# Patient Record
Sex: MALE | Race: OTHER | Employment: OTHER | URBAN - METROPOLITAN AREA
[De-identification: names, ages, dates, MRNs, and addresses within clinical notes are randomized per-mention and may not be internally consistent; named-entity substitution may affect disease eponyms.]

---

## 2021-03-22 ENCOUNTER — APPOINTMENT (OUTPATIENT)
Dept: CT IMAGING | Age: 61
DRG: 223 | End: 2021-03-22
Payer: MEDICAID

## 2021-03-22 ENCOUNTER — HOSPITAL ENCOUNTER (INPATIENT)
Age: 61
LOS: 1 days | Discharge: HOME | DRG: 223 | End: 2021-03-23
Attending: EMERGENCY MEDICINE | Admitting: INTERNAL MEDICINE
Payer: MEDICAID

## 2021-03-22 DIAGNOSIS — K61.1 PERIRECTAL ABSCESS: Primary | ICD-10-CM

## 2021-03-22 PROBLEM — E78.2 MIXED HYPERLIPIDEMIA: Status: ACTIVE | Noted: 2020-03-09

## 2021-03-22 PROBLEM — F17.210 DEPENDENCE ON NICOTINE FROM CIGARETTES: Status: ACTIVE | Noted: 2020-03-09

## 2021-03-22 LAB
-: ABNORMAL
ABSOLUTE EOS #: 0 K/UL (ref 0–0.4)
ABSOLUTE IMMATURE GRANULOCYTE: 0 K/UL (ref 0–0.3)
ABSOLUTE LYMPH #: 1.29 K/UL (ref 1–4.8)
ABSOLUTE MONO #: 2.15 K/UL (ref 0.2–0.8)
AMORPHOUS: ABNORMAL
ANION GAP SERPL CALCULATED.3IONS-SCNC: 14 MMOL/L (ref 9–17)
ATYPICAL LYMPHOCYTE ABSOLUTE COUNT: 0.43 K/UL
ATYPICAL LYMPHOCYTES: 2 %
BACTERIA: ABNORMAL
BASOPHILS # BLD: 0 %
BASOPHILS ABSOLUTE: 0 K/UL (ref 0–0.2)
BILIRUBIN URINE: ABNORMAL
BUN BLDV-MCNC: 16 MG/DL (ref 8–23)
BUN/CREAT BLD: 21 (ref 9–20)
CALCIUM SERPL-MCNC: 9.5 MG/DL (ref 8.6–10.4)
CASTS UA: ABNORMAL /LPF
CHLORIDE BLD-SCNC: 96 MMOL/L (ref 98–107)
CO2: 21 MMOL/L (ref 20–31)
COLOR: ABNORMAL
COMMENT UA: ABNORMAL
CREAT SERPL-MCNC: 0.77 MG/DL (ref 0.7–1.2)
CRYSTALS, UA: ABNORMAL /HPF
DIFFERENTIAL TYPE: ABNORMAL
EOSINOPHILS RELATIVE PERCENT: 0 % (ref 1–4)
EPITHELIAL CELLS UA: ABNORMAL /HPF (ref 0–5)
GFR AFRICAN AMERICAN: >60 ML/MIN
GFR NON-AFRICAN AMERICAN: >60 ML/MIN
GFR SERPL CREATININE-BSD FRML MDRD: ABNORMAL ML/MIN/{1.73_M2}
GFR SERPL CREATININE-BSD FRML MDRD: ABNORMAL ML/MIN/{1.73_M2}
GLUCOSE BLD-MCNC: 113 MG/DL (ref 70–99)
GLUCOSE URINE: NEGATIVE
HCT VFR BLD CALC: 42 % (ref 40.7–50.3)
HEMOGLOBIN: 13.6 G/DL (ref 13–17)
IMMATURE GRANULOCYTES: 0 %
KETONES, URINE: ABNORMAL
LACTIC ACID, SEPSIS WHOLE BLOOD: NORMAL MMOL/L (ref 0.5–1.9)
LACTIC ACID, SEPSIS WHOLE BLOOD: NORMAL MMOL/L (ref 0.5–1.9)
LACTIC ACID, SEPSIS: 0.8 MMOL/L (ref 0.5–1.9)
LACTIC ACID, SEPSIS: 0.8 MMOL/L (ref 0.5–1.9)
LACTIC ACID: 0.6 MMOL/L (ref 0.5–2.2)
LEUKOCYTE ESTERASE, URINE: NEGATIVE
LYMPHOCYTES # BLD: 6 % (ref 24–44)
MCH RBC QN AUTO: 28.6 PG (ref 25.2–33.5)
MCHC RBC AUTO-ENTMCNC: 32.4 G/DL (ref 28.4–34.8)
MCV RBC AUTO: 88.4 FL (ref 82.6–102.9)
MONOCYTES # BLD: 10 % (ref 1–7)
MORPHOLOGY: ABNORMAL
MUCUS: ABNORMAL
NITRITE, URINE: NEGATIVE
NRBC AUTOMATED: 0 PER 100 WBC
OTHER OBSERVATIONS UA: ABNORMAL
PDW BLD-RTO: 12.9 % (ref 11.8–14.4)
PH UA: 6 (ref 5–8)
PLATELET # BLD: 272 K/UL (ref 138–453)
PLATELET ESTIMATE: ABNORMAL
PMV BLD AUTO: 10.5 FL (ref 8.1–13.5)
POTASSIUM SERPL-SCNC: 4.1 MMOL/L (ref 3.7–5.3)
PROCALCITONIN: 0.05 NG/ML
PROTEIN UA: ABNORMAL
RBC # BLD: 4.75 M/UL (ref 4.21–5.77)
RBC # BLD: ABNORMAL 10*6/UL
RBC UA: ABNORMAL /HPF (ref 0–2)
RENAL EPITHELIAL, UA: ABNORMAL /HPF
SEG NEUTROPHILS: 82 % (ref 36–66)
SEGMENTED NEUTROPHILS ABSOLUTE COUNT: 17.63 K/UL (ref 1.8–7.7)
SODIUM BLD-SCNC: 131 MMOL/L (ref 135–144)
SPECIFIC GRAVITY UA: 1.02 (ref 1–1.03)
TRICHOMONAS: ABNORMAL
TURBIDITY: ABNORMAL
URINE HGB: ABNORMAL
UROBILINOGEN, URINE: NORMAL
WBC # BLD: 21.5 K/UL (ref 3.5–11.3)
WBC # BLD: ABNORMAL 10*3/UL
WBC UA: ABNORMAL /HPF (ref 0–5)
YEAST: ABNORMAL

## 2021-03-22 PROCEDURE — 96365 THER/PROPH/DIAG IV INF INIT: CPT

## 2021-03-22 PROCEDURE — 2580000003 HC RX 258: Performed by: EMERGENCY MEDICINE

## 2021-03-22 PROCEDURE — 99223 1ST HOSP IP/OBS HIGH 75: CPT | Performed by: INTERNAL MEDICINE

## 2021-03-22 PROCEDURE — 74177 CT ABD & PELVIS W/CONTRAST: CPT

## 2021-03-22 PROCEDURE — 83605 ASSAY OF LACTIC ACID: CPT

## 2021-03-22 PROCEDURE — 80048 BASIC METABOLIC PNL TOTAL CA: CPT

## 2021-03-22 PROCEDURE — APPSS45 APP SPLIT SHARED TIME 31-45 MINUTES: Performed by: NURSE PRACTITIONER

## 2021-03-22 PROCEDURE — 2500000003 HC RX 250 WO HCPCS: Performed by: STUDENT IN AN ORGANIZED HEALTH CARE EDUCATION/TRAINING PROGRAM

## 2021-03-22 PROCEDURE — 99283 EMERGENCY DEPT VISIT LOW MDM: CPT

## 2021-03-22 PROCEDURE — 84145 PROCALCITONIN (PCT): CPT

## 2021-03-22 PROCEDURE — 81001 URINALYSIS AUTO W/SCOPE: CPT

## 2021-03-22 PROCEDURE — 51798 US URINE CAPACITY MEASURE: CPT

## 2021-03-22 PROCEDURE — 2580000003 HC RX 258: Performed by: NURSE PRACTITIONER

## 2021-03-22 PROCEDURE — 6360000002 HC RX W HCPCS: Performed by: INTERNAL MEDICINE

## 2021-03-22 PROCEDURE — 6360000002 HC RX W HCPCS: Performed by: STUDENT IN AN ORGANIZED HEALTH CARE EDUCATION/TRAINING PROGRAM

## 2021-03-22 PROCEDURE — 36415 COLL VENOUS BLD VENIPUNCTURE: CPT

## 2021-03-22 PROCEDURE — 6370000000 HC RX 637 (ALT 250 FOR IP): Performed by: NURSE PRACTITIONER

## 2021-03-22 PROCEDURE — 85025 COMPLETE CBC W/AUTO DIFF WBC: CPT

## 2021-03-22 PROCEDURE — 87040 BLOOD CULTURE FOR BACTERIA: CPT

## 2021-03-22 PROCEDURE — 0D9P4ZZ DRAINAGE OF RECTUM, PERCUTANEOUS ENDOSCOPIC APPROACH: ICD-10-PCS | Performed by: SURGERY

## 2021-03-22 PROCEDURE — 6360000002 HC RX W HCPCS: Performed by: NURSE PRACTITIONER

## 2021-03-22 PROCEDURE — 1200000000 HC SEMI PRIVATE

## 2021-03-22 PROCEDURE — 96375 TX/PRO/DX INJ NEW DRUG ADDON: CPT

## 2021-03-22 PROCEDURE — 6360000002 HC RX W HCPCS: Performed by: EMERGENCY MEDICINE

## 2021-03-22 PROCEDURE — 6360000004 HC RX CONTRAST MEDICATION: Performed by: EMERGENCY MEDICINE

## 2021-03-22 RX ORDER — PROMETHAZINE HYDROCHLORIDE 12.5 MG/1
12.5 TABLET ORAL EVERY 6 HOURS PRN
Status: DISCONTINUED | OUTPATIENT
Start: 2021-03-22 | End: 2021-03-23 | Stop reason: HOSPADM

## 2021-03-22 RX ORDER — MAGNESIUM SULFATE 1 G/100ML
1000 INJECTION INTRAVENOUS PRN
Status: DISCONTINUED | OUTPATIENT
Start: 2021-03-22 | End: 2021-03-23 | Stop reason: HOSPADM

## 2021-03-22 RX ORDER — ACETAMINOPHEN 650 MG/1
650 SUPPOSITORY RECTAL EVERY 6 HOURS PRN
Status: DISCONTINUED | OUTPATIENT
Start: 2021-03-22 | End: 2021-03-23

## 2021-03-22 RX ORDER — SODIUM CHLORIDE 0.9 % (FLUSH) 0.9 %
10 SYRINGE (ML) INJECTION EVERY 12 HOURS SCHEDULED
Status: DISCONTINUED | OUTPATIENT
Start: 2021-03-22 | End: 2021-03-23 | Stop reason: HOSPADM

## 2021-03-22 RX ORDER — SODIUM CHLORIDE 0.9 % (FLUSH) 0.9 %
10 SYRINGE (ML) INJECTION PRN
Status: DISCONTINUED | OUTPATIENT
Start: 2021-03-22 | End: 2021-03-22 | Stop reason: DRUGHIGH

## 2021-03-22 RX ORDER — LIDOCAINE HYDROCHLORIDE AND EPINEPHRINE 10; 10 MG/ML; UG/ML
20 INJECTION, SOLUTION INFILTRATION; PERINEURAL ONCE
Status: COMPLETED | OUTPATIENT
Start: 2021-03-22 | End: 2021-03-22

## 2021-03-22 RX ORDER — POTASSIUM CHLORIDE 7.45 MG/ML
10 INJECTION INTRAVENOUS PRN
Status: DISCONTINUED | OUTPATIENT
Start: 2021-03-22 | End: 2021-03-23 | Stop reason: HOSPADM

## 2021-03-22 RX ORDER — 0.9 % SODIUM CHLORIDE 0.9 %
80 INTRAVENOUS SOLUTION INTRAVENOUS ONCE
Status: COMPLETED | OUTPATIENT
Start: 2021-03-22 | End: 2021-03-22

## 2021-03-22 RX ORDER — MORPHINE SULFATE 2 MG/ML
1 INJECTION, SOLUTION INTRAMUSCULAR; INTRAVENOUS
Status: DISCONTINUED | OUTPATIENT
Start: 2021-03-22 | End: 2021-03-23

## 2021-03-22 RX ORDER — FENTANYL CITRATE 50 UG/ML
50 INJECTION, SOLUTION INTRAMUSCULAR; INTRAVENOUS ONCE
Status: COMPLETED | OUTPATIENT
Start: 2021-03-22 | End: 2021-03-22

## 2021-03-22 RX ORDER — ASPIRIN 81 MG/1
81 TABLET ORAL DAILY
Status: DISCONTINUED | OUTPATIENT
Start: 2021-03-22 | End: 2021-03-23 | Stop reason: HOSPADM

## 2021-03-22 RX ORDER — MORPHINE SULFATE 4 MG/ML
4 INJECTION, SOLUTION INTRAMUSCULAR; INTRAVENOUS ONCE
Status: COMPLETED | OUTPATIENT
Start: 2021-03-22 | End: 2021-03-22

## 2021-03-22 RX ORDER — POTASSIUM CHLORIDE 20 MEQ/1
40 TABLET, EXTENDED RELEASE ORAL PRN
Status: DISCONTINUED | OUTPATIENT
Start: 2021-03-22 | End: 2021-03-23 | Stop reason: HOSPADM

## 2021-03-22 RX ORDER — SODIUM CHLORIDE 0.9 % (FLUSH) 0.9 %
10 SYRINGE (ML) INJECTION PRN
Status: DISCONTINUED | OUTPATIENT
Start: 2021-03-22 | End: 2021-03-23 | Stop reason: HOSPADM

## 2021-03-22 RX ORDER — NICOTINE 21 MG/24HR
1 PATCH, TRANSDERMAL 24 HOURS TRANSDERMAL DAILY PRN
Status: DISCONTINUED | OUTPATIENT
Start: 2021-03-22 | End: 2021-03-22 | Stop reason: SDUPTHER

## 2021-03-22 RX ORDER — POLYETHYLENE GLYCOL 3350 17 G/17G
17 POWDER, FOR SOLUTION ORAL DAILY PRN
Status: DISCONTINUED | OUTPATIENT
Start: 2021-03-22 | End: 2021-03-23

## 2021-03-22 RX ORDER — ONDANSETRON 2 MG/ML
4 INJECTION INTRAMUSCULAR; INTRAVENOUS EVERY 6 HOURS PRN
Status: DISCONTINUED | OUTPATIENT
Start: 2021-03-22 | End: 2021-03-23 | Stop reason: HOSPADM

## 2021-03-22 RX ORDER — NICOTINE 21 MG/24HR
1 PATCH, TRANSDERMAL 24 HOURS TRANSDERMAL EVERY 24 HOURS
Status: DISCONTINUED | OUTPATIENT
Start: 2021-03-22 | End: 2021-03-23 | Stop reason: HOSPADM

## 2021-03-22 RX ORDER — ACETAMINOPHEN 325 MG/1
650 TABLET ORAL EVERY 6 HOURS PRN
Status: DISCONTINUED | OUTPATIENT
Start: 2021-03-22 | End: 2021-03-23

## 2021-03-22 RX ORDER — ALBUTEROL SULFATE 90 UG/1
2 AEROSOL, METERED RESPIRATORY (INHALATION) EVERY 6 HOURS PRN
Status: DISCONTINUED | OUTPATIENT
Start: 2021-03-22 | End: 2021-03-23 | Stop reason: HOSPADM

## 2021-03-22 RX ORDER — SODIUM CHLORIDE 9 MG/ML
INJECTION, SOLUTION INTRAVENOUS CONTINUOUS
Status: DISCONTINUED | OUTPATIENT
Start: 2021-03-22 | End: 2021-03-23

## 2021-03-22 RX ADMIN — LIDOCAINE HYDROCHLORIDE,EPINEPHRINE BITARTRATE 20 ML: 10; .01 INJECTION, SOLUTION INFILTRATION; PERINEURAL at 13:40

## 2021-03-22 RX ADMIN — PIPERACILLIN AND TAZOBACTAM 3375 MG: 3; .375 INJECTION, POWDER, LYOPHILIZED, FOR SOLUTION INTRAVENOUS at 13:40

## 2021-03-22 RX ADMIN — MORPHINE SULFATE 2 MG: 4 INJECTION, SOLUTION INTRAMUSCULAR; INTRAVENOUS at 03:06

## 2021-03-22 RX ADMIN — MORPHINE SULFATE 1 MG: 2 INJECTION, SOLUTION INTRAMUSCULAR; INTRAVENOUS at 18:11

## 2021-03-22 RX ADMIN — SODIUM CHLORIDE 80 ML: 9 INJECTION, SOLUTION INTRAVENOUS at 05:14

## 2021-03-22 RX ADMIN — VANCOMYCIN HYDROCHLORIDE 1000 MG: 1 INJECTION, POWDER, LYOPHILIZED, FOR SOLUTION INTRAVENOUS at 18:03

## 2021-03-22 RX ADMIN — PIPERACILLIN SODIUM AND TAZOBACTAM SODIUM 4500 MG: 4; .5 INJECTION, POWDER, LYOPHILIZED, FOR SOLUTION INTRAVENOUS at 06:19

## 2021-03-22 RX ADMIN — VANCOMYCIN HYDROCHLORIDE 1500 MG: 5 INJECTION, POWDER, LYOPHILIZED, FOR SOLUTION INTRAVENOUS at 06:57

## 2021-03-22 RX ADMIN — Medication 81 MG: at 11:26

## 2021-03-22 RX ADMIN — SODIUM CHLORIDE: 9 INJECTION, SOLUTION INTRAVENOUS at 08:40

## 2021-03-22 RX ADMIN — Medication 10 ML: at 05:14

## 2021-03-22 RX ADMIN — MORPHINE SULFATE 1 MG: 2 INJECTION, SOLUTION INTRAMUSCULAR; INTRAVENOUS at 08:40

## 2021-03-22 RX ADMIN — PIPERACILLIN AND TAZOBACTAM 3375 MG: 3; .375 INJECTION, POWDER, LYOPHILIZED, FOR SOLUTION INTRAVENOUS at 20:52

## 2021-03-22 RX ADMIN — IOPAMIDOL 75 ML: 755 INJECTION, SOLUTION INTRAVENOUS at 05:14

## 2021-03-22 RX ADMIN — FENTANYL CITRATE 50 MCG: 50 INJECTION, SOLUTION INTRAMUSCULAR; INTRAVENOUS at 10:18

## 2021-03-22 ASSESSMENT — ENCOUNTER SYMPTOMS
SINUS PAIN: 0
RECTAL PAIN: 1
COLOR CHANGE: 0
VOMITING: 0
CONSTIPATION: 0
CHEST TIGHTNESS: 0
DIARRHEA: 0
EYES NEGATIVE: 1
SHORTNESS OF BREATH: 0
APNEA: 0
ABDOMINAL DISTENTION: 0

## 2021-03-22 ASSESSMENT — PAIN DESCRIPTION - FREQUENCY: FREQUENCY: CONTINUOUS

## 2021-03-22 ASSESSMENT — PAIN DESCRIPTION - LOCATION: LOCATION: PERINEUM

## 2021-03-22 ASSESSMENT — PAIN DESCRIPTION - DESCRIPTORS: DESCRIPTORS: DISCOMFORT

## 2021-03-22 ASSESSMENT — PAIN SCALES - GENERAL
PAINLEVEL_OUTOF10: 9
PAINLEVEL_OUTOF10: 2

## 2021-03-22 ASSESSMENT — PAIN DESCRIPTION - ONSET: ONSET: ON-GOING

## 2021-03-22 ASSESSMENT — PAIN DESCRIPTION - PAIN TYPE: TYPE: SURGICAL PAIN

## 2021-03-22 NOTE — PROGRESS NOTES
Lori Ortega the NP for Dr. Ashok Angulo was in to see the pt. New orders received. Dr. Windy Ruiz was informed of the new consult.   He stated he would see him later today and to keep the pt NPO

## 2021-03-22 NOTE — CONSULTS
[facility] GENERAL SURGERY  CONSULT NOTE      ATTENDING PHYSICIAN: Krishna Fajardo MD  CONSULTING PHYSICIAN: Mirlande Wu MD  PATIENT- Stormy Rodgers   - 1960        MRN -  [id number]   [facility]      ADMISSION DATE: 3/22/2021  1:33 AM   TODAY'S DATE: 21   LOS: 0     REASON FOR ADMISSION:  Perirectal abscess    REASON FOR CONSULT:  Perirectal abscess    HISTORY OF PRESENT ILLNESS:  Stormy Rodgers is a 55-year-old gentleman with complaint of perirectal pain, constipation, and difficulty urinating for the past 4 days. This began as a small painful bump near his rectum on Friday and it has gotten progressively more painful. He has not been able to have a bowel movement and has severe pain with straining. He took some stool softener on Saturday but it had no effect. He denies history of perirectal abscess in the past.  He had a colonoscopy that was reportedly normal 5 years ago. He denies history of colorectal cancer or anorectal problems. He presented to the emergency department earlier and was found to have low-grade fever of 99.6 and a white count of 21.5. He is prediabetic and states his most recent A1c was approximately 7. He was on Metformin for a time but no longer takes it. He denies history of heart problems or blood thinner prescription. PAST MEDICAL HISTORY:        Diagnosis Date    COPD (chronic obstructive pulmonary disease) ([facility] [address]     Diabetes mellitus ([facility] [address]     controlled with diet    Mixed hyperlipidemia 3/9/2020       PAST SURGICAL HISTORY:        Procedure Laterality Date    COLONOSCOPY  2015    TOOTH EXTRACTION  14       MEDICATIONS PRIOR TO ADMISSION:   Medications Prior to Admission: albuterol-ipratropium (COMBIVENT)  MCG/ACT inhaler, Inhale 2 puffs into the lungs every 6 hours as needed for Wheezing  nicotine (NICODERM CQ) 21 MG/24HR, Place 1 patch onto the skin every 24 hours.   aspirin EC 81 MG EC tablet, Take 1 tablet by mouth daily. albuterol (VENTOLIN HFA) 108 (90 BASE) MCG/ACT inhaler, Inhale 2 puffs into the lungs every 6 hours as needed for Wheezing. ALLERGIES:    Patient has no known allergies. SOCIAL HISTORY   Social History     Tobacco Use    Smoking status: Current Every Day Smoker     Packs/day: 0.50     Years: 20.00     Pack years: 10.00     Types: Cigarettes    Smokeless tobacco: Never Used   Substance Use Topics    Alcohol use: No    Drug use: No        FAMILY HISTORY:       Problem Relation Age of Onset    High Blood Pressure Mother     High Blood Pressure Father     Cancer Brother        REVIEW OF SYSTEMS:    CONSTITUTIONAL: Denies weight loss, +subjective fever and +chills  HEENT: Denies changes in vision and hearing  RESP: Denies SOB and cough  CV: Denies palpitations and CP  GI: Denies abdominal pain, nausea, vomiting and diarrhea, +constipation  : Denies dysuria and urinary frequency, +urinary retention  MSK: Denies myalgia and joint pain  SKIN: Denies rash and pruritus  NEURO: Denies headache and syncope  PSYCH: Denies recent changes in mood. Denies anxiety and depression    PHYSICAL EXAM:    Vitals:  Temp  Av.7 °F (37.6 °C)  Min: 99.6 °F (37.6 °C)  Max: 99.7 °F (00.0 °C)  Systolic (61CVU), YSJ:240 , Min:108 , UIF:418   Diastolic (70GVI), KEVYN:62, Min:63, Max:63  Pulse  Av.5  Min: 82  Max: 121  Resp  Av  Min: 16  Max: 20  SpO2: 99 % SpO2  Av.5 %  Min: 98 %  Max: 99 %     General: No acute distress. A&Ox3. HEENT:  NC/AT. Conjunctiva moist without icterus. Ears are symmetric. Nares are patent. Oral mucus membranes are moist.  Neck:  Supple. No LAD. Cardiovascular:  Regular rate and rhythm. Warm, well perfused. Respiratory:  Equal chest rise bilaterally. No wheezes, stridor, or increased work of breathing. Abdomen:  Soft, non tender, non distended. No organomegaly. No masses palpated. Neuro: Motor and sensory grossly intact. Extremities:  Warm, dry, and well perfused. Limbs without apparent deformity. Skin:  No rashes or lesions. Rectal: Erythematous, painful, and fluctuant area at the 3 o'clock position adjacent to the anal verge. LABS:  Recent Labs     03/22/21 0310   WBC 21.5*   HGB 13.6        Recent Labs     03/22/21 0310   *   K 4.1   CL 96*   CO2 21   BUN 16   CREATININE 0.77   GLUCOSE 113*     No results for input(s): AST, ALT, ALB, ALKPHOS, BILITOT, BILIDIR in the last 72 hours. No results for input(s): APTT, PROT, INR in the last 72 hours. IMAGING:  Ct Abdomen Pelvis W Iv Contrast  Result Date: 3/22/2021  1. Large multiloculated anorectal posterior soft tissue abscess, with dimensions as discussed above. 2. Note: The inferior extent of the abscess is not completely within the field of view. 3. Surrounding subcutaneous cellulitis within the posterior perineum and medial gluteal subcutaneous tissues. 4. Cholelithiasis, as discussed above. 5. Mild hepatic steatosis. 6. Mild hepatomegaly. ASSESSMENT     78-year-old male with perirectal abscess. PLAN  -Patient seen and evaluated. History, physical exam, laboratory and radiographic findings reviewed and discussed with on-call attending.  -We will perform I&D at bedside  -Continue IV antibiotics for an additional 24 hours.  -Pain and nausea control PRN  -Risks, benefits, and alternatives discussed with patient and they agree with treatment plan. Arminda Brooks MD  General Surgery PGY3  Available via [employer]  Attending: Francisco Javier Franco MD    Attending Physician Statement  I have discussed the case, including pertinent history and exam findings with the resident. I agree with the assessment, plan and orders as documented by the resident. Patient seen and examined. Agree with above. Perirectal abscess in need of sharp incision and drainage. Strict monitor of blood glucose. Continue intravenous antibiotics. Local wound care.

## 2021-03-22 NOTE — CARE COORDINATION
Case Management Initial Discharge Plan  Lee Caceres,             Met with:patient and SO to discuss discharge plans. Information verified: address, contacts, phone number, , insurance Yes  PCP: Zoey Concepcion DO  Date of last visit: [facility] PCP [facility] and patient has yet to set up an appt. Insurance Provider: Louie    Discharge Planning    Living Arrangements:  Family Members   Support Systems:  Spouse/Significant Other    Home has 1 story  4 stairs to climb to get into front door, 0 stairs to climb to reach second floor  Location of bedroom/bathroom in home Main Floor    Patient able to perform ADL's:Independent    Current Services (outpatient & in home) None  DME equipment: None  DME provider: N/A    Pharmacy: [facility]    Potential Assistance Needed:  N/A    Patient agreeable to home care: Yes  Freedom of choice provided:  yes    Prior SNF/Rehab Placement and Facility: No  Agreeable to SNF/Rehab: No  Rochelle of choice provided: n/a   Evaluation: n/a    Expected Discharge date:  21  Patient expects to be discharged to:  home  Follow Up Appointment: Best Day/ Time:     Transportation provider: VIGNESH  Transportation arrangements needed for discharge: No    Readmission Risk              Risk of Unplanned Readmission:        8             Does patient have a readmission risk score greater than 14?: No  If yes, follow-up appointment must be made within 7 days of discharge. Goal of Care:       Discharge Plan:   Met with patient and SO at bedside to discuss d/c plans. Patient admitted with a rectal abscess. Patient had an I&D of heraclio-rectal abscess at bedside. The wound was packed with iodoform and packing tape. Patient is open to [facility]. if necessary at d/c, but surgery resident was not sure what type of dressing patient would be sent home with-provided patient with list of [facility]. agency's-will need to revisit tomorrow.   Independent, drives, and lives with SO and daughter. Patient denies any DME needs at d/c. Follow for Gen Surgery plan.                Electronically signed by Lucia Mcknight RN on 3/22/21 at 4:30 PM EDT

## 2021-03-22 NOTE — ACP (ADVANCE CARE PLANNING)
Advance Care Planning     Advance Care Planning Activator (Inpatient)  Conversation Note      Date of ACP Conversation: 3/22/2021    Conversation Conducted with: Patient with Decision Making Capacity    ACP Activator: Quin Number    {When Decision Maker makes decisions on behalf of the incapacitated patient: Decision Maker is asked to consider and make decisions based on patient values, known preferences, or best interests. Health Care Decision Maker:     Current Designated Health Care Decision Maker:   Anaya Greenwood (Significant Other)  Ph# [phone]    Click here to complete Healthcare Decision Makers including section of the Healthcare Decision Maker Relationship (ie \"Primary\")      Care Preferences    Ventilation: \"If you were in your present state of health and suddenly became very ill and were unable to breathe on your own, what would your preference be about the use of a ventilator (breathing machine) if it were available to you? \"      Would the patient desire the use of ventilator (breathing machine)?: yes    \"If your health worsens and it becomes clear that your chance of recovery is unlikely, what would your preference be about the use of a ventilator (breathing machine) if it were available to you? \"     Would the patient desire the use of ventilator (breathing machine)?: No      Resuscitation  \"CPR works best to restart the heart when there is a sudden event, like a heart attack, in someone who is otherwise healthy. Unfortunately, CPR does not typically restart the heart for people who have serious health conditions or who are very sick. \"    \"In the event your heart stopped as a result of an underlying serious health condition, would you want attempts to be made to restart your heart (answer \"yes\" for attempt to resuscitate) or would you prefer a natural death (answer \"no\" for do not attempt to resuscitate)? \" yes       [] Yes   [] No   Educated Patient / Decision Maker regarding differences

## 2021-03-22 NOTE — PROGRESS NOTES
Pt admitted to room 2010 . Oriented to room, call light and bed mechanics. Side rails up x2. Call light within reach. Orders reviewed.

## 2021-03-22 NOTE — PROGRESS NOTES
Pharmacy Note  Vancomycin Consult - Initial Note     Lamonte Samson is a 59 y.o. male ordered Vancomycin. .  Current diagnosis for which MRSA is suspected/confirmed: skin and soft tissue infection   Vancomycin order/consult received from Dr. Mccloud    Additional antimicrobials:  Zosyn    Patient Active Problem List   Diagnosis    Rectal abscess    Diabetes mellitus ([facility] [address]    COPD (chronic obstructive pulmonary disease) ([facility] [address]    Dependence on nicotine from cigarettes    Mixed hyperlipidemia       Height:     Wt Readings from Last 1 Encounters:   03/22/21 127 lb (57.6 kg)     Allergies:  Patient has no known allergies. No results found for: PROCAL  Recent Labs     03/22/21  0310   BUN 16     Recent Labs     03/22/21  0310   CREATININE 0.77     Recent Labs     03/22/21  0310   WBC 21.5*     No intake or output data in the 24 hours ending 03/22/21 1121    Temp: 99.7    Culture Date / Source  /  Results  Pending   Actual Weight:    Wt Readings from Last 1 Encounters:   03/22/21 127 lb (57.6 kg)     CrCl based on IBW\"  80        PLAN   Initial loading dose of 25mg/kg (max of 2500 mg) = 1500  mg   2. Vancomycin 1000 mg IV every 12 hours. 3.   Ensured BUN/sCr ordered at baseline and at least every 3rd day. 4.   ONLY for suspected pneumonia or COPD: MRSA nasal swab  is not ordered. Non respiratory infection. .    5. Trough ordered for: 3/23/21 1700 . Trough Goals (Non-dialysis patient) Peaks are not routinely recommended. 10-20 mcg/mL for mild skin/soft tissue infections or UTI.  15-20 mcg/mL is the target trough for all other indications that MRSA infection is suspected. TROUGH TIMING: (Additional levels drawn based on renal function and/or clinical response).   Dosing interval Timing of Trough   Every 8 hr, 12 hr, or 18 hr regimen Prior to the 4th dose  Twice weekly troughs for every 8 hour dosing   Every 24 hr regimen Prior to the 3rd or 4th dose   Every 36 hr regimen Prior to the 3rd dose           Thank

## 2021-03-22 NOTE — H&P
[facility]  Office: [address], DO, Toyin Dines, DO, Sallie Zaynab, DO, Bronwyn Fortune Blood, DO, Denys Dodd MD, Rich Glover MD, Kathrin Stewart MD, Niels Reeves MD, Sena Gutierrez MD, Maria A Okeefe MD, Geeta Napier MD, Kaylene Segovia MD, Paresh Ochoa MD, Queenie Patricio DO, Maritza Huitron MD, Darshana Lau DO, Connie Rutledge MD,  Susannah Griffin DO, Viv Whitley MD, Holly Venegas MD, Tnug Ang, [facility], [facility] Zhaneosso, CNP, Ernie Amas, CNP, Pari Pulse, CNS, Alexis Halsted, CNP, Samantha Abebe, CNP, Yesenia Magana, CNP, Pilar Pulse, CNP, Clarice Camp, CNP, Kathleen Gonzalez PA-C, [place] [facility], [facility], Sadiq Adams, CNP, Tucker Yarbrough, CNP, Janell Choe, CNP, Tino Landis, CNP, Bridgett Mak, CNP, Esperanza Nash, CNP         [facility]    HISTORY AND PHYSICAL EXAMINATION            Date:   3/22/2021  Patient name:  Hair Varma  Date of admission:  3/22/2021  1:33 AM  MRN:   [id number]  Account:  [de-identified]  YOB: 1960  PCP:    Reny Pizano MD  Room:   2010/2010-02  Code Status:    Full Code    Chief Complaint:     Chief Complaint   Patient presents with    Other     difficulty urinating    Constipation       History Obtained From:     patient, spouse    History of Present Illness:     Hair Varma is a 59 y.o. Non-/non  male who presents with Other (difficulty urinating) and Constipation   and is admitted to the hospital for the management of Rectal abscess. Patient reports to the emergency department with a chief complaint of constipation for the past 4 days. Patient also reports that he has a palpable protrusion of his rectum. Patient reports that this is causing him significant pain and discomfort in both his rectum and lower abdomen. Patient reports the pain as a 7/10 at its worst is described as a extreme pressure.   Emergency department evaluation was completed and patient is found to have an elevated white count, had a rapid heart rate, and a fever of 99.6. Code sepsis was called in the emergency department and patient was treated accordingly. After emergency department evaluation patient is found to have a perirectal abscess with concern for early sepsis and he was admitted as a result. At the time my exam patient is hemodynamically stable. He is no longer tachycardic. Lactic normal.  Blood pressure WNL. Sepsis not present at the time of my exam.  General surgery consultation has been requested in anticipation of the need for an I&D. Past Medical History:     Past Medical History:   Diagnosis Date    COPD (chronic obstructive pulmonary disease) ([facility] [address]     Diabetes mellitus ([facility] [address]     controlled with diet    Mixed hyperlipidemia 3/9/2020        Past Surgical History:     Past Surgical History:   Procedure Laterality Date    COLONOSCOPY  01/05/2015    TOOTH EXTRACTION  11/06/14        Medications Prior to Admission:     Prior to Admission medications    Medication Sig Start Date End Date Taking? Authorizing Provider   albuterol-ipratropium (COMBIVENT)  MCG/ACT inhaler Inhale 2 puffs into the lungs every 6 hours as needed for Wheezing    Historical Provider, MD   nicotine (NICODERM CQ) 21 MG/24HR Place 1 patch onto the skin every 24 hours. 2/19/15   Jamie Dempsey MD   aspirin EC 81 MG EC tablet Take 1 tablet by mouth daily. 11/18/14   Jamie Dempsey MD   albuterol (VENTOLIN HFA) 108 (90 BASE) MCG/ACT inhaler Inhale 2 puffs into the lungs every 6 hours as needed for Wheezing. 11/18/14   Jamie Dempsey MD        Allergies:     Patient has no known allergies. Social History:     Tobacco:    reports that he has been smoking cigarettes. He has a 10.00 pack-year smoking history. He has never used smokeless tobacco.  Alcohol:      reports no history of alcohol use. Drug Use:  reports no history of drug use.     Family History:     Family History   Problem Relation Age of Onset    High Blood Pressure Mother     High Blood Pressure Father     Cancer Brother        Review of Systems:     Positive and Negative as described in HPI. CONSTITUTIONAL:  negative for fevers, chills, sweats, fatigue, weight loss  HEENT:  negative for vision, hearing changes, runny nose, throat pain  RESPIRATORY:  negative for shortness of breath, cough, congestion, wheezing  CARDIOVASCULAR:  negative for chest pain, palpitations  GASTROINTESTINAL:  negative for nausea, vomiting, diarrhea, constipation, change in bowel habits, mild lower abdominal pain, endorses severe rectal pain. GENITOURINARY:  negative for difficulty of urination, burning with urination, frequency   INTEGUMENT:  negative for rash, skin lesions, easy bruising   HEMATOLOGIC/LYMPHATIC:  negative for swelling/edema   ALLERGIC/IMMUNOLOGIC:  negative for urticaria , itching  ENDOCRINE:  negative increase in drinking, increase in urination, hot or cold intolerance  MUSCULOSKELETAL:  negative joint pains, muscle aches, swelling of joints  NEUROLOGICAL:  negative for headaches, dizziness, lightheadedness, numbness, pain, tingling extremities  BEHAVIOR/PSYCH:  negative for depression, anxiety    Physical Exam:   /63   Pulse 82   Temp 99.7 °F (37.6 °C) (Oral)   Resp 16   Ht 5' 6\" (1.676 m)   Wt 127 lb (57.6 kg)   SpO2 99%   BMI 20.50 kg/m²   Temp (24hrs), Av.7 °F (37.6 °C), Min:99.6 °F (37.6 °C), Max:99.7 °F (37.6 °C)    No results for input(s): POCGLU in the last 72 hours.   No intake or output data in the 24 hours ending 21 0913    General Appearance:  alert, well appearing, and in no acute distress  Mental status: oriented to person, place, and time  Head:  normocephalic, atraumatic  Eye: no icterus, redness, pupils equal and reactive, extraocular eye movements intact, conjunctiva clear  Ear: normal external ear, no discharge, hearing intact  Nose:  no drainage noted  Mouth: mucous membranes moist  Neck: supple, no carotid bruits, thyroid not palpable  Lungs: Bilateral equal air entry, clear to auscultation, no wheezing, rales or rhonchi, normal effort  Cardiovascular: normal rate, regular rhythm, no murmur, gallop, rub. Abdomen: Soft, nontender, nondistended, normal bowel sounds, no hepatomegaly or splenomegaly  Neurologic: There are no new focal motor or sensory deficits, normal muscle tone and bulk, no abnormal sensation, normal speech, cranial nerves II through XII grossly intact  Skin: No gross lesions, rashes, bruising or bleeding on exposed skin area. Anorectal abscess. Extremities:  peripheral pulses palpable, no pedal edema or calf pain with palpation  Psych: normal affect     Investigations:      Laboratory Testing:  Recent Results (from the past 24 hour(s))   Urinalysis Reflex to Culture    Collection Time: 03/22/21  2:10 AM    Specimen: Urine, clean catch   Result Value Ref Range    Color, UA DARK YELLOW (A) YELLOW    Turbidity UA SLIGHTLY CLOUDY (A) CLEAR    Glucose, Ur NEGATIVE NEGATIVE    Bilirubin Urine (A) NEGATIVE     Presumptive positive. Unable to confirm due to unavailability of reagent.     Ketones, Urine 1+ (A) NEGATIVE    Specific Gravity, UA 1.025 1.005 - 1.030    Urine Hgb 2+ (A) NEGATIVE    pH, UA 6.0 5.0 - 8.0    Protein, UA TRACE (A) NEGATIVE    Urobilinogen, Urine Normal Normal    Nitrite, Urine NEGATIVE NEGATIVE    Leukocyte Esterase, Urine NEGATIVE NEGATIVE    Urinalysis Comments NOT REPORTED    Microscopic Urinalysis    Collection Time: 03/22/21  2:10 AM   Result Value Ref Range    -          WBC, UA 0 TO 2 0 - 5 /HPF    RBC, UA 5 TO 10 0 - 2 /HPF    Casts UA NOT REPORTED /LPF    Crystals, UA NOT REPORTED None /HPF    Epithelial Cells UA 0 TO 2 0 - 5 /HPF    Renal Epithelial, UA NOT REPORTED 0 /HPF    Bacteria, UA NOT REPORTED None    Mucus, UA NOT REPORTED None    Trichomonas, UA NOT REPORTED None    Amorphous, UA 1+ (A) None    Other Observations UA NOT 6:40 AM   Result Value Ref Range    Lactic Acid, Sepsis 0.8 0.5 - 1.9 mmol/L    Lactic Acid, Sepsis, Whole Blood NOT REPORTED 0.5 - 1.9 mmol/L       Imaging/Diagnostics:  Ct Abdomen Pelvis W Iv Contrast Additional Contrast? None    Result Date: 3/22/2021  1. Large multiloculated anorectal posterior soft tissue abscess, with dimensions as discussed above. 2. Note: The inferior extent of the abscess is not completely within the field of view. 3. Surrounding subcutaneous cellulitis within the posterior perineum and medial gluteal subcutaneous tissues. 4. Cholelithiasis, as discussed above. 5. Mild hepatic steatosis. 6. Mild hepatomegaly. Assessment :      Hospital Problems           Last Modified POA    * (Principal) Rectal abscess 3/22/2021 Yes    Diabetes mellitus ([address] 3/22/2021 Yes    Overview Signed 3/22/2021  8:56 AM by BIANCA Lux NP     controlled with diet         COPD (chronic obstructive pulmonary disease) ([facility] [address] 3/22/2021 Yes    Dependence on nicotine from cigarettes 3/22/2021 Yes    Overview Signed 3/22/2021  9:08 AM by BIANCA Lux NP     Last Assessment & Plan:   Formatting of this note might be different from the original.  Condition: stable    Discussed risk of Lung Cancer, COPD, PAD, Stroke, Heart Attack and Death. Discussed pharmaceutical and non-pharmaceutical options to quit. Encouraged to quit. 800-QUIT-NOW phone number discussed. Follow up in: one month         Mixed hyperlipidemia 3/22/2021 Yes    Overview Signed 3/22/2021  9:08 AM by BIANCA Lux NP     Last Assessment & Plan:   Formatting of this note might be different from the original.  Condition: stable     Hasn't had labwork completed since 2017. Recommend Establishing care with family medicine to monitor lipid levels, continue low fat, low cholesterol diet and increase activity. Follow up in: one month               Plan:     Patient status inpatient in the Med/Surge    1.  Rectal abscess  1. IV vancomycin and IV Zosyn  2. Trend lactate and add procalcitonin. Trend WBC  3. General surgery consultation for anticipated I&D  4. Blood cultures x2  2. Diabetes  1. , education on the need to follow-up with PCP for continued monitoring  3. Tobacco dependence  1. Nicotine patch  2. Education on the need for tobacco abstinence  4. COPD  1. Combivent and albuterol as needed for shortness of breath and wheezing  5. Hyperlipidemia  1. Recommend outpatient follow-up with PCP for continued monitoring and long-term treatment    Consultations:   PHARMACY TO DOSE VANCOMYCIN  IP CONSULT TO INTERNAL MEDICINE  PHARMACY TO DOSE VANCOMYCIN  IP CONSULT TO GENERAL SURGERY    Patient is admitted as inpatient status because of co-morbidities listed above, severity of signs and symptoms as outlined, requirement for current medical therapies and most importantly because of direct risk to patient if care not provided in a hospital setting. Expected length of stay > 48 hours.     Doug Ceja, APRN - NP  3/22/2021  9:13 AM    Copy sent to Dr. Parish Soto MD

## 2021-03-22 NOTE — PROCEDURES
PROCEDURE NOTE    PATIENT: Lea Kaba    MRN: [id number]    DATE OF PROCEDURE: 3/22/2021     Peter Tsang MD, PGY3    PREOPERATIVE DIAGNOSIS: Perirectal abscess    POSTOPERATIVE DIAGNOSIS: Same     PROCEDURE: Incision and drainage of heraclio-rectal abscess    ANESTHESIA: Local    ESTIMATED BLOOD LOSS:  5 cc    COMPLICATIONS: None     SPECIMENS:  Was Not Obtained     HISTORY: The patient is a 59y.o. year old male with history of perirectal abscess. Risks, benefits, expected outcome, and alternatives to the procedure were explained and all questions answered. Patient understands and signed a consent for procedure. PROCEDURE: The patient was positioned in the left lateral decubitus and the area immediately surrounding the abscess was anesthetized with 50 mcg IV fentanyl 10 mL 1% lidocaine with epi. A 15 blade scalpel was used to make a 1 cm x 1 cm cruciate incision over the area of fluctuance. Approximately 35 cc of purulent, foul-smelling pus was immediately evacuated. The cavity was locally explored with hemostats and all loculations were broken up. The cavity was flushed copiously with normal saline. The wound was packed with 1 inch iodoform packing tape. The patient tolerated the procedure well. Dr. Rama Patterson was available for all critical portions of the case.

## 2021-03-22 NOTE — ED PROVIDER NOTES
EMERGENCY DEPARTMENT ENCOUNTER    Pt Name: Selena Mckoy  MRN: [id number]  [place] 1960  Date of evaluation: 3/22/21  CHIEF COMPLAINT       Chief Complaint   Patient presents with    Other     difficulty urinating    Constipation     HISTORY OF PRESENT ILLNESS   56-year-old male presenting to the emergency room for rectal discomfort difficulty going to the bathroom. Patient reports worsening pain over the last couple of days. He has not had a bowel movement since Friday. He states that when he tries to go it is very painful. He has been straining. He states that he is having some urinary symptoms with retention and difficulty urinating but believes this may be related to the rectal pain. REVIEW OF SYSTEMS     Review of Systems   Constitutional: Negative for activity change, chills and diaphoresis. HENT: Negative for congestion, sinus pain and tinnitus. Eyes: Negative. Respiratory: Negative for apnea, chest tightness and shortness of breath. Gastrointestinal: Positive for rectal pain. Negative for abdominal distention, constipation, diarrhea and vomiting. Genitourinary: Negative for difficulty urinating and frequency. Musculoskeletal: Negative for arthralgias and myalgias. Skin: Negative for color change and rash. Neurological: Negative for dizziness. Hematological: Negative. Psychiatric/Behavioral: Negative.         PASTMEDICAL HISTORY     Past Medical History:   Diagnosis Date    COPD [facility]chronic obstructive [facility] disease) ([facility] [address]     Diabetes mellitus ([facility] [address]     controlled with diet     Past Problem List  Patient Active Problem List   Diagnosis Code    Rectal abscess K61.1     SURGICAL HISTORY       Past Surgical History:   Procedure Laterality Date    COLONOSCOPY  01/05/2015    TOOTH EXTRACTION  11/06/14     CURRENT MEDICATIONS       Previous Medications    ALBUTEROL (VENTOLIN HFA) 108 (90 BASE) MCG/ACT INHALER    Inhale 2 puffs into the lungs every 6 hours as needed for Wheezing. ALBUTEROL-IPRATROPIUM (COMBIVENT)  MCG/ACT INHALER    Inhale 2 puffs into the lungs every 6 hours as needed for Wheezing    ASPIRIN EC 81 MG EC TABLET    Take 1 tablet by mouth daily. NICOTINE (NICODERM CQ) 21 MG/24HR    Place 1 patch onto the skin every 24 hours. ALLERGIES     has No Known Allergies. FAMILY HISTORY     He indicated that his mother is . He indicated that his father is . He indicated that the status of his brother is unknown. SOCIAL HISTORY       Social History     Tobacco Use    Smoking status: Current Every Day Smoker     Packs/day: 0.50     Years: 20.00     Pack years: 10.00     Types: Cigarettes    Smokeless tobacco: Never Used   Substance Use Topics    Alcohol use: No    Drug use: No     PHYSICAL EXAM     INITIAL VITALS: /63   Pulse 121   Temp 99.6 °F (37.6 °C) (Oral)   Resp 20   Ht 5' 6\" (1.676 m)   Wt 127 lb (57.6 kg)   SpO2 98%   BMI 20.50 kg/m²    Physical Exam  Constitutional:       General: He is not in acute distress. Appearance: He is well-developed. HENT:      Head: Normocephalic. Eyes:      Pupils: Pupils are equal, round, and reactive to light. Cardiovascular:      Rate and Rhythm: Normal rate and regular rhythm. Heart sounds: Normal heart sounds. Pulmonary:      Effort: Pulmonary effort is normal. No respiratory distress. Breath sounds: Normal breath sounds. Abdominal:      General: Bowel sounds are normal.      Palpations: Abdomen is soft. Tenderness: There is no abdominal tenderness. Genitourinary:      Musculoskeletal: Normal range of motion. Skin:     General: Skin is warm and dry. Neurological:      Mental Status: He is alert and oriented to person, place, and time. [employer]KIN-year-old male presenting to the emergency room with perirectal abscess; CT imaging shows rectoanal abscess with extending soft tissue swelling and cellulitis.   Given patient's pain elevated heart rate and white count plan is for admission at this time. Patient started on broad-spectrum antibiotics. Plan is for admission and surgical consult evaluate for possible drainage. 1)  Sepsis Identified at Time:      6:00    2)  Sepsis Alert Protocol Guidelines:  · Blood Cultures drawn before antibiotics  · Broad Spectrum Antibiotics given stat within one hour  · Lactic Acid Q2 hours times 2 occurrences (if first lactic acid > 2.0)    3)  Fluid Bolus Guidelines:    If lactate > 4.0   OR   MAP less than 65  OR  systolic BP < 90 (two separate readings),            then 30ml/kg crystalloid fluid bolus infused, and may give over 4 hour duration. Is the patient Obese (BMI > 30): No    Body mass index is 20.5 kg/m². Ideal body weight: 63.8 kg (140 lb 10.5 oz)    If Obese the Ideal Body  ([place] formula):   Ideal body weight (IBW) (men) = 50 kg + 2.3 kg x (height, inches - 60)    Ideal body weight (IBW) (women) = 45.5 kg + 2.3 kg x (height, inches - 60)    4)  Repeat Sepsis Exam: 6:00 am           5)  Vasopressors: For persistent hypotension after completion of 30ml/kg fluid bolus (need to measure BP Q 15 min in the hour after completion of fluid bolus). Discuss risks and benefits of vasopressors and alternative therapies with patient and/or DPOA. CRITICAL CARE:       PROCEDURES:    Procedures    DIAGNOSTIC RESULTS   EKG:All EKG's are interpreted by the Emergency Department Physician who either signs or Co-signs this chart in the absence of a cardiologist.        RADIOLOGY:All plain film, CT, MRI, and formal ultrasound images (except ED bedside ultrasound) are read by the radiologist, see reports below, unless otherwisenoted in MDM or here. CT ABDOMEN PELVIS W IV CONTRAST Additional Contrast? None   Final Result   1. Large multiloculated anorectal posterior soft tissue abscess, with   dimensions as discussed above.    2. Note: The inferior extent of the abscess is not completely within the   field of view. 3. Surrounding subcutaneous cellulitis within the posterior perineum and   medial gluteal subcutaneous tissues. 4. Cholelithiasis, as discussed above. 5. Mild hepatic steatosis. 6. Mild hepatomegaly. LABS: All lab results were reviewed by myself, and all abnormals are listed below. Labs Reviewed   URINE RT REFLEX TO CULTURE - Abnormal; Notable for the following components:       Result Value    Color, UA DARK YELLOW (*)     Turbidity UA SLIGHTLY CLOUDY (*)     Bilirubin Urine   (*)     Value: Presumptive positive. Unable to confirm due to unavailability of reagent.     Ketones, Urine 1+ (*)     Urine Hgb 2+ (*)     Protein, UA TRACE (*)     All other components within normal limits   MICROSCOPIC URINALYSIS - Abnormal; Notable for the following components:    Amorphous, UA 1+ (*)     All other components within normal limits   CBC WITH AUTO DIFFERENTIAL - Abnormal; Notable for the following components:    WBC 21.5 (*)     Seg Neutrophils 82 (*)     Lymphocytes 6 (*)     Monocytes 10 (*)     Eosinophils % 0 (*)     Segs Absolute 17.63 (*)     Absolute Mono # 2.15 (*)     All other components within normal limits   BASIC METABOLIC PANEL W/ REFLEX TO MG FOR LOW K - Abnormal; Notable for the following components:    Glucose 113 (*)     Bun/Cre Ratio 21 (*)     Sodium 131 (*)     Chloride 96 (*)     All other components within normal limits   CULTURE, BLOOD 1   CULTURE, BLOOD 1   LACTATE, SEPSIS   LACTATE, SEPSIS       EMERGENCY DEPARTMENTCOURSE:         Vitals:    Vitals:    03/22/21 0128   BP: 118/63   Pulse: 121   Resp: 20   Temp: 99.6 °F (37.6 °C)   TempSrc: Oral   SpO2: 98%   Weight: 127 lb (57.6 kg)   Height: 5' 6\" (1.676 m)       The patient was given the following medications while in the emergency department:  Orders Placed This Encounter   Medications    morphine sulfate (PF) injection 4 mg    0.9 % sodium chloride bolus    sodium chloride flush 0.9 % injection 10 mL    iopamidol (ISOVUE-370) 76 % injection 75 mL    piperacillin-tazobactam (ZOSYN) 4,500 mg in dextrose 5 % 100 mL IVPB (mini-bag)     Order Specific Question:   Antimicrobial Indications     Answer:   Skin and Soft Tissue Infection    vancomycin (VANCOCIN) 1,500 mg in dextrose 5 % 250 mL IVPB     Order Specific Question:   Antimicrobial Indications     Answer:   Skin and Soft Tissue Infection     CONSULTS:  PHARMACY TO DOSE VANCOMYCIN  IP CONSULT TO INTERNAL MEDICINE  IP CONSULT TO GENERAL SURGERY  PHARMACY TO DOSE VANCOMYCIN    FINAL IMPRESSION      1. Perirectal abscess          DISPOSITION/PLAN   DISPOSITION        PATIENT REFERRED TO:  No follow-up provider specified.   DISCHARGE MEDICATIONS:  New Prescriptions    No medications on file     Mahesh Ng MD  Attending Emergency Physician                 Rosan Bernheim, MD  03/22/21 7914

## 2021-03-23 VITALS
DIASTOLIC BLOOD PRESSURE: 60 MMHG | OXYGEN SATURATION: 96 % | BODY MASS INDEX: 18.95 KG/M2 | WEIGHT: 117.9 LBS | SYSTOLIC BLOOD PRESSURE: 107 MMHG | TEMPERATURE: 99 F | HEART RATE: 69 BPM | HEIGHT: 66 IN | RESPIRATION RATE: 16 BRPM

## 2021-03-23 LAB
ANION GAP SERPL CALCULATED.3IONS-SCNC: 10 MMOL/L (ref 9–17)
BUN BLDV-MCNC: 12 MG/DL (ref 8–23)
BUN/CREAT BLD: 18 (ref 9–20)
CALCIUM SERPL-MCNC: 8.6 MG/DL (ref 8.6–10.4)
CHLORIDE BLD-SCNC: 103 MMOL/L (ref 98–107)
CO2: 23 MMOL/L (ref 20–31)
CREAT SERPL-MCNC: 0.67 MG/DL (ref 0.7–1.2)
GFR AFRICAN AMERICAN: >60 ML/MIN
GFR NON-AFRICAN AMERICAN: >60 ML/MIN
GFR SERPL CREATININE-BSD FRML MDRD: ABNORMAL ML/MIN/{1.73_M2}
GFR SERPL CREATININE-BSD FRML MDRD: ABNORMAL ML/MIN/{1.73_M2}
GLUCOSE BLD-MCNC: 116 MG/DL (ref 70–99)
HCT VFR BLD CALC: 39.2 % (ref 40.7–50.3)
HEMOGLOBIN: 12 G/DL (ref 13–17)
MCH RBC QN AUTO: 28 PG (ref 25.2–33.5)
MCHC RBC AUTO-ENTMCNC: 30.6 G/DL (ref 28.4–34.8)
MCV RBC AUTO: 91.6 FL (ref 82.6–102.9)
NRBC AUTOMATED: 0 PER 100 WBC
PDW BLD-RTO: 13.2 % (ref 11.8–14.4)
PLATELET # BLD: 238 K/UL (ref 138–453)
PMV BLD AUTO: 10.9 FL (ref 8.1–13.5)
POTASSIUM SERPL-SCNC: 4 MMOL/L (ref 3.7–5.3)
RBC # BLD: 4.28 M/UL (ref 4.21–5.77)
SODIUM BLD-SCNC: 136 MMOL/L (ref 135–144)
WBC # BLD: 13.2 K/UL (ref 3.5–11.3)

## 2021-03-23 PROCEDURE — 2580000003 HC RX 258: Performed by: NURSE PRACTITIONER

## 2021-03-23 PROCEDURE — 6370000000 HC RX 637 (ALT 250 FOR IP): Performed by: NURSE PRACTITIONER

## 2021-03-23 PROCEDURE — 80048 BASIC METABOLIC PNL TOTAL CA: CPT

## 2021-03-23 PROCEDURE — 6360000002 HC RX W HCPCS: Performed by: NURSE PRACTITIONER

## 2021-03-23 PROCEDURE — APPSS45 APP SPLIT SHARED TIME 31-45 MINUTES: Performed by: NURSE PRACTITIONER

## 2021-03-23 PROCEDURE — 6370000000 HC RX 637 (ALT 250 FOR IP): Performed by: STUDENT IN AN ORGANIZED HEALTH CARE EDUCATION/TRAINING PROGRAM

## 2021-03-23 PROCEDURE — 99238 HOSP IP/OBS DSCHRG MGMT 30/<: CPT | Performed by: FAMILY MEDICINE

## 2021-03-23 PROCEDURE — 36415 COLL VENOUS BLD VENIPUNCTURE: CPT

## 2021-03-23 PROCEDURE — 85027 COMPLETE CBC AUTOMATED: CPT

## 2021-03-23 RX ORDER — IBUPROFEN 400 MG/1
400 TABLET ORAL EVERY 6 HOURS SCHEDULED
Status: DISCONTINUED | OUTPATIENT
Start: 2021-03-23 | End: 2021-03-23 | Stop reason: HOSPADM

## 2021-03-23 RX ORDER — POLYETHYLENE GLYCOL 3350 17 G/17G
17 POWDER, FOR SOLUTION ORAL 2 TIMES DAILY
Status: DISCONTINUED | OUTPATIENT
Start: 2021-03-23 | End: 2021-03-23 | Stop reason: HOSPADM

## 2021-03-23 RX ORDER — AMOXICILLIN AND CLAVULANATE POTASSIUM 875; 125 MG/1; MG/1
1 TABLET, FILM COATED ORAL 2 TIMES DAILY
Qty: 12 TABLET | Refills: 0 | Status: SHIPPED | OUTPATIENT
Start: 2021-03-23 | End: 2021-03-29

## 2021-03-23 RX ORDER — OXYCODONE HYDROCHLORIDE 5 MG/1
5 TABLET ORAL EVERY 4 HOURS PRN
Qty: 14 TABLET | Refills: 0 | Status: SHIPPED | OUTPATIENT
Start: 2021-03-23 | End: 2021-03-26

## 2021-03-23 RX ORDER — ACETAMINOPHEN 500 MG
1000 TABLET ORAL EVERY 8 HOURS SCHEDULED
Status: DISCONTINUED | OUTPATIENT
Start: 2021-03-23 | End: 2021-03-23 | Stop reason: HOSPADM

## 2021-03-23 RX ORDER — OXYCODONE HYDROCHLORIDE 5 MG/1
2.5 TABLET ORAL EVERY 4 HOURS PRN
Status: DISCONTINUED | OUTPATIENT
Start: 2021-03-23 | End: 2021-03-23 | Stop reason: HOSPADM

## 2021-03-23 RX ADMIN — Medication 81 MG: at 08:57

## 2021-03-23 RX ADMIN — ENOXAPARIN SODIUM 40 MG: 40 INJECTION SUBCUTANEOUS at 08:57

## 2021-03-23 RX ADMIN — ACETAMINOPHEN 1000 MG: 500 TABLET ORAL at 05:53

## 2021-03-23 RX ADMIN — IBUPROFEN 400 MG: 400 TABLET, FILM COATED ORAL at 05:53

## 2021-03-23 RX ADMIN — SODIUM CHLORIDE: 9 INJECTION, SOLUTION INTRAVENOUS at 04:24

## 2021-03-23 RX ADMIN — PIPERACILLIN AND TAZOBACTAM 3375 MG: 3; .375 INJECTION, POWDER, LYOPHILIZED, FOR SOLUTION INTRAVENOUS at 04:24

## 2021-03-23 RX ADMIN — VANCOMYCIN HYDROCHLORIDE 1000 MG: 1 INJECTION, POWDER, LYOPHILIZED, FOR SOLUTION INTRAVENOUS at 05:53

## 2021-03-23 RX ADMIN — POLYETHYLENE GLYCOL 3350 17 G: 17 POWDER, FOR SOLUTION ORAL at 08:57

## 2021-03-23 ASSESSMENT — PAIN SCALES - GENERAL
PAINLEVEL_OUTOF10: 2
PAINLEVEL_OUTOF10: 1

## 2021-03-23 ASSESSMENT — PAIN DESCRIPTION - PROGRESSION: CLINICAL_PROGRESSION: NOT CHANGED

## 2021-03-23 ASSESSMENT — PAIN - FUNCTIONAL ASSESSMENT: PAIN_FUNCTIONAL_ASSESSMENT: ACTIVITIES ARE NOT PREVENTED

## 2021-03-23 ASSESSMENT — PAIN DESCRIPTION - FREQUENCY: FREQUENCY: CONTINUOUS

## 2021-03-23 ASSESSMENT — PAIN DESCRIPTION - ONSET: ONSET: ON-GOING

## 2021-03-23 NOTE — PROGRESS NOTES
[facility]  Office: [address], DO, John Gloria, DO, [place] Elidia, DO, Elisa Lordwilly Mccloud, DO, Last Hodges MD, Katie Montenegro MD, Braulio Sierra MD, Willette Klinefelter, MD, Chelsey Huffman MD, Sagar Mcdonald MD, Melissa Shaw MD, Lady Escalante MD, Paresh Orta MD, Irineo Huntley, DO, Sabrina Swenson MD, Elmo Lao DO, Beltran Vanegas MD,  Luciano Wilkes DO, Jesse Winkler MD, Gali Tsang MD, Lizbeth Rubin, [facility], [facility], CNP, Georges Hemp, CNP, Monik Andrew, CNS, Naomy Green, CNP, Kalee January, CNP, Suzan [place], CNP, Vandana Travis, CNP, Kathy Red, CNP, Luis Eduardo Handy PA-C, Gavin Tracy, [facility], Adal Farr, CNP, Earnestine Waite, CNP, Monique Schmidt, CNP, Marland Cowden, CNP, Khushbu Abrams, CNP, Katerina Celis, [facility]    Progress Note    3/23/2021    11:45 AM    Name:   Diamond Peterson  MRN:     [id number]     Acct:      [de-identified]   Room:   2010/2010-02  IP Day:  1  Admit Date:  3/22/2021  1:33 AM    PCP:   Sarah Chapa DO  Code Status:  Full Code    Subjective:     C/C:   Chief Complaint   Patient presents with   [address] [facility] [address] Other     difficulty urinating    Constipation     Interval History Status: improved. Patient condition has improved overnight. Patient underwent I&D at the bedside yesterday and the operative note is reviewed. General surgery has seen and evaluated the patient today and is comfortable with discharge. Patient will be continued on an oral antibiotic and discharge with outpatient follow-up with general surgery in 1 week. White count has improved, patient is afebrile, patient's pain is more manageable. Brief History:     3/22 - Patient reports to the emergency department with a chief complaint of constipation for the past 4 days. Patient also reports that he has a palpable protrusion of his rectum.   Patient reports that this is causing him significant pain and discomfort in both his rectum and lower abdomen. Patient reports the pain as a 7/10 at its worst is described as a extreme pressure. Emergency department evaluation was completed and patient is found to have an elevated white count, had a rapid heart rate, and a fever of 99.6. Code sepsis was called in the emergency department and patient was treated accordingly. After emergency department evaluation patient is found to have a perirectal abscess with concern for early sepsis and he was admitted as a result. At the time my exam patient is hemodynamically stable. He is no longer tachycardic. Lactic normal.  Blood pressure WNL. Sepsis not present at the time of my exam.  General surgery consultation has been requested in anticipation of the need for an I&D.     3/23 - Patient condition has improved overnight. Patient underwent I&D at the bedside yesterday and the operative note is reviewed. General surgery has seen and evaluated the patient today and is comfortable with discharge. Patient will be continued on an oral antibiotic and discharge with outpatient follow-up with general surgery in 1 week. White count has improved, patient is afebrile, patient's pain is more manageable. Review of Systems:     Constitutional:  negative for chills, fevers, sweats  Respiratory:  negative for cough, dyspnea on exertion, shortness of breath, wheezing  Cardiovascular:  negative for chest pain, chest pressure/discomfort, lower extremity edema, palpitations  Gastrointestinal:  negative for abdominal pain, constipation, diarrhea, nausea, vomiting. Expected post I&D pain  Neurological:  negative for dizziness, headache    Medications:      Allergies:  No Known Allergies    Current Meds:   Scheduled Meds:    acetaminophen  1,000 mg Oral 3 times per day    ibuprofen  400 mg Oral 4 times per day    polyethylene glycol  17 g Oral BID    sodium chloride flush  10 mL Intravenous 2 times per day    enoxaparin  40 mg Subcutaneous Daily  piperacillin-tazobactam  3,375 mg Intravenous Q8H    vancomycin  1,000 mg Intravenous Q12H    vancomycin (VANCOCIN) intermittent dosing (placeholder)   Other RX Placeholder    nicotine  1 patch Transdermal Q24H    aspirin EC  81 mg Oral Daily     Continuous Infusions:   PRN Meds: oxyCODONE, sodium chloride flush, potassium chloride **OR** potassium alternative oral replacement **OR** potassium chloride, magnesium sulfate, promethazine **OR** ondansetron, albuterol sulfate HFA, albuterol-ipratropium    Data:     Past Medical History:   has a past medical history of COPD (chronic obstructive pulmonary disease) ([facility] [address] Diabetes mellitus ([address] and Mixed hyperlipidemia. Social History:   reports that he has been smoking cigarettes. He has a 10.00 pack-year smoking history. He has never used smokeless tobacco. He reports that he does not drink alcohol or use drugs. Family History:   Family History   Problem Relation Age of Onset    High Blood Pressure Mother     High Blood Pressure Father     Cancer Brother        Vitals:  /60   Pulse 69   Temp 99 °F (37.2 °C) (Oral)   Resp 16   Ht 5' 6\" (1.676 m)   Wt 117 lb 14.4 oz (53.5 kg)   SpO2 96%   BMI 19.03 kg/m²   Temp (24hrs), Av.6 °F (37.6 °C), Min:99 °F (37.2 °C), Max:100.6 °F (38.1 °C)    No results for input(s): POCGLU in the last 72 hours. I/O (24Hr):     Intake/Output Summary (Last 24 hours) at 3/23/2021 1145  Last data filed at 3/23/2021 0616  Gross per 24 hour   Intake 1882 ml   Output 400 ml   Net 1482 ml       Labs:  Hematology:  Recent Labs     21  0310 21  0600   WBC 21.5* 13.2*   RBC 4.75 4.28   HGB 13.6 12.0*   HCT 42.0 39.2*   MCV 88.4 91.6   MCH 28.6 28.0   MCHC 32.4 30.6   RDW 12.9 13.2    238   MPV 10.5 10.9     Chemistry:  Recent Labs     21  0310 21  0600   * 136   K 4.1 4.0   CL 96* 103   CO2 21 23   GLUCOSE 113* 116*   BUN 16 12   CREATININE 0.77 0.67*   ANIONGAP 14 10   LABGLOM >60 >60   GFRAA >60 >60   CALCIUM 9.5 8.6   No results for input(s): PROT, LABALBU, LABA1C, I3BXFLA, B7FLBMS, FT4, TSH, AST, ALT, LDH, GGT, ALKPHOS, LABGGT, BILITOT, BILIDIR, AMMONIA, AMYLASE, LIPASE, LACTATE, CHOL, HDL, LDLCHOLESTEROL, CHOLHDLRATIO, TRIG, VLDL, TVH63DY, PHENYTOIN, PHENYF, URICACID, POCGLU in the last 72 hours. ABG:No results found for: POCPH, PHART, PH, POCPCO2, DSL7QGX, PCO2, POCPO2, PO2ART, PO2, POCHCO3, BSC0DIR, HCO3, NBEA, PBEA, BEART, BE, THGBART, THB, XTP9BHP, ODTM8TCY, E0QQLBNC, O2SAT, FIO2  Lab Results   Component Value Date/Time    SPECIAL LT AC 03/22/2021 05:00 AM     Lab Results   Component Value Date/Time    CULTURE NO GROWTH 23 HOURS 03/22/2021 05:00 AM       Radiology:  Ct Abdomen Pelvis W Iv Contrast Additional Contrast? None    Result Date: 3/22/2021  1. Large multiloculated anorectal posterior soft tissue abscess, with dimensions as discussed above. 2. Note: The inferior extent of the abscess is not completely within the field of view. 3. Surrounding subcutaneous cellulitis within the posterior perineum and medial gluteal subcutaneous tissues. 4. Cholelithiasis, as discussed above. 5. Mild hepatic steatosis. 6. Mild hepatomegaly. Physical Examination:        General appearance:  alert, cooperative and no distress  Mental Status:  oriented to person, place and time and normal affect  Lungs:  clear to auscultation bilaterally, normal effort  Heart:  regular rate and rhythm, no murmur  Abdomen:  soft, nontender, nondistended, normal bowel sounds, no masses, hepatomegaly, splenomegaly  Extremities:  no edema, redness, tenderness in the calves  Skin:  no gross lesions, rashes, induration. Expected postsurgical findings.     Assessment:        Hospital Problems           Last Modified POA    * (Principal) Perirectal abscess 3/22/2021 Yes    Diabetes mellitus ([address] 3/22/2021 Yes    Overview Signed 3/22/2021  8:56 AM by BIANCA Lr NP     controlled with diet         COPD (chronic obstructive pulmonary disease) ([facility] [address] 3/22/2021 Yes    Dependence on nicotine from cigarettes 3/22/2021 Yes    Overview Signed 3/22/2021  9:08 AM by BIANCA Carolina NP     Last Assessment & Plan:   Formatting of this note might be different from the original.  Condition: stable    Discussed risk of Lung Cancer, COPD, PAD, Stroke, Heart Attack and Death. Discussed pharmaceutical and non-pharmaceutical options to quit. Encouraged to quit. 800-QUIT-NOW phone number discussed. Follow up in: one month         Mixed hyperlipidemia 3/22/2021 Yes    Overview Signed 3/22/2021  9:08 AM by BIANCA Carolina NP     Last Assessment & Plan:   Formatting of this note might be different from the original.  Condition: stable     Hasn't had labwork completed since 2017. Recommend Establishing care with family medicine to monitor lipid levels, continue low fat, low cholesterol diet and increase activity. Follow up in: one month               Plan:        1. Rectal abscess  1. IV vancomycin and IV Zosyn -transition to oral Augmentin today  2. White count trending near baseline, no fever  3. General surgery consultation completed bedside I&D  4. Blood cultures negative thus far x2  2. COPD  1. Combivent and albuterol as needed for shortness of breath and wheezing  2. Education on smoking cessation  3. Hyperlipidemia  1.  Recommend outpatient follow-up with PCP for continued monitoring and long-term treatment    BIANCA Carolina NP  3/23/2021  11:45 AM

## 2021-03-23 NOTE — PLAN OF CARE
Problem: Infection:  Goal: Will remain free from infection  Description: Will remain free from infection  3/23/2021 1625 by Leslie Patel RN  Outcome: Completed  3/23/2021 1210 by Leslie Patel RN  Outcome: Ongoing     Problem: Safety:  Goal: Free from accidental physical injury  Description: Free from accidental physical injury  3/23/2021 1625 by Leslie aPtel RN  Outcome: Completed  3/23/2021 1210 by Leslie Patel RN  Outcome: Ongoing  Goal: Free from intentional harm  Description: Free from intentional harm  3/23/2021 1625 by Leslie Patel RN  Outcome: Completed  3/23/2021 1210 by Leslie Patel RN  Outcome: Ongoing     Problem: Daily Care:  Goal: Daily care needs are met  Description: Daily care needs are met  Outcome: Completed     Problem: Pain:  Goal: Patient's pain/discomfort is manageable  Description: Patient's pain/discomfort is manageable  Outcome: Completed     Problem: Skin Integrity:  Goal: Skin integrity will stabilize  Description: Skin integrity will stabilize  Outcome: Completed     Problem: Discharge Planning:  Goal: Patients continuum of care needs are met  Description: Patients continuum of care needs are met  Outcome: Completed     Problem:  Activity:  Goal: Risk for activity intolerance will decrease  Description: Risk for activity intolerance will decrease  Outcome: Completed     Problem: Coping:  Goal: Ability to adjust to condition or change in health will improve  Description: Ability to adjust to condition or change in health will improve  Outcome: Completed     Problem: Fluid Volume:  Goal: Ability to maintain a balanced intake and output will improve  Description: Ability to maintain a balanced intake and output will improve  Outcome: Completed     Problem: Health Behavior:  Goal: Ability to identify and utilize available resources and services will improve  Description: Ability to identify and utilize available resources and services will improve  Outcome: Completed  Goal: Ability to manage health-related needs will improve  Description: Ability to manage health-related needs will improve  Outcome: Completed     Problem: Metabolic:  Goal: Ability to maintain appropriate glucose levels will improve  Description: Ability to maintain appropriate glucose levels will improve  Outcome: Completed     Problem: Nutritional:  Goal: Maintenance of adequate nutrition will improve  Description: Maintenance of adequate nutrition will improve  Outcome: Completed  Goal: Progress toward achieving an optimal weight will improve  Description: Progress toward achieving an optimal weight will improve  Outcome: Completed     Problem: Physical Regulation:  Goal: Complications related to the disease process, condition or treatment will be avoided or minimized  Description: Complications related to the disease process, condition or treatment will be avoided or minimized  Outcome: Completed  Goal: Diagnostic test results will improve  Description: Diagnostic test results will improve  Outcome: Completed     Problem: Skin Integrity:  Goal: Risk for impaired skin integrity will decrease  Description: Risk for impaired skin integrity will decrease  Outcome: Completed     Problem: Tissue Perfusion:  Goal: Adequacy of tissue perfusion will improve  Description: Adequacy of tissue perfusion will improve  Outcome: Completed     Problem: Nutrition  Goal: Optimal nutrition therapy  Outcome: Completed

## 2021-03-23 NOTE — PLAN OF CARE
Problem: Infection:  Goal: Will remain free from infection  Description: Will remain free from infection  3/23/2021 1210 by Kerwin Pelaez RN  Outcome: Ongoing  3/23/2021 0124 by Ike Duong RN  Outcome: Ongoing     Problem: Safety:  Goal: Free from accidental physical injury  Description: Free from accidental physical injury  3/23/2021 1210 by Kerwin Pelaez RN  Outcome: Ongoing  3/23/2021 0124 by Ike Duong RN  Outcome: Ongoing  Goal: Free from intentional harm  Description: Free from intentional harm  Outcome: Ongoing

## 2021-03-23 NOTE — DISCHARGE SUMMARY
[facility]  Office: [address], DO, Kendra Betts, DO, Sana Carias, DO, John Manriquez Blood, DO, Katharina Painter MD, Yris Shah MD, Loraine Simons MD, Kranthi Kim MD, Cali Perez MD, Radha Steinberg MD, Alec Brewer MD, Kayleigh Manuel MD, Paresh Norwood MD, Rangel Brown DO, Kalee Boone MD, Akin Choe DO, Rupa Shields MD,  Rachell Choi DO, Augustine Peacock MD, Sharron Delcid MD, Silvia Mcguire, [facility], [facility] Zbigniew, [facility], Catalina Sevilla, CNP, Arnold Mc, [facility], Eddi Thomas, CNP, Cam Verdugo, CNP, Wanda Rutledge, CNP, Alta Francois, CNP, Patria Rowan, CNP, Savanna Stephenson PA-C, Jennifer Bazan, RAINE, Selena Flores, CNP, Isiah, CNP, Mingo Baig, CNP, Mary Phan, CNP, Janice Hammer, [facility], Tasha Taveras, [facility]    Discharge Summary     Patient ID: Marielos Cardenas  :  1960   MRN: [id number]     ACCOUNT:  [de-identified]   Patient's PCP: Estelle Jonas DO  Admit Date: 3/22/2021   Discharge Date: 3/23/2021     Length of Stay: 1  Code Status:  Full Code  Admitting Physician: Yoana Romano MD  Discharge Physician: BIANCA Carolina NP     Active Discharge Diagnoses:     Hospital Problem Lists:  Principal Problem:    Perirectal abscess  Active Problems:    Diabetes mellitus ([facility]    COPD (chronic obstructive pulmonary disease) ([facility]    Dependence on nicotine from cigarettes    Mixed hyperlipidemia  Resolved Problems:    * No resolved hospital problems.  *      Admission Condition:  fair     Discharged Condition: stable    Hospital Stay:     Hospital Course:  Marielos Cardenas is a 59 y.o. male who was admitted for the management of  Perirectal abscess , presented to ER with Other (difficulty urinating) and Constipation     3/22 - Patient reports to the emergency department with a chief complaint of constipation for the past 4 days.  Patient also reports that he has a palpable protrusion of his rectum.  Patient reports that this is causing him significant pain and discomfort in both his rectum and lower abdomen.  Patient reports the pain as a 7/10 at its worst is described as a extreme pressure.  Emergency department evaluation was completed and patient is found to have an elevated white count, had a rapid heart rate, and a fever of 99.6.  Code sepsis was called in the emergency department and patient was treated accordingly.  After emergency department evaluation patient is found to have a perirectal abscess with concern for early sepsis and he was admitted as a result.  At the time my exam patient is hemodynamically stable.  He is no longer tachycardic.  Lactic normal.  Blood pressure WNL.  Sepsis not present at the time of my exam. Maribel Garcia surgery consultation has been requested in anticipation of the need for an I&D.      3/23 - Patient condition has improved overnight. Patient underwent I&D at the bedside yesterday and the operative note is reviewed. General surgery has seen and evaluated the patient today and is comfortable with discharge. Patient will be continued on an oral antibiotic and discharge with outpatient follow-up with general surgery in 1 week. White count has improved, patient is afebrile, patient's pain is more manageable.     Significant therapeutic interventions: IV antibiotics, I&D per surgery, glycemic control, transition to oral antibiotics    Significant Diagnostic Studies:   Labs / Micro:  CBC:   Lab Results   Component Value Date    WBC 13.2 03/23/2021    RBC 4.28 03/23/2021    HGB 12.0 03/23/2021    HCT 39.2 03/23/2021    MCV 91.6 03/23/2021    MCH 28.0 03/23/2021    MCHC 30.6 03/23/2021    RDW 13.2 03/23/2021     03/23/2021     BMP:    Lab Results   Component Value Date    GLUCOSE 116 03/23/2021     03/23/2021    K 4.0 03/23/2021     03/23/2021    CO2 23 03/23/2021    ANIONGAP 10 03/23/2021    BUN 12 03/23/2021    CREATININE 0.67 03/23/2021    BUNCRER 18 03/23/2021    CALCIUM 8.6 03/23/2021    LABGLOM >60 03/23/2021    GFRAA >60 03/23/2021    GFR      03/23/2021    GFR NOT REPORTED 03/23/2021     U/A:    Lab Results   Component Value Date    COLORU DARK YELLOW 03/22/2021    TURBIDITY SLIGHTLY CLOUDY 03/22/2021    SPECGRAV 1.025 03/22/2021    HGBUR 2+ 03/22/2021    PHUR 6.0 03/22/2021    PROTEINU TRACE 03/22/2021    GLUCOSEU NEGATIVE 03/22/2021    KETUA 1+ 03/22/2021    BILIRUBINUR  03/22/2021     Presumptive positive. Unable to confirm due to unavailability of reagent. UROBILINOGEN Normal 03/22/2021    NITRU NEGATIVE 03/22/2021    LEUKOCYTESUR NEGATIVE 03/22/2021        Radiology:  Ct Abdomen Pelvis W Iv Contrast Additional Contrast? None    Result Date: 3/22/2021  1. Large multiloculated anorectal posterior soft tissue abscess, with dimensions as discussed above. 2. Note: The inferior extent of the abscess is not completely within the field of view. 3. Surrounding subcutaneous cellulitis within the posterior perineum and medial gluteal subcutaneous tissues. 4. Cholelithiasis, as discussed above. 5. Mild hepatic steatosis. 6. Mild hepatomegaly. Consultations:    Consults:     Final Specialist Recommendations/Findings:   PHARMACY TO DOSE VANCOMYCIN  IP CONSULT TO INTERNAL MEDICINE  PHARMACY TO DOSE VANCOMYCIN  IP CONSULT TO GENERAL SURGERY      The patient was seen and examined on day of discharge and this discharge summary is in conjunction with any daily progress note from day of discharge. Discharge plan:     Disposition: Home    Physician Follow Up:     Arnold Fothergill, MD  [address]  [place]  [phone]    In 1 week         Requiring Further Evaluation/Follow Up POST HOSPITALIZATION/Incidental Findings: Have your annual physical and speak with your physician about continuing to manage your high cholesterol, blood pressure, and diabetes.     Diet: cardiac diet    Activity: As directed by

## 2021-03-23 NOTE — PROGRESS NOTES
General Surgery:  Daily Progress Note         PATIENT NAME: Lamonte Samson     TODAY'S DATE: 3/23/2021, 5:23 AM  CC:  Buttock pain improving     SUBJECTIVE:     Pt seen and examined at bedside. No acute events overnight. Tolerated diet, no n/v. +flatus no bm. Pain better than prior to I&D. Afebrile. Concerned he has not had bm. OBJECTIVE:   VITALS:  /65   Pulse 77   Temp 100.6 °F (38.1 °C) (Oral)   Resp 14   Ht 5' 6\" (1.676 m)   Wt 117 lb 14.4 oz (53.5 kg)   SpO2 97%   BMI 19.03 kg/m²      INTAKE/OUTPUT:    No intake or output data in the 24 hours ending 03/23/21 0523    PHYSICAL EXAM:  General Appearance: awake, alert, oriented, in no acute distress  Heart: s1+s2  Lungs: equal and symmetric chest rise/fall  Abdomen:soft, nd, nt  : buttocks w/out erythema, induration improving. Packing removed and replaced. Extremities: No cyanosis, pitting edema, rashes noted. Skin: Skin color, texture, turgor normal. No rashes or lesions. Data:  Cbc pending     Radiology Review:      ASSESSMENT:  Active Hospital Problems    Diagnosis Date Noted    Perirectal abscess [K61.1] 03/22/2021    Diabetes mellitus ([facility] [E11.9]     COPD (chronic obstructive pulmonary disease) ([facility] [J44.9]     Dependence on nicotine from cigarettes [F17.210] 03/09/2020    Mixed hyperlipidemia [E78.2] 03/09/2020       1. 59 y.o. male with perianal abscess s/p I&D    Plan:  1. Wound re-packed this am. Start sitz bath TID and after each bowel movement. If leukocytosis improved ok to transition to PO abx (total 7d including IV abx) and d/c home. No need to repack wound after today. Once home pt to continue sitz baths. Can follow up with Dr. Torsten Vanegas in 1 week. Javier [address] [place] [address] for general diet  3. Bowel regimen rec miralax titrated to daily bm.      Electronically signed by Jose Daniel Cardoza DO  on 3/23/2021 at 5:23 AM   Attending Physician Statement  I have discussed the case, including pertinent history and exam findings with the resident. I agree with the assessment, plan and orders as documented by the resident.     Surgically stable for discharge
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
## 2021-03-24 ENCOUNTER — CARE COORDINATION (OUTPATIENT)
Dept: CASE MANAGEMENT | Age: 61
End: 2021-03-24

## 2021-03-24 NOTE — CARE COORDINATION
[facility] Transitions Initial Follow Up Call    Call within 2 business days of discharge: Yes    Patient: Crystal Sue Patient : 1960   MRN: <[id number]>  Reason for Admission: perirectal abcess  Discharge Date: 3/23/21 RARS: Readmission Risk Score: 7      Last Discharge [facility]       Complaint Diagnosis Description Type Department Provider    3/22/21 Other; Constipation Perirectal abscess ED to Hosp-Admission (Discharged) (ADMITTED) Oni Luna MD; Aniceto Soliz. .. Attempted to reach pt for follow up call. Left message requesting call back. Care Transitions 24 Hour Call    Care Transitions Interventions         Follow Up  No future appointments.     Sue Goltz

## 2021-03-25 ENCOUNTER — CARE COORDINATION (OUTPATIENT)
Dept: CASE MANAGEMENT | Age: 61
End: 2021-03-25

## 2021-03-25 NOTE — CARE COORDINATION
[facility] Transitions Initial Follow Up Call    Call within 2 business days of discharge: Yes    Patient: Doni Hartman Patient : 1960   MRN: <[id number]>  Reason for Admission: Perirectal abscess   Discharge Date: 3/23/21 RARS: Readmission Risk Score: 7      Last Discharge [facility]       Complaint Diagnosis Description Type Department Provider    3/22/21 Other; Constipation Perirectal abscess ED to Hosp-Admission (Discharged) (ADMITTED) Anjum Caldwell MD; Kevin Phan. .. Second and final attempt made to reach patient for initial post hospital transition call. VM left stating purpose of call along with my contact information requesting a return call. Care Transitions 24 Hour Call    Care Transitions Interventions         Follow Up  No future appointments.     Chelo Johnson RN

## 2021-03-28 LAB
CULTURE: NORMAL
CULTURE: NORMAL
Lab: NORMAL
Lab: NORMAL
SPECIMEN DESCRIPTION: NORMAL
SPECIMEN DESCRIPTION: NORMAL

## 2023-12-26 ENCOUNTER — HOSPITAL ENCOUNTER (INPATIENT)
Age: 63
LOS: 16 days | Discharge: NURSING, REHAB OR LONG-TERM CARE | DRG: 165 | End: 2024-01-11
Attending: INTERNAL MEDICINE | Admitting: INTERNAL MEDICINE
Payer: MEDICAID

## 2023-12-26 ENCOUNTER — APPOINTMENT (OUTPATIENT)
Dept: VASCULAR LAB | Age: 63
DRG: 165 | End: 2023-12-26
Attending: INTERNAL MEDICINE
Payer: MEDICAID

## 2023-12-26 ENCOUNTER — ANESTHESIA EVENT (OUTPATIENT)
Dept: OPERATING ROOM | Age: 63
End: 2023-12-26
Payer: MEDICAID

## 2023-12-26 ENCOUNTER — APPOINTMENT (OUTPATIENT)
Dept: GENERAL RADIOLOGY | Age: 63
DRG: 165 | End: 2023-12-26
Payer: MEDICAID

## 2023-12-26 ENCOUNTER — ANESTHESIA (OUTPATIENT)
Dept: OPERATING ROOM | Age: 63
End: 2023-12-26
Payer: MEDICAID

## 2023-12-26 ENCOUNTER — APPOINTMENT (OUTPATIENT)
Dept: GENERAL RADIOLOGY | Age: 63
DRG: 165 | End: 2023-12-26
Attending: INTERNAL MEDICINE
Payer: MEDICAID

## 2023-12-26 ENCOUNTER — APPOINTMENT (OUTPATIENT)
Age: 63
DRG: 165 | End: 2023-12-26
Attending: INTERNAL MEDICINE
Payer: MEDICAID

## 2023-12-26 ENCOUNTER — HOSPITAL ENCOUNTER (OUTPATIENT)
Age: 63
Discharge: ANOTHER HOSPITAL | DRG: 165 | End: 2023-12-26
Attending: EMERGENCY MEDICINE | Admitting: INTERNAL MEDICINE
Payer: MEDICAID

## 2023-12-26 VITALS
SYSTOLIC BLOOD PRESSURE: 124 MMHG | OXYGEN SATURATION: 100 % | RESPIRATION RATE: 24 BRPM | WEIGHT: 117 LBS | BODY MASS INDEX: 18.88 KG/M2 | DIASTOLIC BLOOD PRESSURE: 62 MMHG | HEART RATE: 72 BPM

## 2023-12-26 DIAGNOSIS — I25.10 LEFT MAIN CORONARY ARTERY DISEASE: Primary | ICD-10-CM

## 2023-12-26 DIAGNOSIS — I21.3 ST ELEVATION MYOCARDIAL INFARCTION (STEMI), UNSPECIFIED ARTERY (HCC): ICD-10-CM

## 2023-12-26 DIAGNOSIS — I21.4 NSTEMI (NON-ST ELEVATION MYOCARDIAL INFARCTION) (HCC): ICD-10-CM

## 2023-12-26 DIAGNOSIS — U07.1 COVID: ICD-10-CM

## 2023-12-26 DIAGNOSIS — I21.3 STEMI (ST ELEVATION MYOCARDIAL INFARCTION) (HCC): ICD-10-CM

## 2023-12-26 DIAGNOSIS — I25.10 CAD, MULTIPLE VESSEL: Primary | ICD-10-CM

## 2023-12-26 DIAGNOSIS — I74.3: ICD-10-CM

## 2023-12-26 DIAGNOSIS — Z95.1 S/P CABG X 3: ICD-10-CM

## 2023-12-26 DIAGNOSIS — M79.89 LEFT LEG SWELLING: ICD-10-CM

## 2023-12-26 PROBLEM — R57.0 CARDIOGENIC SHOCK (HCC): Status: ACTIVE | Noted: 2023-12-26

## 2023-12-26 PROBLEM — Z01.810 ENCOUNTER FOR PRE-OPERATIVE CARDIOVASCULAR CLEARANCE: Status: ACTIVE | Noted: 2023-12-26

## 2023-12-26 PROBLEM — D72.825 BANDEMIA: Status: ACTIVE | Noted: 2023-12-26

## 2023-12-26 LAB
ALBUMIN SERPL-MCNC: 3.7 G/DL (ref 3.5–5.2)
ALBUMIN SERPL-MCNC: 4.4 G/DL (ref 3.5–5.2)
ALBUMIN/GLOB SERPL: 1.3 {RATIO} (ref 1–2.5)
ALLEN TEST: ABNORMAL
ALP SERPL-CCNC: 157 U/L (ref 40–129)
ALP SERPL-CCNC: 91 U/L (ref 40–129)
ALT SERPL-CCNC: 18 U/L (ref 5–41)
ALT SERPL-CCNC: 29 U/L (ref 5–41)
ANION GAP SERPL CALCULATED.3IONS-SCNC: 11 MMOL/L (ref 9–17)
ANION GAP SERPL CALCULATED.3IONS-SCNC: 20 MMOL/L (ref 9–17)
ANTI-XA UNFRAC HEPARIN: 0.6 IU/L
APAP SERPL-MCNC: <10 UG/ML (ref 10–30)
AST SERPL-CCNC: 24 U/L
AST SERPL-CCNC: 52 U/L
BACTERIA URNS QL MICRO: NORMAL
BASOPHILS # BLD: 0 K/UL (ref 0–0.2)
BASOPHILS # BLD: 0 K/UL (ref 0–0.2)
BASOPHILS # BLD: 0.11 K/UL (ref 0–0.2)
BASOPHILS NFR BLD: 0 % (ref 0–2)
BASOPHILS NFR BLD: 0 % (ref 0–2)
BASOPHILS NFR BLD: 1 %
BILIRUB DIRECT SERPL-MCNC: 0.1 MG/DL
BILIRUB DIRECT SERPL-MCNC: 0.1 MG/DL
BILIRUB INDIRECT SERPL-MCNC: 0.1 MG/DL (ref 0–1)
BILIRUB INDIRECT SERPL-MCNC: 0.2 MG/DL (ref 0–1)
BILIRUB SERPL-MCNC: 0.2 MG/DL (ref 0.3–1.2)
BILIRUB SERPL-MCNC: 0.3 MG/DL (ref 0.3–1.2)
BILIRUB UR QL STRIP: NEGATIVE
BNP SERPL-MCNC: 454 PG/ML
BUN BLD-MCNC: 13 MG/DL (ref 8–26)
BUN BLD-MCNC: 15 MG/DL (ref 8–26)
BUN BLD-MCNC: 16 MG/DL (ref 8–26)
BUN BLD-MCNC: 16 MG/DL (ref 8–26)
BUN BLD-MCNC: 17 MG/DL (ref 8–26)
BUN SERPL-MCNC: 15 MG/DL (ref 8–23)
BUN SERPL-MCNC: 17 MG/DL (ref 8–23)
BUN/CREAT SERPL: 17 (ref 9–20)
CA-I BLD-SCNC: 0.66 MMOL/L (ref 1.15–1.33)
CA-I BLD-SCNC: 0.93 MMOL/L (ref 1.15–1.33)
CA-I BLD-SCNC: 0.98 MMOL/L (ref 1.15–1.33)
CA-I BLD-SCNC: 0.99 MMOL/L (ref 1.15–1.33)
CA-I BLD-SCNC: 1.23 MMOL/L (ref 1.15–1.33)
CA-I BLD-SCNC: 1.26 MMOL/L (ref 1.15–1.33)
CA-I BLD-SCNC: 1.32 MMOL/L (ref 1.15–1.33)
CALCIUM SERPL-MCNC: 8.7 MG/DL (ref 8.6–10.4)
CALCIUM SERPL-MCNC: 9.7 MG/DL (ref 8.6–10.4)
CASTS #/AREA URNS LPF: NORMAL /LPF (ref 0–8)
CHLORIDE BLD-SCNC: 100 MMOL/L (ref 98–107)
CHLORIDE BLD-SCNC: 102 MMOL/L (ref 98–107)
CHLORIDE BLD-SCNC: 104 MMOL/L (ref 98–107)
CHLORIDE BLD-SCNC: 104 MMOL/L (ref 98–107)
CHLORIDE BLD-SCNC: 108 MMOL/L (ref 98–107)
CHLORIDE BLD-SCNC: 112 MMOL/L (ref 98–107)
CHLORIDE SERPL-SCNC: 102 MMOL/L (ref 98–107)
CHLORIDE SERPL-SCNC: 104 MMOL/L (ref 98–107)
CLARITY UR: CLEAR
CLOT ANGLE.KAOLIN INDUCED BLD RES TEG: 56.9 DEG (ref 63–78)
CLOT ANGLE.KAOLIN INDUCED BLD RES TEG: 67.9 DEG (ref 63–78)
CO2 BLD CALC-SCNC: 21 MMOL/L (ref 22–30)
CO2 BLD CALC-SCNC: 22 MMOL/L (ref 22–30)
CO2 BLD CALC-SCNC: 23 MMOL/L (ref 22–30)
CO2 BLD CALC-SCNC: 25 MMOL/L (ref 22–30)
CO2 BLD CALC-SCNC: 25 MMOL/L (ref 22–30)
CO2 BLD CALC-SCNC: 27 MMOL/L (ref 22–30)
CO2 BLD CALC-SCNC: 27 MMOL/L (ref 22–30)
CO2 SERPL-SCNC: 16 MMOL/L (ref 20–31)
CO2 SERPL-SCNC: 20 MMOL/L (ref 20–31)
COLOR UR: YELLOW
CREAT SERPL-MCNC: 0.9 MG/DL (ref 0.7–1.2)
CREAT SERPL-MCNC: 0.9 MG/DL (ref 0.7–1.2)
CRITICAL ACTION: NORMAL
CRITICAL NOTIFICATION DATE/TIME: NORMAL
CRITICAL NOTIFICATION: NORMAL
CRITICAL VALUE READ BACK: YES
ECHO AO ROOT DIAM: 3.3 CM
ECHO AO ROOT INDEX: 2.12 CM/M2
ECHO AV AREA PEAK VELOCITY: 1.9 CM2
ECHO AV AREA VTI: 2 CM2
ECHO AV AREA/BSA PEAK VELOCITY: 1.2 CM2/M2
ECHO AV AREA/BSA VTI: 1.3 CM2/M2
ECHO AV MEAN GRADIENT: 2 MMHG
ECHO AV MEAN VELOCITY: 0.6 M/S
ECHO AV PEAK GRADIENT: 4 MMHG
ECHO AV PEAK VELOCITY: 1 M/S
ECHO AV VELOCITY RATIO: 0.7
ECHO AV VTI: 18.8 CM
ECHO BSA: 1.55 M2
ECHO EST RA PRESSURE: 10 MMHG
ECHO IVC EXP: 1.6 CM
ECHO LA AREA 4C: 12.9 CM2
ECHO LA DIAMETER INDEX: 1.6 CM/M2
ECHO LA DIAMETER: 2.5 CM
ECHO LA MAJOR AXIS: 4.6 CM
ECHO LA TO AORTIC ROOT RATIO: 0.76
ECHO LA VOL MOD A4C: 29 ML (ref 18–58)
ECHO LA VOLUME INDEX MOD A4C: 19 ML/M2 (ref 16–34)
ECHO LV E' LATERAL VELOCITY: 4 CM/S
ECHO LV E' SEPTAL VELOCITY: 4 CM/S
ECHO LV EDV A2C: 42 ML
ECHO LV EDV A4C: 38 ML
ECHO LV EDV INDEX A4C: 24 ML/M2
ECHO LV EDV NDEX A2C: 27 ML/M2
ECHO LV EJECTION FRACTION A2C: 34 %
ECHO LV EJECTION FRACTION A4C: 57 %
ECHO LV EJECTION FRACTION BIPLANE: 46 % (ref 55–100)
ECHO LV ESV A2C: 28 ML
ECHO LV ESV A4C: 16 ML
ECHO LV ESV INDEX A2C: 18 ML/M2
ECHO LV ESV INDEX A4C: 10 ML/M2
ECHO LV INTERNAL DIMENSION DIASTOLE INDEX: 2.88 CM/M2
ECHO LV INTERNAL DIMENSION DIASTOLIC: 4.5 CM (ref 4.2–5.9)
ECHO LV IVSD: 0.8 CM (ref 0.6–1)
ECHO LV MASS 2D: 113.6 G (ref 88–224)
ECHO LV MASS INDEX 2D: 72.8 G/M2 (ref 49–115)
ECHO LV POSTERIOR WALL DIASTOLIC: 0.8 CM (ref 0.6–1)
ECHO LV RELATIVE WALL THICKNESS RATIO: 0.36
ECHO LVOT AREA: 2.8 CM2
ECHO LVOT AV VTI INDEX: 0.69
ECHO LVOT DIAM: 1.9 CM
ECHO LVOT MEAN GRADIENT: 1 MMHG
ECHO LVOT PEAK GRADIENT: 2 MMHG
ECHO LVOT PEAK VELOCITY: 0.7 M/S
ECHO LVOT STROKE VOLUME INDEX: 23.6 ML/M2
ECHO LVOT SV: 36.8 ML
ECHO LVOT VTI: 13 CM
ECHO MV A VELOCITY: 0.77 M/S
ECHO MV AREA VTI: 2.4 CM2
ECHO MV E DECELERATION TIME (DT): 278 MS
ECHO MV E VELOCITY: 0.43 M/S
ECHO MV E/A RATIO: 0.56
ECHO MV E/E' LATERAL: 10.75
ECHO MV E/E' RATIO (AVERAGED): 10.75
ECHO MV LVOT VTI INDEX: 1.19
ECHO MV MAX VELOCITY: 0.6 M/S
ECHO MV MEAN GRADIENT: 0 MMHG
ECHO MV MEAN VELOCITY: 0.3 M/S
ECHO MV PEAK GRADIENT: 1 MMHG
ECHO MV VTI: 15.5 CM
ECHO PV MAX VELOCITY: 0.8 M/S
ECHO PV PEAK GRADIENT: 2 MMHG
ECHO RA AREA 4C: 11.6 CM2
ECHO RIGHT VENTRICULAR SYSTOLIC PRESSURE (RVSP): 39 MMHG
ECHO RV BASAL DIMENSION: 3 CM
ECHO RV MID DIMENSION: 2.3 CM
ECHO RV TAPSE: 2 CM (ref 1.7–?)
ECHO TV REGURGITANT MAX VELOCITY: 2.71 M/S
ECHO TV REGURGITANT PEAK GRADIENT: 29 MMHG
EGFR, POC: >60 ML/MIN/1.73M2
EOSINOPHIL # BLD: 0 K/UL (ref 0–0.4)
EOSINOPHIL # BLD: 0 K/UL (ref 0–0.4)
EOSINOPHIL # BLD: 0.11 K/UL (ref 0–0.4)
EOSINOPHILS RELATIVE PERCENT: 0 % (ref 1–4)
EOSINOPHILS RELATIVE PERCENT: 0 % (ref 1–4)
EOSINOPHILS RELATIVE PERCENT: 1 % (ref 1–4)
EPI CELLS #/AREA URNS HPF: NORMAL /HPF (ref 0–5)
ERYTHROCYTE [DISTWIDTH] IN BLOOD BY AUTOMATED COUNT: 13 % (ref 11.8–14.4)
ERYTHROCYTE [DISTWIDTH] IN BLOOD BY AUTOMATED COUNT: 13.1 % (ref 11.8–14.4)
ERYTHROCYTE [DISTWIDTH] IN BLOOD BY AUTOMATED COUNT: 13.3 % (ref 11.8–14.4)
ETHANOL PERCENT: <0.01 %
ETHANOLAMINE SERPL-MCNC: <10 MG/DL
FIBRINOGEN, FUNCTIONAL TEG: 16.7 MM (ref 15–32)
FIBRINOGEN, FUNCTIONAL TEG: 20.8 MM (ref 15–32)
FIO2: 100
FIO2: 100
FLUAV RNA RESP QL NAA+PROBE: NOT DETECTED
FLUBV RNA RESP QL NAA+PROBE: NOT DETECTED
GFR SERPL CREATININE-BSD FRML MDRD: >60 ML/MIN/1.73M2
GFR SERPL CREATININE-BSD FRML MDRD: >60 ML/MIN/1.73M2
GLUCOSE BLD-MCNC: 118 MG/DL (ref 74–100)
GLUCOSE BLD-MCNC: 126 MG/DL (ref 74–100)
GLUCOSE BLD-MCNC: 129 MG/DL (ref 74–100)
GLUCOSE BLD-MCNC: 138 MG/DL (ref 74–100)
GLUCOSE BLD-MCNC: 141 MG/DL (ref 74–100)
GLUCOSE BLD-MCNC: 173 MG/DL (ref 74–100)
GLUCOSE BLD-MCNC: 175 MG/DL (ref 74–100)
GLUCOSE SERPL-MCNC: 124 MG/DL (ref 70–99)
GLUCOSE SERPL-MCNC: 156 MG/DL (ref 70–99)
GLUCOSE UR STRIP-MCNC: NEGATIVE MG/DL
HCO3 VENOUS: 19.7 MMOL/L (ref 22–29)
HCT VFR BLD AUTO: 20 % (ref 41–53)
HCT VFR BLD AUTO: 22 % (ref 41–53)
HCT VFR BLD AUTO: 24 % (ref 41–53)
HCT VFR BLD AUTO: 26 % (ref 41–53)
HCT VFR BLD AUTO: 37 % (ref 41–53)
HCT VFR BLD AUTO: 39 % (ref 41–53)
HCT VFR BLD AUTO: 45.8 % (ref 40.7–50.3)
HCT VFR BLD AUTO: 48.4 % (ref 40.7–50.3)
HCT VFR BLD AUTO: 49 % (ref 41–53)
HCT VFR BLD AUTO: 59.8 % (ref 40.7–50.3)
HGB BLD-MCNC: 15.2 G/DL (ref 13–17)
HGB BLD-MCNC: 15.7 G/DL (ref 13–17)
HGB BLD-MCNC: 18.6 G/DL (ref 13–17)
HGB UR QL STRIP.AUTO: ABNORMAL
IMM GRANULOCYTES # BLD AUTO: 0 K/UL (ref 0–0.3)
IMM GRANULOCYTES # BLD AUTO: 0.32 K/UL (ref 0–0.3)
IMM GRANULOCYTES # BLD AUTO: 0.79 K/UL (ref 0–0.3)
IMM GRANULOCYTES NFR BLD: 0 %
IMM GRANULOCYTES NFR BLD: 1 %
IMM GRANULOCYTES NFR BLD: 7 %
INR PPP: 1.1
INR PPP: 1.2
KETONES UR STRIP-MCNC: NEGATIVE MG/DL
KINETICS TEG: 1.6 MIN (ref 0.8–2.1)
KINETICS TEG: 3.7 MIN (ref 0.8–2.1)
LACTATE BLDV-SCNC: 9.9 MMOL/L (ref 0.5–1.9)
LACTIC ACID, WHOLE BLOOD: 1.4 MMOL/L (ref 0.7–2.1)
LACTIC ACID, WHOLE BLOOD: 1.6 MMOL/L (ref 0.7–2.1)
LEUKOCYTE ESTERASE UR QL STRIP: NEGATIVE
LYMPHOCYTES NFR BLD: 1.86 K/UL (ref 1–4.8)
LYMPHOCYTES NFR BLD: 2.23 K/UL (ref 1–4.8)
LYMPHOCYTES NFR BLD: 5.43 K/UL (ref 1–4.8)
LYMPHOCYTES RELATIVE PERCENT: 48 % (ref 24–44)
LYMPHOCYTES RELATIVE PERCENT: 6 % (ref 24–44)
LYMPHOCYTES RELATIVE PERCENT: 7 % (ref 24–44)
MA (MAX CLOT) TEG: 56.8 MM (ref 52–69)
MA (MAX CLOT) TEG: 61.7 MM (ref 52–69)
MA(MAX CLOT) RAPID TEG: 58.4 MM (ref 52–70)
MA(MAX CLOT) RAPID TEG: 64.4 MM (ref 52–70)
MAGNESIUM SERPL-MCNC: 2.1 MG/DL (ref 1.6–2.6)
MAGNESIUM SERPL-MCNC: 2.3 MG/DL (ref 1.6–2.6)
MCH RBC QN AUTO: 28.6 PG (ref 25.2–33.5)
MCH RBC QN AUTO: 28.7 PG (ref 25.2–33.5)
MCH RBC QN AUTO: 29.2 PG (ref 25.2–33.5)
MCHC RBC AUTO-ENTMCNC: 31.1 G/DL (ref 28.4–34.8)
MCHC RBC AUTO-ENTMCNC: 32.4 G/DL (ref 28.4–34.8)
MCHC RBC AUTO-ENTMCNC: 33.2 G/DL (ref 28.4–34.8)
MCV RBC AUTO: 87.9 FL (ref 82.6–102.9)
MCV RBC AUTO: 88.2 FL (ref 82.6–102.9)
MCV RBC AUTO: 92.4 FL (ref 82.6–102.9)
MODE: ABNORMAL
MONOCYTES NFR BLD: 0.45 K/UL (ref 0.2–0.8)
MONOCYTES NFR BLD: 1.24 K/UL (ref 0.1–0.8)
MONOCYTES NFR BLD: 2.55 K/UL (ref 0.1–0.8)
MONOCYTES NFR BLD: 4 % (ref 1–7)
MONOCYTES NFR BLD: 4 % (ref 1–7)
MONOCYTES NFR BLD: 8 % (ref 1–7)
MORPHOLOGY: NORMAL
MORPHOLOGY: NORMAL
NEGATIVE BASE EXCESS, ART: 0.9 MMOL/L (ref 0–2)
NEGATIVE BASE EXCESS, ART: 1 MMOL/L (ref 0–2)
NEGATIVE BASE EXCESS, ART: 4.4 MMOL/L (ref 0–2)
NEGATIVE BASE EXCESS, ART: 5.2 MMOL/L (ref 0–2)
NEGATIVE BASE EXCESS, VEN: 9.8 MMOL/L (ref 0–2)
NEUTROPHILS NFR BLD: 39 % (ref 36–66)
NEUTROPHILS NFR BLD: 84 % (ref 36–66)
NEUTROPHILS NFR BLD: 90 % (ref 36–66)
NEUTS SEG NFR BLD: 26.8 K/UL (ref 1.8–7.7)
NEUTS SEG NFR BLD: 27.9 K/UL (ref 1.8–7.7)
NEUTS SEG NFR BLD: 4.41 K/UL (ref 1.8–7.7)
NITRITE UR QL STRIP: NEGATIVE
NRBC BLD-RTO: 0 PER 100 WBC
NRBC BLD-RTO: 0 PER 100 WBC
NRBC BLD-RTO: 2.8 PER 100 WBC
O2 DELIVERY DEVICE: ABNORMAL
O2 DELIVERY DEVICE: ABNORMAL
O2 SAT, VEN: 33.2 % (ref 60–85)
PARTIAL THROMBOPLASTIN TIME: 108.9 SEC (ref 23–36.5)
PARTIAL THROMBOPLASTIN TIME: 64.4 SEC (ref 23–36.5)
PATIENT TEMP: 36.2
PCO2, VEN: 54.1 MM HG (ref 41–51)
PERFORMING LOCATION: ABNORMAL
PERFORMING LOCATION: ABNORMAL
PH UR STRIP: 7 [PH] (ref 5–8)
PH VENOUS: 7.17 (ref 7.32–7.43)
PHOSPHATE SERPL-MCNC: 2.5 MG/DL (ref 2.5–4.5)
PLATELET # BLD AUTO: 211 K/UL (ref 138–453)
PLATELET # BLD AUTO: 272 K/UL (ref 138–453)
PLATELET # BLD AUTO: 273 K/UL (ref 138–453)
PMV BLD AUTO: 10.5 FL (ref 8.1–13.5)
PMV BLD AUTO: 10.5 FL (ref 8.1–13.5)
PMV BLD AUTO: 11.1 FL (ref 8.1–13.5)
PO2, VEN: 26 MM HG (ref 30–50)
POC ANION GAP: 11 MMOL/L (ref 7–16)
POC ANION GAP: 15 MMOL/L (ref 7–16)
POC CREATININE: 0.7 MG/DL (ref 0.51–1.19)
POC CREATININE: 0.8 MG/DL (ref 0.51–1.19)
POC CREATININE: 0.8 MG/DL (ref 0.51–1.19)
POC CREATININE: 0.9 MG/DL (ref 0.51–1.19)
POC CREATININE: 0.9 MG/DL (ref 0.51–1.19)
POC HCO3: 21 MMOL/L (ref 21–28)
POC HCO3: 22 MMOL/L (ref 21–28)
POC HCO3: 23.7 MMOL/L (ref 21–28)
POC HCO3: 25.7 MMOL/L (ref 21–28)
POC HCO3: 26.1 MMOL/L (ref 21–28)
POC HCO3: 27.3 MMOL/L (ref 21–28)
POC HCO3: 27.6 MMOL/L (ref 21–28)
POC HEMOGLOBIN (CALC): 12.5 G/DL (ref 13.5–17.5)
POC HEMOGLOBIN (CALC): 13.4 G/DL (ref 13.5–17.5)
POC HEMOGLOBIN (CALC): 16.8 G/DL (ref 13.5–17.5)
POC HEMOGLOBIN (CALC): 7 G/DL (ref 13.5–17.5)
POC HEMOGLOBIN (CALC): 7.5 G/DL (ref 13.5–17.5)
POC HEMOGLOBIN (CALC): 8.1 G/DL (ref 13.5–17.5)
POC HEMOGLOBIN (CALC): 8.9 G/DL (ref 13.5–17.5)
POC LACTIC ACID: 1 MMOL/L (ref 0.56–1.39)
POC LACTIC ACID: 1.4 MMOL/L (ref 0.56–1.39)
POC LACTIC ACID: 1.6 MMOL/L (ref 0.56–1.39)
POC LACTIC ACID: 1.8 MMOL/L (ref 0.56–1.39)
POC LACTIC ACID: 2.4 MMOL/L (ref 0.56–1.39)
POC LACTIC ACID: 3.2 MMOL/L (ref 0.56–1.39)
POC LACTIC ACID: 3.4 MMOL/L (ref 0.56–1.39)
POC O2 SATURATION: 100 % (ref 94–98)
POC O2 SATURATION: 98.4 % (ref 94–98)
POC O2 SATURATION: 99.6 % (ref 94–98)
POC O2 SATURATION: 99.6 % (ref 94–98)
POC O2 SATURATION: 99.9 % (ref 94–98)
POC PCO2: 38.3 MM HG (ref 35–48)
POC PCO2: 39.7 MM HG (ref 35–48)
POC PCO2: 40 MM HG (ref 35–48)
POC PCO2: 41.6 MM HG (ref 35–48)
POC PCO2: 42.4 MM HG (ref 35–48)
POC PCO2: 44.8 MM HG (ref 35–48)
POC PCO2: 66.1 MM HG (ref 35–48)
POC PH: 7.22 (ref 7.35–7.45)
POC PH: 7.3 (ref 7.35–7.45)
POC PH: 7.3 (ref 7.35–7.45)
POC PH: 7.4 (ref 7.35–7.45)
POC PH: 7.4 (ref 7.35–7.45)
POC PH: 7.42 (ref 7.35–7.45)
POC PH: 7.45 (ref 7.35–7.45)
POC PO2: 124.3 MM HG (ref 83–108)
POC PO2: 176.2 MM HG (ref 83–108)
POC PO2: 212.4 MM HG (ref 83–108)
POC PO2: 283.4 MM HG (ref 83–108)
POC PO2: 339.8 MM HG (ref 83–108)
POC PO2: 362.6 MM HG (ref 83–108)
POC PO2: 453.1 MM HG (ref 83–108)
POSITIVE BASE EXCESS, ART: 0.8 MMOL/L (ref 0–3)
POSITIVE BASE EXCESS, ART: 1.5 MMOL/L (ref 0–3)
POSITIVE BASE EXCESS, ART: 3.3 MMOL/L (ref 0–3)
POTASSIUM BLD-SCNC: 3.7 MMOL/L (ref 3.5–4.5)
POTASSIUM BLD-SCNC: 3.7 MMOL/L (ref 3.5–4.5)
POTASSIUM BLD-SCNC: 4 MMOL/L (ref 3.5–4.5)
POTASSIUM BLD-SCNC: 4.3 MMOL/L (ref 3.5–4.5)
POTASSIUM BLD-SCNC: 4.7 MMOL/L (ref 3.5–4.5)
POTASSIUM BLD-SCNC: 4.9 MMOL/L (ref 3.5–4.5)
POTASSIUM BLD-SCNC: 5 MMOL/L (ref 3.5–4.5)
POTASSIUM SERPL-SCNC: 4 MMOL/L (ref 3.7–5.3)
POTASSIUM SERPL-SCNC: 4.5 MMOL/L (ref 3.7–5.3)
PROT SERPL-MCNC: 6.5 G/DL (ref 6.4–8.3)
PROT SERPL-MCNC: 7.8 G/DL (ref 6.4–8.3)
PROT UR STRIP-MCNC: NEGATIVE MG/DL
PROTHROMBIN TIME: 13.7 SEC (ref 11.7–14.9)
PROTHROMBIN TIME: 15.1 SEC (ref 11.7–14.9)
RBC # BLD AUTO: 5.21 M/UL (ref 4.21–5.77)
RBC # BLD AUTO: 5.49 M/UL (ref 4.21–5.77)
RBC # BLD AUTO: 6.47 M/UL (ref 4.21–5.77)
RBC #/AREA URNS HPF: NORMAL /HPF (ref 0–4)
REACTION TIME TEG W HEPARIN: 12.3 MIN (ref 4.3–8.3)
REACTION TIME TEG W HEPARIN: 9.4 MIN (ref 4.3–8.3)
REACTION TIME TEG: 13.1 MIN (ref 4.6–9.1)
REACTION TIME TEG: >17 MIN (ref 4.6–9.1)
SALICYLATES SERPL-MCNC: <1 MG/DL (ref 3–10)
SAMPLE SITE: ABNORMAL
SAMPLE SITE: ABNORMAL
SARS-COV-2 RNA RESP QL NAA+PROBE: DETECTED
SODIUM BLD-SCNC: 135 MMOL/L (ref 138–146)
SODIUM BLD-SCNC: 137 MMOL/L (ref 138–146)
SODIUM BLD-SCNC: 139 MMOL/L (ref 138–146)
SODIUM BLD-SCNC: 139 MMOL/L (ref 138–146)
SODIUM BLD-SCNC: 141 MMOL/L (ref 138–146)
SODIUM BLD-SCNC: 141 MMOL/L (ref 138–146)
SODIUM BLD-SCNC: 144 MMOL/L (ref 138–146)
SODIUM SERPL-SCNC: 135 MMOL/L (ref 135–144)
SODIUM SERPL-SCNC: 138 MMOL/L (ref 135–144)
SOURCE: ABNORMAL
SP GR UR STRIP: 1.01 (ref 1–1.03)
SPECIMEN DESCRIPTION: ABNORMAL
TOXIC TRICYCLIC SC,BLOOD: NEGATIVE
TROPONIN I SERPL HS-MCNC: 11 NG/L (ref 0–22)
UROBILINOGEN UR STRIP-ACNC: NORMAL EU/DL (ref 0–1)
VAS LEFT GSV BK DIST DIAM: 0.7 MM
VAS LEFT GSV BK MID DIAM: 1.1 MM
VAS LEFT GSV BK PROX DIAM: 1.4 MM
VAS LEFT GSV JUNC DIAM: 4.7 MM
VAS LEFT GSV THIGH DIST DIAM: 1.1 MM
VAS LEFT GSV THIGH MID DIAM: 1.1 MM
VAS LEFT RADIAL DIST AP DIAM: 0.15 CM
VAS LEFT RADIAL DIST TR DIAM: 0.2 CM
VAS LEFT RADIAL MID AP DIAM: 0.18 CM
VAS LEFT RADIAL MID TR DIAM: 0.22 CM
VAS RIGHT BULB EDV: 13 CM/S
VAS RIGHT BULB PSV: 80.8 CM/S
VAS RIGHT CCA DIST EDV: 15.5 CM/S
VAS RIGHT CCA DIST PSV: 73.3 CM/S
VAS RIGHT CCA MID EDV: 16.2 CM/S
VAS RIGHT CCA MID PSV: 47.2 CM/S
VAS RIGHT CCA PROX EDV: 13 CM/S
VAS RIGHT CCA PROX PSV: 60.9 CM/S
VAS RIGHT GSV BK DIST DIAM: 1.4 MM
VAS RIGHT GSV BK MID DIAM: 1.4 MM
VAS RIGHT GSV JUNC DIAM: 3.5 MM
VAS RIGHT GSV THIGH MID DIAM: 1.5 MM
VAS RIGHT ICA PROX PSV: 13.7 CM/S
VAS RIGHT RADIAL DIST AP DIAM: 0.16 CM
VAS RIGHT RADIAL DIST TR DIAM: 0.23 CM
VAS RIGHT RADIAL MID AP DIAM: 0.19 CM
VAS RIGHT RADIAL MID TR DIAM: 0.24 CM
VAS RIGHT RADIAL PROX AP DIAM: 0.18 CM
VAS RIGHT RADIAL PROX TR DIAM: 0.18 CM
VAS RIGHT ULNAR DIST AP DIAM: 0.16 CM
VAS RIGHT ULNAR DIST TR DIAM: 0.23 CM
VAS RIGHT ULNAR MID AP DIAM: 0.18 CM
VAS RIGHT ULNAR MID TR DIAM: 0.19 CM
VAS RIGHT ULNAR PROX AP DIAM: 0.15 CM
VAS RIGHT ULNAR PROX TR DIAM: 0.2 CM
WBC #/AREA URNS HPF: NORMAL /HPF (ref 0–5)
WBC OTHER # BLD: 11.3 K/UL (ref 3.5–11.3)
WBC OTHER # BLD: 31 K/UL (ref 3.5–11.3)
WBC OTHER # BLD: 31.9 K/UL (ref 3.5–11.3)

## 2023-12-26 PROCEDURE — 85730 THROMBOPLASTIN TIME PARTIAL: CPT

## 2023-12-26 PROCEDURE — 85027 COMPLETE CBC AUTOMATED: CPT

## 2023-12-26 PROCEDURE — 2500000003 HC RX 250 WO HCPCS: Performed by: INTERNAL MEDICINE

## 2023-12-26 PROCEDURE — 6360000002 HC RX W HCPCS

## 2023-12-26 PROCEDURE — 33970 AORTIC CIRCULATION ASSIST: CPT | Performed by: INTERNAL MEDICINE

## 2023-12-26 PROCEDURE — 2709999900 HC NON-CHARGEABLE SUPPLY: Performed by: INTERNAL MEDICINE

## 2023-12-26 PROCEDURE — 71045 X-RAY EXAM CHEST 1 VIEW: CPT

## 2023-12-26 PROCEDURE — 33508 ENDOSCOPIC VEIN HARVEST: CPT | Performed by: THORACIC SURGERY (CARDIOTHORACIC VASCULAR SURGERY)

## 2023-12-26 PROCEDURE — 84520 ASSAY OF UREA NITROGEN: CPT

## 2023-12-26 PROCEDURE — 6370000000 HC RX 637 (ALT 250 FOR IP): Performed by: NURSE ANESTHETIST, CERTIFIED REGISTERED

## 2023-12-26 PROCEDURE — 6360000002 HC RX W HCPCS: Performed by: NURSE PRACTITIONER

## 2023-12-26 PROCEDURE — 93458 L HRT ARTERY/VENTRICLE ANGIO: CPT | Performed by: INTERNAL MEDICINE

## 2023-12-26 PROCEDURE — 93930 UPPER EXTREMITY STUDY: CPT

## 2023-12-26 PROCEDURE — 84295 ASSAY OF SERUM SODIUM: CPT

## 2023-12-26 PROCEDURE — 2700000000 HC OXYGEN THERAPY PER DAY

## 2023-12-26 PROCEDURE — 82803 BLOOD GASES ANY COMBINATION: CPT

## 2023-12-26 PROCEDURE — 2500000003 HC RX 250 WO HCPCS: Performed by: EMERGENCY MEDICINE

## 2023-12-26 PROCEDURE — 81001 URINALYSIS AUTO W/SCOPE: CPT

## 2023-12-26 PROCEDURE — 5A1223Z PERFORMANCE OF CARDIAC PACING, CONTINUOUS: ICD-10-PCS | Performed by: INTERNAL MEDICINE

## 2023-12-26 PROCEDURE — 85347 COAGULATION TIME ACTIVATED: CPT

## 2023-12-26 PROCEDURE — 99291 CRITICAL CARE FIRST HOUR: CPT | Performed by: INTERNAL MEDICINE

## 2023-12-26 PROCEDURE — 83605 ASSAY OF LACTIC ACID: CPT

## 2023-12-26 PROCEDURE — 92950 HEART/LUNG RESUSCITATION CPR: CPT

## 2023-12-26 PROCEDURE — 6360000002 HC RX W HCPCS: Performed by: STUDENT IN AN ORGANIZED HEALTH CARE EDUCATION/TRAINING PROGRAM

## 2023-12-26 PROCEDURE — B2151ZZ FLUOROSCOPY OF LEFT HEART USING LOW OSMOLAR CONTRAST: ICD-10-PCS | Performed by: INTERNAL MEDICINE

## 2023-12-26 PROCEDURE — 80048 BASIC METABOLIC PNL TOTAL CA: CPT

## 2023-12-26 PROCEDURE — 93970 EXTREMITY STUDY: CPT

## 2023-12-26 PROCEDURE — 5A1221Z PERFORMANCE OF CARDIAC OUTPUT, CONTINUOUS: ICD-10-PCS | Performed by: THORACIC SURGERY (CARDIOTHORACIC VASCULAR SURGERY)

## 2023-12-26 PROCEDURE — 99152 MOD SED SAME PHYS/QHP 5/>YRS: CPT | Performed by: INTERNAL MEDICINE

## 2023-12-26 PROCEDURE — 2709999900 HC NON-CHARGEABLE SUPPLY: Performed by: THORACIC SURGERY (CARDIOTHORACIC VASCULAR SURGERY)

## 2023-12-26 PROCEDURE — 85025 COMPLETE CBC W/AUTO DIFF WBC: CPT

## 2023-12-26 PROCEDURE — 6360000002 HC RX W HCPCS: Performed by: THORACIC SURGERY (CARDIOTHORACIC VASCULAR SURGERY)

## 2023-12-26 PROCEDURE — 2500000003 HC RX 250 WO HCPCS: Performed by: NURSE ANESTHETIST, CERTIFIED REGISTERED

## 2023-12-26 PROCEDURE — 99223 1ST HOSP IP/OBS HIGH 75: CPT | Performed by: INTERNAL MEDICINE

## 2023-12-26 PROCEDURE — 99255 IP/OBS CONSLTJ NEW/EST HI 80: CPT | Performed by: NURSE PRACTITIONER

## 2023-12-26 PROCEDURE — 6370000000 HC RX 637 (ALT 250 FOR IP): Performed by: STUDENT IN AN ORGANIZED HEALTH CARE EDUCATION/TRAINING PROGRAM

## 2023-12-26 PROCEDURE — 83735 ASSAY OF MAGNESIUM: CPT

## 2023-12-26 PROCEDURE — 86901 BLOOD TYPING SEROLOGIC RH(D): CPT

## 2023-12-26 PROCEDURE — 84100 ASSAY OF PHOSPHORUS: CPT

## 2023-12-26 PROCEDURE — 021109W BYPASS CORONARY ARTERY, TWO ARTERIES FROM AORTA WITH AUTOLOGOUS VENOUS TISSUE, OPEN APPROACH: ICD-10-PCS | Performed by: THORACIC SURGERY (CARDIOTHORACIC VASCULAR SURGERY)

## 2023-12-26 PROCEDURE — 2580000003 HC RX 258

## 2023-12-26 PROCEDURE — 31500 INSERT EMERGENCY AIRWAY: CPT

## 2023-12-26 PROCEDURE — 94761 N-INVAS EAR/PLS OXIMETRY MLT: CPT

## 2023-12-26 PROCEDURE — 80076 HEPATIC FUNCTION PANEL: CPT

## 2023-12-26 PROCEDURE — 85384 FIBRINOGEN ACTIVITY: CPT

## 2023-12-26 PROCEDURE — 87641 MR-STAPH DNA AMP PROBE: CPT

## 2023-12-26 PROCEDURE — 3700000000 HC ANESTHESIA ATTENDED CARE: Performed by: THORACIC SURGERY (CARDIOTHORACIC VASCULAR SURGERY)

## 2023-12-26 PROCEDURE — 02100Z9 BYPASS CORONARY ARTERY, ONE ARTERY FROM LEFT INTERNAL MAMMARY, OPEN APPROACH: ICD-10-PCS | Performed by: THORACIC SURGERY (CARDIOTHORACIC VASCULAR SURGERY)

## 2023-12-26 PROCEDURE — 2500000003 HC RX 250 WO HCPCS: Performed by: STUDENT IN AN ORGANIZED HEALTH CARE EDUCATION/TRAINING PROGRAM

## 2023-12-26 PROCEDURE — C1889 IMPLANT/INSERT DEVICE, NOC: HCPCS | Performed by: INTERNAL MEDICINE

## 2023-12-26 PROCEDURE — 93930 UPPER EXTREMITY STUDY: CPT | Performed by: SURGERY

## 2023-12-26 PROCEDURE — 86900 BLOOD TYPING SEROLOGIC ABO: CPT

## 2023-12-26 PROCEDURE — 2580000003 HC RX 258: Performed by: THORACIC SURGERY (CARDIOTHORACIC VASCULAR SURGERY)

## 2023-12-26 PROCEDURE — 2580000003 HC RX 258: Performed by: NURSE PRACTITIONER

## 2023-12-26 PROCEDURE — 3600000018 HC SURGERY OHS ADDTL 15MIN: Performed by: THORACIC SURGERY (CARDIOTHORACIC VASCULAR SURGERY)

## 2023-12-26 PROCEDURE — 85520 HEPARIN ASSAY: CPT

## 2023-12-26 PROCEDURE — 93970 EXTREMITY STUDY: CPT | Performed by: SURGERY

## 2023-12-26 PROCEDURE — 83036 HEMOGLOBIN GLYCOSYLATED A1C: CPT

## 2023-12-26 PROCEDURE — 4A023N7 MEASUREMENT OF CARDIAC SAMPLING AND PRESSURE, LEFT HEART, PERCUTANEOUS APPROACH: ICD-10-PCS | Performed by: INTERNAL MEDICINE

## 2023-12-26 PROCEDURE — 93005 ELECTROCARDIOGRAM TRACING: CPT | Performed by: EMERGENCY MEDICINE

## 2023-12-26 PROCEDURE — 3700000001 HC ADD 15 MINUTES (ANESTHESIA): Performed by: THORACIC SURGERY (CARDIOTHORACIC VASCULAR SURGERY)

## 2023-12-26 PROCEDURE — 2720000010 HC SURG SUPPLY STERILE: Performed by: THORACIC SURGERY (CARDIOTHORACIC VASCULAR SURGERY)

## 2023-12-26 PROCEDURE — 85014 HEMATOCRIT: CPT

## 2023-12-26 PROCEDURE — 99255 IP/OBS CONSLTJ NEW/EST HI 80: CPT | Performed by: INTERNAL MEDICINE

## 2023-12-26 PROCEDURE — 6360000002 HC RX W HCPCS: Performed by: EMERGENCY MEDICINE

## 2023-12-26 PROCEDURE — C1894 INTRO/SHEATH, NON-LASER: HCPCS | Performed by: INTERNAL MEDICINE

## 2023-12-26 PROCEDURE — 82330 ASSAY OF CALCIUM: CPT

## 2023-12-26 PROCEDURE — 93880 EXTRACRANIAL BILAT STUDY: CPT | Performed by: SURGERY

## 2023-12-26 PROCEDURE — 2500000003 HC RX 250 WO HCPCS

## 2023-12-26 PROCEDURE — 33518 CABG ARTERY-VEIN TWO: CPT | Performed by: THORACIC SURGERY (CARDIOTHORACIC VASCULAR SURGERY)

## 2023-12-26 PROCEDURE — P9045 ALBUMIN (HUMAN), 5%, 250 ML: HCPCS | Performed by: NURSE ANESTHETIST, CERTIFIED REGISTERED

## 2023-12-26 PROCEDURE — 82374 ASSAY BLOOD CARBON DIOXIDE: CPT

## 2023-12-26 PROCEDURE — 93306 TTE W/DOPPLER COMPLETE: CPT | Performed by: INTERNAL MEDICINE

## 2023-12-26 PROCEDURE — A4217 STERILE WATER/SALINE, 500 ML: HCPCS | Performed by: THORACIC SURGERY (CARDIOTHORACIC VASCULAR SURGERY)

## 2023-12-26 PROCEDURE — 6370000000 HC RX 637 (ALT 250 FOR IP): Performed by: THORACIC SURGERY (CARDIOTHORACIC VASCULAR SURGERY)

## 2023-12-26 PROCEDURE — 6360000002 HC RX W HCPCS: Performed by: NURSE ANESTHETIST, CERTIFIED REGISTERED

## 2023-12-26 PROCEDURE — 96375 TX/PRO/DX INJ NEW DRUG ADDON: CPT

## 2023-12-26 PROCEDURE — 0BH17EZ INSERTION OF ENDOTRACHEAL AIRWAY INTO TRACHEA, VIA NATURAL OR ARTIFICIAL OPENING: ICD-10-PCS | Performed by: EMERGENCY MEDICINE

## 2023-12-26 PROCEDURE — G0480 DRUG TEST DEF 1-7 CLASSES: HCPCS

## 2023-12-26 PROCEDURE — 5A2204Z RESTORATION OF CARDIAC RHYTHM, SINGLE: ICD-10-PCS | Performed by: EMERGENCY MEDICINE

## 2023-12-26 PROCEDURE — B24BZZ4 ULTRASONOGRAPHY OF HEART WITH AORTA, TRANSESOPHAGEAL: ICD-10-PCS | Performed by: ANESTHESIOLOGY

## 2023-12-26 PROCEDURE — 93306 TTE W/DOPPLER COMPLETE: CPT

## 2023-12-26 PROCEDURE — 5A1945Z RESPIRATORY VENTILATION, 24-96 CONSECUTIVE HOURS: ICD-10-PCS | Performed by: INTERNAL MEDICINE

## 2023-12-26 PROCEDURE — 80307 DRUG TEST PRSMV CHEM ANLYZR: CPT

## 2023-12-26 PROCEDURE — 86920 COMPATIBILITY TEST SPIN: CPT

## 2023-12-26 PROCEDURE — 85610 PROTHROMBIN TIME: CPT

## 2023-12-26 PROCEDURE — 80179 DRUG ASSAY SALICYLATE: CPT

## 2023-12-26 PROCEDURE — 6360000004 HC RX CONTRAST MEDICATION

## 2023-12-26 PROCEDURE — 82565 ASSAY OF CREATININE: CPT

## 2023-12-26 PROCEDURE — 80143 DRUG ASSAY ACETAMINOPHEN: CPT

## 2023-12-26 PROCEDURE — 3600000008 HC SURGERY OHS BASE: Performed by: THORACIC SURGERY (CARDIOTHORACIC VASCULAR SURGERY)

## 2023-12-26 PROCEDURE — 85055 RETICULATED PLATELET ASSAY: CPT

## 2023-12-26 PROCEDURE — 82947 ASSAY GLUCOSE BLOOD QUANT: CPT

## 2023-12-26 PROCEDURE — 6360000002 HC RX W HCPCS: Performed by: INTERNAL MEDICINE

## 2023-12-26 PROCEDURE — 06BP4ZZ EXCISION OF RIGHT SAPHENOUS VEIN, PERCUTANEOUS ENDOSCOPIC APPROACH: ICD-10-PCS | Performed by: THORACIC SURGERY (CARDIOTHORACIC VASCULAR SURGERY)

## 2023-12-26 PROCEDURE — 80051 ELECTROLYTE PANEL: CPT

## 2023-12-26 PROCEDURE — 94002 VENT MGMT INPAT INIT DAY: CPT

## 2023-12-26 PROCEDURE — 33210 INSERT ELECTRD/PM CATH SNGL: CPT | Performed by: INTERNAL MEDICINE

## 2023-12-26 PROCEDURE — 84132 ASSAY OF SERUM POTASSIUM: CPT

## 2023-12-26 PROCEDURE — 6360000004 HC RX CONTRAST MEDICATION: Performed by: INTERNAL MEDICINE

## 2023-12-26 PROCEDURE — 93880 EXTRACRANIAL BILAT STUDY: CPT

## 2023-12-26 PROCEDURE — 5A02210 ASSISTANCE WITH CARDIAC OUTPUT USING BALLOON PUMP, CONTINUOUS: ICD-10-PCS | Performed by: INTERNAL MEDICINE

## 2023-12-26 PROCEDURE — 33533 CABG ARTERIAL SINGLE: CPT | Performed by: THORACIC SURGERY (CARDIOTHORACIC VASCULAR SURGERY)

## 2023-12-26 PROCEDURE — 2100000001 HC CVICU R&B

## 2023-12-26 PROCEDURE — 93005 ELECTROCARDIOGRAM TRACING: CPT | Performed by: NURSE PRACTITIONER

## 2023-12-26 PROCEDURE — 96374 THER/PROPH/DIAG INJ IV PUSH: CPT

## 2023-12-26 PROCEDURE — C1729 CATH, DRAINAGE: HCPCS | Performed by: THORACIC SURGERY (CARDIOTHORACIC VASCULAR SURGERY)

## 2023-12-26 PROCEDURE — 87086 URINE CULTURE/COLONY COUNT: CPT

## 2023-12-26 PROCEDURE — 99153 MOD SED SAME PHYS/QHP EA: CPT | Performed by: INTERNAL MEDICINE

## 2023-12-26 PROCEDURE — 37799 UNLISTED PX VASCULAR SURGERY: CPT

## 2023-12-26 PROCEDURE — 6370000000 HC RX 637 (ALT 250 FOR IP): Performed by: NURSE PRACTITIONER

## 2023-12-26 PROCEDURE — 2580000003 HC RX 258: Performed by: NURSE ANESTHETIST, CERTIFIED REGISTERED

## 2023-12-26 PROCEDURE — 2500000003 HC RX 250 WO HCPCS: Performed by: NURSE PRACTITIONER

## 2023-12-26 PROCEDURE — 86850 RBC ANTIBODY SCREEN: CPT

## 2023-12-26 PROCEDURE — B2111ZZ FLUOROSCOPY OF MULTIPLE CORONARY ARTERIES USING LOW OSMOLAR CONTRAST: ICD-10-PCS | Performed by: INTERNAL MEDICINE

## 2023-12-26 PROCEDURE — 85576 BLOOD PLATELET AGGREGATION: CPT

## 2023-12-26 PROCEDURE — 83880 ASSAY OF NATRIURETIC PEPTIDE: CPT

## 2023-12-26 PROCEDURE — 84484 ASSAY OF TROPONIN QUANT: CPT

## 2023-12-26 PROCEDURE — 87636 SARSCOV2 & INF A&B AMP PRB: CPT

## 2023-12-26 PROCEDURE — 99285 EMERGENCY DEPT VISIT HI MDM: CPT

## 2023-12-26 DEVICE — HORIZON TI SMALL RED  24 CLIPS/POUCH
Type: IMPLANTABLE DEVICE | Status: FUNCTIONAL
Brand: WECK

## 2023-12-26 DEVICE — FLOW DIRECTED PACING CATHETER
Type: IMPLANTABLE DEVICE | Status: FUNCTIONAL
Brand: PACEL™

## 2023-12-26 RX ORDER — NOREPINEPHRINE BITARTRATE 0.06 MG/ML
INJECTION, SOLUTION INTRAVENOUS CONTINUOUS PRN
Status: COMPLETED | OUTPATIENT
Start: 2023-12-26 | End: 2023-12-26

## 2023-12-26 RX ORDER — PROTAMINE SULFATE 10 MG/ML
INJECTION, SOLUTION INTRAVENOUS
Status: COMPLETED
Start: 2023-12-26 | End: 2023-12-26

## 2023-12-26 RX ORDER — MIDAZOLAM HYDROCHLORIDE 1 MG/ML
1-10 INJECTION, SOLUTION INTRAVENOUS CONTINUOUS
Status: DISCONTINUED | OUTPATIENT
Start: 2023-12-26 | End: 2023-12-26

## 2023-12-26 RX ORDER — METOPROLOL TARTRATE 1 MG/ML
2.5 INJECTION, SOLUTION INTRAVENOUS EVERY 10 MIN PRN
Status: DISCONTINUED | OUTPATIENT
Start: 2023-12-26 | End: 2024-01-05

## 2023-12-26 RX ORDER — POTASSIUM CHLORIDE 7.45 MG/ML
10 INJECTION INTRAVENOUS PRN
Status: DISCONTINUED | OUTPATIENT
Start: 2023-12-26 | End: 2023-12-26

## 2023-12-26 RX ORDER — CLOPIDOGREL BISULFATE 75 MG/1
75 TABLET ORAL DAILY
Status: DISCONTINUED | OUTPATIENT
Start: 2023-12-27 | End: 2024-01-11 | Stop reason: HOSPADM

## 2023-12-26 RX ORDER — NOREPINEPHRINE BITARTRATE 0.06 MG/ML
.01-3.3 INJECTION, SOLUTION INTRAVENOUS CONTINUOUS
Status: DISCONTINUED | OUTPATIENT
Start: 2023-12-26 | End: 2023-12-26

## 2023-12-26 RX ORDER — NOREPINEPHRINE BITARTRATE 0.06 MG/ML
1-100 INJECTION, SOLUTION INTRAVENOUS CONTINUOUS
Status: DISCONTINUED | OUTPATIENT
Start: 2023-12-26 | End: 2023-12-26 | Stop reason: HOSPADM

## 2023-12-26 RX ORDER — PROPOFOL 10 MG/ML
INJECTION, EMULSION INTRAVENOUS PRN
Status: DISCONTINUED | OUTPATIENT
Start: 2023-12-26 | End: 2023-12-26 | Stop reason: SDUPTHER

## 2023-12-26 RX ORDER — PROTAMINE SULFATE 10 MG/ML
INJECTION, SOLUTION INTRAVENOUS PRN
Status: DISCONTINUED | OUTPATIENT
Start: 2023-12-26 | End: 2023-12-26 | Stop reason: SDUPTHER

## 2023-12-26 RX ORDER — POLYETHYLENE GLYCOL 3350 17 G/17G
17 POWDER, FOR SOLUTION ORAL DAILY
Status: DISCONTINUED | OUTPATIENT
Start: 2023-12-27 | End: 2024-01-11 | Stop reason: HOSPADM

## 2023-12-26 RX ORDER — HEPARIN SODIUM 1000 [USP'U]/ML
INJECTION, SOLUTION INTRAVENOUS; SUBCUTANEOUS
Status: COMPLETED
Start: 2023-12-26 | End: 2023-12-26

## 2023-12-26 RX ORDER — POTASSIUM CHLORIDE 29.8 MG/ML
20 INJECTION INTRAVENOUS PRN
Status: DISCONTINUED | OUTPATIENT
Start: 2023-12-26 | End: 2024-01-05

## 2023-12-26 RX ORDER — AMIODARONE HYDROCHLORIDE 200 MG/1
200 TABLET ORAL 3 TIMES DAILY
Status: DISCONTINUED | OUTPATIENT
Start: 2023-12-26 | End: 2023-12-27

## 2023-12-26 RX ORDER — POTASSIUM CHLORIDE 20 MEQ/1
40 TABLET, EXTENDED RELEASE ORAL PRN
Status: DISCONTINUED | OUTPATIENT
Start: 2023-12-26 | End: 2023-12-26

## 2023-12-26 RX ORDER — HEPARIN SODIUM 10000 [USP'U]/100ML
5-30 INJECTION, SOLUTION INTRAVENOUS CONTINUOUS
Status: DISCONTINUED | OUTPATIENT
Start: 2023-12-26 | End: 2023-12-26

## 2023-12-26 RX ORDER — HEPARIN SODIUM 1000 [USP'U]/ML
INJECTION, SOLUTION INTRAVENOUS; SUBCUTANEOUS PRN
Status: DISCONTINUED | OUTPATIENT
Start: 2023-12-26 | End: 2023-12-26 | Stop reason: HOSPADM

## 2023-12-26 RX ORDER — ONDANSETRON 4 MG/1
4 TABLET, ORALLY DISINTEGRATING ORAL EVERY 8 HOURS PRN
Status: DISCONTINUED | OUTPATIENT
Start: 2023-12-26 | End: 2024-01-11 | Stop reason: HOSPADM

## 2023-12-26 RX ORDER — SODIUM CHLORIDE 9 MG/ML
INJECTION, SOLUTION INTRAVENOUS PRN
Status: DISCONTINUED | OUTPATIENT
Start: 2023-12-26 | End: 2024-01-11 | Stop reason: HOSPADM

## 2023-12-26 RX ORDER — PROMETHAZINE HYDROCHLORIDE 12.5 MG/1
12.5 TABLET ORAL EVERY 6 HOURS PRN
Status: DISCONTINUED | OUTPATIENT
Start: 2023-12-26 | End: 2023-12-26

## 2023-12-26 RX ORDER — AMIODARONE HYDROCHLORIDE 200 MG/1
200 TABLET ORAL 3 TIMES DAILY
Status: DISCONTINUED | OUTPATIENT
Start: 2023-12-26 | End: 2023-12-26

## 2023-12-26 RX ORDER — HEPARIN SODIUM 10000 [USP'U]/100ML
INJECTION, SOLUTION INTRAVENOUS CONTINUOUS PRN
Status: COMPLETED | OUTPATIENT
Start: 2023-12-26 | End: 2023-12-26

## 2023-12-26 RX ORDER — SODIUM CHLORIDE 9 MG/ML
INJECTION, SOLUTION INTRAVENOUS PRN
Status: DISCONTINUED | OUTPATIENT
Start: 2023-12-26 | End: 2023-12-26

## 2023-12-26 RX ORDER — PROPOFOL 10 MG/ML
5-50 INJECTION, EMULSION INTRAVENOUS CONTINUOUS
Status: DISCONTINUED | OUTPATIENT
Start: 2023-12-26 | End: 2023-12-26 | Stop reason: HOSPADM

## 2023-12-26 RX ORDER — SODIUM CHLORIDE, SODIUM LACTATE, POTASSIUM CHLORIDE, CALCIUM CHLORIDE 600; 310; 30; 20 MG/100ML; MG/100ML; MG/100ML; MG/100ML
INJECTION, SOLUTION INTRAVENOUS CONTINUOUS PRN
Status: DISCONTINUED | OUTPATIENT
Start: 2023-12-26 | End: 2023-12-26 | Stop reason: SDUPTHER

## 2023-12-26 RX ORDER — EPINEPHRINE 1 MG/ML(1)
AMPUL (ML) INJECTION PRN
Status: DISCONTINUED | OUTPATIENT
Start: 2023-12-26 | End: 2023-12-26 | Stop reason: HOSPADM

## 2023-12-26 RX ORDER — SODIUM CHLORIDE 0.9 % (FLUSH) 0.9 %
10 SYRINGE (ML) INJECTION EVERY 12 HOURS SCHEDULED
Status: DISCONTINUED | OUTPATIENT
Start: 2023-12-26 | End: 2023-12-26

## 2023-12-26 RX ORDER — ROCURONIUM BROMIDE 10 MG/ML
INJECTION, SOLUTION INTRAVENOUS PRN
Status: DISCONTINUED | OUTPATIENT
Start: 2023-12-26 | End: 2023-12-26 | Stop reason: SDUPTHER

## 2023-12-26 RX ORDER — DIPHENHYDRAMINE HCL 25 MG
25 TABLET ORAL NIGHTLY PRN
Status: DISCONTINUED | OUTPATIENT
Start: 2023-12-27 | End: 2024-01-11 | Stop reason: HOSPADM

## 2023-12-26 RX ORDER — ISOFLURANE 1 ML/ML
LIQUID RESPIRATORY (INHALATION)
Status: DISCONTINUED
Start: 2023-12-26 | End: 2023-12-26

## 2023-12-26 RX ORDER — MIDAZOLAM HYDROCHLORIDE 1 MG/ML
INJECTION INTRAMUSCULAR; INTRAVENOUS PRN
Status: DISCONTINUED | OUTPATIENT
Start: 2023-12-26 | End: 2023-12-26 | Stop reason: HOSPADM

## 2023-12-26 RX ORDER — CHLORHEXIDINE GLUCONATE ORAL RINSE 1.2 MG/ML
15 SOLUTION DENTAL ONCE
Status: COMPLETED | OUTPATIENT
Start: 2023-12-26 | End: 2023-12-26

## 2023-12-26 RX ORDER — MAGNESIUM HYDROXIDE 1200 MG/15ML
LIQUID ORAL CONTINUOUS PRN
Status: COMPLETED | OUTPATIENT
Start: 2023-12-26 | End: 2023-12-26

## 2023-12-26 RX ORDER — HEPARIN SODIUM 1000 [USP'U]/ML
60 INJECTION, SOLUTION INTRAVENOUS; SUBCUTANEOUS PRN
Status: DISCONTINUED | OUTPATIENT
Start: 2023-12-26 | End: 2023-12-26

## 2023-12-26 RX ORDER — POLYETHYLENE GLYCOL 3350 17 G/17G
17 POWDER, FOR SOLUTION ORAL DAILY PRN
Status: DISCONTINUED | OUTPATIENT
Start: 2023-12-26 | End: 2023-12-26

## 2023-12-26 RX ORDER — SODIUM CHLORIDE 0.9 % (FLUSH) 0.9 %
5-40 SYRINGE (ML) INJECTION PRN
Status: DISCONTINUED | OUTPATIENT
Start: 2023-12-26 | End: 2024-01-11 | Stop reason: HOSPADM

## 2023-12-26 RX ORDER — SENNA AND DOCUSATE SODIUM 50; 8.6 MG/1; MG/1
1 TABLET, FILM COATED ORAL 2 TIMES DAILY
Status: DISCONTINUED | OUTPATIENT
Start: 2023-12-26 | End: 2024-01-11 | Stop reason: HOSPADM

## 2023-12-26 RX ORDER — FENTANYL CITRATE 50 UG/ML
25 INJECTION, SOLUTION INTRAMUSCULAR; INTRAVENOUS
Status: DISCONTINUED | OUTPATIENT
Start: 2023-12-26 | End: 2024-01-05

## 2023-12-26 RX ORDER — ALBUMIN (HUMAN) 12.5 G/50ML
25 SOLUTION INTRAVENOUS PRN
Status: DISCONTINUED | OUTPATIENT
Start: 2023-12-26 | End: 2024-01-11 | Stop reason: HOSPADM

## 2023-12-26 RX ORDER — PAPAVERINE HYDROCHLORIDE 30 MG/ML
INJECTION INTRAMUSCULAR; INTRAVENOUS
Status: DISCONTINUED
Start: 2023-12-26 | End: 2023-12-26

## 2023-12-26 RX ORDER — METOPROLOL TARTRATE 1 MG/ML
5 INJECTION, SOLUTION INTRAVENOUS ONCE
Status: COMPLETED | OUTPATIENT
Start: 2023-12-26 | End: 2023-12-26

## 2023-12-26 RX ORDER — HYDRALAZINE HYDROCHLORIDE 20 MG/ML
5 INJECTION INTRAMUSCULAR; INTRAVENOUS EVERY 5 MIN PRN
Status: DISCONTINUED | OUTPATIENT
Start: 2023-12-26 | End: 2024-01-05

## 2023-12-26 RX ORDER — MAGNESIUM SULFATE IN WATER 40 MG/ML
2000 INJECTION, SOLUTION INTRAVENOUS PRN
Status: DISCONTINUED | OUTPATIENT
Start: 2023-12-26 | End: 2024-01-05

## 2023-12-26 RX ORDER — OXYCODONE HYDROCHLORIDE AND ACETAMINOPHEN 5; 325 MG/1; MG/1
1 TABLET ORAL EVERY 4 HOURS PRN
Status: DISCONTINUED | OUTPATIENT
Start: 2023-12-26 | End: 2023-12-30

## 2023-12-26 RX ORDER — MIDAZOLAM HYDROCHLORIDE 1 MG/ML
INJECTION INTRAMUSCULAR; INTRAVENOUS
Status: COMPLETED
Start: 2023-12-26 | End: 2023-12-26

## 2023-12-26 RX ORDER — HEPARIN SODIUM 1000 [USP'U]/ML
INJECTION, SOLUTION INTRAVENOUS; SUBCUTANEOUS PRN
Status: DISCONTINUED | OUTPATIENT
Start: 2023-12-26 | End: 2023-12-26 | Stop reason: SDUPTHER

## 2023-12-26 RX ORDER — SODIUM CHLORIDE 0.9 % (FLUSH) 0.9 %
5-40 SYRINGE (ML) INJECTION EVERY 12 HOURS SCHEDULED
Status: DISCONTINUED | OUTPATIENT
Start: 2023-12-26 | End: 2024-01-11 | Stop reason: HOSPADM

## 2023-12-26 RX ORDER — PROPOFOL 10 MG/ML
INJECTION, EMULSION INTRAVENOUS
Status: DISCONTINUED
Start: 2023-12-26 | End: 2023-12-26 | Stop reason: HOSPADM

## 2023-12-26 RX ORDER — FENTANYL CITRATE 0.05 MG/ML
INJECTION, SOLUTION INTRAMUSCULAR; INTRAVENOUS PRN
Status: DISCONTINUED | OUTPATIENT
Start: 2023-12-26 | End: 2023-12-26 | Stop reason: SDUPTHER

## 2023-12-26 RX ORDER — FUROSEMIDE 10 MG/ML
40 INJECTION INTRAMUSCULAR; INTRAVENOUS ONCE
Status: COMPLETED | OUTPATIENT
Start: 2023-12-26 | End: 2023-12-26

## 2023-12-26 RX ORDER — PROPOFOL 10 MG/ML
5-50 INJECTION, EMULSION INTRAVENOUS CONTINUOUS
Status: DISCONTINUED | OUTPATIENT
Start: 2023-12-26 | End: 2023-12-26

## 2023-12-26 RX ORDER — MAGNESIUM SULFATE IN WATER 40 MG/ML
2000 INJECTION, SOLUTION INTRAVENOUS PRN
Status: DISCONTINUED | OUTPATIENT
Start: 2023-12-26 | End: 2023-12-26

## 2023-12-26 RX ORDER — MIDAZOLAM HYDROCHLORIDE 2 MG/2ML
2 INJECTION, SOLUTION INTRAMUSCULAR; INTRAVENOUS ONCE
Status: COMPLETED | OUTPATIENT
Start: 2023-12-26 | End: 2023-12-26

## 2023-12-26 RX ORDER — 0.9 % SODIUM CHLORIDE 0.9 %
30 INTRAVENOUS SOLUTION INTRAVENOUS PRN
Status: DISCONTINUED | OUTPATIENT
Start: 2023-12-26 | End: 2024-01-11 | Stop reason: HOSPADM

## 2023-12-26 RX ORDER — NOREPINEPHRINE BITARTRATE 0.06 MG/ML
.01-.08 INJECTION, SOLUTION INTRAVENOUS CONTINUOUS PRN
Status: DISCONTINUED | OUTPATIENT
Start: 2023-12-26 | End: 2024-01-05

## 2023-12-26 RX ORDER — ONDANSETRON 2 MG/ML
4 INJECTION INTRAMUSCULAR; INTRAVENOUS EVERY 6 HOURS PRN
Status: DISCONTINUED | OUTPATIENT
Start: 2023-12-26 | End: 2023-12-26

## 2023-12-26 RX ORDER — DEXTROSE MONOHYDRATE 100 MG/ML
INJECTION, SOLUTION INTRAVENOUS CONTINUOUS PRN
Status: DISCONTINUED | OUTPATIENT
Start: 2023-12-26 | End: 2024-01-11 | Stop reason: HOSPADM

## 2023-12-26 RX ORDER — BISACODYL 10 MG
10 SUPPOSITORY, RECTAL RECTAL DAILY PRN
Status: DISCONTINUED | OUTPATIENT
Start: 2023-12-26 | End: 2024-01-11 | Stop reason: HOSPADM

## 2023-12-26 RX ORDER — ATORVASTATIN CALCIUM 40 MG/1
40 TABLET, FILM COATED ORAL NIGHTLY
Status: DISCONTINUED | OUTPATIENT
Start: 2023-12-26 | End: 2023-12-26

## 2023-12-26 RX ORDER — ASPIRIN 81 MG/1
81 TABLET ORAL
Status: DISCONTINUED | OUTPATIENT
Start: 2023-12-26 | End: 2023-12-26

## 2023-12-26 RX ORDER — SODIUM CHLORIDE 0.9 % (FLUSH) 0.9 %
10 SYRINGE (ML) INJECTION PRN
Status: DISCONTINUED | OUTPATIENT
Start: 2023-12-26 | End: 2023-12-26

## 2023-12-26 RX ORDER — ACETAMINOPHEN 650 MG/1
650 SUPPOSITORY RECTAL EVERY 6 HOURS PRN
Status: DISCONTINUED | OUTPATIENT
Start: 2023-12-26 | End: 2023-12-26

## 2023-12-26 RX ORDER — SODIUM CHLORIDE 9 MG/ML
INJECTION, SOLUTION INTRAVENOUS PRN
Status: DISCONTINUED | OUTPATIENT
Start: 2023-12-26 | End: 2023-12-28

## 2023-12-26 RX ORDER — ETOMIDATE 2 MG/ML
INJECTION INTRAVENOUS DAILY PRN
Status: COMPLETED | OUTPATIENT
Start: 2023-12-26 | End: 2023-12-26

## 2023-12-26 RX ORDER — MIDAZOLAM HYDROCHLORIDE 1 MG/ML
INJECTION INTRAMUSCULAR; INTRAVENOUS PRN
Status: DISCONTINUED | OUTPATIENT
Start: 2023-12-26 | End: 2023-12-26 | Stop reason: SDUPTHER

## 2023-12-26 RX ORDER — MIDAZOLAM HYDROCHLORIDE 1 MG/ML
2 INJECTION INTRAMUSCULAR; INTRAVENOUS ONCE
Status: COMPLETED | OUTPATIENT
Start: 2023-12-26 | End: 2023-12-26

## 2023-12-26 RX ORDER — PHENYLEPHRINE HCL IN 0.9% NACL 1 MG/10 ML
SYRINGE (ML) INTRAVENOUS PRN
Status: DISCONTINUED | OUTPATIENT
Start: 2023-12-26 | End: 2023-12-26 | Stop reason: SDUPTHER

## 2023-12-26 RX ORDER — SODIUM CHLORIDE 0.9 % (FLUSH) 0.9 %
5-40 SYRINGE (ML) INJECTION EVERY 12 HOURS SCHEDULED
Status: DISCONTINUED | OUTPATIENT
Start: 2023-12-26 | End: 2023-12-26

## 2023-12-26 RX ORDER — IPRATROPIUM BROMIDE AND ALBUTEROL SULFATE 2.5; .5 MG/3ML; MG/3ML
1 SOLUTION RESPIRATORY (INHALATION) EVERY 4 HOURS PRN
Status: DISCONTINUED | OUTPATIENT
Start: 2023-12-26 | End: 2024-01-11 | Stop reason: HOSPADM

## 2023-12-26 RX ORDER — PROPOFOL 10 MG/ML
10 INJECTION, EMULSION INTRAVENOUS CONTINUOUS
Status: DISCONTINUED | OUTPATIENT
Start: 2023-12-26 | End: 2024-01-02

## 2023-12-26 RX ORDER — NOREPINEPHRINE BITARTRATE 0.06 MG/ML
1-100 INJECTION, SOLUTION INTRAVENOUS CONTINUOUS
Status: DISCONTINUED | OUTPATIENT
Start: 2023-12-26 | End: 2023-12-26

## 2023-12-26 RX ORDER — ALBUMIN, HUMAN INJ 5% 5 %
25 SOLUTION INTRAVENOUS PRN
Status: DISCONTINUED | OUTPATIENT
Start: 2023-12-26 | End: 2024-01-11 | Stop reason: HOSPADM

## 2023-12-26 RX ORDER — VANCOMYCIN HYDROCHLORIDE 1 G/20ML
INJECTION, POWDER, LYOPHILIZED, FOR SOLUTION INTRAVENOUS PRN
Status: DISCONTINUED | OUTPATIENT
Start: 2023-12-26 | End: 2023-12-26 | Stop reason: SDUPTHER

## 2023-12-26 RX ORDER — OXYCODONE HYDROCHLORIDE AND ACETAMINOPHEN 5; 325 MG/1; MG/1
2 TABLET ORAL EVERY 4 HOURS PRN
Status: DISCONTINUED | OUTPATIENT
Start: 2023-12-26 | End: 2023-12-30

## 2023-12-26 RX ORDER — SUCCINYLCHOLINE CHLORIDE 20 MG/ML
INJECTION INTRAMUSCULAR; INTRAVENOUS DAILY PRN
Status: COMPLETED | OUTPATIENT
Start: 2023-12-26 | End: 2023-12-26

## 2023-12-26 RX ORDER — CHLORHEXIDINE GLUCONATE 4 G/100ML
SOLUTION TOPICAL SEE ADMIN INSTRUCTIONS
Status: DISCONTINUED | OUTPATIENT
Start: 2023-12-26 | End: 2023-12-26

## 2023-12-26 RX ORDER — ASPIRIN 81 MG/1
81 TABLET, CHEWABLE ORAL DAILY
Status: DISCONTINUED | OUTPATIENT
Start: 2023-12-26 | End: 2023-12-26

## 2023-12-26 RX ORDER — CALCIUM CHLORIDE 100 MG/ML
INJECTION INTRAVENOUS; INTRAVENTRICULAR PRN
Status: DISCONTINUED | OUTPATIENT
Start: 2023-12-26 | End: 2023-12-26 | Stop reason: SDUPTHER

## 2023-12-26 RX ORDER — ONDANSETRON 2 MG/ML
4 INJECTION INTRAMUSCULAR; INTRAVENOUS EVERY 6 HOURS PRN
Status: DISCONTINUED | OUTPATIENT
Start: 2023-12-26 | End: 2024-01-11 | Stop reason: HOSPADM

## 2023-12-26 RX ORDER — SODIUM CHLORIDE 9 MG/ML
INJECTION, SOLUTION INTRAVENOUS CONTINUOUS
Status: DISCONTINUED | OUTPATIENT
Start: 2023-12-27 | End: 2023-12-26

## 2023-12-26 RX ORDER — ASPIRIN 81 MG/1
81 TABLET ORAL DAILY
Status: DISCONTINUED | OUTPATIENT
Start: 2023-12-27 | End: 2024-01-05

## 2023-12-26 RX ORDER — ALBUMIN, HUMAN INJ 5% 5 %
SOLUTION INTRAVENOUS PRN
Status: DISCONTINUED | OUTPATIENT
Start: 2023-12-26 | End: 2023-12-26 | Stop reason: SDUPTHER

## 2023-12-26 RX ORDER — ATORVASTATIN CALCIUM 20 MG/1
20 TABLET, FILM COATED ORAL NIGHTLY
Status: DISCONTINUED | OUTPATIENT
Start: 2023-12-27 | End: 2024-01-08

## 2023-12-26 RX ORDER — PROPOFOL 10 MG/ML
INJECTION, EMULSION INTRAVENOUS CONTINUOUS PRN
Status: DISCONTINUED | OUTPATIENT
Start: 2023-12-26 | End: 2023-12-26 | Stop reason: SDUPTHER

## 2023-12-26 RX ORDER — PROPOFOL 10 MG/ML
INJECTION, EMULSION INTRAVENOUS
Status: DISCONTINUED
Start: 2023-12-26 | End: 2023-12-26

## 2023-12-26 RX ORDER — FENTANYL CITRATE 50 UG/ML
50 INJECTION, SOLUTION INTRAMUSCULAR; INTRAVENOUS
Status: DISCONTINUED | OUTPATIENT
Start: 2023-12-26 | End: 2024-01-05

## 2023-12-26 RX ORDER — PROPOFOL 10 MG/ML
5-50 INJECTION, EMULSION INTRAVENOUS ONCE
Status: COMPLETED | OUTPATIENT
Start: 2023-12-26 | End: 2023-12-26

## 2023-12-26 RX ORDER — MEPERIDINE HYDROCHLORIDE 50 MG/ML
25 INJECTION INTRAMUSCULAR; INTRAVENOUS; SUBCUTANEOUS
Status: ACTIVE | OUTPATIENT
Start: 2023-12-26 | End: 2023-12-27

## 2023-12-26 RX ORDER — VANCOMYCIN HYDROCHLORIDE 1 G/20ML
INJECTION, POWDER, LYOPHILIZED, FOR SOLUTION INTRAVENOUS
Status: COMPLETED
Start: 2023-12-26 | End: 2023-12-26

## 2023-12-26 RX ORDER — ACETAMINOPHEN 325 MG/1
650 TABLET ORAL EVERY 6 HOURS PRN
Status: DISCONTINUED | OUTPATIENT
Start: 2023-12-26 | End: 2023-12-26

## 2023-12-26 RX ORDER — HEPARIN SODIUM 1000 [USP'U]/ML
30 INJECTION, SOLUTION INTRAVENOUS; SUBCUTANEOUS PRN
Status: DISCONTINUED | OUTPATIENT
Start: 2023-12-26 | End: 2023-12-26

## 2023-12-26 RX ADMIN — PROPOFOL 30 MCG/KG/MIN: 10 INJECTION, EMULSION INTRAVENOUS at 16:25

## 2023-12-26 RX ADMIN — CALCIUM CHLORIDE 0.5 G: 100 INJECTION INTRAVENOUS; INTRAVENTRICULAR at 22:12

## 2023-12-26 RX ADMIN — PROTAMINE SULFATE 300 MG: 10 INJECTION, SOLUTION INTRAVENOUS at 21:10

## 2023-12-26 RX ADMIN — METOPROLOL TARTRATE 5 MG: 5 INJECTION INTRAVENOUS at 09:47

## 2023-12-26 RX ADMIN — HEPARIN SODIUM 18.8 UNITS/KG/HR: 10000 INJECTION, SOLUTION INTRAVENOUS at 14:42

## 2023-12-26 RX ADMIN — ROCURONIUM BROMIDE 25 MG: 10 INJECTION, SOLUTION INTRAVENOUS at 20:22

## 2023-12-26 RX ADMIN — ROCURONIUM BROMIDE 25 MG: 10 INJECTION, SOLUTION INTRAVENOUS at 18:20

## 2023-12-26 RX ADMIN — ROCURONIUM BROMIDE 30 MG: 10 INJECTION, SOLUTION INTRAVENOUS at 21:11

## 2023-12-26 RX ADMIN — CHLORHEXIDINE GLUCONATE 15 ML: 1.2 RINSE ORAL at 17:00

## 2023-12-26 RX ADMIN — FENTANYL CITRATE 150 MCG: 50 INJECTION INTRAVENOUS at 18:17

## 2023-12-26 RX ADMIN — PROPOFOL 10 MCG/KG/MIN: 10 INJECTION, EMULSION INTRAVENOUS at 09:54

## 2023-12-26 RX ADMIN — SUCCINYLCHOLINE CHLORIDE 150 MG: 20 INJECTION, SOLUTION INTRAMUSCULAR; INTRAVENOUS at 09:26

## 2023-12-26 RX ADMIN — ROCURONIUM BROMIDE 25 MG: 10 INJECTION, SOLUTION INTRAVENOUS at 19:35

## 2023-12-26 RX ADMIN — SODIUM CHLORIDE, POTASSIUM CHLORIDE, SODIUM LACTATE AND CALCIUM CHLORIDE: 600; 310; 30; 20 INJECTION, SOLUTION INTRAVENOUS at 17:35

## 2023-12-26 RX ADMIN — Medication 100 MCG: at 18:22

## 2023-12-26 RX ADMIN — Medication 1000 MG: at 18:17

## 2023-12-26 RX ADMIN — FUROSEMIDE 40 MG: 10 INJECTION, SOLUTION INTRAMUSCULAR; INTRAVENOUS at 15:20

## 2023-12-26 RX ADMIN — ALBUMIN (HUMAN) 12.5 G: 12.5 INJECTION, SOLUTION INTRAVENOUS at 21:32

## 2023-12-26 RX ADMIN — VANCOMYCIN HYDROCHLORIDE 1000 MG: 1 INJECTION, POWDER, LYOPHILIZED, FOR SOLUTION INTRAVENOUS at 18:14

## 2023-12-26 RX ADMIN — Medication 100 MCG: at 19:19

## 2023-12-26 RX ADMIN — MIDAZOLAM HYDROCHLORIDE 2 MG: 1 INJECTION, SOLUTION INTRAMUSCULAR; INTRAVENOUS at 14:55

## 2023-12-26 RX ADMIN — Medication 50 MCG: at 19:28

## 2023-12-26 RX ADMIN — ETOMIDATE INJECTION 10 MG: 2 SOLUTION INTRAVENOUS at 09:26

## 2023-12-26 RX ADMIN — SODIUM BICARBONATE 25 MEQ: 84 INJECTION, SOLUTION INTRAVENOUS at 16:02

## 2023-12-26 RX ADMIN — SODIUM BICARBONATE 25 MEQ: 84 INJECTION, SOLUTION INTRAVENOUS at 16:01

## 2023-12-26 RX ADMIN — MIDAZOLAM 2 MG: 1 INJECTION INTRAMUSCULAR; INTRAVENOUS at 17:39

## 2023-12-26 RX ADMIN — MIDAZOLAM HYDROCHLORIDE 2 MG: 2 INJECTION, SOLUTION INTRAMUSCULAR; INTRAVENOUS at 14:55

## 2023-12-26 RX ADMIN — AMIODARONE HYDROCHLORIDE 1 MG/MIN: 50 INJECTION, SOLUTION INTRAVENOUS at 16:51

## 2023-12-26 RX ADMIN — Medication 25 MCG/HR: at 15:20

## 2023-12-26 RX ADMIN — FENTANYL CITRATE 100 MCG: 50 INJECTION INTRAVENOUS at 21:11

## 2023-12-26 RX ADMIN — AMINOCAPROIC ACID 5 G/HR: 250 INJECTION, SOLUTION INTRAVENOUS at 17:35

## 2023-12-26 RX ADMIN — PROPOFOL 25 MCG/KG/MIN: 10 INJECTION, EMULSION INTRAVENOUS at 22:10

## 2023-12-26 RX ADMIN — HEPARIN SODIUM 20000 UNITS: 1000 INJECTION INTRAVENOUS; SUBCUTANEOUS at 19:20

## 2023-12-26 RX ADMIN — FENTANYL CITRATE 150 MCG: 50 INJECTION INTRAVENOUS at 17:54

## 2023-12-26 RX ADMIN — Medication 100 MCG: at 19:32

## 2023-12-26 RX ADMIN — ALBUMIN (HUMAN) 12.5 G: 12.5 INJECTION, SOLUTION INTRAVENOUS at 21:37

## 2023-12-26 RX ADMIN — ROCURONIUM BROMIDE 50 MG: 10 INJECTION, SOLUTION INTRAVENOUS at 17:40

## 2023-12-26 RX ADMIN — MIDAZOLAM 2 MG: 1 INJECTION INTRAMUSCULAR; INTRAVENOUS at 20:26

## 2023-12-26 RX ADMIN — Medication 100 MCG: at 19:29

## 2023-12-26 RX ADMIN — Medication 100 MCG: at 18:52

## 2023-12-26 RX ADMIN — SODIUM CHLORIDE 1 UNITS/HR: 9 INJECTION, SOLUTION INTRAVENOUS at 20:44

## 2023-12-26 RX ADMIN — ASPIRIN 81 MG: 81 TABLET, CHEWABLE ORAL at 15:20

## 2023-12-26 RX ADMIN — PROPOFOL 30 MG: 10 INJECTION, EMULSION INTRAVENOUS at 18:56

## 2023-12-26 RX ADMIN — FUROSEMIDE 40 MG: 10 INJECTION, SOLUTION INTRAMUSCULAR; INTRAVENOUS at 13:40

## 2023-12-26 RX ADMIN — FENTANYL CITRATE 250 MCG: 50 INJECTION INTRAVENOUS at 18:20

## 2023-12-26 RX ADMIN — EPINEPHRINE 0.04 MCG/KG/MIN: 1 INJECTION INTRAMUSCULAR; INTRAVENOUS; SUBCUTANEOUS at 20:32

## 2023-12-26 RX ADMIN — PROPOFOL 30 MCG/KG/MIN: 10 INJECTION, EMULSION INTRAVENOUS at 14:06

## 2023-12-26 RX ADMIN — MIDAZOLAM 2 MG: 1 INJECTION INTRAMUSCULAR; INTRAVENOUS at 09:51

## 2023-12-26 RX ADMIN — FENTANYL CITRATE 50 MCG: 50 INJECTION INTRAVENOUS at 22:04

## 2023-12-26 RX ADMIN — ROCURONIUM BROMIDE 25 MG: 10 INJECTION, SOLUTION INTRAVENOUS at 19:12

## 2023-12-26 RX ADMIN — SODIUM CHLORIDE 0.3 MCG/KG/MIN: 9 INJECTION, SOLUTION INTRAVENOUS at 17:35

## 2023-12-26 RX ADMIN — FENTANYL CITRATE 50 MCG: 50 INJECTION, SOLUTION INTRAMUSCULAR; INTRAVENOUS at 23:00

## 2023-12-26 ASSESSMENT — PULMONARY FUNCTION TESTS
PIF_VALUE: 19
PIF_VALUE: 18
PIF_VALUE: 18
PIF_VALUE: 13
PIF_VALUE: 14
PIF_VALUE: 22
PIF_VALUE: 17
PIF_VALUE: 17
PIF_VALUE: 16
PIF_VALUE: 24
PIF_VALUE: 9
PIF_VALUE: 13
PIF_VALUE: 22

## 2023-12-26 NOTE — CONSULTS
[facility] Cardiothoracic Surgery  Consult    Patient's Name/Date of Birth: Lee Mejia / 1960 (63 y.o.)    Date: December 26, 2023     Chief Complaint: LM disease-VFIB arrest     HPI: Lee Mejia is a 63 y.o.  Mediterranean male who presented to cardiothoracic surgery from [facility] after he went to the ER with chest pain.  In the emergency department patient had V-fib arrest required CPR.  ROSC was obtained and patient taken stat to cardiac catheterization.  In cardiac Cath Lab patient noted to have left main disease along with ostial LAD disease with good TESSIE flow.    Patient has no known medical concerns that would be contraindicated for surgery.  History of smoking with COPD.  Does not take any AC or AP medication.    On arrival to [facility] patient had what was described as a seizure-like activity and was given 5 mg of Versed stat and propofol bolus.    Neurology, pulmonary, infectious disease follow-up consulted stat for evaluation of anoxic brain injury, tested positive for COVID and pulmonary for vent management.    Currently patient does not show any acute distress intubated with IABP in place.  Labs noted to be stable from [facility].    Imaging ordered stat to begin evaluation for potential bypass surgery.      ROS:   CONSTITUTIONAL:  PRAVEEN    Past Medical History:   Diagnosis Date    COPD (chronic obstructive pulmonary disease) (HCC)     COPD (chronic obstructive pulmonary disease) (HCC)     Diabetes mellitus (HCC)     controlled with diet    Diabetes mellitus (HCC)     Mixed hyperlipidemia 03/09/2020     Past Surgical History:   Procedure Laterality Date    COLONOSCOPY  01/05/2015    TOOTH EXTRACTION  11/06/14     No Known Allergies  Family History   Problem Relation Age of Onset    High Blood Pressure Mother     High Blood Pressure Father     Cancer Brother      Social History     Socioeconomic History    Marital status: Single     Spouse name: Not on file    Number of children:  Benito Gillis MD        acetaminophen (TYLENOL) tablet 650 mg  650 mg Oral Q6H PRN Benito Gillis MD        Or    acetaminophen (TYLENOL) suppository 650 mg  650 mg Rectal Q6H PRN Benito Gillis MD        fentaNYL 50 mcg/mL 50 mL infusion   mcg/hr IntraVENous Continuous Benito Gillis MD        propofol infusion  5-50 mcg/kg/min IntraVENous Continuous Benito Gillis MD        heparin (porcine) injection 3,190 Units  60 Units/kg IntraVENous PRN Benito Gillis MD        heparin (porcine) injection 1,590 Units  30 Units/kg IntraVENous PRN Benito Gillis MD        heparin 25,000 units in dextrose 5% 250 mL (premix) infusion  5-30 Units/kg/hr IntraVENous Continuous Benito Gillis MD        aspirin chewable tablet 81 mg  81 mg Oral Daily Benito Gillis MD        atorvastatin (LIPITOR) tablet 40 mg  40 mg Oral Nightly Benito Gillis MD           Physical Exam:  Vitals:    12/26/23 1321   Pulse: 74   Resp: 20     Weight:               General: had tremors noted. Unable to eval  HEENT:  Normocephalic and atraumatic. PERRL. EOMI. Lips and oral mucosa moist and without lesions.  Neck:  Supple. Trachea midline.  Chest:  No abnormality.  Equal and symmetric expansion with respiration.  Lungs:  bilateral rhonchi noted   Cardiac:  Regular rate and rhythm without murmurs, rubs or gallops.   Abdomen:  Soft, non-tender, normoactive bowel sounds. No masses or organomegaly.  Extremities:  No cyanosis, clubbing, or edema.  Intact pulses in all four extremities.  Musculoskeletal:  Intact range of motion of peripheral joints.  Normal muscular strength.  Neurologic:  on entry pt had what might of been seizure like activity. PRAVEEN due heavy sedation at this time  Psychiatric: PRAVEEN      Imaging Studies:    Cardiac Cath:Left main: 80% distal left main extending into ostial LAD     LAD: 90% ostial LAD with TESSIE-3 flow     LCX: Dominant with

## 2023-12-26 NOTE — PROGRESS NOTES
[facility]    Shift date: 12/26/2023  Shift day: Tuesday   Shift # 1    Room # 1027/1027-01   Name: Lee Mejia                Roman Catholic: Family of \"mixed\" Episcopal backgrounds   Place of Hoahaoism: Unknown    Referral: Rapid Response and from  at [facility]    Admit Date & Time: 12/26/2023  1:07 PM    Assessment:  Lee Mejia is a 63 y.o. male in the hospital because of ST elevation myocardial infarction. Upon entering the hallway, outside patient's room writer observes many medical staff in room.  went to waiting room to offer emotional and spiritual support and accompaniment to as many as 15 family members and friends at one time. Many family members displayed signs of anxiety and powerlessness as they awaited updates from medical staff and to learn if they would be permitted to see the patient.      Intervention:  Writer introduced self and title as , present to offer emotional and spiritual support. Offered active listening and accompaniment to 15 family members and friends; deescalated some of family members' expressions of anxiety; offered sustained presence and spoken prayers.      Outcome:  Family members utilized  presence as a resource for emotional and spiritual support and expressed gratitude for accompaniment and prayers.    Plan:  Chaplains will remain available to offer spiritual and emotional support as needed.      Electronically signed by Chaplain Johnathan, on 12/26/2023 at 3:14 PM.  [facility]  [phone]     12/26/23 1505   Encounter Summary   Encounter Overview/Reason  Crisis;Follow-up   Service Provided For: Patient not available;Family   Referral/Consult From: Other ;Multi-disciplinary team   Support System Family members   Last Encounter  12/26/23   Complexity of Encounter High   Begin Time 1310   End Time  1456   Total Time Calculated 106 min   Crisis   Type

## 2023-12-26 NOTE — PROGRESS NOTES
[facility] Cardiothoracic Surgery  Pre-op Note    12/26/2023    Lee Mejia is scheduled for surgery today.  All of the pertinent studies have been reviewed and patient is NPO.      I have discussed the procedure with the patient and family, and they have been given opportunity to ask questions.     The attendant risks, benefits, and possible outcomes have been discussed with them.  They understand and have signed informed consent.      David Bañuelos MD    Patient has a critical left main with V. Fib arrest.  Have explained the elevated risks to girlfriend, daughter (POA) and family members.

## 2023-12-26 NOTE — CONSULTS
[facility] -   Infectious diseases evaluation  admission date 12/26/2023    reason for consultation:   Covid  - surg clearance    Impression :   Current:  Anterolateral NSTEMI  Card cath complicated w complete AV block and placed a temporary pace maker  High grade left main ostial disease 12/26  LCX 20-30 - RCA 20 - 30LVEF 35  Card arrest due to the AV block -  CPR less than a min to achieve ROSC  Cardiogenic shock  Intra aortic balloon placed  Bandemia 31  Flash Pulm edema - sp white red, no clear bacterial pneumonia   Resp failure from the card arrest   intubated  Covid test +  COPD  DM    Other:    Discussion / summary of stay / plan of care     Recommendations   Planned for urgent CABG TODAY 12/26  COVID + but not clear if causing disease  No contraindication to the CABG 12/26  Start remdesevir after the CABG  Bandemia is reactive to the MI and arrest  Disc w RN and pulm     Infection Control Recommendations   [place] Precautions  Contact Isolation   Antimicrobial Stewardship Recommendations   Simplification of therapy  Targeted therapy    History of Present Illness:   Initial history:  Lee Mejia is a 63 y.o.-year-old male admitted w severe chest pain and NSTEMI  Over exerts himself and has COPD  Taken to card cath and had a complete HB and pacer placed  He is intubated w resp failure and P edema  on CXR  WBC elevated and no clear infection  Tested + covid and ID called for clearance for surgery  He is on pressors for card failure and cardiogenic shock  According to the girl friend she had worked  long hours last 4 days and can't tell about cough or other symptoms -      Pt now is on the vent - no cellulitis -  UA ng  BC pend          Interval changes  12/26/2023   Patient Vitals for the past 8 hrs:   BP Temp Pulse Resp SpO2 Height Weight   12/26/23 1628 -- -- 88 20 97 % -- --   12/26/23 1603 -- -- -- -- -- 1.626 m (5' 4\") 53.1 kg (117 lb)   12/26/23 1515

## 2023-12-26 NOTE — H&P
[facility]    H&P               Today's Date: 12/26/2023  Patient Name: Lee Mejia  Date of admission: 12/26/2023  1:07 PM  Patient's age: 63 y.o., 1960  Admission Dx: STEMI (ST elevation myocardial infarction) ([facility]) [I21.3]    History Obtained From: patient and chart review     History of Present Illness:    This patient 63 y.o. years old male with past medical history as below.     Patient presented to [facility] with sudden onset severe chest pain that woke him up from sleep.  Patient went into V-fib cardiac arrest while in the ER at [facility] requiring defibrillation and converted to normal sinus rhythm.  EKG was consistent with anterolateral STEMI and patient was emergently taken to Cath Lab at [facility].  Coronary angiogram showed high-grade left main and ostial LAD disease with a dominant left circumflex system.  Due to cardiogenic shock, IABP was placed as well.  Patient did develop complete heart block during cardiac cath and a temporary pacemaker was inserted via the left femoral vein approach.  Patient was initially admitted to medical ICU at [facility] where he did coded again with very brief CPR and achievement of ROSC.  Patient was subsequently transferred to [place] for CT surgery evaluation for emergent CABG.  On my evaluation, patient is intubated.  He is showing some purposeful movements when he is not on any sedation.  Pinpointed pupils bilaterally.  IABP in place with 1:1 augmentation.  Currently running on IV heparin drip.  He is also requiring Levophed.    Past Medical History:   has a past medical history of COPD (chronic obstructive pulmonary disease) ([facility]), COPD (chronic obstructive pulmonary disease) ([facility]), Diabetes mellitus (HCC), Diabetes mellitus (HCC), Left main coronary artery disease, and Mixed hyperlipidemia.    Past Surgical History:   has a past surgical history that includes Tooth Extraction (11/06/14) and Colonoscopy  (01/05/2015).     Home Medications:    Prior to Admission medications    Medication Sig Start Date End Date Taking? Authorizing Provider   albuterol-ipratropium (COMBIVENT)  MCG/ACT inhaler Inhale 2 puffs into the lungs every 6 hours as needed for Wheezing    Provider, MD Candie   nicotine (NICODERM CQ) 21 MG/24HR Place 1 patch onto the skin every 24 hours. 2/19/15   Lorrie Serrato MD   aspirin EC 81 MG EC tablet Take 1 tablet by mouth daily. 11/18/14   Lorrie Serrato MD   albuterol (VENTOLIN HFA) 108 (90 BASE) MCG/ACT inhaler Inhale 2 puffs into the lungs every 6 hours as needed for Wheezing. 11/18/14   Lorrie Serrato MD       Allergies:  Patient has no known allergies.    Social History:   reports that he has been smoking cigarettes. He has a 10.0 pack-year smoking history. He has never used smokeless tobacco. He reports that he does not drink alcohol and does not use drugs.     Family History: family history includes Cancer in his brother; High Blood Pressure in his father and mother.     REVIEW OF SYSTEMS:    Not able to obtain as intubated    PHYSICAL EXAM:      Pulse 74   Resp 20    Constitutional and General Appearance: Intubated and sedated   HEENT: atraumatic, normocephalic.   Respiratory:  Bilateral ventilatory breath sounds present  Cardiovascular:  Regular S1 and S2.  No JVD  Peripheral pulses are symmetrical and full   Abdomen:   Soft abdomen  No organomegaly  Bowel sounds present  Extremities:  No LE edema or cyanosis   Neurological:  Intubated      DATA:    EKG:   Anterolateral STEs    ECHO:   ordered, but not yet obtained.     Stress Test:   not obtained.    Cardiac Angiography:   11/26/23  Findings:     Left main: 80% distal left main extending into ostial LAD     LAD: 90% ostial LAD with TESSIE-3 flow     LCX: Dominant with luminal irregularities of 20 to 30%     RCA: Luminal irregularities of 20 to 30%     The LV gram was performed in the WITT 30 position.   LVEF:

## 2023-12-26 NOTE — PROGRESS NOTES
1228:  Sudden loss of augmentation on IABP and pressures. No palpable pulses. CPR started. Increased Norepi drip. Code Blue called. 1229: Carotid pulse felt, BP and augmentation normalizing. Cancel code.

## 2023-12-26 NOTE — PLAN OF CARE
Problem: Respiratory - Adult  Goal: Achieves optimal ventilation and oxygenation  Outcome: Progressing     Problem: OXYGENATION/RESPIRATORY FUNCTION  Goal: Patient will maintain patent airway  Outcome: Ongoing  Goal: Patient will achieve/maintain normal respiratory rate/effort  Respiratory rate and effort will be within normal limits for the patient  Outcome: Ongoing    Problem: MECHANICAL VENTILATION  Goal: Patient will maintain patent airway  Outcome: Ongoing  Goal: Oral health is maintained or improved  Outcome: Ongoing  Goal: ET tube will be managed safely  Outcome: Ongoing  Goal: Ability to express needs and understand communication  Outcome: Ongoing  Goal: Mobility/activity is maintained at optimum level for patient  Outcome: Ongoing    Problem: ASPIRATION PRECAUTIONS  Goal: Patient’s risk of aspiration is minimized  Outcome: Ongoing    Problem: SKIN INTEGRITY  Goal: Skin integrity is maintained or improved  Outcome: Ongoing

## 2023-12-26 NOTE — ANESTHESIA PRE PROCEDURE
Department of Anesthesiology  Preprocedure Note       Name:  Jennifer Willis   Age:  70 y.o.  :  1960                                          MRN:  [id number]         Date:  2023      Surgeon: Khadijah Cerda):  Shasha Acevedo MD    Procedure: Procedure(s):  CABG CORONARY ARTERY BYPASS x2, ON PUMP, SWAN, ANGELLA    Medications prior to admission:   Prior to Admission medications    Medication Sig Start Date End Date Taking? Authorizing Provider   albuterol-ipratropium (COMBIVENT)  MCG/ACT inhaler Inhale 2 puffs into the lungs every 6 hours as needed for Wheezing    Provider, MD Candie   nicotine (NICODERM CQ) 21 MG/24HR Place 1 patch onto the skin every 24 hours. 2/19/15   Peter Tafoya MD   aspirin EC 81 MG EC tablet Take 1 tablet by mouth daily. 14   Mina Serrato MD   albuterol (VENTOLIN HFA) 108 (90 BASE) MCG/ACT inhaler Inhale 2 puffs into the lungs every 6 hours as needed for Wheezing.  14   Peter Tafoya MD       Current medications:    Current Facility-Administered Medications   Medication Dose Route Frequency Provider Last Rate Last Admin    sodium chloride flush 0.9 % injection 10 mL  10 mL IntraVENous 2 times per day Jackie Sevilla MD        sodium chloride flush 0.9 % injection 10 mL  10 mL IntraVENous PRN Jackie Sevilla MD        0.9 % sodium chloride infusion   IntraVENous PRN Jackie Sevilla MD        potassium chloride (KLOR-CON M) extended release tablet 40 mEq  40 mEq Oral PRN Jackie Sevilla MD        Or    potassium bicarb-citric acid (EFFER-K) effervescent tablet 40 mEq  40 mEq Oral PRN Jackie Sevilla MD        Or    potassium chloride 10 mEq/100 mL IVPB (Peripheral Line)  10 mEq IntraVENous PRN Jackie Sevilla MD        magnesium sulfate 2000 mg in 50 mL IVPB premix  2,000 mg IntraVENous PRN Jackie Sevilla MD        promethazine (PHENERGAN) tablet 12.5 mg  12.5 mg Oral Q6H PRN

## 2023-12-26 NOTE — H&P
[facility]  In PatientCardiology Consult             Date:   12/26/2023  Patient name: Aruna Fisher  Date of admission:  12/26/2023  9:22 AM  MRN:   [id number]  YOB: 1960      Date:   12/26/2023  Patient name: Aruna Fisher  Date of admission:  12/26/2023  9:22 AM  MRN:   [id number]  YOB: 1960    Reason for Admission: Cardiac arrest    CHIEF COMPLAINT: Chest pain    History Obtained From:  Patient and medical records. HISTORY OF PRESENT ILLNESS:      70years old male with history of prediabetes and chronic smoking presented to the hospital with sudden onset of crushing chest pain that awakened him up from sleep brought to the emergency room, while in the emergency room went into cardiac arrest ventricular fibrillation and was shocked back to normal sinus rhythm, EKG is consistent with anterolateral STEMI was taken emergently for cardiac cath. Past Medical History:   has a past medical history of COPD (chronic obstructive pulmonary disease) ([address]), COPD (chronic obstructive pulmonary disease) ([address]), Diabetes mellitus ([address]), Diabetes mellitus ([address]), and Mixed hyperlipidemia. Past Surgical History:   has a past surgical history that includes Tooth Extraction (11/06/14) and Colonoscopy (01/05/2015). Home Medications:    Prior to Admission medications    Medication Sig Start Date End Date Taking? Authorizing Provider   albuterol-ipratropium (COMBIVENT)  MCG/ACT inhaler Inhale 2 puffs into the lungs every 6 hours as needed for Wheezing    ProviderCandie MD   nicotine (NICODERM CQ) 21 MG/24HR Place 1 patch onto the skin every 24 hours. 2/19/15   Tiny Brody MD   aspirin EC 81 MG EC tablet Take 1 tablet by mouth daily. 11/18/14   Anurag Serrato MD   albuterol (VENTOLIN HFA) 108 (90 BASE) MCG/ACT inhaler Inhale 2 puffs into the lungs every 6 hours as needed for Wheezing.  11/18/14   Tiny Brody MD       Allergies:  Patient has

## 2023-12-26 NOTE — PROGRESS NOTES
[address] NOTE    Room # 2030/2030-01   Name: Jonathan Harrington              Reason for visit: Routine    I visited the patient. Admit Date & Time: 12/26/2023  9:22 AM    Assessment:  Jonathan Harrington is a 70 y.o. male. Upon entering the room patient was intubated, on stretcher to be transferred to [facility]. V's for emergency surgery. PT: coded on transport. Code blue team present. Family already left for [facility]. PT:'s pulse was regained on on his way to [facility]. V's. Intervention:   provided a ministry presence and  prayer. Outcome:  Patient did not respond. Plan:  Chaplains will remain available to offer spiritual and emotional support as needed. Electronically signed by Chaplain Zacarias, on 12/26/2023 at 12:47 PM.  [address]      12/26/23 1245   Encounter Summary   Encounter Overview/Reason  Crisis; Follow-up   Service Provided For: Patient not available   Referral/Consult From: Multi-disciplinary team   Last Encounter  12/26/23   Complexity of Encounter High   Begin Time 1225   End Time  1245   Total Time Calculated 20 min   Crisis   Type Code Blue   Assessment/Intervention/Outcome   Assessment Unable to assess   Intervention Prayer (assurance of)/[place];Sustaining Presence/Ministry of presence

## 2023-12-26 NOTE — PROCEDURES
[facility]        Date:   12/26/2023  Patient name: Harish Castro  Date of admission:  12/26/2023  9:22 AM  MRN:   [id number]  YOB: 1960    CARDIAC CATHETERIZATION    Operators:  Primary: Paige Randall MD.  Assistant:     Indications for cath: Cardiac arrest and STEMI    Procedure performed: Cardiac cath. Access: Right Femoral artery      Procedure: After informed consent was obtained with explanation of the risks and benefits, patient was brought to the cath lab. The right groin were prepped and draped in sterile fashion. 1% lidocaine was used for local block. The Femoral artery was cannulated with ultrasound guidance with 6  Fr sheath with brisk arterial blood return. The side port was frequently flushed and aspirated with normal saline. EBL is 20 mL    Findings:    Left main: 80% distal left main extending into ostial LAD    LAD: 90% ostial LAD with TESSIE-3 flow    LCX: Dominant with luminal irregularities of 20 to 30%    RCA: Luminal irregularities of 20 to 30%    The LV gram was performed in the WITT 30 position. LVEF: 35%. LV Wall Motion: Anteroapical akinesis    Conclusions:  High-grade left main and ostial LAD disease.   Dominant left circumflex system  Moderately reduced LV function  Cardiogenic shock  Short episode of complete heart block during cardiac cath    Recommendation:  CT consult for emergent CABG  Intra-aortic balloon pump was inserted via her left femoral approach  Transvenous temporary pacemaker inserted via left femoral vein  Supportive care on vent and vasopressors  Transfer emergently to [place] for CABG      Electronically signed by Enrrique Amaro MD on 12/26/2023 at [facility]  [phone]

## 2023-12-26 NOTE — ED PROVIDER NOTES
EMERGENCY DEPARTMENT ENCOUNTER    Pt Name: Lee Mejia  MRN: [id number]  Birthdate 1960  Date of evaluation: 12/26/23  CHIEF COMPLAINT       Chief Complaint   Patient presents with    Chest Pain     HISTORY OF PRESENT ILLNESS   This is a 63-year-old male that presents the emergency department with complaints of chest pain.  The clinical history was taken from the patient's wife, the patient was not able to give any clinical history.  The wife states that he woke up today and woke her up from a dead sleep complaining of severe chest pain.  The patient was brought to the emergency department, while he was being brought back from triage she became unresponsive and went to cardiac arrest.           REVIEW OF SYSTEMS     Review of Systems   Unable to perform ROS: Dementia     PASTMEDICAL HISTORY     Past Medical History:   Diagnosis Date    COPD (chronic obstructive pulmonary disease) (HCC)     COPD (chronic obstructive pulmonary disease) (HCC)     Diabetes mellitus (HCC)     controlled with diet    Diabetes mellitus (HCC)     Mixed hyperlipidemia 03/09/2020     Past Problem List  Patient Active Problem List   Diagnosis Code    Perirectal abscess K61.1    Diabetes mellitus (HCC) E11.9    COPD (chronic obstructive pulmonary disease) (HCC) J44.9    Dependence on nicotine from cigarettes F17.210    Mixed hyperlipidemia E78.2     SURGICAL HISTORY       Past Surgical History:   Procedure Laterality Date    COLONOSCOPY  01/05/2015    TOOTH EXTRACTION  11/06/14     CURRENT MEDICATIONS       Current Discharge Medication List        CONTINUE these medications which have NOT CHANGED    Details   albuterol-ipratropium (COMBIVENT)  MCG/ACT inhaler Inhale 2 puffs into the lungs every 6 hours as needed for Wheezing      nicotine (NICODERM CQ) 21 MG/24HR Place 1 patch onto the skin every 24 hours.  Qty: 30 patch, Refills: 3    Associated Diagnoses: Smoker      aspirin EC 81 MG EC tablet Take 1 tablet by mouth daily.  Qty: 30   Tube size (mm):  7.5    Tube type:  Cuffed    Tube visualized through cords: yes    Placement assessment:     ETT at teeth/gumline (cm):  24    Tube secured with:  ETT short    Placement verification: CXR verification      CXR findings:  Appropriate position  Post-procedure details:     Procedure completion:  Tolerated well, no immediate complications        DATA FOR LAB AND RADIOLOGY TESTS ORDERED BELOW ARE REVIEWED BY THE ED CLINICIAN:    RADIOLOGY: All x-rays, CT, MRI, and formal ultrasound images (except ED bedside ultrasound) are read by the radiologist, see reports below, unless otherwise noted in MDM or here.  Reports below are reviewed by myself.  XR CHEST PORTABLE   Final Result   Chronic pulmonary change with scattered right pulmonary opacification may   relate to an infectious process.             LABS: Lab orders shown below, the results are reviewed by myself, and all abnormals are listed below.  Labs Reviewed   COVID-19 & INFLUENZA COMBO - Abnormal; Notable for the following components:       Result Value    SARS-CoV-2 RNA, RT PCR DETECTED (*)     All other components within normal limits   BASIC METABOLIC PANEL - Abnormal; Notable for the following components:    CO2 16 (*)     Anion Gap 20 (*)     Glucose 124 (*)     All other components within normal limits   CBC WITH AUTO DIFFERENTIAL - Abnormal; Notable for the following components:    RBC 6.47 (*)     Hemoglobin 18.6 (*)     Hematocrit 59.8 (*)     NRBC Automated 2.8 (*)     Lymphocytes % 48 (*)     Immature Granulocytes 7 (*)     Lymphocytes Absolute 5.43 (*)     Absolute Immature Granulocyte 0.79 (*)     All other components within normal limits   TOX SCR, BLD, ED - Abnormal; Notable for the following components:    Acetaminophen Level <10 (*)     Salicylate Lvl <1.0 (*)     All other components within normal limits   HEPATIC FUNCTION PANEL - Abnormal; Notable for the following components:    Alkaline Phosphatase 157 (*)     Total Bilirubin  mg/10 mL injection    midazolam (VERSED) injection    heparin (porcine) injection     DISCHARGE PRESCRIPTIONS:  Current Discharge Medication List        PHYSICIAN CONSULTS ORDERED THIS ENCOUNTER:  IP CONSULT TO HOSPITALIST  FINAL IMPRESSION      1. STEMI (ST elevation myocardial infarction) ([address])          DISPOSITION/PLAN   DISPOSITION Decision To Admit 12/26/2023 09:49:07 AM      OUTPATIENT FOLLOW UP THE PATIENT:  No follow-up provider specified.     MD Vanesa Baugh MD  12/26/23 4451

## 2023-12-26 NOTE — PROGRESS NOTES
Patient to floor from Cath Lab. IABP running, BP trigger, 100% augmentation, radial pulses moderate, UO good from [place]. Temp pacemaker in place rate 60bpm set. Heparin, Norepinephrine and Propofol drips running. VSS.   Awaiting [facility] transport team.

## 2023-12-26 NOTE — CONSULTS
PULMONARY & CRITICAL CARE MEDICINE PROGRESS NOTE     Patient:  Lee Mejia  MRN: [id number]  Admit date: 12/26/2023  Primary Care Physician: Dee Morrison DO  Consulting Physician: Db Sanchez MD  CODE Status: Full Code  LOS: 0     SUBJECTIVE     CHIEF COMPLAINT/REASON FOR INITIAL CONSULT:    BRIEF HOSPITAL COURSE:   The patient is a 63 y.o. male with chronic smoking history and COPD, presented to [facility] with severe chest pain.  He went into V-fib arrest while in the ER at [facility] requiring defibrillation.  He converted into sinus rhythm.  EKG was obtained which was consistent with anterolateral STEMI and the patient was emergently taken to Cath Lab.  He underwent cardiac cath which showed high-grade left main and ostial LAD disease with a dominant left circumflex system.  He was in cardiogenic shock, intra-aortic balloon pump was placed.  He also developed complete heart block during cardiac cath, temporary pacemaker was inserted.  He was admitted in medical ICU at the same [facility] facility where he coded again, had brief CPR after which ROSC was achieved.  He was subsequently transferred to [facility] for CT surgery evaluation for emergent CABG.  Nasal swab also came back positive for COVID.  CT surgery planning on bypass today.  Pulmonology consulted for recommendations prior to surgery, COVID infection and for vent management.  History obtained from the family member/girlfriend.  She denies any sick contact in the family.  But he does go out and meet several people.  She does not think that the patient had cough, fever or breathing difficulty.  Patient is heavy smoker, has been smoking 1-.5 pack per day for last several years.  He uses inhaler at home.  Denies any other significant past medical history.      REVIEW OF SYSTEMS:  Unobtainable from patient due to mechanical ventilation    OBJECTIVE     VENTILATOR SETTINGS:  Vent Information  Ventilator Initiate:  pulmonary edema and not due to Covid -19 pneumonia .   Pt is vaccinated and had his booster shot   Ok to proceed with emergent CABG from pulmonary standpoint   Will follow post op   D/w Dr Bañuelos   D/w Dr Lim    Total critical care time caring for this patient with life threatening, unstable organ failure, including direct patient contact, management of life support systems, review of data including imaging and labs, discussions with other team members and physicians at least 50 Min so far today, excluding procedures.     Electronically signed by FELIPA PURDY MD on   12/26/23 at 8:57 PM EST    Please note that this chart was generated using voice recognition Dragon dictation software.  Although every effort was made to ensure the accuracy of this automated transcription, some errors in transcription may have occurred.

## 2023-12-26 NOTE — ANESTHESIA PROCEDURE NOTES
Procedure Performed: ANGELLA       Start Time:  12/26/2023 5:57 PM       End Time:      Preanesthesia Checklist:  Patient identified, IV assessed, risks and benefits discussed, monitors and equipment assessed, procedure being performed at surgeon's request and anesthesia consent obtained. General Procedure Information  Diagnostic Indications for Echo:  hemodynamic monitoring and assessment of valve function  Physician Requesting Echo: Christal Sutherland MD  Location performed:  OR  Intubated  Heart visualized  Probe Type:  3D  Modalities:  2D, color flow mapping, continuous wave Doppler and M-mode                   Name:  Kevin Mcintyre                                         Age:  70 y.o. MRN:  [id number]           Procedure (Scheduled):  ANGELLA  Requested by Surgeon: Dr. Fernando York  Performed by Dr. Alex Lake Date: 12/26/2023    Patient seen and examined. History and Physical reviewed. Labs reviewed. Structures:    LA: Normal  RA: Normal  RV: Normal size and function  LV: Normal size. Anterior segment hypokinetic. Estimated LVEF 35-40 %  LV apex: No LV apical thrombus identified  Aorta: Mild atheromatous disease Asc Aorta, arch and descending Aorta  Percardium: No pericardial effusion  JACKIE: No appendage thrombus identified  Septum: No intracardiac shunt via color Doppler. IABP catheter tip seen in descending aorta just distal to LSA takeoff. Valves:    Mitral Valve: Structurally normal. No regurgitation is identified. Aortic Valve: The aortic valve is trileaflet and opens adequately. No stenosis is identified. No regurgitation is identified. Tricuspid valve: Structurally normal. No regurgitation is identified. Pulmonary valve: Normal. No significant regurgitation  No valvular vegetations or thrombus identified. Summary:     1. A ANGELLA was performed without complications. 2. LVEF 35-40 % preop. 3. Pre-op Valvular abnormalities No significant valvular problems. 4. No Aortic dissection  5.  IABP

## 2023-12-26 NOTE — ANESTHESIA PROCEDURE NOTES
Central Venous Line:    A central venous line was placed using ultrasound guidance, in the OR for the following indication(s): central venous access and CVP monitoring. Sterility preparation included the following: hand hygiene performed prior to procedure, maximum sterile barriers used and sterile technique used to drape from head to toe. The patient was placed in Trendelenburg position. The right internal jugular vein was prepped. The site was prepped with Chloraprep. A 9 Fr (size), 10 (length), introducer SLIC was placed. During the procedure, the following specific steps were taken: target vein identified, needle advanced into vein and blood aspirated and guidewire advanced into vein. Intravenous verification was obtained by ultrasound and venous blood return. Post insertion care included: all ports aspirated, all ports flushed easily, guidewire removed intact, Biopatch applied, line sutured in place and dressing applied. During the procedure the patient experienced: patient tolerated procedure well with no complications. Insertion site scrubbed per usage guidelines?: Yes  Skin prep agent dried for 3 minutes prior to procedure?:yes  Outcomes: uncomplicatedno  Anesthesia type: general..No  Staffing  Performed: Anesthesiologist   Anesthesiologist: Florencio Long MD  Performed by: Florencio Long MD  Authorized by:  Florencio Long MD    Preanesthetic Checklist  Completed: patient identified, IV checked, site marked, risks and benefits discussed, surgical/procedural consents, equipment checked, pre-op evaluation, timeout performed, anesthesia consent given, oxygen available, monitors applied/VS acknowledged, fire risk safety assessment completed and verbalized and blood product R/B/A discussed and consented

## 2023-12-26 NOTE — CONSULTS
Neuro Critical Care Consult Note    Reason for Consult:  Prognostication post cardiac arrest  Requesting Physician:  Db  Attending Physician: Dr. David Mcmahon    History Obtained From:  electronic medical record    CHIEF COMPLAINT:       Unable to state; cardiac arrest    HISTORY OF PRESENT ILLNESS:       The patient is a 63 y.o. male with a history of DM 2, HLD and COPD who presented to [facility] who presents to [facility] Cardiac ICU as a direct admit from [facility] following vfib arrest.  Patient initially presented to [facility] ED with chest pain.  Per records, patient was woken this morning by severe chest pain.  He went to [facility] ED for evaluation and as he was being brought back to a room from triage became unresponsive and went into cardiac arrest.  CPR initiated.  Initial rhythm vfib; given a single defibrillation with ROSC.  EKG showed STEMI.  Patient was intubated and taken emergently to cath lab where he was found to have high-grade left main and ostial LAD disease, moderately reduced LV function and cardiogenic shock.  He had a short episode of complete heart block during the cath.  Underwent IABP placement and transvenous temporary pacemaker placement.  Labs at OSH overall unremarkable however patient did test positive for COVID-19.  Transferred to [facility] cardiac ICU.  CT Surgery consulted for emergent CABG.  Neuro Critical Care consulted for neuro prognostication.  Of note, there was some concern for possible seizure activity although it was described as generalized tremoring/shaking and spontaneous movement of extremities.  CT surgery requesting expedited neuro prognostication.    Patient is intubated and on Propofol 20mcg/kg/min for sedation.  He received IV Versed during the cardiac cath at OSH.  He is on Levophed and Heparin infusions.  Propofol was held for clinical exam.  Patient opens eyes to noxious stimuli, maintains eye  intubated and taken emergently to cath lab where he was found to have high-grade left main and ostial LAD disease, moderately reduced LV function and cardiogenic shock.  He had a short episode of complete heart block during the cath.  Underwent IABP placement and transvenous temporary pacemaker placement.  Labs at OSH overall unremarkable however patient did test positive for COVID-19.  Transferred to [facility] cardiac ICU.  CT Surgery consulted for emergent CABG.  Neuro Critical Care consulted for neuro prognostication.     Encephalopathy post cardiac arrest  Clinical exam encouraging; opening eyes, moving all extremities  Follow clinical examination, limit sedation as able  Will follow clinically at this time; no need for brain imaging or LTME    Vfib arrest  STEMI  CAD  Cardiogenic shock  Acute hypoxic respiratory failure requiring mechanical ventilation  COVID-19 positive  On Levophed and Heparin infusion  IABP and temporary transvenous pacemaker placed 12/26  CT surgery consulted for emergent CABG  ID consult pending  Management per primary team    DISPOSITION per primary team.    We will continue to follow along.     For any changes in exam or patient status please contact Neuro Critical Care.      BIANCA Kan - CNP  Neuro Critical Care  Pager [phone]  12/26/2023     1:42 PM

## 2023-12-27 ENCOUNTER — APPOINTMENT (OUTPATIENT)
Dept: GENERAL RADIOLOGY | Age: 63
DRG: 165 | End: 2023-12-27
Attending: INTERNAL MEDICINE
Payer: MEDICAID

## 2023-12-27 PROBLEM — Z95.1 S/P CABG X 3: Status: ACTIVE | Noted: 2023-12-27

## 2023-12-27 LAB
ABO/RH: NORMAL
ALLEN TEST: ABNORMAL
ANION GAP SERPL CALCULATED.3IONS-SCNC: 11 MMOL/L (ref 9–17)
ANION GAP SERPL CALCULATED.3IONS-SCNC: 13 MMOL/L (ref 9–17)
ANION GAP SERPL CALCULATED.3IONS-SCNC: 18 MMOL/L (ref 9–17)
ANION GAP SERPL CALCULATED.3IONS-SCNC: 9 MMOL/L (ref 9–17)
ANTI-XA UNFRAC HEPARIN: <0.1 IU/L
ANTI-XA UNFRAC HEPARIN: <0.1 IU/L
ANTIBODY SCREEN: NEGATIVE
ARM BAND NUMBER: NORMAL
BASOPHILS # BLD: <0.03 K/UL (ref 0–0.2)
BASOPHILS NFR BLD: 0 % (ref 0–2)
BLOOD BANK DISPENSE STATUS: NORMAL
BLOOD BANK DISPENSE STATUS: NORMAL
BLOOD BANK SAMPLE EXPIRATION: NORMAL
BPU ID: NORMAL
BPU ID: NORMAL
BUN BLD-MCNC: 17 MG/DL (ref 8–26)
BUN BLD-MCNC: 23 MG/DL (ref 8–26)
BUN SERPL-MCNC: 16 MG/DL (ref 8–23)
BUN SERPL-MCNC: 17 MG/DL (ref 8–23)
BUN SERPL-MCNC: 23 MG/DL (ref 8–23)
BUN SERPL-MCNC: 26 MG/DL (ref 8–23)
CA-I BLD-SCNC: 1.1 MMOL/L (ref 1.13–1.33)
CA-I BLD-SCNC: 1.12 MMOL/L (ref 1.13–1.33)
CA-I BLD-SCNC: 1.16 MMOL/L (ref 1.15–1.33)
CA-I BLD-SCNC: 1.17 MMOL/L (ref 1.13–1.33)
CA-I BLD-SCNC: 1.17 MMOL/L (ref 1.15–1.33)
CA-I BLD-SCNC: 1.2 MMOL/L (ref 1.13–1.33)
CALCIUM SERPL-MCNC: 7.8 MG/DL (ref 8.6–10.4)
CALCIUM SERPL-MCNC: 7.8 MG/DL (ref 8.6–10.4)
CALCIUM SERPL-MCNC: 7.9 MG/DL (ref 8.6–10.4)
CALCIUM SERPL-MCNC: 8.6 MG/DL (ref 8.6–10.4)
CHLORIDE BLD-SCNC: 110 MMOL/L (ref 98–107)
CHLORIDE BLD-SCNC: 116 MMOL/L (ref 98–107)
CHLORIDE SERPL-SCNC: 106 MMOL/L (ref 98–107)
CHLORIDE SERPL-SCNC: 107 MMOL/L (ref 98–107)
CHLORIDE SERPL-SCNC: 109 MMOL/L (ref 98–107)
CHLORIDE SERPL-SCNC: 110 MMOL/L (ref 98–107)
CO2 BLD CALC-SCNC: 14 MMOL/L (ref 22–30)
CO2 BLD CALC-SCNC: 22 MMOL/L (ref 22–30)
CO2 SERPL-SCNC: 14 MMOL/L (ref 20–31)
CO2 SERPL-SCNC: 19 MMOL/L (ref 20–31)
CO2 SERPL-SCNC: 22 MMOL/L (ref 20–31)
CO2 SERPL-SCNC: 23 MMOL/L (ref 20–31)
COMPONENT: NORMAL
COMPONENT: NORMAL
CREAT SERPL-MCNC: 0.9 MG/DL (ref 0.7–1.2)
CREAT SERPL-MCNC: 0.9 MG/DL (ref 0.7–1.2)
CREAT SERPL-MCNC: 1.3 MG/DL (ref 0.7–1.2)
CREAT SERPL-MCNC: 1.5 MG/DL (ref 0.7–1.2)
CROSSMATCH RESULT: NORMAL
CROSSMATCH RESULT: NORMAL
EGFR, POC: 48 ML/MIN/1.73M2
EGFR, POC: >60 ML/MIN/1.73M2
EKG ATRIAL RATE: 94 BPM
EKG P AXIS: 82 DEGREES
EKG P-R INTERVAL: 148 MS
EKG Q-T INTERVAL: 332 MS
EKG QRS DURATION: 84 MS
EKG QTC CALCULATION (BAZETT): 415 MS
EKG R AXIS: 56 DEGREES
EKG T AXIS: 85 DEGREES
EKG VENTRICULAR RATE: 94 BPM
EOSINOPHIL # BLD: <0.03 K/UL (ref 0–0.44)
EOSINOPHILS RELATIVE PERCENT: 0 % (ref 1–4)
ERYTHROCYTE [DISTWIDTH] IN BLOOD BY AUTOMATED COUNT: 13.2 % (ref 11.8–14.4)
ERYTHROCYTE [DISTWIDTH] IN BLOOD BY AUTOMATED COUNT: 13.3 % (ref 11.8–14.4)
ERYTHROCYTE [DISTWIDTH] IN BLOOD BY AUTOMATED COUNT: 13.7 % (ref 11.8–14.4)
ERYTHROCYTE [DISTWIDTH] IN BLOOD BY AUTOMATED COUNT: 13.9 % (ref 11.8–14.4)
EST. AVERAGE GLUCOSE BLD GHB EST-MCNC: 120 MG/DL
FIO2: 100
FIO2: 100
FIO2: 40
FIO2: 40
FIO2: 60
FIO2: 70
FIO2: 70
FIO2: 80
GFR SERPL CREATININE-BSD FRML MDRD: 51 ML/MIN/1.73M2
GFR SERPL CREATININE-BSD FRML MDRD: >60 ML/MIN/1.73M2
GLUCOSE BLD-MCNC: 105 MG/DL (ref 74–100)
GLUCOSE BLD-MCNC: 105 MG/DL (ref 75–110)
GLUCOSE BLD-MCNC: 106 MG/DL (ref 75–110)
GLUCOSE BLD-MCNC: 109 MG/DL (ref 74–100)
GLUCOSE BLD-MCNC: 110 MG/DL (ref 75–110)
GLUCOSE BLD-MCNC: 115 MG/DL (ref 74–100)
GLUCOSE BLD-MCNC: 119 MG/DL (ref 75–110)
GLUCOSE BLD-MCNC: 122 MG/DL (ref 74–100)
GLUCOSE BLD-MCNC: 122 MG/DL (ref 75–110)
GLUCOSE BLD-MCNC: 130 MG/DL (ref 75–110)
GLUCOSE BLD-MCNC: 135 MG/DL (ref 75–110)
GLUCOSE BLD-MCNC: 135 MG/DL (ref 75–110)
GLUCOSE BLD-MCNC: 146 MG/DL (ref 74–100)
GLUCOSE BLD-MCNC: 159 MG/DL (ref 74–100)
GLUCOSE BLD-MCNC: 160 MG/DL (ref 75–110)
GLUCOSE BLD-MCNC: 78 MG/DL (ref 74–100)
GLUCOSE BLD-MCNC: 84 MG/DL (ref 74–100)
GLUCOSE BLD-MCNC: 97 MG/DL (ref 75–110)
GLUCOSE SERPL-MCNC: 110 MG/DL (ref 70–99)
GLUCOSE SERPL-MCNC: 127 MG/DL (ref 70–99)
GLUCOSE SERPL-MCNC: 165 MG/DL (ref 70–99)
GLUCOSE SERPL-MCNC: 96 MG/DL (ref 70–99)
HBA1C MFR BLD: 5.8 % (ref 4–6)
HCT VFR BLD AUTO: 25 % (ref 41–53)
HCT VFR BLD AUTO: 26.8 % (ref 40.7–50.3)
HCT VFR BLD AUTO: 27 % (ref 41–53)
HCT VFR BLD AUTO: 28.3 % (ref 40.7–50.3)
HCT VFR BLD AUTO: 28.7 % (ref 40.7–50.3)
HCT VFR BLD AUTO: 35.6 % (ref 40.7–50.3)
HGB BLD-MCNC: 11.5 G/DL (ref 13–17)
HGB BLD-MCNC: 8.6 G/DL (ref 13–17)
HGB BLD-MCNC: 8.8 G/DL (ref 13–17)
HGB BLD-MCNC: 9.2 G/DL (ref 13–17)
IMM GRANULOCYTES # BLD AUTO: 0.06 K/UL (ref 0–0.3)
IMM GRANULOCYTES NFR BLD: 1 %
INR PPP: 1.5
INR PPP: 1.8
INR PPP: 2.9
LYMPHOCYTES NFR BLD: 1.04 K/UL (ref 1.1–3.7)
LYMPHOCYTES RELATIVE PERCENT: 8 % (ref 24–43)
MAGNESIUM SERPL-MCNC: 2.6 MG/DL (ref 1.6–2.6)
MAGNESIUM SERPL-MCNC: 3.1 MG/DL (ref 1.6–2.6)
MAGNESIUM SERPL-MCNC: 3.7 MG/DL (ref 1.6–2.6)
MCH RBC QN AUTO: 28.7 PG (ref 25.2–33.5)
MCH RBC QN AUTO: 29 PG (ref 25.2–33.5)
MCHC RBC AUTO-ENTMCNC: 31.1 G/DL (ref 28.4–34.8)
MCHC RBC AUTO-ENTMCNC: 32.1 G/DL (ref 28.4–34.8)
MCHC RBC AUTO-ENTMCNC: 32.1 G/DL (ref 28.4–34.8)
MCHC RBC AUTO-ENTMCNC: 32.3 G/DL (ref 28.4–34.8)
MCV RBC AUTO: 89.9 FL (ref 82.6–102.9)
MCV RBC AUTO: 90.2 FL (ref 82.6–102.9)
MCV RBC AUTO: 90.5 FL (ref 82.6–102.9)
MCV RBC AUTO: 92.2 FL (ref 82.6–102.9)
MICROORGANISM SPEC CULT: NO GROWTH
MODE: ABNORMAL
MONOCYTES NFR BLD: 0.66 K/UL (ref 0.1–1.2)
MONOCYTES NFR BLD: 5 % (ref 3–12)
MRSA, DNA, NASAL: NEGATIVE
NEGATIVE BASE EXCESS, ART: 1.6 MMOL/L (ref 0–2)
NEGATIVE BASE EXCESS, ART: 11.8 MMOL/L (ref 0–2)
NEGATIVE BASE EXCESS, ART: 3 MMOL/L (ref 0–2)
NEGATIVE BASE EXCESS, ART: 3.1 MMOL/L (ref 0–2)
NEGATIVE BASE EXCESS, ART: 3.5 MMOL/L (ref 0–2)
NEGATIVE BASE EXCESS, ART: 4.1 MMOL/L (ref 0–2)
NEGATIVE BASE EXCESS, ART: 5.6 MMOL/L (ref 0–2)
NEGATIVE BASE EXCESS, ART: 7.2 MMOL/L (ref 0–2)
NEUTROPHILS NFR BLD: 86 % (ref 36–65)
NEUTS SEG NFR BLD: 10.64 K/UL (ref 1.5–8.1)
NRBC BLD-RTO: 0 PER 100 WBC
O2 DELIVERY DEVICE: ABNORMAL
PARTIAL THROMBOPLASTIN TIME: 32.9 SEC (ref 23–36.5)
PARTIAL THROMBOPLASTIN TIME: 59.4 SEC (ref 23–36.5)
PATIENT TEMP: 36.1
PATIENT TEMP: 36.9
PATIENT TEMP: 37.3
PATIENT TEMP: 38.1
PLATELET # BLD AUTO: 87 K/UL (ref 138–453)
PLATELET # BLD AUTO: ABNORMAL K/UL (ref 138–453)
PLATELET, FLUORESCENCE: 116 K/UL (ref 138–453)
PLATELET, FLUORESCENCE: 133 K/UL (ref 138–453)
PLATELET, FLUORESCENCE: 92 K/UL (ref 138–453)
PLATELETS.RETICULATED NFR BLD AUTO: 10 % (ref 1.1–10.3)
PLATELETS.RETICULATED NFR BLD AUTO: 7.9 % (ref 1.1–10.3)
PLATELETS.RETICULATED NFR BLD AUTO: 9.9 % (ref 1.1–10.3)
PMV BLD AUTO: 12.9 FL (ref 8.1–13.5)
POC ANION GAP: 11 MMOL/L (ref 7–16)
POC ANION GAP: 15 MMOL/L (ref 7–16)
POC CREATININE: 1 MG/DL (ref 0.51–1.19)
POC CREATININE: 1.6 MG/DL (ref 0.51–1.19)
POC HCO3: 14.1 MMOL/L (ref 21–28)
POC HCO3: 17.6 MMOL/L (ref 21–28)
POC HCO3: 18.6 MMOL/L (ref 21–28)
POC HCO3: 20.9 MMOL/L (ref 21–28)
POC HCO3: 21.1 MMOL/L (ref 21–28)
POC HCO3: 22 MMOL/L (ref 21–28)
POC HCO3: 23 MMOL/L (ref 21–28)
POC HCO3: 23.4 MMOL/L (ref 21–28)
POC HEMOGLOBIN (CALC): 8.4 G/DL (ref 13.5–17.5)
POC HEMOGLOBIN (CALC): 9.3 G/DL (ref 13.5–17.5)
POC LACTIC ACID: 1.9 MMOL/L (ref 0.56–1.39)
POC LACTIC ACID: 2.1 MMOL/L (ref 0.56–1.39)
POC LACTIC ACID: 2.2 MMOL/L (ref 0.56–1.39)
POC LACTIC ACID: 2.8 MMOL/L (ref 0.56–1.39)
POC LACTIC ACID: 3.1 MMOL/L (ref 0.56–1.39)
POC LACTIC ACID: 3.4 MMOL/L (ref 0.56–1.39)
POC LACTIC ACID: 4.6 MMOL/L (ref 0.56–1.39)
POC LACTIC ACID: 6.9 MMOL/L (ref 0.56–1.39)
POC O2 SATURATION: 90.4 % (ref 94–98)
POC O2 SATURATION: 91 % (ref 94–98)
POC O2 SATURATION: 92 % (ref 94–98)
POC O2 SATURATION: 96.6 % (ref 94–98)
POC O2 SATURATION: 96.8 % (ref 94–98)
POC O2 SATURATION: 98.9 % (ref 94–98)
POC O2 SATURATION: 99 % (ref 94–98)
POC O2 SATURATION: 99.9 % (ref 94–98)
POC PCO2 TEMP: 31.6 MM HG
POC PCO2 TEMP: 42.9 MM HG
POC PCO2: 30 MM HG (ref 35–48)
POC PCO2: 31.1 MM HG (ref 35–48)
POC PCO2: 31.2 MM HG (ref 35–48)
POC PCO2: 32.6 MM HG (ref 35–48)
POC PCO2: 37.3 MM HG (ref 35–48)
POC PCO2: 39.9 MM HG (ref 35–48)
POC PCO2: 40.9 MM HG (ref 35–48)
POC PCO2: 44.7 MM HG (ref 35–48)
POC PH TEMP: 7.26
POC PH TEMP: 7.32
POC PH: 7.26 (ref 7.35–7.45)
POC PH: 7.32 (ref 7.35–7.45)
POC PH: 7.34 (ref 7.35–7.45)
POC PH: 7.36 (ref 7.35–7.45)
POC PH: 7.36 (ref 7.35–7.45)
POC PH: 7.38 (ref 7.35–7.45)
POC PH: 7.4 (ref 7.35–7.45)
POC PH: 7.42 (ref 7.35–7.45)
POC PO2 TEMP: 140.9 MM HG
POC PO2 TEMP: 68.3 MM HG
POC PO2: 133.9 MM HG (ref 83–108)
POC PO2: 141.9 MM HG (ref 83–108)
POC PO2: 272.5 MM HG (ref 83–108)
POC PO2: 59.9 MM HG (ref 83–108)
POC PO2: 65.3 MM HG (ref 83–108)
POC PO2: 66.9 MM HG (ref 83–108)
POC PO2: 85.4 MM HG (ref 83–108)
POC PO2: 90 MM HG (ref 83–108)
POTASSIUM BLD-SCNC: 4.7 MMOL/L (ref 3.5–4.5)
POTASSIUM BLD-SCNC: 5.1 MMOL/L (ref 3.5–4.5)
POTASSIUM SERPL-SCNC: 4.1 MMOL/L (ref 3.7–5.3)
POTASSIUM SERPL-SCNC: 4.8 MMOL/L (ref 3.7–5.3)
POTASSIUM SERPL-SCNC: 5.1 MMOL/L (ref 3.7–5.3)
POTASSIUM SERPL-SCNC: 5.2 MMOL/L (ref 3.7–5.3)
PROTHROMBIN TIME: 18.2 SEC (ref 11.7–14.9)
PROTHROMBIN TIME: 20.2 SEC (ref 11.7–14.9)
PROTHROMBIN TIME: 30 SEC (ref 11.7–14.9)
RBC # BLD AUTO: 2.97 M/UL (ref 4.21–5.77)
RBC # BLD AUTO: 3.07 M/UL (ref 4.21–5.77)
RBC # BLD AUTO: 3.17 M/UL (ref 4.21–5.77)
RBC # BLD AUTO: 3.96 M/UL (ref 4.21–5.77)
SAMPLE SITE: ABNORMAL
SODIUM BLD-SCNC: 142 MMOL/L (ref 138–146)
SODIUM BLD-SCNC: 144 MMOL/L (ref 138–146)
SODIUM SERPL-SCNC: 138 MMOL/L (ref 135–144)
SODIUM SERPL-SCNC: 139 MMOL/L (ref 135–144)
SODIUM SERPL-SCNC: 141 MMOL/L (ref 135–144)
SODIUM SERPL-SCNC: 143 MMOL/L (ref 135–144)
SPECIMEN DESCRIPTION: NORMAL
SPECIMEN DESCRIPTION: NORMAL
TRANSFUSION STATUS: NORMAL
TRANSFUSION STATUS: NORMAL
UNIT DIVISION: 0
UNIT DIVISION: 0
WBC OTHER # BLD: 12.4 K/UL (ref 3.5–11.3)
WBC OTHER # BLD: 18.2 K/UL (ref 3.5–11.3)
WBC OTHER # BLD: 22.4 K/UL (ref 3.5–11.3)
WBC OTHER # BLD: 27.8 K/UL (ref 3.5–11.3)

## 2023-12-27 PROCEDURE — 85520 HEPARIN ASSAY: CPT

## 2023-12-27 PROCEDURE — 2500000003 HC RX 250 WO HCPCS: Performed by: NURSE PRACTITIONER

## 2023-12-27 PROCEDURE — 94003 VENT MGMT INPAT SUBQ DAY: CPT

## 2023-12-27 PROCEDURE — 6360000002 HC RX W HCPCS: Performed by: INTERNAL MEDICINE

## 2023-12-27 PROCEDURE — 99233 SBSQ HOSP IP/OBS HIGH 50: CPT | Performed by: SURGERY

## 2023-12-27 PROCEDURE — 82803 BLOOD GASES ANY COMBINATION: CPT

## 2023-12-27 PROCEDURE — 6370000000 HC RX 637 (ALT 250 FOR IP): Performed by: NURSE PRACTITIONER

## 2023-12-27 PROCEDURE — 82565 ASSAY OF CREATININE: CPT

## 2023-12-27 PROCEDURE — 6360000002 HC RX W HCPCS: Performed by: THORACIC SURGERY (CARDIOTHORACIC VASCULAR SURGERY)

## 2023-12-27 PROCEDURE — 2580000003 HC RX 258: Performed by: INTERNAL MEDICINE

## 2023-12-27 PROCEDURE — 80051 ELECTROLYTE PANEL: CPT

## 2023-12-27 PROCEDURE — 85055 RETICULATED PLATELET ASSAY: CPT

## 2023-12-27 PROCEDURE — 6360000002 HC RX W HCPCS: Performed by: STUDENT IN AN ORGANIZED HEALTH CARE EDUCATION/TRAINING PROGRAM

## 2023-12-27 PROCEDURE — 85014 HEMATOCRIT: CPT

## 2023-12-27 PROCEDURE — 2580000003 HC RX 258: Performed by: STUDENT IN AN ORGANIZED HEALTH CARE EDUCATION/TRAINING PROGRAM

## 2023-12-27 PROCEDURE — 99291 CRITICAL CARE FIRST HOUR: CPT | Performed by: INTERNAL MEDICINE

## 2023-12-27 PROCEDURE — P9041 ALBUMIN (HUMAN),5%, 50ML: HCPCS | Performed by: NURSE PRACTITIONER

## 2023-12-27 PROCEDURE — 99024 POSTOP FOLLOW-UP VISIT: CPT | Performed by: NURSE PRACTITIONER

## 2023-12-27 PROCEDURE — 2500000003 HC RX 250 WO HCPCS: Performed by: THORACIC SURGERY (CARDIOTHORACIC VASCULAR SURGERY)

## 2023-12-27 PROCEDURE — 85025 COMPLETE CBC W/AUTO DIFF WBC: CPT

## 2023-12-27 PROCEDURE — 2700000000 HC OXYGEN THERAPY PER DAY

## 2023-12-27 PROCEDURE — 84520 ASSAY OF UREA NITROGEN: CPT

## 2023-12-27 PROCEDURE — 99233 SBSQ HOSP IP/OBS HIGH 50: CPT | Performed by: INTERNAL MEDICINE

## 2023-12-27 PROCEDURE — 71045 X-RAY EXAM CHEST 1 VIEW: CPT

## 2023-12-27 PROCEDURE — 82330 ASSAY OF CALCIUM: CPT

## 2023-12-27 PROCEDURE — 7100000001 HC PACU RECOVERY - ADDTL 15 MIN

## 2023-12-27 PROCEDURE — 2580000003 HC RX 258: Performed by: THORACIC SURGERY (CARDIOTHORACIC VASCULAR SURGERY)

## 2023-12-27 PROCEDURE — 6360000002 HC RX W HCPCS: Performed by: NURSE PRACTITIONER

## 2023-12-27 PROCEDURE — 7100000000 HC PACU RECOVERY - FIRST 15 MIN

## 2023-12-27 PROCEDURE — 82947 ASSAY GLUCOSE BLOOD QUANT: CPT

## 2023-12-27 PROCEDURE — 37799 UNLISTED PX VASCULAR SURGERY: CPT

## 2023-12-27 PROCEDURE — 99253 IP/OBS CNSLTJ NEW/EST LOW 45: CPT | Performed by: PSYCHIATRY & NEUROLOGY

## 2023-12-27 PROCEDURE — 2580000003 HC RX 258: Performed by: NURSE PRACTITIONER

## 2023-12-27 PROCEDURE — 94761 N-INVAS EAR/PLS OXIMETRY MLT: CPT

## 2023-12-27 PROCEDURE — 2100000001 HC CVICU R&B

## 2023-12-27 PROCEDURE — 83605 ASSAY OF LACTIC ACID: CPT

## 2023-12-27 PROCEDURE — C9113 INJ PANTOPRAZOLE SODIUM, VIA: HCPCS | Performed by: INTERNAL MEDICINE

## 2023-12-27 PROCEDURE — 85730 THROMBOPLASTIN TIME PARTIAL: CPT

## 2023-12-27 PROCEDURE — XW033E5 INTRODUCTION OF REMDESIVIR ANTI-INFECTIVE INTO PERIPHERAL VEIN, PERCUTANEOUS APPROACH, NEW TECHNOLOGY GROUP 5: ICD-10-PCS | Performed by: INTERNAL MEDICINE

## 2023-12-27 RX ORDER — HEPARIN SODIUM 1000 [USP'U]/ML
60 INJECTION, SOLUTION INTRAVENOUS; SUBCUTANEOUS PRN
Status: DISCONTINUED | OUTPATIENT
Start: 2023-12-27 | End: 2024-01-03

## 2023-12-27 RX ORDER — HEPARIN SODIUM 1000 [USP'U]/ML
30 INJECTION, SOLUTION INTRAVENOUS; SUBCUTANEOUS PRN
Status: DISCONTINUED | OUTPATIENT
Start: 2023-12-27 | End: 2024-01-03

## 2023-12-27 RX ORDER — PANTOPRAZOLE SODIUM 40 MG/10ML
40 INJECTION, POWDER, LYOPHILIZED, FOR SOLUTION INTRAVENOUS DAILY
Status: DISCONTINUED | OUTPATIENT
Start: 2023-12-27 | End: 2024-01-11 | Stop reason: HOSPADM

## 2023-12-27 RX ORDER — DEXMEDETOMIDINE HYDROCHLORIDE 4 UG/ML
.1-1.5 INJECTION, SOLUTION INTRAVENOUS CONTINUOUS
Status: DISCONTINUED | OUTPATIENT
Start: 2023-12-27 | End: 2024-01-08

## 2023-12-27 RX ORDER — MAGNESIUM SULFATE IN WATER 40 MG/ML
2000 INJECTION, SOLUTION INTRAVENOUS ONCE
Status: COMPLETED | OUTPATIENT
Start: 2023-12-27 | End: 2023-12-27

## 2023-12-27 RX ORDER — HEPARIN SODIUM 10000 [USP'U]/100ML
5-30 INJECTION, SOLUTION INTRAVENOUS CONTINUOUS
Status: DISCONTINUED | OUTPATIENT
Start: 2023-12-27 | End: 2024-01-03

## 2023-12-27 RX ADMIN — DEXMEDETOMIDINE HYDROCHLORIDE 0.6 MCG/KG/HR: 400 INJECTION, SOLUTION INTRAVENOUS at 18:14

## 2023-12-27 RX ADMIN — ALBUMIN (HUMAN) 25 G: 12.5 INJECTION, SOLUTION INTRAVENOUS at 21:35

## 2023-12-27 RX ADMIN — HEPARIN SODIUM 12 UNITS/KG/HR: 10000 INJECTION, SOLUTION INTRAVENOUS at 17:03

## 2023-12-27 RX ADMIN — PANTOPRAZOLE SODIUM 40 MG: 40 INJECTION, POWDER, FOR SOLUTION INTRAVENOUS at 13:40

## 2023-12-27 RX ADMIN — SENNOSIDES AND DOCUSATE SODIUM 1 TABLET: 50; 8.6 TABLET ORAL at 21:36

## 2023-12-27 RX ADMIN — ALBUMIN (HUMAN) 25 G: 12.5 INJECTION, SOLUTION INTRAVENOUS at 00:02

## 2023-12-27 RX ADMIN — SENNOSIDES AND DOCUSATE SODIUM 1 TABLET: 50; 8.6 TABLET ORAL at 08:37

## 2023-12-27 RX ADMIN — VANCOMYCIN HYDROCHLORIDE 1000 MG: 1 INJECTION, POWDER, LYOPHILIZED, FOR SOLUTION INTRAVENOUS at 18:33

## 2023-12-27 RX ADMIN — CLOPIDOGREL BISULFATE 75 MG: 75 TABLET ORAL at 08:37

## 2023-12-27 RX ADMIN — ASPIRIN 81 MG: 81 TABLET, COATED ORAL at 08:37

## 2023-12-27 RX ADMIN — SODIUM BICARBONATE 50 MEQ: 84 INJECTION, SOLUTION INTRAVENOUS at 20:50

## 2023-12-27 RX ADMIN — MUPIROCIN: 20 OINTMENT TOPICAL at 21:36

## 2023-12-27 RX ADMIN — CALCIUM CHLORIDE 1000 MG: 100 INJECTION INTRAVENOUS; INTRAVENTRICULAR at 11:14

## 2023-12-27 RX ADMIN — SODIUM CHLORIDE, PRESERVATIVE FREE 10 ML: 5 INJECTION INTRAVENOUS at 00:53

## 2023-12-27 RX ADMIN — POLYETHYLENE GLYCOL 3350 17 G: 17 POWDER, FOR SOLUTION ORAL at 08:37

## 2023-12-27 RX ADMIN — Medication 2000 MG: at 08:39

## 2023-12-27 RX ADMIN — VANCOMYCIN HYDROCHLORIDE 1000 MG: 1 INJECTION, POWDER, LYOPHILIZED, FOR SOLUTION INTRAVENOUS at 06:28

## 2023-12-27 RX ADMIN — Medication 2000 MG: at 17:10

## 2023-12-27 RX ADMIN — SODIUM BICARBONATE 50 MEQ: 84 INJECTION, SOLUTION INTRAVENOUS at 20:45

## 2023-12-27 RX ADMIN — REMDESIVIR 100 MG: 100 INJECTION, POWDER, LYOPHILIZED, FOR SOLUTION INTRAVENOUS at 17:26

## 2023-12-27 RX ADMIN — POTASSIUM CHLORIDE 20 MEQ: 29.8 INJECTION, SOLUTION INTRAVENOUS at 02:23

## 2023-12-27 RX ADMIN — OXYCODONE AND ACETAMINOPHEN 2 TABLET: 5; 325 TABLET ORAL at 11:44

## 2023-12-27 RX ADMIN — AMIODARONE HYDROCHLORIDE 1 MG/MIN: 50 INJECTION, SOLUTION INTRAVENOUS at 00:50

## 2023-12-27 RX ADMIN — OXYCODONE AND ACETAMINOPHEN 2 TABLET: 5; 325 TABLET ORAL at 21:37

## 2023-12-27 RX ADMIN — ALBUMIN (HUMAN) 25 G: 12.5 INJECTION, SOLUTION INTRAVENOUS at 03:48

## 2023-12-27 RX ADMIN — AMIODARONE HYDROCHLORIDE 1 MG/MIN: 50 INJECTION, SOLUTION INTRAVENOUS at 08:35

## 2023-12-27 RX ADMIN — ATORVASTATIN CALCIUM 20 MG: 20 TABLET, FILM COATED ORAL at 22:05

## 2023-12-27 RX ADMIN — MUPIROCIN: 20 OINTMENT TOPICAL at 08:37

## 2023-12-27 RX ADMIN — SODIUM BICARBONATE 50 MEQ: 84 INJECTION, SOLUTION INTRAVENOUS at 20:55

## 2023-12-27 RX ADMIN — HEPARIN SODIUM 3190 UNITS: 1000 INJECTION INTRAVENOUS; SUBCUTANEOUS at 23:50

## 2023-12-27 RX ADMIN — MAGNESIUM SULFATE HEPTAHYDRATE 2000 MG: 40 INJECTION, SOLUTION INTRAVENOUS at 12:24

## 2023-12-27 RX ADMIN — SODIUM BICARBONATE 50 MEQ: 84 INJECTION, SOLUTION INTRAVENOUS at 11:37

## 2023-12-27 RX ADMIN — DEXMEDETOMIDINE HYDROCHLORIDE 0.2 MCG/KG/HR: 400 INJECTION, SOLUTION INTRAVENOUS at 09:44

## 2023-12-27 RX ADMIN — PROPOFOL 20 MCG/KG/MIN: 10 INJECTION, EMULSION INTRAVENOUS at 10:18

## 2023-12-27 RX ADMIN — FENTANYL CITRATE 50 MCG: 50 INJECTION, SOLUTION INTRAMUSCULAR; INTRAVENOUS at 04:30

## 2023-12-27 RX ADMIN — MUPIROCIN: 20 OINTMENT TOPICAL at 00:39

## 2023-12-27 RX ADMIN — ALBUMIN (HUMAN) 25 G: 12.5 INJECTION, SOLUTION INTRAVENOUS at 15:53

## 2023-12-27 RX ADMIN — SODIUM CHLORIDE, PRESERVATIVE FREE 10 ML: 5 INJECTION INTRAVENOUS at 08:39

## 2023-12-27 RX ADMIN — SENNOSIDES AND DOCUSATE SODIUM 1 TABLET: 50; 8.6 TABLET ORAL at 00:39

## 2023-12-27 RX ADMIN — ALBUMIN (HUMAN) 25 G: 12.5 INJECTION, SOLUTION INTRAVENOUS at 09:53

## 2023-12-27 RX ADMIN — ALBUMIN (HUMAN) 25 G: 12.5 INJECTION, SOLUTION INTRAVENOUS at 08:39

## 2023-12-27 RX ADMIN — AMIODARONE HYDROCHLORIDE 0.5 MG/MIN: 50 INJECTION, SOLUTION INTRAVENOUS at 23:35

## 2023-12-27 RX ADMIN — FENTANYL CITRATE 50 MCG: 50 INJECTION, SOLUTION INTRAMUSCULAR; INTRAVENOUS at 19:30

## 2023-12-27 RX ADMIN — Medication 2000 MG: at 01:14

## 2023-12-27 ASSESSMENT — PULMONARY FUNCTION TESTS
PIF_VALUE: 18
PIF_VALUE: 18
PIF_VALUE: 26
PIF_VALUE: 27
PIF_VALUE: 20
PIF_VALUE: 24
PIF_VALUE: 26
PIF_VALUE: 16
PIF_VALUE: 21
PIF_VALUE: 28
PIF_VALUE: 29
PIF_VALUE: 24

## 2023-12-27 NOTE — PROGRESS NOTES
Dr Gallegos at bedside. Patient vent had just been switched to CPAP but was switched back to previous setting per Dr Gallegos. No extubation today. Will restart propofol. Patient able to follow commands and nod appropriately

## 2023-12-27 NOTE — PLAN OF CARE
Problem: Respiratory - Adult  Goal: Achieves optimal ventilation and oxygenation  12/27/2023 0847 by Kat Jiang RCP  Outcome: Progressing     Problem: OXYGENATION/RESPIRATORY FUNCTION  Goal: Patient will maintain patent airway  Outcome: Ongoing  Goal: Patient will achieve/maintain normal respiratory rate/effort  Respiratory rate and effort will be within normal limits for the patient  Outcome: Ongoing    Problem: MECHANICAL VENTILATION  Goal: Patient will maintain patent airway  Outcome: Ongoing  Goal: Oral health is maintained or improved  Outcome: Ongoing  Goal: ET tube will be managed safely  Outcome: Ongoing  Goal: Ability to express needs and understand communication  Outcome: Ongoing  Goal: Mobility/activity is maintained at optimum level for patient  Outcome: Ongoing    Problem: ASPIRATION PRECAUTIONS  Goal: Patient’s risk of aspiration is minimized  Outcome: Ongoing    Problem: SKIN INTEGRITY  Goal: Skin integrity is maintained or improved  Outcome: Ongoing

## 2023-12-27 NOTE — PLAN OF CARE
Problem: Respiratory - Adult  Goal: Achieves optimal ventilation and oxygenation  12/27/2023 0442 by Latoya Coats RCP  Outcome: Progressing     Problem: OXYGENATION/RESPIRATORY FUNCTION  Goal: Patient will maintain patent airway  Outcome: Ongoing  Goal: Patient will achieve/maintain normal respiratory rate/effort  Respiratory rate and effort will be within normal limits for the patient  Outcome: Ongoing    Problem: MECHANICAL VENTILATION  Goal: Patient will maintain patent airway  Outcome: Ongoing  Goal: Oral health is maintained or improved  Outcome: Ongoing  Goal: ET tube will be managed safely  Outcome: Ongoing  Goal: Ability to express needs and understand communication  Outcome: Ongoing  Goal: Mobility/activity is maintained at optimum level for patient  Outcome: Ongoing    Problem: ASPIRATION PRECAUTIONS  Goal: Patient’s risk of aspiration is minimized  Outcome: Ongoing    Problem: SKIN INTEGRITY  Goal: Skin integrity is maintained or improved  Outcome: Ongoing

## 2023-12-27 NOTE — PROGRESS NOTES
Vascular Surgery Progress Note            PATIENT NAME: Lee Mejia     TODAY'S DATE: 2023, 2:38 PM    CC: intubated    SUBJECTIVE:    Pt seen and examined. Af, remains intubated with IABP in place.      OBJECTIVE:   VITALS:  /69   Pulse 63   Temp 99.9 °F (37.7 °C)   Resp 18   Ht 1.626 m (5' 4\")   Wt 53.1 kg (117 lb)   SpO2 96%   BMI 20.08 kg/m²  I Temperature Max - Temp  Av.8 °F (37.1 °C)  Min: 96.4 °F (35.8 °C)  Max: 100.9 °F (38.3 °C)      LABS:  Lab Results   Component Value Date/Time    WBC 22.4 2023 05:22 AM    HGB 9.2 2023 05:22 AM    HCT 28.7 2023 05:22 AM    PLT See Reflexed IPF Result 2023 05:22 AM       Lab Results   Component Value Date/Time     2023 05:22 AM    K 4.8 2023 05:22 AM     2023 05:22 AM    CO2 22 2023 05:22 AM    BUN 17 2023 05:22 AM    CREATININE 0.9 2023 05:22 AM    CREATININE 1.0 2023 05:13 AM    GLUCOSE 165 2023 05:22 AM       Lab Results   Component Value Date/Time    ALKPHOS 91 2023 02:04 PM    ALT 29 2023 02:04 PM    AST 52 2023 02:04 PM    PROT 6.5 2023 02:04 PM    BILITOT 0.3 2023 02:04 PM    BILIDIR 0.1 2023 02:04 PM    IBILI 0.2 2023 02:04 PM    LABALBU 3.7 2023 02:04 PM       Lab Results   Component Value Date/Time    PROTIME 20.2 2023 05:22 AM    INR 1.8 2023 05:22 AM       Calcium:    Lab Results   Component Value Date/Time    CALCIUM 7.8 2023 05:22 AM     Ionized Calcium:  No results found for: \"IONCA\"  Magnesium:    Lab Results   Component Value Date/Time    MG 2.6 2023 05:22 AM     Phosphorus:    Lab Results   Component Value Date/Time    PHOS 2.5 2023 09:38 AM     Albumin:    Lab Results   Component Value Date/Time    LABALBU 3.7 2023 02:04 PM         General: AOx3, NAD  Heart: Regular rate and rhythm   Lungs: mechanically ventilated, chest tubes in place  Abdomen: Soft,   nondistended.  Extremity: pt and pop signals present, foot warm, cap refill 2-3 sec, no mottling or stiffness        ASSESSMENT   Principal Problem:    STEMI (ST elevation myocardial infarction) ([facility])  Active Problems:    Left main coronary artery disease    Bandemia    COVID    Cardiogenic shock ([facility])    Encounter for pre-operative cardiovascular clearance    S/P CABG x 3  Resolved Problems:    * No resolved hospital problems. *      PLAN  Re-assess patient once iabp pump down and also once extubated for accurate motro and sensation exam. Foot/extremity does not appear acuetly threatened. Pt and pop signals present, cap refill 2-3 seconds. No mottling, warm.   Hep gtt if able  Continue warming lower extremites  Continue pulse checks per unit protocol        Kary Ram, DO  General Surgery, PGY-3

## 2023-12-27 NOTE — PROGRESS NOTES
Carrie fellow at bedside. IABP 1:1, had previously been at 1:2 for a short time per Dr Bañuelos. Updated on pressor requirements and low u/o. Will continue to wean vent, propofol off and just using precedex for sedation at this time

## 2023-12-27 NOTE — PROGRESS NOTES
[facility] Cardiothoracic Surgery  Progress Note    12/27/2023 8:33 AM  Surgeon: Surgeon CTS: Dr. Sarmad ESCOBAR   POD # 1  S/P: emergent CABG with IABP     Subjective:  Mr. Mejia   Intubated with no acute distress.   IABP In place.     Objective:  /69   Pulse 72   Temp (!) 100.8 °F (38.2 °C)   Resp 18   Ht 1.626 m (5' 4\")   Wt 53.1 kg (117 lb)   SpO2 98%   BMI 20.08 kg/m²   Chest: pacing wires: yes, chest tubes:yes, air leak no, 3 +  CV: no murmur noted, Normal S1, S2,   Lungs: clear to auscultation, no wheezes, rales, or rhonchi  Abd: normal bowel sounds   Lower Extremities: Trace edema  Saph Incison: no sign of drainage or infection  Sternal Incison: dressing applied-no excessive drainage or signs of infection noted    CXR-stable CXR with no significant effusion. IABPO in normal placement. No pneumothorax     Labs: Labs reviewed today  CBC:   Recent Labs     12/26/23  1404 12/26/23  1642 12/27/23  0522   WBC 31.9* 31.0* 22.4*   HGB 15.7 15.2 9.2*   HCT 48.4 45.8 28.7*   MCV 88.2 87.9 90.5    272 See Reflexed IPF Result     BMP:   Recent Labs     12/26/23  0938 12/26/23  1404 12/26/23  1510 12/26/23  2247 12/27/23  0513 12/27/23  0522    135  --   --   --  143   K 4.0 4.5  --   --   --  4.8    104  --   --   --  110*   CO2 16* 20  --   --   --  22   PHOS 2.5  --   --   --   --   --    BUN 15 17  --   --   --  17   CREATININE 0.9 0.9   < > 0.7 1.0 0.9    < > = values in this interval not displayed.       I/O: I/O last 3 completed shifts:  In: 2769.3 [I.V.:1969.3; Blood:750; IV Piggyback:50]  Out: 2556 [Urine:2200; Blood:1; Chest Tube:355]  Scheduled Meds:   sodium chloride flush  5-40 mL IntraVENous 2 times per day    aspirin  81 mg Oral Daily    clopidogrel  75 mg Oral Daily    amiodarone  200 mg Oral TID    mupirocin   Each Nostril BID    polyethylene glycol  17 g Oral Daily    sennosides-docusate sodium  1 tablet Oral BID    metoprolol tartrate  12.5 mg Oral BID    atorvastatin  20 mg

## 2023-12-27 NOTE — PROGRESS NOTES
Physical Therapy        Physical Therapy Cancel Note      DATE: 2023    NAME: Lee Mejia  MRN: [id number]   : 1960      Patient not seen this date for Physical Therapy due to:    Other: int/sed. Ck       Electronically signed by Erica Soriano PT on 2023 at 12:12 PM

## 2023-12-27 NOTE — PLAN OF CARE
Problem: Chronic Conditions and Co-morbidities  Goal: Patient's chronic conditions and co-morbidity symptoms are monitored and maintained or improved  Outcome: Progressing     Problem: Discharge Planning  Goal: Discharge to home or other facility with appropriate resources  Outcome: Progressing     Problem: Respiratory - Adult  Goal: Achieves optimal ventilation and oxygenation  12/27/2023 0253 by Rod Salas RN  Outcome: Progressing  12/26/2023 1323 by Kat Jiang RCP  Outcome: Progressing     Problem: Safety - Adult  Goal: Free from fall injury  Outcome: Progressing     Problem: Safety - Medical Restraint  Goal: Remains free of injury from restraints (Restraint for Interference with Medical Device)  Description: INTERVENTIONS:  1. Determine that other, less restrictive measures have been tried or would not be effective before applying the restraint  2. Evaluate the patient's condition at the time of restraint application  3. Inform patient/family regarding the reason for restraint  4. Q2H: Monitor safety, psychosocial status, comfort, nutrition and hydration  Outcome: Progressing

## 2023-12-27 NOTE — PROGRESS NOTES
[facility]   Progress Note                   Date:   12/27/2023  Patient name: Lee Mejia  Date of admission:  12/26/2023  1:07 PM  MRN:   [id number]  YOB: 1960  PCP: Dee Morrison DO    Reason for Admission: stemi     Subjective:       Patient seen and examined at bedside.  Underwent CABG x 3 yesterday.  Remains intubated.  IABP in place, 1:1 augmentation.  Currently on Levophed and epinephrine.  On IV amiodarone prophylactically per CT surgery.  Labs reviewed.  Hemoglobin 9.2, platelets 116.  INR 1.8.    Medications:   Scheduled Meds:   magnesium sulfate  2,000 mg IntraVENous Once    sodium chloride flush  5-40 mL IntraVENous 2 times per day    aspirin  81 mg Oral Daily    clopidogrel  75 mg Oral Daily    mupirocin   Each Nostril BID    polyethylene glycol  17 g Oral Daily    sennosides-docusate sodium  1 tablet Oral BID    metoprolol tartrate  12.5 mg Oral BID    atorvastatin  20 mg Oral Nightly    ceFAZolin (ANCEF) IVPB  2,000 mg IntraVENous Q8H    vancomycin (VANCOCIN) IV  1,000 mg IntraVENous Q12H    remdesivir 200 mg in sodium chloride 0.9 % 250 mL IVPB  200 mg IntraVENous Once    Followed by    remdesivir 100 mg in sodium chloride 0.9 % 250 mL IVPB  100 mg IntraVENous Q24H     Continuous Infusions:   amiodarone (CORDARONE) 450 mg in dextrose 5 % 250 mL infusion 0.5 mg/min (12/27/23 1114)    dexmedetomidine 0.2 mcg/kg/hr (12/27/23 1018)    sodium chloride      sodium chloride      propofol 20 mcg/kg/min (12/27/23 1018)    norepinephrine 0.06 mcg/kg/min (12/27/23 1018)    EPINEPHrine 0.02 mcg/kg/min (12/27/23 1018)    insulin 2.2 Units/hr (12/27/23 1122)    dextrose       CBC:   Recent Labs     12/26/23  1404 12/26/23  1642 12/27/23  0522   WBC 31.9* 31.0* 22.4*   HGB 15.7 15.2 9.2*    272 See Reflexed IPF Result     BMP:    Recent Labs     12/26/23  0938 12/26/23  1404 12/26/23  1510 12/26/23  2247 12/27/23  0513 12/27/23  0522    135  --   --   --  143   K

## 2023-12-27 NOTE — PROGRESS NOTES
input(s): \"LDH\" in the last 72 hours.  BMP:   Recent Labs     12/26/23  0017 12/26/23  0938 12/26/23  1404 12/26/23  1510 12/26/23  1953 12/26/23  2040 12/26/23  2247 12/27/23  0513 12/27/23  0522    138 135  --   --   --   --   --  143   K 4.1 4.0 4.5  --   --   --   --   --  4.8    102 104  --   --   --   --   --  110*   CO2 23 16* 20  --   --   --   --   --  22   BUN 16 15 17  --   --   --   --   --  17   CREATININE 0.9 0.9 0.9   < > 0.8 0.8 0.7 1.0 0.9   GLUCOSE 127* 124* 156*  --   --   --   --   --  165*   MG 3.1* 2.1 2.3  --   --   --   --   --  2.6   PHOS  --  2.5  --   --   --   --   --   --   --     < > = values in this interval not displayed.     Liver Function Test:   Recent Labs     12/26/23  0938 12/26/23  1404   PROT 7.8 6.5   LABALBU 4.4 3.7   ALT 18 29   AST 24 52*   ALKPHOS 157* 91   BILITOT 0.2* 0.3     Coagulation Profile:   Recent Labs     12/26/23  0017 12/26/23  1404 12/26/23  1642 12/27/23  0522   INR 1.5 1.2 1.1 1.8   PROTIME 18.2* 15.1* 13.7 20.2*   APTT 32.9 108.9* 64.4*  --      D-Dimer:  No results for input(s): \"DDIMER\" in the last 72 hours.  Lactic Acid:  No results for input(s): \"LACTA\" in the last 72 hours.  Cardiac Enzymes:  No results for input(s): \"CKTOTAL\", \"CKMB\", \"CKMBINDEX\", \"TROPONINI\" in the last 72 hours.    Invalid input(s): \"TROPONIN\", \"HSTROP\"  BNP/ProBNP:   Recent Labs     12/26/23  1404   PROBNP 454*     Triglycerides:  No results for input(s): \"TRIG\" in the last 72 hours.     Microbiology:  Urine Culture:  No components found for: \"CURINE\"  Blood Culture:  No components found for: \"CBLOOD\", \"CFUNGUSBL\"  Sputum Culture:  No components found for: \"CSPUTUM\"  Recent Labs     12/26/23  1723   SPECDESC .NASAL SWAB     Recent Labs     12/26/23  0942 12/26/23  1723   SPECDESC .NASOPHARYNGEAL SWAB .NASAL SWAB        Pathology:    Radiology Reports:  XR CHEST PORTABLE   Final Result   No significant interval change.         XR CHEST PORTABLE   Final Result  was generated using voice recognition Dragon dictation software.  Although every effort was made to ensure the accuracy of this automated transcription, some errors in transcription may have occurred.

## 2023-12-27 NOTE — PROGRESS NOTES
Comprehensive Nutrition Assessment    Type and Reason for Visit:  Initial (vent check)    Nutrition Recommendations/Plan:   Continue NPO.  If indicated, start Standard with Fiber TF formula 10 mL/hr and increase by 10 mL/hr every 8 hours to a goal rate of 35 mL/hr. Goal rate will provide 1260 kcal, 54 gm protein, and 638 mL free water.  Monitor plan for care.     Malnutrition Assessment:  Malnutrition Status:  Insufficient data (12/27/23 1440)    Context:  Acute Illness     Findings of the 6 clinical characteristics of malnutrition:  Energy Intake:  Unable to assess  Weight Loss:  Unable to assess     Body Fat Loss:  Unable to assess     Muscle Mass Loss:  Unable to assess    Fluid Accumulation:  Unable to assess     Strength:  Not Performed    Nutrition Assessment:    Chart reviewed for vent check. 62 yo M adm for STEMI, CAD, COVID, bandemia, s/p CABGX3. PMH includes DM, COPD, mixed HLD. A1c 5.8%, Glu . Patient intubated/sedated. Propofol running at 1.6 mL/hr providing 42 kcal/day. +OGT. Per RN, plans to extubate either tomorrow or the day after.    Nutrition Related Findings:    Labs/meds reviewed. Wound Type: Surgical Incision       Current Nutrition Intake & Therapies:    Average Meal Intake: NPO  Average Supplements Intake: NPO  No diet orders on file  Additional Calorie Sources:  propofol at 1.6 mL/hr provides 42 kcal/day    Anthropometric Measures:  Height: 162.6 cm (5' 4\")  Ideal Body Weight (IBW): 130 lbs (59 kg)    Current Body Weight: 53.1 kg (117 lb 1 oz),   IBW. Weight Source: Other (Comment) (estimated)  Current BMI (kg/m2): 20.1  BMI Categories: Normal Weight (BMI 18.5-24.9)    Estimated Daily Nutrient Needs:  Energy Requirements Based On: Kcal/kg     Energy (kcal/day): 8887-5947  Weight Used for Protein Requirements: Current  Protein (g/day): 80  Method Used for Fluid Requirements: 1 ml/kcal  Fluid (ml/day): 1300 mL/d or per MD    Nutrition Diagnosis:   Inadequate oral intake related to  impaired respiratory function as evidenced by NPO or clear liquid status due to medical condition, intubation    Nutrition Interventions:   Food and/or Nutrient Delivery: Start Tube Feeding  Nutrition Education/Counseling: No recommendation at this time  Coordination of Nutrition Care: Continue to monitor while inpatient  Plan of Care discussed with: RN    Goals:  Previous Goal Met:  (goal set)  Goals: Initiate nutrition support, within 2 days       Nutrition Monitoring and Evaluation:   Behavioral-Environmental Outcomes: None Identified  Food/Nutrient Intake Outcomes: Enteral Nutrition Intake/Tolerance  Physical Signs/Symptoms Outcomes: Biochemical Data, GI Status, Fluid Status or Edema, Hemodynamic Status, Nutrition Focused Physical Findings, Weight, Skin    Discharge Planning:    Too soon to determine     Yaritza Smart RD  Contact: [phone]

## 2023-12-27 NOTE — OP NOTE
anastomosis was done using 5-0  Prolene running suture.  At this point, the patient was actively  rewarmed.  The LIMA was retrieved from the left chest, cleaned of  fibrofatty tissue.  The LIMA was a smaller vessel.  It does have good  flow but is 1 mm in size.  It matches well with the first diagonal and  the first diagonal was opened using a 15-blade and a [place] blade.  The  LIMA to diagonal anastomosis was done using a 7-0 Prolene running  suture.  Once this was completed, the patient had the pedicles tacked to  the surface heart using 6-0 Prolene figure-of-eight suture.    At this juncture, the hot shot blood was given in antegrade fashion.   The heart was beating spontaneously in deep Trendelenburg position.  The  crossclamp was removed.  The the patient had the ventricular pacing  wires placed and they were tested at rate of 80 to be functioning well.   The patient was slowly weaned from cardiopulmonary bypass.  The balloon  pump was started at 1:1 and he was weaned successfully from  cardiopulmonary bypass.  The venous cannula was removed.  There was  improvement on the echo at this time noted with the new grafts.    Protamine was started.  The antegrade needle was then removed.  Site was  oversewn using 5-0 Prolene figure-of-eight suture.  Additional protamine  was given.  The aortic cannula was removed.  Site was oversewn using 4-0  pledgeted Prolene suture.    At this point, the patient had three mediastinal chest tubes placed in  addition to the ventricular pacing wires which were sewn in place.  The  patient had the three mediastinal chest tubes sewn in place using 0 silk  suture.  The chest was then closed using eight sternal wires.   Suprasternal fascia was closed using #1 running Vicryl suture; 2-0, 3-0  and 4-0 Vicryl for the superficial layers.  The leg was closed using a  2-0 and 4-0 Vicryl suture.  Vascular Surgery was consulted at the end of  the procedure for diminished pulses in the  leg.    The platelet gel was applied to the cut surface of the sternum prior to  closing as well as applied into the leg.  The patient was taken in  extremely critical condition to the intensive care unit.  Family was  updated regarding the outcome of the procedure and the critical nature  of the patient.    This procedure could not have been performed without the assistance of  Errol Cuellar who was invaluable harvesting the vein, closing, and  assisting at the chest.        MINOO NAIK MD    D: 12/27/2023 11:32:05       T: 12/27/2023 11:35:57     [email or web address]  Job#: [id number]     Doc#: [id number]    CC:

## 2023-12-27 NOTE — PROGRESS NOTES
Daily Progress Note  Neuro Critical Care    Patient Name: Lee Mejia  Patient : 1960  Room/Bed: 1028/1028-01  Code Status: FULL  Allergies: No Known Allergies    CHIEF COMPLAINT:      Unable to state, cardiac arrest     INTERVAL HISTORY    Initial Presentation (Admitted 23):  The patient is a 63 y.o. male with a history of DM 2, HLD and COPD who [facility] as a direct admit from [facility] following vfib arrest.  Patient initially presented to [facility] ED with chest pain.  Per records, patient was woken this morning by severe chest pain.  He went to [facility] ED for evaluation and as he was being brought back to a room from triage became unresponsive and went into cardiac arrest.  CPR initiated.  Initial rhythm vfib; given a single defibrillation with ROSC.  EKG showed STEMI.  Patient was intubated and taken emergently to cath lab where he was found to have high-grade left main and ostial LAD disease, moderately reduced LV function and cardiogenic shock.  He had a short episode of complete heart block during the cath.  Underwent IABP placement and transvenous temporary pacemaker placement.  Labs at OSH overall unremarkable however patient did test positive for COVID-19.  Transferred to [facility] cardiac ICU.  CT Surgery consulted for emergent CABG.  Neuro Critical Care consulted for neuro prognostication.  Of note, there was some concern for possible seizure activity although it was described as generalized tremoring/shaking and spontaneous movement of extremities.  CT surgery requesting expedited neuro prognostication.     Patient is intubated and on Propofol 20mcg/kg/min for sedation.  He received IV Versed during the cardiac cath at OSH.  He is on Levophed and Heparin infusions.  Propofol was held for clinical exam.  Patient opens eyes to noxious stimuli, maintains eye opening but does not track or follow commands.  PERRL, intact  6  Fr sheath with brisk arterial blood return. The side port was frequently flushed and aspirated with normal saline.  EBL is 20 mL  Findings:  Left main: 80% distal left main extending into ostial LAD  LAD: 90% ostial LAD with TESSIE-3 flow  LCX: Dominant with luminal irregularities of 20 to 30%  RCA: Luminal irregularities of 20 to 30%  The LV gram was performed in the WITT 30 position. LVEF: 35%. LV Wall Motion: Anteroapical akinesis  Conclusions: High-grade left main and ostial LAD disease. Dominant left circumflex system Moderately reduced LV function Cardiogenic shock Short episode of complete heart block during cardiac cath  Recommendation: CT consult for emergent CABG Intra-aortic balloon pump was inserted via her left femoral approach Transvenous temporary pacemaker inserted via left femoral vein Supportive care on vent and vasopressors Transfer emergently to [facility] for CABG   Electronically signed by Radha Escobar MD on 12/26/2023 at 11:22 AM  [facility] [phone]      XR CHEST PORTABLE    Result Date: 12/26/2023  EXAMINATION: ONE XRAY VIEW OF THE CHEST 12/26/2023 9:49 am COMPARISON: None. HISTORY: ORDERING SYSTEM PROVIDED HISTORY: AMS TECHNOLOGIST PROVIDED HISTORY: AMS FINDINGS: There is chronic pulmonary change.  There is scattered right pulmonary opacification.  There is no effusion.  There is no pneumothorax.  Mediastinal structures are unremarkable.  The upper abdomen is unremarkable.  The extrathoracic soft tissues are unremarkable.  There is an endotracheal tube with the tip in the midtrachea.  There is a gastric tube and the tip is not visualized branch     Chronic pulmonary change with scattered right pulmonary opacification may relate to an infectious process.        Labs and Images reviewed with:  [] Dr. Dea Gregory  [x] Dr. David Mcmahon  [] Dr. Kenrick Gómez  [] There are no new interval images to review.     PHYSICAL EXAM       CONSTITUTIONAL:  Intubated,

## 2023-12-27 NOTE — CARE COORDINATION
Case Management Assessment  Initial Evaluation    Date/Time of Evaluation: 12/27/2023 11:37 AM  Assessment Completed by: JESSICA NOVOA RN    If patient is discharged prior to next notation, then this note serves as note for discharge by case management.    Patient Name: Lee Mejia                   YOB: 1960  Diagnosis: STEMI (ST elevation myocardial infarction) ([facility]) [I21.3]                   Date / Time: 12/26/2023  1:07 PM    Patient Admission Status: Inpatient   Readmission Risk (Low < 19, Mod (19-27), High > 27): Readmission Risk Score: 9.3    Current PCP: Dee Morrison, DO  PCP verified by CM? (P) Yes    Chart Reviewed: Yes      History Provided by: (P) Child/Family (Only child Nataliia Fishman. Pt not .)  Patient Orientation: (P) Sedated    Patient Cognition: (P) Other (see comment) (intubated)    Hospitalization in the last 30 days (Readmission):  No    If yes, Readmission Assessment in CM Navigator will be completed.    Advance Directives:      Code Status: Full Code   Patient's Primary Decision Maker is: (P) Legal Next of Kin      Discharge Planning:    Patient lives with: (P) Friends (Hazel Milner) Type of Home: (P) House  Primary Care Giver: (P) Self  Patient Support Systems include: (P) Children   Current Financial resources: (P) Medicaid  Current community resources:    Current services prior to admission: (P) None            Current DME:              Type of Home Care services:  (P) None    ADLS  Prior functional level: (P) Independent in ADLs/IADLs  Current functional level: (P) Assistance with the following:, Bathing, Dressing, Toileting, Feeding, Cooking, Housework, Shopping, Mobility    PT AM-PAC:   /24  OT AM-PAC:   /24    Family can provide assistance at DC: (P) Yes  Would you like Case Management to discuss the discharge plan with any other family members/significant others, and if so, who? (P) Yes (daughter)  Plans to Return to Present Housing: (P) Unknown at

## 2023-12-27 NOTE — PROGRESS NOTES
Charge RN received call from [facility]. Pt currently in OR. No pulses bilateral lower extremities after coming off bypass. [facility] asking Charge RN to reach out to vascular for stat consult while the patient is still in OR. Perfect serve sent to Dr amaro. She is now on her way to [facility] to see patient.

## 2023-12-27 NOTE — BRIEF OP NOTE
Brief Postoperative Note      Patient: Lee Mejia  YOB: 1960  MRN: [id number]    Date of Procedure: 12/26/2023    Pre-Op Diagnosis Codes:     * Coronary artery disease involving coronary bypass graft of native heart without angina pectoris [I25.810]    Post-Op Diagnosis: Same       Procedure(s):  EMERGENCY CABG, CORONARY ARTERY BYPASS X3, ON PUMP, ANGELLA PER ANESTHESIA    Surgeon(s):  David Bañuelos MD    Assistant:  * No surgical staff found *    Anesthesia: General    Estimated Blood Loss (mL): 1000    Complications: None    Specimens:   * No specimens in log *    Implants:  Implant Name Type Inv. Item Serial No.  Lot No. LRB No. Used Action   CLIP INT SM WIDE WECK RED TI TRNSVRS GRV CHEVRON SHP W/ PERCIS TIP 24 PER PK - [id number]  CLIP INT SM WIDE WECK RED TI TRNSVRS GRV CHEVRON SHP W/ PERCIS TIP 24 PER PK  [employer] [id number] N/A 2 Implanted         Drains:   Chest Tube Right 1 (Active)       Chest Tube Left 2 (Active)       Chest Tube Left 3 (Active)       NG/OG/NJ/NE Tube Orogastric Center mouth (Active)   Surrounding Skin Dry;Intact 12/26/23 1330   Securement device Tape 12/26/23 1330   Status Suction-low intermittent 12/26/23 1330   Placement Verified X-Ray (Initial);Gastric Contents;External Catheter Length 12/26/23 1330   Drainage Appearance Brown;Bile 12/26/23 1330       Urinary Catheter 12/26/23 (Active)   Catheter Indications Need for fluid volume management of the critically ill patient in a critical care setting 12/26/23 1330   Site Assessment No urethral drainage 12/26/23 1330   Urine Color Yellow 12/26/23 1330   Urine Appearance Clear 12/26/23 1330   Collection Container Standard 12/26/23 1330   Securement Method Securing device (Describe) 12/26/23 1330   Catheter Care  Soap and water 12/26/23 1330   Catheter Best Practices  Drainage tube clipped to bed;Catheter secured to thigh;Tamper seal intact;Bag below bladder;Bag not on floor;Lack of dependent loop in  tubing;Drainage bag less than half full 12/26/23 1330   Status Draining 12/26/23 1330   Output (mL) 1050 mL 12/26/23 1700       Findings: Improved EF      Electronically signed by David Bañuelos MD on 12/26/2023 at 9:47 PM

## 2023-12-27 NOTE — CONSULTS
VASCULAR SURGERY CONSULTATION  [facility] Department      Patient's Name/ Date of Birth/ Gender: Lee Mejia / 1960 (63 y.o.) / male     Referring Physician: Db Sanchez MD    Consulting Physician: Dr. Delos Reyes    History of present Illness: Pt is a 63 y.o. male who presented as transfer from [facility] after vfib arrest. Underwetn cardiac cath with intraortic balloon pump placed. Was transferred to [facility] for emergent cabg. During operative intervention, after coming off bypass, stat consult called for loss of signals to left lower extremity. Unfortunately patient cannot tolerate balloon pump being down. Right leg has plapble fem pulse, and popliteal/sp/pt signals. Left leg has only fem signal, no distal signals. No vascualr surgcial hx or hx PAD per chart review.     Past Medical History:  has a past medical history of COPD (chronic obstructive pulmonary disease) ([facility]), COPD (chronic obstructive pulmonary disease) (HCC), Diabetes mellitus (HCC), Diabetes mellitus (HCC), Left main coronary artery disease, and Mixed hyperlipidemia.    Past Surgical History:   Past Surgical History:   Procedure Laterality Date    COLONOSCOPY  01/05/2015    TOOTH EXTRACTION  11/06/14       Social History:  reports that he has been smoking cigarettes. He has a 10.0 pack-year smoking history. He has never used smokeless tobacco. He reports that he does not drink alcohol and does not use drugs.    Family History: family history includes Cancer in his brother; High Blood Pressure in his father and mother.    Review of Systems:   Unable to obtian due to patient being intubated and under general anesthesia    Allergies: Patient has no known allergies.    Current Meds:  Current Facility-Administered Medications:     amiodarone (CORDARONE) 450 mg in dextrose 5 % 250 mL infusion, 1 mg/min, IntraVENous, Continuous, Sonya Jose MD    0.9 % sodium chloride infusion, , IntraVENous, PRN, Kameron Cerda APRN - NP     0.01-0.08 mcg/kg/min, IntraVENous, Continuous PRN, Paradise Cerdas MAC, APRN - NP, Last Rate: 6.4 mL/hr at 12/26/23 2222, 0.04 mcg/kg/min at 12/26/23 2222    insulin regular (HUMULIN R;NOVOLIN R) 100 Units in sodium chloride 0.9 % 100 mL infusion, 0.1-50 Units/hr, IntraVENous, Continuous, CerdaParadise abads MAC, APRN - NP, Paused at 12/27/23 0030    glucose chewable tablet 16 g, 4 tablet, Oral, PRN, Cerda, Kameron A, APRN - NP    dextrose bolus 10% 125 mL, 125 mL, IntraVENous, PRN **OR** dextrose bolus 10% 250 mL, 250 mL, IntraVENous, PRN, CerdaGonzálezKameron A, APRN - NP    glucagon (rDNA) injection 1 mg, 1 mg, IntraMUSCular, PRN, CerdaGonzálezKameron A, APRN - NP    dextrose 10 % infusion, , IntraVENous, Continuous PRN, Paradise Cerdas MAC, APRN - NP    remdesivir 200 mg in sodium chloride 0.9 % 250 mL IVPB, 200 mg, IntraVENous, Once **FOLLOWED BY** remdesivir 100 mg in sodium chloride 0.9 % 250 mL IVPB, 100 mg, IntraVENous, Q24H, Milli Lim MD    sodium chloride 0.9 % bolus 30 mL, 30 mL, IntraVENous, PRN, Milli Lim MD    Vital Signs:  Vitals:    12/26/23 1730   BP: 112/69   Pulse: 65   Resp: 10   Temp: 99 °F (37.2 °C)   SpO2: 100%       Physical Exam:  Due to limitations of exam on operating table, lower extremity vascular exam performed only    R fem palpable 2+, pop signal sp/pt signal  L fem signal, no distal signals  Both extremities cool      Labs:   Lab Results   Component Value Date/Time    WBC 31.0 12/26/2023 04:42 PM    HGB 15.2 12/26/2023 04:42 PM    HCT 45.8 12/26/2023 04:42 PM    MCV 87.9 12/26/2023 04:42 PM     12/26/2023 04:42 PM     Lab Results   Component Value Date/Time     12/26/2023 02:04 PM    K 4.5 12/26/2023 02:04 PM     12/26/2023 02:04 PM    CO2 20 12/26/2023 02:04 PM    BUN 17 12/26/2023 02:04 PM    CREATININE 0.7 12/26/2023 10:47 PM    CREATININE 0.9 12/26/2023 02:04 PM    GLUCOSE 156 12/26/2023 02:04 PM    CALCIUM 8.7 12/26/2023 02:04 PM     Lab Results   Component

## 2023-12-28 ENCOUNTER — APPOINTMENT (OUTPATIENT)
Dept: GENERAL RADIOLOGY | Age: 63
DRG: 165 | End: 2023-12-28
Attending: INTERNAL MEDICINE
Payer: MEDICAID

## 2023-12-28 PROBLEM — N17.0 ATN (ACUTE TUBULAR NECROSIS) (HCC): Status: ACTIVE | Noted: 2023-12-28

## 2023-12-28 PROBLEM — E87.20 METABOLIC ACIDEMIA: Status: ACTIVE | Noted: 2023-12-28

## 2023-12-28 PROBLEM — E87.0 HYPERNATREMIA: Status: ACTIVE | Noted: 2023-12-28

## 2023-12-28 LAB
ALBUMIN SERPL-MCNC: 3.8 G/DL (ref 3.5–5.2)
ALBUMIN/GLOB SERPL: 6.3 {RATIO} (ref 1–2.5)
ALLEN TEST: ABNORMAL
ALP SERPL-CCNC: 35 U/L (ref 40–129)
ALT SERPL-CCNC: 1585 U/L (ref 5–41)
ANION GAP SERPL CALCULATED.3IONS-SCNC: 15 MMOL/L (ref 9–17)
ANION GAP SERPL CALCULATED.3IONS-SCNC: 19 MMOL/L (ref 9–17)
ANTI-XA UNFRAC HEPARIN: <0.1 IU/L
ANTI-XA UNFRAC HEPARIN: <0.1 IU/L
AST SERPL-CCNC: 2550 U/L
BILIRUB DIRECT SERPL-MCNC: 1.1 MG/DL
BILIRUB INDIRECT SERPL-MCNC: 1.1 MG/DL (ref 0–1)
BILIRUB SERPL-MCNC: 2.2 MG/DL (ref 0.3–1.2)
BUN BLD-MCNC: 30 MG/DL (ref 8–26)
BUN BLD-MCNC: 33 MG/DL (ref 8–26)
BUN BLD-MCNC: 34 MG/DL (ref 8–26)
BUN BLD-MCNC: 36 MG/DL (ref 8–26)
BUN SERPL-MCNC: 36 MG/DL (ref 8–23)
BUN SERPL-MCNC: 49 MG/DL (ref 8–23)
CA-I BLD-SCNC: 1.04 MMOL/L (ref 1.13–1.33)
CA-I BLD-SCNC: 1.06 MMOL/L (ref 1.15–1.33)
CA-I BLD-SCNC: 1.08 MMOL/L (ref 1.15–1.33)
CA-I BLD-SCNC: 1.12 MMOL/L (ref 1.15–1.33)
CA-I BLD-SCNC: 1.13 MMOL/L (ref 1.15–1.33)
CALCIUM SERPL-MCNC: 7.8 MG/DL (ref 8.6–10.4)
CALCIUM SERPL-MCNC: 9.2 MG/DL (ref 8.6–10.4)
CHLORIDE BLD-SCNC: 112 MMOL/L (ref 98–107)
CHLORIDE BLD-SCNC: 114 MMOL/L (ref 98–107)
CHLORIDE BLD-SCNC: 114 MMOL/L (ref 98–107)
CHLORIDE BLD-SCNC: 115 MMOL/L (ref 98–107)
CHLORIDE SERPL-SCNC: 110 MMOL/L (ref 98–107)
CHLORIDE SERPL-SCNC: 110 MMOL/L (ref 98–107)
CO2 BLD CALC-SCNC: 18 MMOL/L (ref 22–30)
CO2 BLD CALC-SCNC: 19 MMOL/L (ref 22–30)
CO2 BLD CALC-SCNC: 20 MMOL/L (ref 22–30)
CO2 BLD CALC-SCNC: 21 MMOL/L (ref 22–30)
CO2 SERPL-SCNC: 19 MMOL/L (ref 20–31)
CO2 SERPL-SCNC: 23 MMOL/L (ref 20–31)
CREAT SERPL-MCNC: 1.9 MG/DL (ref 0.7–1.2)
CREAT SERPL-MCNC: 2.3 MG/DL (ref 0.7–1.2)
EGFR, POC: 34 ML/MIN/1.73M2
EGFR, POC: 36 ML/MIN/1.73M2
EGFR, POC: 41 ML/MIN/1.73M2
EGFR, POC: 44 ML/MIN/1.73M2
EKG ATRIAL RATE: 122 BPM
EKG ATRIAL RATE: 300 BPM
EKG P AXIS: -127 DEGREES
EKG P AXIS: 77 DEGREES
EKG P-R INTERVAL: 120 MS
EKG Q-T INTERVAL: 300 MS
EKG Q-T INTERVAL: 302 MS
EKG QRS DURATION: 74 MS
EKG QRS DURATION: 84 MS
EKG QTC CALCULATION (BAZETT): 430 MS
EKG QTC CALCULATION (BAZETT): 474 MS
EKG R AXIS: 19 DEGREES
EKG R AXIS: 7 DEGREES
EKG T AXIS: 34 DEGREES
EKG T AXIS: 63 DEGREES
EKG VENTRICULAR RATE: 122 BPM
EKG VENTRICULAR RATE: 150 BPM
ERYTHROCYTE [DISTWIDTH] IN BLOOD BY AUTOMATED COUNT: 14.2 % (ref 11.8–14.4)
ERYTHROCYTE [DISTWIDTH] IN BLOOD BY AUTOMATED COUNT: 14.2 % (ref 11.8–14.4)
FIO2: 60
FIO2: 60
FIO2: 65
GFR SERPL CREATININE-BSD FRML MDRD: 31 ML/MIN/1.73M2
GFR SERPL CREATININE-BSD FRML MDRD: 39 ML/MIN/1.73M2
GLUCOSE BLD-MCNC: 101 MG/DL (ref 74–100)
GLUCOSE BLD-MCNC: 102 MG/DL (ref 74–100)
GLUCOSE BLD-MCNC: 103 MG/DL (ref 74–100)
GLUCOSE BLD-MCNC: 105 MG/DL (ref 74–100)
GLUCOSE BLD-MCNC: 110 MG/DL (ref 74–100)
GLUCOSE BLD-MCNC: 115 MG/DL (ref 74–100)
GLUCOSE BLD-MCNC: 95 MG/DL (ref 74–100)
GLUCOSE BLD-MCNC: 95 MG/DL (ref 74–100)
GLUCOSE SERPL-MCNC: 107 MG/DL (ref 70–99)
GLUCOSE SERPL-MCNC: 128 MG/DL (ref 70–99)
HCT VFR BLD AUTO: 23 % (ref 41–53)
HCT VFR BLD AUTO: 23 % (ref 41–53)
HCT VFR BLD AUTO: 24 % (ref 41–53)
HCT VFR BLD AUTO: 24 % (ref 41–53)
HCT VFR BLD AUTO: 25.4 % (ref 40.7–50.3)
HCT VFR BLD AUTO: 26.4 % (ref 40.7–50.3)
HGB BLD-MCNC: 8.2 G/DL (ref 13–17)
HGB BLD-MCNC: 8.5 G/DL (ref 13–17)
INR PPP: 3.5
INR PPP: 3.8
INR PPP: 5.3
LACTIC ACID, WHOLE BLOOD: 3.3 MMOL/L (ref 0.7–2.1)
MAGNESIUM SERPL-MCNC: 3.1 MG/DL (ref 1.6–2.6)
MAGNESIUM SERPL-MCNC: 3.2 MG/DL (ref 1.6–2.6)
MCH RBC QN AUTO: 29 PG (ref 25.2–33.5)
MCH RBC QN AUTO: 29.3 PG (ref 25.2–33.5)
MCHC RBC AUTO-ENTMCNC: 32.2 G/DL (ref 28.4–34.8)
MCHC RBC AUTO-ENTMCNC: 32.3 G/DL (ref 28.4–34.8)
MCV RBC AUTO: 90.1 FL (ref 82.6–102.9)
MCV RBC AUTO: 90.7 FL (ref 82.6–102.9)
MODE: ABNORMAL
NEGATIVE BASE EXCESS, ART: 2.9 MMOL/L (ref 0–2)
NEGATIVE BASE EXCESS, ART: 3.3 MMOL/L (ref 0–2)
NEGATIVE BASE EXCESS, ART: 4.4 MMOL/L (ref 0–2)
NEGATIVE BASE EXCESS, ART: 4.5 MMOL/L (ref 0–2)
NEGATIVE BASE EXCESS, ART: 5.2 MMOL/L (ref 0–2)
NEGATIVE BASE EXCESS, ART: 6.4 MMOL/L (ref 0–2)
NRBC BLD-RTO: 0.2 PER 100 WBC
NRBC BLD-RTO: 0.9 PER 100 WBC
O2 DELIVERY DEVICE: ABNORMAL
PATIENT TEMP: 37.8
PLATELET # BLD AUTO: ABNORMAL K/UL (ref 138–453)
PLATELET # BLD AUTO: ABNORMAL K/UL (ref 138–453)
PLATELET, FLUORESCENCE: 81 K/UL (ref 138–453)
PLATELET, FLUORESCENCE: 87 K/UL (ref 138–453)
PLATELETS.RETICULATED NFR BLD AUTO: 13.3 % (ref 1.1–10.3)
PLATELETS.RETICULATED NFR BLD AUTO: 14.6 % (ref 1.1–10.3)
POC ANION GAP: 13 MMOL/L (ref 7–16)
POC ANION GAP: 14 MMOL/L (ref 7–16)
POC ANION GAP: 14 MMOL/L (ref 7–16)
POC ANION GAP: 16 MMOL/L (ref 7–16)
POC CREATININE: 1.7 MG/DL (ref 0.51–1.19)
POC CREATININE: 1.8 MG/DL (ref 0.51–1.19)
POC CREATININE: 2 MG/DL (ref 0.51–1.19)
POC CREATININE: 2.1 MG/DL (ref 0.51–1.19)
POC HCO3: 17.9 MMOL/L (ref 21–28)
POC HCO3: 19.2 MMOL/L (ref 21–28)
POC HCO3: 19.3 MMOL/L (ref 21–28)
POC HCO3: 19.9 MMOL/L (ref 21–28)
POC HCO3: 20.4 MMOL/L (ref 21–28)
POC HCO3: 21.3 MMOL/L (ref 21–28)
POC HCO3: 24.2 MMOL/L (ref 21–28)
POC HCO3: 27.3 MMOL/L (ref 21–28)
POC HEMOGLOBIN (CALC): 7.8 G/DL (ref 13.5–17.5)
POC HEMOGLOBIN (CALC): 8 G/DL (ref 13.5–17.5)
POC HEMOGLOBIN (CALC): 8.1 G/DL (ref 13.5–17.5)
POC HEMOGLOBIN (CALC): 8.1 G/DL (ref 13.5–17.5)
POC LACTIC ACID: 2.4 MMOL/L (ref 0.56–1.39)
POC LACTIC ACID: 4.3 MMOL/L (ref 0.56–1.39)
POC LACTIC ACID: 5.2 MMOL/L (ref 0.56–1.39)
POC LACTIC ACID: 6.7 MMOL/L (ref 0.56–1.39)
POC LACTIC ACID: 7.3 MMOL/L (ref 0.56–1.39)
POC LACTIC ACID: 7.6 MMOL/L (ref 0.56–1.39)
POC O2 SATURATION: 93.7 % (ref 94–98)
POC O2 SATURATION: 94.4 % (ref 94–98)
POC O2 SATURATION: 95.8 % (ref 94–98)
POC O2 SATURATION: 95.9 % (ref 94–98)
POC O2 SATURATION: 96.1 % (ref 94–98)
POC O2 SATURATION: 96.1 % (ref 94–98)
POC O2 SATURATION: 96.6 % (ref 94–98)
POC O2 SATURATION: 96.8 % (ref 94–98)
POC PCO2 TEMP: 39.5 MM HG
POC PCO2: 29.4 MM HG (ref 35–48)
POC PCO2: 29.9 MM HG (ref 35–48)
POC PCO2: 30.7 MM HG (ref 35–48)
POC PCO2: 31.9 MM HG (ref 35–48)
POC PCO2: 32.3 MM HG (ref 35–48)
POC PCO2: 33.5 MM HG (ref 35–48)
POC PCO2: 36 MM HG (ref 35–48)
POC PCO2: 38.1 MM HG (ref 35–48)
POC PH TEMP: 7.45
POC PH: 7.38 (ref 7.35–7.45)
POC PH: 7.39 (ref 7.35–7.45)
POC PH: 7.4 (ref 7.35–7.45)
POC PH: 7.41 (ref 7.35–7.45)
POC PH: 7.42 (ref 7.35–7.45)
POC PH: 7.43 (ref 7.35–7.45)
POC PH: 7.43 (ref 7.35–7.45)
POC PH: 7.46 (ref 7.35–7.45)
POC PO2 TEMP: 87.7 MM HG
POC PO2: 66.5 MM HG (ref 83–108)
POC PO2: 72.1 MM HG (ref 83–108)
POC PO2: 77.2 MM HG (ref 83–108)
POC PO2: 78.3 MM HG (ref 83–108)
POC PO2: 79.6 MM HG (ref 83–108)
POC PO2: 82.6 MM HG (ref 83–108)
POC PO2: 83.2 MM HG (ref 83–108)
POC PO2: 85.8 MM HG (ref 83–108)
POSITIVE BASE EXCESS, ART: 0.1 MMOL/L (ref 0–3)
POSITIVE BASE EXCESS, ART: 3.3 MMOL/L (ref 0–3)
POTASSIUM BLD-SCNC: 4.7 MMOL/L (ref 3.5–4.5)
POTASSIUM BLD-SCNC: 4.8 MMOL/L (ref 3.5–4.5)
POTASSIUM SERPL-SCNC: 4.3 MMOL/L (ref 3.7–5.3)
POTASSIUM SERPL-SCNC: 4.8 MMOL/L (ref 3.7–5.3)
PROT SERPL-MCNC: 4.4 G/DL (ref 6.4–8.3)
PROTHROMBIN TIME: 34.4 SEC (ref 11.7–14.9)
PROTHROMBIN TIME: 36.8 SEC (ref 11.7–14.9)
PROTHROMBIN TIME: 47.9 SEC (ref 11.7–14.9)
RBC # BLD AUTO: 2.8 M/UL (ref 4.21–5.77)
RBC # BLD AUTO: 2.93 M/UL (ref 4.21–5.77)
SAMPLE SITE: ABNORMAL
SODIUM BLD-SCNC: 146 MMOL/L (ref 138–146)
SODIUM BLD-SCNC: 148 MMOL/L (ref 138–146)
SODIUM SERPL-SCNC: 148 MMOL/L (ref 135–144)
SODIUM SERPL-SCNC: 148 MMOL/L (ref 135–144)
WBC OTHER # BLD: 13.8 K/UL (ref 3.5–11.3)
WBC OTHER # BLD: 15.5 K/UL (ref 3.5–11.3)

## 2023-12-28 PROCEDURE — 2500000003 HC RX 250 WO HCPCS: Performed by: NURSE PRACTITIONER

## 2023-12-28 PROCEDURE — 6370000000 HC RX 637 (ALT 250 FOR IP): Performed by: NURSE PRACTITIONER

## 2023-12-28 PROCEDURE — 84520 ASSAY OF UREA NITROGEN: CPT

## 2023-12-28 PROCEDURE — 85055 RETICULATED PLATELET ASSAY: CPT

## 2023-12-28 PROCEDURE — 94761 N-INVAS EAR/PLS OXIMETRY MLT: CPT

## 2023-12-28 PROCEDURE — 2580000003 HC RX 258: Performed by: THORACIC SURGERY (CARDIOTHORACIC VASCULAR SURGERY)

## 2023-12-28 PROCEDURE — 83735 ASSAY OF MAGNESIUM: CPT

## 2023-12-28 PROCEDURE — 6360000002 HC RX W HCPCS: Performed by: INTERNAL MEDICINE

## 2023-12-28 PROCEDURE — 80076 HEPATIC FUNCTION PANEL: CPT

## 2023-12-28 PROCEDURE — 85520 HEPARIN ASSAY: CPT

## 2023-12-28 PROCEDURE — 6360000002 HC RX W HCPCS: Performed by: THORACIC SURGERY (CARDIOTHORACIC VASCULAR SURGERY)

## 2023-12-28 PROCEDURE — 82330 ASSAY OF CALCIUM: CPT

## 2023-12-28 PROCEDURE — 2580000003 HC RX 258: Performed by: NURSE PRACTITIONER

## 2023-12-28 PROCEDURE — 2500000003 HC RX 250 WO HCPCS: Performed by: THORACIC SURGERY (CARDIOTHORACIC VASCULAR SURGERY)

## 2023-12-28 PROCEDURE — 86927 PLASMA FRESH FROZEN: CPT

## 2023-12-28 PROCEDURE — 80048 BASIC METABOLIC PNL TOTAL CA: CPT

## 2023-12-28 PROCEDURE — 2580000003 HC RX 258: Performed by: INTERNAL MEDICINE

## 2023-12-28 PROCEDURE — 36430 TRANSFUSION BLD/BLD COMPNT: CPT

## 2023-12-28 PROCEDURE — 37799 UNLISTED PX VASCULAR SURGERY: CPT

## 2023-12-28 PROCEDURE — 85027 COMPLETE CBC AUTOMATED: CPT

## 2023-12-28 PROCEDURE — C9113 INJ PANTOPRAZOLE SODIUM, VIA: HCPCS | Performed by: INTERNAL MEDICINE

## 2023-12-28 PROCEDURE — P9017 PLASMA 1 DONOR FRZ W/IN 8 HR: HCPCS

## 2023-12-28 PROCEDURE — 6360000002 HC RX W HCPCS: Performed by: NURSE PRACTITIONER

## 2023-12-28 PROCEDURE — 30233K1 TRANSFUSION OF NONAUTOLOGOUS FROZEN PLASMA INTO PERIPHERAL VEIN, PERCUTANEOUS APPROACH: ICD-10-PCS | Performed by: THORACIC SURGERY (CARDIOTHORACIC VASCULAR SURGERY)

## 2023-12-28 PROCEDURE — 82565 ASSAY OF CREATININE: CPT

## 2023-12-28 PROCEDURE — 6360000002 HC RX W HCPCS: Performed by: STUDENT IN AN ORGANIZED HEALTH CARE EDUCATION/TRAINING PROGRAM

## 2023-12-28 PROCEDURE — 82803 BLOOD GASES ANY COMBINATION: CPT

## 2023-12-28 PROCEDURE — 83605 ASSAY OF LACTIC ACID: CPT

## 2023-12-28 PROCEDURE — 99291 CRITICAL CARE FIRST HOUR: CPT | Performed by: INTERNAL MEDICINE

## 2023-12-28 PROCEDURE — 94003 VENT MGMT INPAT SUBQ DAY: CPT

## 2023-12-28 PROCEDURE — 85014 HEMATOCRIT: CPT

## 2023-12-28 PROCEDURE — 99024 POSTOP FOLLOW-UP VISIT: CPT | Performed by: NURSE PRACTITIONER

## 2023-12-28 PROCEDURE — 99233 SBSQ HOSP IP/OBS HIGH 50: CPT | Performed by: INTERNAL MEDICINE

## 2023-12-28 PROCEDURE — 85610 PROTHROMBIN TIME: CPT

## 2023-12-28 PROCEDURE — 99222 1ST HOSP IP/OBS MODERATE 55: CPT | Performed by: INTERNAL MEDICINE

## 2023-12-28 PROCEDURE — 82947 ASSAY GLUCOSE BLOOD QUANT: CPT

## 2023-12-28 PROCEDURE — 86022 PLATELET ANTIBODIES: CPT

## 2023-12-28 PROCEDURE — 2100000001 HC CVICU R&B

## 2023-12-28 PROCEDURE — 2500000003 HC RX 250 WO HCPCS: Performed by: INTERNAL MEDICINE

## 2023-12-28 PROCEDURE — 80051 ELECTROLYTE PANEL: CPT

## 2023-12-28 PROCEDURE — 71045 X-RAY EXAM CHEST 1 VIEW: CPT

## 2023-12-28 RX ORDER — SODIUM CHLORIDE 9 MG/ML
INJECTION, SOLUTION INTRAVENOUS PRN
Status: DISCONTINUED | OUTPATIENT
Start: 2023-12-28 | End: 2024-01-11 | Stop reason: HOSPADM

## 2023-12-28 RX ORDER — FUROSEMIDE 10 MG/ML
40 INJECTION INTRAMUSCULAR; INTRAVENOUS ONCE
Status: COMPLETED | OUTPATIENT
Start: 2023-12-28 | End: 2023-12-28

## 2023-12-28 RX ORDER — SODIUM CHLORIDE 9 MG/ML
INJECTION, SOLUTION INTRAVENOUS PRN
Status: DISCONTINUED | OUTPATIENT
Start: 2023-12-28 | End: 2023-12-30

## 2023-12-28 RX ADMIN — PROPOFOL 20 MCG/KG/MIN: 10 INJECTION, EMULSION INTRAVENOUS at 13:11

## 2023-12-28 RX ADMIN — SODIUM CHLORIDE: 9 INJECTION, SOLUTION INTRAVENOUS at 00:53

## 2023-12-28 RX ADMIN — POLYETHYLENE GLYCOL 3350 17 G: 17 POWDER, FOR SOLUTION ORAL at 10:35

## 2023-12-28 RX ADMIN — MUPIROCIN: 20 OINTMENT TOPICAL at 20:48

## 2023-12-28 RX ADMIN — Medication 2000 MG: at 01:41

## 2023-12-28 RX ADMIN — DEXMEDETOMIDINE HYDROCHLORIDE 0.3 MCG/KG/HR: 400 INJECTION, SOLUTION INTRAVENOUS at 02:56

## 2023-12-28 RX ADMIN — OXYCODONE AND ACETAMINOPHEN 2 TABLET: 5; 325 TABLET ORAL at 06:02

## 2023-12-28 RX ADMIN — VASOPRESSIN 0.03 UNITS/MIN: 20 INJECTION INTRAVENOUS at 10:11

## 2023-12-28 RX ADMIN — HEPARIN SODIUM 16 UNITS/KG/HR: 10000 INJECTION, SOLUTION INTRAVENOUS at 00:53

## 2023-12-28 RX ADMIN — SODIUM CHLORIDE, PRESERVATIVE FREE 10 ML: 5 INJECTION INTRAVENOUS at 10:23

## 2023-12-28 RX ADMIN — SODIUM BICARBONATE: 84 INJECTION, SOLUTION INTRAVENOUS at 18:39

## 2023-12-28 RX ADMIN — SODIUM BICARBONATE 50 MEQ: 84 INJECTION, SOLUTION INTRAVENOUS at 05:25

## 2023-12-28 RX ADMIN — MUPIROCIN: 20 OINTMENT TOPICAL at 12:55

## 2023-12-28 RX ADMIN — OXYCODONE AND ACETAMINOPHEN 2 TABLET: 5; 325 TABLET ORAL at 11:18

## 2023-12-28 RX ADMIN — FENTANYL CITRATE 50 MCG: 50 INJECTION, SOLUTION INTRAMUSCULAR; INTRAVENOUS at 16:53

## 2023-12-28 RX ADMIN — SENNOSIDES AND DOCUSATE SODIUM 1 TABLET: 50; 8.6 TABLET ORAL at 20:48

## 2023-12-28 RX ADMIN — FUROSEMIDE 40 MG: 10 INJECTION, SOLUTION INTRAMUSCULAR; INTRAVENOUS at 12:54

## 2023-12-28 RX ADMIN — CALCIUM CHLORIDE 2000 MG: 100 INJECTION, SOLUTION INTRAVENOUS at 13:30

## 2023-12-28 RX ADMIN — HEPARIN SODIUM 3190 UNITS: 1000 INJECTION INTRAVENOUS; SUBCUTANEOUS at 07:09

## 2023-12-28 RX ADMIN — Medication 2000 MG: at 10:35

## 2023-12-28 RX ADMIN — SODIUM CHLORIDE, PRESERVATIVE FREE 10 ML: 5 INJECTION INTRAVENOUS at 20:48

## 2023-12-28 RX ADMIN — SODIUM BICARBONATE 50 MEQ: 84 INJECTION, SOLUTION INTRAVENOUS at 08:42

## 2023-12-28 RX ADMIN — OXYCODONE AND ACETAMINOPHEN 2 TABLET: 5; 325 TABLET ORAL at 16:22

## 2023-12-28 RX ADMIN — SODIUM CHLORIDE: 9 INJECTION, SOLUTION INTRAVENOUS at 00:54

## 2023-12-28 RX ADMIN — VANCOMYCIN HYDROCHLORIDE 1000 MG: 1 INJECTION, POWDER, LYOPHILIZED, FOR SOLUTION INTRAVENOUS at 05:39

## 2023-12-28 RX ADMIN — SENNOSIDES AND DOCUSATE SODIUM 1 TABLET: 50; 8.6 TABLET ORAL at 10:35

## 2023-12-28 RX ADMIN — PANTOPRAZOLE SODIUM 40 MG: 40 INJECTION, POWDER, FOR SOLUTION INTRAVENOUS at 13:27

## 2023-12-28 RX ADMIN — FENTANYL CITRATE 50 MCG: 50 INJECTION, SOLUTION INTRAMUSCULAR; INTRAVENOUS at 13:47

## 2023-12-28 RX ADMIN — PROPOFOL 35 MCG/KG/MIN: 10 INJECTION, EMULSION INTRAVENOUS at 23:29

## 2023-12-28 RX ADMIN — SODIUM BICARBONATE: 84 INJECTION, SOLUTION INTRAVENOUS at 06:23

## 2023-12-28 ASSESSMENT — PULMONARY FUNCTION TESTS
PIF_VALUE: 25
PIF_VALUE: 25
PIF_VALUE: 27
PIF_VALUE: 25
PIF_VALUE: 26
PIF_VALUE: 26
PIF_VALUE: 27
PIF_VALUE: 25

## 2023-12-28 NOTE — PROGRESS NOTES
[facility] Cardiothoracic Surgery  Progress Note    12/28/2023 8:20 AM  Surgeon: Surgeon CTS: Dr. Sarmad ESCOBAR   POD # 2  S/P: emergent CABG with IABP     Subjective:  Mr. Mejia   Intubated with no acute distress.   IABP In place.   Well sedated.   Objective:  /69   Pulse 55   Temp 98.1 °F (36.7 °C)   Resp 19   Ht 1.626 m (5' 4\")   Wt 69.1 kg (152 lb 5.4 oz)   SpO2 96%   BMI 26.15 kg/m²   Chest: pacing wires: yes, chest tubes:yes, air leak no, 3 +  CV: no murmur noted, Normal S1, S2,   Lungs: clear to auscultation, no wheezes, rales, or rhonchi  Abd: normal bowel sounds  Lower Extremities: Trace edema  Saph Incison: no sign of drainage or infection  Sternal Incison: dressing applied-no excessive drainage or signs of infection noted    CXR-stable CXR with no significant effusion. IABP in normal placement. No pneumothorax     Labs: Labs reviewed today    CBC:   Recent Labs     12/27/23  1430 12/27/23 2038 12/28/23  0604   WBC 12.4* 18.2* 15.5*   HGB 8.6* 8.8* 8.5*   HCT 26.8* 28.3* 26.4*   MCV 90.2 92.2 90.1   PLT See Reflexed IPF Result 87* See Reflexed IPF Result     BMP:   Recent Labs     12/26/23  0938 12/26/23  1404 12/27/23  1430 12/27/23  1959 12/27/23 2003 12/28/23  0007 12/28/23  0438 12/28/23  0604 12/28/23  0606      < > 139  --  141  --   --  148*  --    K 4.0   < > 5.1  --  5.2  --   --  4.8  --       < > 107  --  109*  --   --  110*  --    CO2 16*   < > 19*  --  14*  --   --  19*  --    PHOS 2.5  --   --   --   --   --   --   --   --    BUN 15   < > 23  --  26*  --   --  36*  --    CREATININE 0.9   < > 1.3*   < > 1.5*   < > 2.0* 1.9* 2.1*    < > = values in this interval not displayed.       I/O: I/O last 3 completed shifts:  In: 4391.3 [I.V.:2659.6; Blood:750; IV Piggyback:981.7]  Out: 2486 [Urine:1895; Blood:1; Chest Tube:590]  Scheduled Meds:   furosemide  40 mg IntraVENous Once    pantoprazole  40 mg IntraVENous Daily    sodium chloride flush  5-40 mL IntraVENous 2 times per

## 2023-12-28 NOTE — PLAN OF CARE
Problem: Chronic Conditions and Co-morbidities  Goal: Patient's chronic conditions and co-morbidity symptoms are monitored and maintained or improved  Outcome: Progressing     Problem: Discharge Planning  Goal: Discharge to home or other facility with appropriate resources  Outcome: Progressing     Problem: Respiratory - Adult  Goal: Achieves optimal ventilation and oxygenation  Outcome: Progressing     Problem: Safety - Adult  Goal: Free from fall injury  Outcome: Progressing     Problem: Safety - Medical Restraint  Goal: Remains free of injury from restraints (Restraint for Interference with Medical Device)  Description: INTERVENTIONS:  1. Determine that other, less restrictive measures have been tried or would not be effective before applying the restraint  2. Evaluate the patient's condition at the time of restraint application  3. Inform patient/family regarding the reason for restraint  4. Q2H: Monitor safety, psychosocial status, comfort, nutrition and hydration  Outcome: Progressing  Flowsheets  Taken 12/28/2023 0200 by Essence Randhawa RN  Remains free of injury from restraints (restraint for interference with medical device):   Determine that other, less restrictive measures have been tried or would not be effective before applying the restraint   Evaluate the patient's condition at the time of restraint application   Inform patient/family regarding the reason for restraint   Every 2 hours: Monitor safety, psychosocial status, comfort, nutrition and hydration    Problem: Pain  Goal: Verbalizes/displays adequate comfort level or baseline comfort level  Outcome: Progressing     Problem: Skin/Tissue Integrity  Goal: Absence of new skin breakdown  Description: 1.  Monitor for areas of redness and/or skin breakdown  2.  Assess vascular access sites hourly  3.  Every 4-6 hours minimum:  Change oxygen saturation probe site  4.  Every 4-6 hours:  If on nasal continuous positive airway pressure, respiratory therapy  assess nares and determine need for appliance change or resting period.  Outcome: Progressing     Problem: ABCDS Injury Assessment  Goal: Absence of physical injury  Outcome: Progressing

## 2023-12-28 NOTE — CARE COORDINATION
Post Acute Facility/Agency List     Provided child with the following list, the list includes the overall star ratings obtained from CMS per the Medicare Web site (www.Medicare.gov):     [x] Long Term Acute Care Facilities  [] Acute Inpatient Rehabilitation Facilities  [x] Skilled Nursing Facilities  [] Home Care    Provided verbal instructions on how to utilize the QR Code to obtain additional detailed star ratings from www.Medicare.gov

## 2023-12-28 NOTE — PROGRESS NOTES
[facility] -   Infectious diseases evaluation  admission date 12/26/2023    reason for consultation:   Covid  - surg clearance    Impression :   Current:  Anterolateral NSTEMI  Card cath complicated w complete AV block and placed a temporary pace maker  High grade left main ostial disease 12/26  LCX 20-30 - RCA 20 - 30LVEF 35  Card arrest due to the AV block -  CPR less than a min to achieve ROSC  CABG 12/26 emergent bypass x  3   Cardiogenic shock  Intra aortic balloon placed  Bandemia 31  Flash Pulm edema - sp white red, no clear bacterial pneumonia   Resp failure from the card arrest   intubated  Covid test +  COPD  DM    Other:    Discussion / summary of stay / plan of care     Recommendations     COVID + but not clear if causing disease  12/26 remdesevir after the CABG  Emergent CABG 12/26 - 3 vessels  Bandemia is reactive to the MI and arrest  Disc w RN and pulm     Infection Control Recommendations   [place] Precautions  Contact Isolation   Antimicrobial Stewardship Recommendations   Simplification of therapy  Targeted therapy    History of Present Illness:   Initial history:  Lee Mejia is a 63 y.o.-year-old male admitted w severe chest pain and NSTEMI  Over exerts himself and has COPD  Taken to card cath and had a complete HB and pacer placed  He is intubated w resp failure and P edema  on CXR  WBC elevated and no clear infection  Tested + covid and ID called for clearance for surgery  He is on pressors for card failure and cardiogenic shock  According to the girl friend she had worked  long hours last 4 days and can't tell about cough or other symptoms -      Pt now is on the vent - no cellulitis -  UA ng  BC pend          Interval changes  12/27/2023   Patient Vitals for the past 8 hrs:   Temp Pulse Resp SpO2   12/27/23 2145 99 °F (37.2 °C) 58 -- 98 %   12/27/23 2130 99 °F (37.2 °C) 58 -- 97 %   12/27/23 2115 99 °F (37.2 °C) 59 -- 97 %   12/27/23 2100  Oral Nightly    ceFAZolin (ANCEF) IVPB  2,000 mg IntraVENous Q8H    vancomycin (VANCOCIN) IV  1,000 mg IntraVENous Q12H    remdesivir 200 mg in sodium chloride 0.9 % 250 mL IVPB  200 mg IntraVENous Once    Followed by    remdesivir 100 mg in sodium chloride 0.9 % 250 mL IVPB  100 mg IntraVENous Q24H       Social History:     Social History     Socioeconomic History    Marital status: Single     Spouse name: Not on file    Number of children: Not on file    Years of education: Not on file    Highest education level: Not on file   Occupational History    Not on file   Tobacco Use    Smoking status: Every Day     Current packs/day: 0.50     Average packs/day: 0.5 packs/day for 20.0 years (10.0 ttl pk-yrs)     Types: Cigarettes    Smokeless tobacco: Never   Substance and Sexual Activity    Alcohol use: No    Drug use: No    Sexual activity: Not on file   Other Topics Concern    Not on file   Social History Narrative    Not on file     Social Determinants of Health     Financial Resource Strain: Not on file   Food Insecurity: Not on file   Transportation Needs: Not on file   Physical Activity: Not on file   Stress: Not on file   Social Connections: Not on file   Intimate Partner Violence: Not on file   Housing Stability: Not on file       Family History:     Family History   Problem Relation Age of Onset    High Blood Pressure Mother     High Blood Pressure Father     Cancer Brother       Medical Decision Making:   I have independently reviewed/ordered the following labs:    CBC with Differential:   Recent Labs     12/26/23  1642 12/27/23  0522 12/27/23  1430 12/27/23 2038   WBC 31.0*   < > 12.4* 18.2*   HGB 15.2   < > 8.6* 8.8*   HCT 45.8   < > 26.8* 28.3*      < > See Reflexed IPF Result 87*   LYMPHOPCT 6*  --  8*  --    MONOPCT 4  --  5  --     < > = values in this interval not displayed.       BMP:  Recent Labs     12/27/23  0522 12/27/23  1430 12/27/23 1959 12/27/23 2003    139  --  141   K 4.8 5.1

## 2023-12-28 NOTE — PLAN OF CARE
Problem: Safety - Medical Restraint  Goal: Remains free of injury from restraints (Restraint for Interference with Medical Device)  Description: INTERVENTIONS:  1. Determine that other, less restrictive measures have been tried or would not be effective before applying the restraint  2. Evaluate the patient's condition at the time of restraint application  3. Inform patient/family regarding the reason for restraint  4. Q2H: Monitor safety, psychosocial status, comfort, nutrition and hydration  12/28/2023   Outcome: Progressing  Flowsheets   Remains free of injury from restraints (restraint for interference with medical device):   Determine that other, less restrictive measures have been tried or would not be effective before applying the restraint   Evaluate the patient's condition at the time of restraint application   Inform patient/family regarding the reason for restraint   Every 2 hours: Monitor safety, psychosocial status, comfort, nutrition and hydration

## 2023-12-28 NOTE — CONSULTS
Nephrology Consult Note    Reason for Consult: Elevated creatinine and hyponatremia  Requesting Physician: Dr. Sanchez    Chief Complaint: Status post cardiac arrest and emergent CABG  History Obtained From:  electronic medical record    History of Present Illness:              This is a 63 y.o. male who presents to the office for evaluation of elevated creatinine, decreased urine output and hyponatremia.  Patient is a 63-year-old gentleman with a history of COPD, hyperlipidemia and diabetes.  He presented to [facility] with sudden onset of severe chest pain which woke him up from sleep..  To ER.  In ER he developed V-fib.  And requiring defibrillation.  His troponins were elevated.  EKGs with suggestive of acute MI.  He was taken to Cath Lab temporary pacemaker was inserted.  Patient was admitted to ICU.  In the [facility] facility.  Patient coded and have a brief CPR.  Subsequently patient was transferred to [facility] and underwent emergent coronary artery bypass surgery.  Three-vessel coronary artery bypass was performed and intra-aortic balloon was placed.  At present patient is on intra-aortic balloon pump with a ratio of 1 is to 3.         Patient is now intubated and sedated.  According to nurses earlier he was following commands.  Due to elevation in creatinine of 1.8 and sodium of 148 nephrology was consulted.  Other lab abnormalities include prolonged INR of 5.3.  Patient has elevated liver enzymes.  He was also tested positive for COVID.      Past Medical History:        Diagnosis Date    COPD (chronic obstructive pulmonary disease) (HCC)     COPD (chronic obstructive pulmonary disease) (HCC)     Diabetes mellitus (HCC)     controlled with diet    Diabetes mellitus (HCC)     Left main coronary artery disease 12/26/2023    Mixed hyperlipidemia 03/09/2020       Past Surgical History:        Procedure Laterality Date    CARDIAC PROCEDURE N/A 12/26/2023    Coronary angiography performed by        Family History:   Family History   Problem Relation Age of Onset    High Blood Pressure Mother     High Blood Pressure Father     Cancer Brother        Review of Systems:    Constitutional: Intubated and sedated     Objective:  /69   Pulse 61   Temp 100 °F (37.8 °C)   Resp 18   Ht 1.626 m (5' 4\")   Wt 69.1 kg (152 lb 5.4 oz)   SpO2 97%   BMI 26.15 kg/m²     Physical Exam:  General appearance: Intubated and sedated skin: Warm to touch  Eyes: conjunctivae normal and sclera anicteric  ENT: No thrush or pharyngeal congestion  Neck: Hard to assess JVD, no carotid bruit  Pulmonary: Bilateral air entry  Cardiovascular: S1 and S2 audible.  Scar of recent surgery was present next abdomen: Soft and nontender bowel sounds were not audible   extremities: Trace edema  Labs:    CBC:   Lab Results   Component Value Date/Time    WBC 15.5 12/28/2023 06:04 AM    RBC 2.93 12/28/2023 06:04 AM    HGB 8.5 12/28/2023 06:04 AM    HCT 26.4 12/28/2023 06:04 AM    MCV 90.1 12/28/2023 06:04 AM    RDW 14.2 12/28/2023 06:04 AM    PLT See Reflexed IPF Result 12/28/2023 06:04 AM    MPV 12.9 12/27/2023 08:38 PM      BMP:    Latest Reference Range & Units 12/28/23 06:04   Sodium 135 - 144 mmol/L 148 (H)   Potassium 3.7 - 5.3 mmol/L 4.8   Chloride 98 - 107 mmol/L 110 (H)   CO2 20 - 31 mmol/L 19 (L)   BUN,BUNPL 8 - 23 mg/dL 36 (H)   Creatinine 0.7 - 1.2 mg/dL 1.9 (H)   Anion Gap 9 - 17 mmol/L 19 (H)   Calcium, Ionized 1.13 - 1.33 mmol/L 1.04 (L)   Est, Glom Filt Rate >60 mL/min/1.73m2 39 (L)     PHOSPHORUS:    Lab Results   Component Value Date/Time    PHOS 2.5 12/26/2023 09:38 AM     MAGNESIUM:   Lab Results   Component Value Date/Time    MG 3.2 12/28/2023 06:04 AM     ALBUMIN:   Lab Results   Component Value Date/Time    LABALBU 3.8 12/28/2023 06:04 AM       SPEP:   Lab Results   Component Value Date/Time    PROT 4.4 12/28/2023 06:04 AM     Urinalysis:  U/A:   Lab Results   Component Value Date/Time    NITRU NEGATIVE 12/26/2023

## 2023-12-28 NOTE — PROGRESS NOTES
12/28/23 0855   Care Plan - Respiratory Goals   Achieves optimal ventilation and oxygenation Assess for changes in respiratory status;Assess for changes in mentation and behavior;Position to facilitate oxygenation and minimize respiratory effort;Oxygen supplementation based on oxygen saturation or arterial blood gases;Initiate smoking cessation protocol as indicated;Encourage broncho-pulmonary hygiene including cough, deep breathe, incentive spirometry;Assess the need for suctioning and aspirate as needed;Assess and instruct to report shortness of breath or any respiratory difficulty;Respiratory therapy support as indicated

## 2023-12-28 NOTE — PROGRESS NOTES
Updated cardiology the fellow Dr. Gillis. Patient is tolerating IABP 1:2 want to clarify if its ok to wean IABP per cardiology treatment team

## 2023-12-28 NOTE — PROGRESS NOTES
PULMONARY & CRITICAL CARE MEDICINE PROGRESS NOTE     Patient:  Lee Mejia  MRN: [id number]  Admit date: 12/26/2023  Primary Care Physician: Dee Morrison DO  Consulting Physician: Db Sanchez MD  CODE Status: Full Code  LOS: 2     SUBJECTIVE     CHIEF COMPLAINT/REASON FOR INITIAL CONSULT:    BRIEF HOSPITAL COURSE:   The patient is a 63 y.o. male with chronic smoking history and COPD, presented to [facility] with severe chest pain.  He went into V-fib arrest while in the ER at [facility] requiring defibrillation.  He converted into sinus rhythm.  EKG was obtained which was consistent with anterolateral STEMI and the patient was emergently taken to Cath Lab.  He underwent cardiac cath which showed high-grade left main and ostial LAD disease with a dominant left circumflex system.  He was in cardiogenic shock, intra-aortic balloon pump was placed.  He also developed complete heart block during cardiac cath, temporary pacemaker was inserted.  He was admitted in medical ICU at the same [facility] facility where he coded again, had brief CPR after which ROSC was achieved.  He was subsequently transferred to [facility] for CT surgery evaluation for emergent CABG.  Nasal swab also came back positive for COVID.  CT surgery planning on bypass today.  Pulmonology consulted for recommendations prior to surgery, COVID infection and for vent management.  History obtained from the family member/girlfriend.  She denies any sick contact in the family.  But he does go out and meet several people.  She does not think that the patient had cough, fever or breathing difficulty.  Patient is heavy smoker, has been smoking 1-.5 pack per day for last several years.  He uses inhaler at home.  Denies any other significant past medical history.    12/28/2023   Subjective    Post 3 vessel cabg 12/26/23   Overnight events noted and ABG d/w Rn overnight multiple times   He received total 4 amps of iv bicarb

## 2023-12-28 NOTE — PROGRESS NOTES
Messaged Dr. Gillis to update repeat INR redraw.  Patient INR went from 5.3 to 3.5 post recheck after 2 FFP.

## 2023-12-28 NOTE — PLAN OF CARE
Problem: Chronic Conditions and Co-morbidities  Goal: Patient's chronic conditions and co-morbidity symptoms are monitored and maintained or improved  12/28/2023 1517 by Ronda Acevedo RN  Outcome: Progressing  12/28/2023 0247 by Essence Randhawa RN  Outcome: Progressing     Problem: Discharge Planning  Goal: Discharge to home or other facility with appropriate resources  12/28/2023 1517 by Ronda Acevedo RN  Outcome: Progressing  12/28/2023 0247 by Essence Randhawa RN  Outcome: Progressing     Problem: Respiratory - Adult  Goal: Achieves optimal ventilation and oxygenation  12/28/2023 1517 by Ronda Acevedo RN  Outcome: Progressing  Flowsheets (Taken 12/28/2023 0855 by Rebeca Griffith ALAN)  Achieves optimal ventilation and oxygenation:   Assess for changes in respiratory status   Assess for changes in mentation and behavior   Position to facilitate oxygenation and minimize respiratory effort   Oxygen supplementation based on oxygen saturation or arterial blood gases   Initiate smoking cessation protocol as indicated   Encourage broncho-pulmonary hygiene including cough, deep breathe, incentive spirometry   Assess the need for suctioning and aspirate as needed   Assess and instruct to report shortness of breath or any respiratory difficulty   Respiratory therapy support as indicated  12/28/2023 0247 by Essence Randhawa RN  Outcome: Progressing     Problem: Safety - Adult  Goal: Free from fall injury  12/28/2023 1517 by Ronda Acevedo RN  Outcome: Progressing  12/28/2023 0247 by Essence Randhawa RN  Outcome: Progressing     Problem: Safety - Medical Restraint  Goal: Remains free of injury from restraints (Restraint for Interference with Medical Device)  Description: INTERVENTIONS:  1. Determine that other, less restrictive measures have been tried or would not be effective before applying the restraint  2. Evaluate the patient's condition at the time of restraint application  3. Inform patient/family  restraint   Evaluate the patient's condition at the time of restraint application   Inform patient/family regarding the reason for restraint   Every 2 hours: Monitor safety, psychosocial status, comfort, nutrition and hydration  Taken 12/27/2023 2200 by Rod Salas RN  Remains free of injury from restraints (restraint for interference with medical device):   Determine that other, less restrictive measures have been tried or would not be effective before applying the restraint   Evaluate the patient's condition at the time of restraint application   Inform patient/family regarding the reason for restraint   Every 2 hours: Monitor safety, psychosocial status, comfort, nutrition and hydration  Taken 12/27/2023 2000 by Rod Salas RN  Remains free of injury from restraints (restraint for interference with medical device):   Determine that other, less restrictive measures have been tried or would not be effective before applying the restraint   Evaluate the patient's condition at the time of restraint application   Inform patient/family regarding the reason for restraint   Every 2 hours: Monitor safety, psychosocial status, comfort, nutrition and hydration     Problem: Nutrition Deficit:  Goal: Optimize nutritional status  12/28/2023 1517 by Ronda Acevedo RN  Outcome: Progressing  12/28/2023 0247 by Essence Randhawa RN  Outcome: Progressing     Problem: Pain  Goal: Verbalizes/displays adequate comfort level or baseline comfort level  12/28/2023 1517 by Ronda Acevedo RN  Outcome: Progressing  12/28/2023 0247 by Essence Randhawa RN  Outcome: Progressing     Problem: Skin/Tissue Integrity  Goal: Absence of new skin breakdown  Description: 1.  Monitor for areas of redness and/or skin breakdown  2.  Assess vascular access sites hourly  3.  Every 4-6 hours minimum:  Change oxygen saturation probe site  4.  Every 4-6 hours:  If on nasal continuous positive airway pressure, respiratory therapy assess nares and determine

## 2023-12-28 NOTE — PLAN OF CARE
INR still high at 3.5 after giving FFPs. Not safe to remove the IABP at this point. Will trend INR and treat with Vitamin K if needed. Will keep the IABP for today and potentially remove tomorrow once INR<2. Discussed with the patient's RN and family.

## 2023-12-28 NOTE — PROGRESS NOTES
[facility]   Progress Note                   Date:   12/28/2023  Patient name: Lee Mejia  Date of admission:  12/26/2023  1:07 PM  MRN:   [id number]  YOB: 1960  PCP: Dee Morrison DO    Reason for Admission: stemi     Subjective:       Patient seen and examined at bedside. Remains intubated.  IABP in place, 1:1 augmentation.  Currently on Levophed 0.04.  On IV amiodarone prophylactically per CT surgery.  Labs reviewed. Plt 87,  INR 5.3    Medications:   Scheduled Meds:   furosemide  40 mg IntraVENous Once    pantoprazole  40 mg IntraVENous Daily    sodium chloride flush  5-40 mL IntraVENous 2 times per day    aspirin  81 mg Oral Daily    clopidogrel  75 mg Oral Daily    mupirocin   Each Nostril BID    polyethylene glycol  17 g Oral Daily    sennosides-docusate sodium  1 tablet Oral BID    metoprolol tartrate  12.5 mg Oral BID    atorvastatin  20 mg Oral Nightly    ceFAZolin (ANCEF) IVPB  2,000 mg IntraVENous Q8H    remdesivir 200 mg in sodium chloride 0.9 % 250 mL IVPB  200 mg IntraVENous Once    Followed by    remdesivir 100 mg in sodium chloride 0.9 % 250 mL IVPB  100 mg IntraVENous Q24H     Continuous Infusions:   sodium bicarbonate 75 mEq in sodium chloride 0.45 % 1,000 mL infusion 50 mL/hr at 12/28/23 0623    sodium chloride      amiodarone (CORDARONE) 450 mg in dextrose 5 % 250 mL infusion Stopped (12/28/23 0848)    dexmedetomidine Stopped (12/28/23 0312)    heparin (PORCINE) Infusion Stopped (12/28/23 0830)    sodium chloride 25 mL/hr at 12/28/23 0314    propofol Stopped (12/28/23 0312)    norepinephrine Stopped (12/27/23 2255)    EPINEPHrine Stopped (12/27/23 2113)    insulin Stopped (12/27/23 1245)    dextrose       CBC:   Recent Labs     12/27/23  1430 12/27/23 2038 12/28/23  0604   WBC 12.4* 18.2* 15.5*   HGB 8.6* 8.8* 8.5*   PLT See Reflexed IPF Result 87* See Reflexed IPF Result       BMP:    Recent Labs     12/27/23  1430 12/27/23 1959 12/27/23 2003

## 2023-12-29 ENCOUNTER — APPOINTMENT (OUTPATIENT)
Dept: GENERAL RADIOLOGY | Age: 63
DRG: 165 | End: 2023-12-29
Attending: INTERNAL MEDICINE
Payer: MEDICAID

## 2023-12-29 LAB
ALBUMIN SERPL-MCNC: 3.4 G/DL (ref 3.5–5.2)
ALBUMIN SERPL-MCNC: 3.5 G/DL (ref 3.5–5.2)
ALBUMIN/GLOB SERPL: 2.4 {RATIO} (ref 1–2.5)
ALBUMIN/GLOB SERPL: 2.7 {RATIO} (ref 1–2.5)
ALLEN TEST: ABNORMAL
ALP SERPL-CCNC: 66 U/L (ref 40–129)
ALP SERPL-CCNC: 69 U/L (ref 40–129)
ALT SERPL-CCNC: 3598 U/L (ref 5–41)
ALT SERPL-CCNC: 4109 U/L (ref 5–41)
ANION GAP SERPL CALCULATED.3IONS-SCNC: 10 MMOL/L (ref 9–17)
ANION GAP SERPL CALCULATED.3IONS-SCNC: 8 MMOL/L (ref 9–17)
ANION GAP SERPL CALCULATED.3IONS-SCNC: 9 MMOL/L (ref 9–17)
ANION GAP SERPL CALCULATED.3IONS-SCNC: 9 MMOL/L (ref 9–17)
AST SERPL-CCNC: >7000 U/L
AST SERPL-CCNC: >7000 U/L
BILIRUB DIRECT SERPL-MCNC: 0.9 MG/DL
BILIRUB DIRECT SERPL-MCNC: 0.9 MG/DL
BILIRUB INDIRECT SERPL-MCNC: 1.1 MG/DL (ref 0–1)
BILIRUB INDIRECT SERPL-MCNC: 1.2 MG/DL (ref 0–1)
BILIRUB SERPL-MCNC: 2 MG/DL (ref 0.3–1.2)
BILIRUB SERPL-MCNC: 2.1 MG/DL (ref 0.3–1.2)
BLOOD BANK BLOOD PRODUCT EXPIRATION DATE: NORMAL
BLOOD BANK BLOOD PRODUCT EXPIRATION DATE: NORMAL
BLOOD BANK DISPENSE STATUS: NORMAL
BLOOD BANK DISPENSE STATUS: NORMAL
BLOOD BANK ISBT PRODUCT BLOOD TYPE: 7300
BLOOD BANK ISBT PRODUCT BLOOD TYPE: 7300
BLOOD BANK PRODUCT CODE: NORMAL
BLOOD BANK PRODUCT CODE: NORMAL
BLOOD BANK UNIT TYPE AND RH: NORMAL
BLOOD BANK UNIT TYPE AND RH: NORMAL
BPU ID: NORMAL
BPU ID: NORMAL
BUN SERPL-MCNC: 61 MG/DL (ref 8–23)
BUN SERPL-MCNC: 65 MG/DL (ref 8–23)
BUN SERPL-MCNC: 65 MG/DL (ref 8–23)
BUN SERPL-MCNC: 67 MG/DL (ref 8–23)
C3 SERPL-MCNC: 34 MG/DL (ref 90–180)
C4 SERPL-MCNC: 3 MG/DL (ref 10–40)
CALCIUM SERPL-MCNC: 7.6 MG/DL (ref 8.6–10.4)
CALCIUM SERPL-MCNC: 8 MG/DL (ref 8.6–10.4)
CALCIUM SERPL-MCNC: 8.1 MG/DL (ref 8.6–10.4)
CALCIUM SERPL-MCNC: 8.2 MG/DL (ref 8.6–10.4)
CHLORIDE SERPL-SCNC: 110 MMOL/L (ref 98–107)
CHLORIDE SERPL-SCNC: 110 MMOL/L (ref 98–107)
CHLORIDE SERPL-SCNC: 111 MMOL/L (ref 98–107)
CHLORIDE SERPL-SCNC: 111 MMOL/L (ref 98–107)
CO2 SERPL-SCNC: 27 MMOL/L (ref 20–31)
COMPONENT: NORMAL
COMPONENT: NORMAL
CREAT SERPL-MCNC: 2.1 MG/DL (ref 0.7–1.2)
CREAT SERPL-MCNC: 2.2 MG/DL (ref 0.7–1.2)
CREAT SERPL-MCNC: 2.3 MG/DL (ref 0.7–1.2)
CREAT SERPL-MCNC: 2.3 MG/DL (ref 0.7–1.2)
ERYTHROCYTE [DISTWIDTH] IN BLOOD BY AUTOMATED COUNT: 13.9 % (ref 11.8–14.4)
ERYTHROCYTE [DISTWIDTH] IN BLOOD BY AUTOMATED COUNT: 14 % (ref 11.8–14.4)
FIO2: 70
FIO2: 70
GFR SERPL CREATININE-BSD FRML MDRD: 31 ML/MIN/1.73M2
GFR SERPL CREATININE-BSD FRML MDRD: 31 ML/MIN/1.73M2
GFR SERPL CREATININE-BSD FRML MDRD: 32 ML/MIN/1.73M2
GFR SERPL CREATININE-BSD FRML MDRD: 34 ML/MIN/1.73M2
GLUCOSE BLD-MCNC: 117 MG/DL (ref 74–100)
GLUCOSE BLD-MCNC: 121 MG/DL (ref 74–100)
GLUCOSE BLD-MCNC: 93 MG/DL (ref 74–100)
GLUCOSE SERPL-MCNC: 123 MG/DL (ref 70–99)
GLUCOSE SERPL-MCNC: 126 MG/DL (ref 70–99)
GLUCOSE SERPL-MCNC: 131 MG/DL (ref 70–99)
GLUCOSE SERPL-MCNC: 96 MG/DL (ref 70–99)
HCT VFR BLD AUTO: 24.3 % (ref 40.7–50.3)
HCT VFR BLD AUTO: 25.6 % (ref 40.7–50.3)
HGB BLD-MCNC: 8 G/DL (ref 13–17)
HGB BLD-MCNC: 8.3 G/DL (ref 13–17)
INR PPP: 2.1
INR PPP: 2.4
INR PPP: 2.5
INR PPP: 2.8
INR PPP: 3.2
INR PPP: 3.9
LACTIC ACID, WHOLE BLOOD: 1.6 MMOL/L (ref 0.7–2.1)
LACTIC ACID, WHOLE BLOOD: 1.7 MMOL/L (ref 0.7–2.1)
LACTIC ACID, WHOLE BLOOD: 1.9 MMOL/L (ref 0.7–2.1)
MAGNESIUM SERPL-MCNC: 3 MG/DL (ref 1.6–2.6)
MAGNESIUM SERPL-MCNC: 3.1 MG/DL (ref 1.6–2.6)
MCH RBC QN AUTO: 29.2 PG (ref 25.2–33.5)
MCH RBC QN AUTO: 29.5 PG (ref 25.2–33.5)
MCHC RBC AUTO-ENTMCNC: 32.4 G/DL (ref 28.4–34.8)
MCHC RBC AUTO-ENTMCNC: 32.9 G/DL (ref 28.4–34.8)
MCV RBC AUTO: 89.7 FL (ref 82.6–102.9)
MCV RBC AUTO: 90.1 FL (ref 82.6–102.9)
MODE: ABNORMAL
NRBC BLD-RTO: 1.1 PER 100 WBC
NRBC BLD-RTO: 1.6 PER 100 WBC
O2 DELIVERY DEVICE: ABNORMAL
PARTIAL THROMBOPLASTIN TIME: 38.8 SEC (ref 23–36.5)
PATIENT TEMP: 36.9
PF4 HEPARIN CMPLX IGG SERPL IA: 0.07 O.D. (ref 0–0.4)
PLATELET # BLD AUTO: 54 K/UL (ref 138–453)
PLATELET # BLD AUTO: 58 K/UL (ref 138–453)
PMV BLD AUTO: 12.7 FL (ref 8.1–13.5)
PMV BLD AUTO: 13 FL (ref 8.1–13.5)
POC HCO3: 28 MMOL/L (ref 21–28)
POC HCO3: 28.3 MMOL/L (ref 21–28)
POC HCO3: 30 MMOL/L (ref 21–28)
POC LACTIC ACID: 1.4 MMOL/L (ref 0.56–1.39)
POC LACTIC ACID: 1.4 MMOL/L (ref 0.56–1.39)
POC O2 SATURATION: 98.9 % (ref 94–98)
POC O2 SATURATION: 98.9 % (ref 94–98)
POC O2 SATURATION: 99.1 % (ref 94–98)
POC PCO2: 34.8 MM HG (ref 35–48)
POC PCO2: 37.4 MM HG (ref 35–48)
POC PCO2: 38.8 MM HG (ref 35–48)
POC PH: 7.47 (ref 7.35–7.45)
POC PH: 7.48 (ref 7.35–7.45)
POC PH: 7.54 (ref 7.35–7.45)
POC PO2: 110.7 MM HG (ref 83–108)
POC PO2: 117.5 MM HG (ref 83–108)
POC PO2: 127 MM HG (ref 83–108)
POSITIVE BASE EXCESS, ART: 4.2 MMOL/L (ref 0–3)
POSITIVE BASE EXCESS, ART: 4.4 MMOL/L (ref 0–3)
POSITIVE BASE EXCESS, ART: 7.1 MMOL/L (ref 0–3)
POTASSIUM SERPL-SCNC: 3.3 MMOL/L (ref 3.7–5.3)
POTASSIUM SERPL-SCNC: 3.5 MMOL/L (ref 3.7–5.3)
POTASSIUM SERPL-SCNC: 3.8 MMOL/L (ref 3.7–5.3)
POTASSIUM SERPL-SCNC: 3.9 MMOL/L (ref 3.7–5.3)
PROT SERPL-MCNC: 4.8 G/DL (ref 6.4–8.3)
PROT SERPL-MCNC: 4.8 G/DL (ref 6.4–8.3)
PROTHROMBIN TIME: 23.3 SEC (ref 11.7–14.9)
PROTHROMBIN TIME: 25.8 SEC (ref 11.7–14.9)
PROTHROMBIN TIME: 26.9 SEC (ref 11.7–14.9)
PROTHROMBIN TIME: 29.1 SEC (ref 11.7–14.9)
PROTHROMBIN TIME: 32.2 SEC (ref 11.7–14.9)
PROTHROMBIN TIME: 37.3 SEC (ref 11.7–14.9)
RBC # BLD AUTO: 2.71 M/UL (ref 4.21–5.77)
RBC # BLD AUTO: 2.84 M/UL (ref 4.21–5.77)
SAMPLE SITE: ABNORMAL
SODIUM SERPL-SCNC: 145 MMOL/L (ref 135–144)
SODIUM SERPL-SCNC: 147 MMOL/L (ref 135–144)
TRANSFUSION STATUS: NORMAL
TRANSFUSION STATUS: NORMAL
TRIGL SERPL-MCNC: 193 MG/DL
UNIT DIVISION: 0
UNIT DIVISION: 0
UNIT ISSUE DATE/TIME: NORMAL
UNIT ISSUE DATE/TIME: NORMAL
WBC OTHER # BLD: 10.6 K/UL (ref 3.5–11.3)
WBC OTHER # BLD: 8.9 K/UL (ref 3.5–11.3)

## 2023-12-29 PROCEDURE — 2100000001 HC CVICU R&B

## 2023-12-29 PROCEDURE — 99233 SBSQ HOSP IP/OBS HIGH 50: CPT | Performed by: INTERNAL MEDICINE

## 2023-12-29 PROCEDURE — 83735 ASSAY OF MAGNESIUM: CPT

## 2023-12-29 PROCEDURE — 84478 ASSAY OF TRIGLYCERIDES: CPT

## 2023-12-29 PROCEDURE — 80048 BASIC METABOLIC PNL TOTAL CA: CPT

## 2023-12-29 PROCEDURE — 6360000002 HC RX W HCPCS: Performed by: INTERNAL MEDICINE

## 2023-12-29 PROCEDURE — 86160 COMPLEMENT ANTIGEN: CPT

## 2023-12-29 PROCEDURE — 6370000000 HC RX 637 (ALT 250 FOR IP): Performed by: STUDENT IN AN ORGANIZED HEALTH CARE EDUCATION/TRAINING PROGRAM

## 2023-12-29 PROCEDURE — 36430 TRANSFUSION BLD/BLD COMPNT: CPT

## 2023-12-29 PROCEDURE — 6360000002 HC RX W HCPCS: Performed by: NURSE PRACTITIONER

## 2023-12-29 PROCEDURE — 80076 HEPATIC FUNCTION PANEL: CPT

## 2023-12-29 PROCEDURE — 2700000000 HC OXYGEN THERAPY PER DAY

## 2023-12-29 PROCEDURE — 6370000000 HC RX 637 (ALT 250 FOR IP): Performed by: NURSE PRACTITIONER

## 2023-12-29 PROCEDURE — 82947 ASSAY GLUCOSE BLOOD QUANT: CPT

## 2023-12-29 PROCEDURE — 86927 PLASMA FRESH FROZEN: CPT

## 2023-12-29 PROCEDURE — P9017 PLASMA 1 DONOR FRZ W/IN 8 HR: HCPCS

## 2023-12-29 PROCEDURE — 85730 THROMBOPLASTIN TIME PARTIAL: CPT

## 2023-12-29 PROCEDURE — 82803 BLOOD GASES ANY COMBINATION: CPT

## 2023-12-29 PROCEDURE — 99291 CRITICAL CARE FIRST HOUR: CPT | Performed by: INTERNAL MEDICINE

## 2023-12-29 PROCEDURE — 99024 POSTOP FOLLOW-UP VISIT: CPT | Performed by: NURSE PRACTITIONER

## 2023-12-29 PROCEDURE — 37799 UNLISTED PX VASCULAR SURGERY: CPT

## 2023-12-29 PROCEDURE — 99232 SBSQ HOSP IP/OBS MODERATE 35: CPT | Performed by: INTERNAL MEDICINE

## 2023-12-29 PROCEDURE — 2580000003 HC RX 258: Performed by: NURSE PRACTITIONER

## 2023-12-29 PROCEDURE — 71045 X-RAY EXAM CHEST 1 VIEW: CPT

## 2023-12-29 PROCEDURE — 2580000003 HC RX 258: Performed by: INTERNAL MEDICINE

## 2023-12-29 PROCEDURE — C9113 INJ PANTOPRAZOLE SODIUM, VIA: HCPCS | Performed by: INTERNAL MEDICINE

## 2023-12-29 PROCEDURE — 85027 COMPLETE CBC AUTOMATED: CPT

## 2023-12-29 PROCEDURE — 94761 N-INVAS EAR/PLS OXIMETRY MLT: CPT

## 2023-12-29 PROCEDURE — 83605 ASSAY OF LACTIC ACID: CPT

## 2023-12-29 PROCEDURE — 94003 VENT MGMT INPAT SUBQ DAY: CPT

## 2023-12-29 PROCEDURE — 85610 PROTHROMBIN TIME: CPT

## 2023-12-29 RX ORDER — PHYTONADIONE 5 MG/1
5 TABLET ORAL ONCE
Status: COMPLETED | OUTPATIENT
Start: 2023-12-29 | End: 2023-12-29

## 2023-12-29 RX ORDER — SODIUM CHLORIDE 9 MG/ML
INJECTION, SOLUTION INTRAVENOUS PRN
Status: DISCONTINUED | OUTPATIENT
Start: 2023-12-29 | End: 2023-12-30

## 2023-12-29 RX ORDER — CHLOROTHIAZIDE SODIUM 500 MG/1
250 INJECTION INTRAVENOUS 2 TIMES DAILY
Status: COMPLETED | OUTPATIENT
Start: 2023-12-29 | End: 2023-12-29

## 2023-12-29 RX ORDER — PHYTONADIONE 5 MG/1
10 TABLET ORAL ONCE
Status: COMPLETED | OUTPATIENT
Start: 2023-12-29 | End: 2023-12-29

## 2023-12-29 RX ORDER — DEXTROSE AND SODIUM CHLORIDE 5; .45 G/100ML; G/100ML
INJECTION, SOLUTION INTRAVENOUS CONTINUOUS
Status: DISCONTINUED | OUTPATIENT
Start: 2023-12-29 | End: 2023-12-29

## 2023-12-29 RX ADMIN — PANTOPRAZOLE SODIUM 40 MG: 40 INJECTION, POWDER, FOR SOLUTION INTRAVENOUS at 08:33

## 2023-12-29 RX ADMIN — FENTANYL CITRATE 25 MCG: 50 INJECTION, SOLUTION INTRAMUSCULAR; INTRAVENOUS at 20:25

## 2023-12-29 RX ADMIN — OXYCODONE AND ACETAMINOPHEN 2 TABLET: 5; 325 TABLET ORAL at 06:40

## 2023-12-29 RX ADMIN — MUPIROCIN: 20 OINTMENT TOPICAL at 10:04

## 2023-12-29 RX ADMIN — SENNOSIDES AND DOCUSATE SODIUM 1 TABLET: 50; 8.6 TABLET ORAL at 20:35

## 2023-12-29 RX ADMIN — SODIUM CHLORIDE, PRESERVATIVE FREE 10 ML: 5 INJECTION INTRAVENOUS at 08:13

## 2023-12-29 RX ADMIN — OXYCODONE AND ACETAMINOPHEN 2 TABLET: 5; 325 TABLET ORAL at 17:05

## 2023-12-29 RX ADMIN — METOPROLOL TARTRATE 12.5 MG: 25 TABLET, FILM COATED ORAL at 20:35

## 2023-12-29 RX ADMIN — OXYCODONE AND ACETAMINOPHEN 2 TABLET: 5; 325 TABLET ORAL at 02:38

## 2023-12-29 RX ADMIN — SODIUM CHLORIDE, PRESERVATIVE FREE 10 ML: 5 INJECTION INTRAVENOUS at 20:36

## 2023-12-29 RX ADMIN — POLYETHYLENE GLYCOL 3350 17 G: 17 POWDER, FOR SOLUTION ORAL at 08:33

## 2023-12-29 RX ADMIN — SENNOSIDES AND DOCUSATE SODIUM 1 TABLET: 50; 8.6 TABLET ORAL at 08:33

## 2023-12-29 RX ADMIN — CHLOROTHIAZIDE SODIUM 250 MG: 500 INJECTION, POWDER, LYOPHILIZED, FOR SOLUTION INTRAVENOUS at 20:35

## 2023-12-29 RX ADMIN — POTASSIUM CHLORIDE 20 MEQ: 29.8 INJECTION, SOLUTION INTRAVENOUS at 11:23

## 2023-12-29 RX ADMIN — PROPOFOL 35 MCG/KG/MIN: 10 INJECTION, EMULSION INTRAVENOUS at 06:00

## 2023-12-29 RX ADMIN — PROPOFOL 35 MCG/KG/MIN: 10 INJECTION, EMULSION INTRAVENOUS at 10:02

## 2023-12-29 RX ADMIN — CHLOROTHIAZIDE SODIUM 250 MG: 500 INJECTION, POWDER, LYOPHILIZED, FOR SOLUTION INTRAVENOUS at 09:08

## 2023-12-29 RX ADMIN — MUPIROCIN: 20 OINTMENT TOPICAL at 22:19

## 2023-12-29 RX ADMIN — PHYTONADIONE 5 MG: 5 TABLET ORAL at 12:36

## 2023-12-29 RX ADMIN — PHYTONADIONE 10 MG: 5 TABLET ORAL at 02:38

## 2023-12-29 RX ADMIN — OXYCODONE AND ACETAMINOPHEN 2 TABLET: 5; 325 TABLET ORAL at 11:20

## 2023-12-29 RX ADMIN — POTASSIUM CHLORIDE 20 MEQ: 29.8 INJECTION, SOLUTION INTRAVENOUS at 12:36

## 2023-12-29 RX ADMIN — OXYCODONE AND ACETAMINOPHEN 2 TABLET: 5; 325 TABLET ORAL at 22:20

## 2023-12-29 RX ADMIN — SODIUM CHLORIDE: 9 INJECTION, SOLUTION INTRAVENOUS at 08:10

## 2023-12-29 ASSESSMENT — PULMONARY FUNCTION TESTS
PIF_VALUE: 27
PIF_VALUE: 27
PIF_VALUE: 32
PIF_VALUE: 29
PIF_VALUE: 28
PIF_VALUE: 30
PIF_VALUE: 27
PIF_VALUE: 27

## 2023-12-29 NOTE — PLAN OF CARE
Problem: Chronic Conditions and Co-morbidities  Goal: Patient's chronic conditions and co-morbidity symptoms are monitored and maintained or improved  12/29/2023 0457 by Carline Urrutia RN  Outcome: Progressing    Problem: Discharge Planning  Goal: Discharge to home or other facility with appropriate resources  Outcome: Progressing    Problem: Safety - Adult  Goal: Free from fall injury  12/29/2023 0457 by Carline Urrutia RN  Outcome: Progressing    Problem: Safety - Medical Restraint  Goal: Remains free of injury from restraints (Restraint for Interference with Medical Device)  Description: INTERVENTIONS:  1. Determine that other, less restrictive measures have been tried or would not be effective before applying the restraint  2. Evaluate the patient's condition at the time of restraint application  3. Inform patient/family regarding the reason for restraint  4. Q2H: Monitor safety, psychosocial status, comfort, nutrition and hydration  12/29/2023 0457 by Carilne Urrutia RN  Outcome: Progressing  Flowsheets  Taken 12/28/2023 2000 by Carline Urrutia RN  Remains free of injury from restraints (restraint for interference with medical device): Determine that other, less restrictive measures have been tried or would not be effective before applying the restraint  Problem: Nutrition Deficit:  Goal: Optimize nutritional status  12/29/2023 0457 by Carline Urrutia RN  Outcome: Progressing    Problem: Pain  Goal: Verbalizes/displays adequate comfort level or baseline comfort level  12/29/2023 0457 by Carline Urrutia RN  Outcome: Progressing

## 2023-12-29 NOTE — PROGRESS NOTES
No results for input(s): \"BNP\", \"PROBNP\" in the last 72 hours.    Triglycerides:  Recent Labs     12/29/23  0529   TRIG 193*        Microbiology:  Urine Culture:  No components found for: \"CURINE\"  Blood Culture:  No components found for: \"CBLOOD\", \"CFUNGUSBL\"  Sputum Culture:  No components found for: \"CSPUTUM\"  Recent Labs     12/26/23  1717 12/26/23  1723   SPECDESC .INDWELLING CATH URINE .NASAL SWAB   CULTURE NO GROWTH  --      Recent Labs     12/26/23  1717 12/26/23  1723   SPECDESC .INDWELLING CATH URINE .NASAL SWAB   CULTURE NO GROWTH  --         Pathology:    Radiology Reports:  XR CHEST PORTABLE   Final Result   Support devices in adequate position.  Left basilar atelectasis.  No large   effusion or pneumothorax.         XR CHEST PORTABLE   Final Result   1. Persistent hazy opacity over the right hemithorax.  Differential includes   asymmetric pulmonary edema and layering pleural effusion.   2. Bandlike opacity in left base, likely discoid atelectasis.         XR CHEST (SINGLE VIEW FRONTAL)   Final Result   1. Interstitial pulmonary edema.   2. Small left pleural effusion.  Platelike atelectasis in the left lung base.   3. Supporting lines and tubes as described.         XR CHEST PORTABLE   Final Result   No significant interval change.         XR CHEST PORTABLE   Final Result   Satisfactory postop appearance with suggestion of fluid overload versus mild   failure.         XR CHEST PORTABLE   Final Result   The intra-aortic balloon pump tip is at the level of the superior margin of   the aortic knob.         Vascular duplex vein mapping lower bilateral   Final Result      Vascular duplex upper extremity arteries bilateral   Final Result      Vascular duplex carotid bilateral   Final Result      XR CHEST PORTABLE   Final Result   Stable interspace crash that stable interstitial pulmonary edema, right   greater than left.      IABP marker now projecting along the superior margin of the aortic knob.   Stable  position of endotracheal tube and lower extremity transvenous pacer.         XR CHEST PORTABLE    (Results Pending)        Echocardiogram:   No results found for this or any previous visit.       ASSESSMENT AND PLAN     Assessment:    // Acute hypoxic respiratory failure  // acute cardiogenic pulmonary edema   // post cabg x3 emergent on 12/26/23   // v fib arrest   // cardiogenic shock   // IABP  //shock liver   // coagulopathy   // thrombocytopenia   // anterolateral STEMI  //chb - post TPP - currently not requiring pacing .  // covid -19 infection on remdesivir   //copd       Plan:  Wean peep and fio2 to keep sats >92 % .   Dc bicarb gtt   Diuresis   Monitor LFTS   Start tf at 10 ml /hr   Will start SBT and evaluate for extubation in am - once peep is dec to <10 and fio2 <50 %   IABP as per cv   Family updated   D/w CTS         D/w Rt and Rn  Total critical care time caring for this patient with life threatening, unstable organ failure, including direct patient contact, management of life support systems, review of data including imaging and labs, discussions with other team members and physicians at least 40 Min so far today, excluding procedures.     Electronically signed by FELIPA PURDY MD on 12/29/2023 at 4:03 PM   Please note that this chart was generated using voice recognition Dragon dictation software.  Although every effort was made to ensure the accuracy of this automated transcription, some errors in transcription may have occurred.

## 2023-12-29 NOTE — PROGRESS NOTES
[facility] -   Infectious diseases evaluation  admission date 12/26/2023    reason for consultation:   Covid  - surg clearance    Impression :   Current:  Anterolateral NSTEMI  Card cath complicated w complete AV block and placed a temporary pace maker  High grade left main ostial disease 12/26  LCX 20-30 - RCA 20 - 30LVEF 35  Card arrest due to the AV block -  CPR less than a min to achieve ROSC  CABG 12/26 emergent bypass x  3   Cardiogenic shock  Intra aortic balloon placed  Bandemia 31 - better  Shock liver 12/28  Flash Pulm edema - sp white red, no clear bacterial pneumonia   Resp failure from the card arrest   intubated  Covid test +  COPD  DM    Other:    Discussion / summary of stay / plan of care     Recommendations     COVID + but not clear if causing disease  12/26 remdesevir after the CABG - stopped 12/28 due to shock liver and elevated Liver enzymes  Emergent CABG 12/26 - 3 vessels  Bandemia is reactive to the MI and arrest - improved  Disc w RN and pulm     Infection Control Recommendations   [place] Precautions  Contact Isolation   Antimicrobial Stewardship Recommendations   Simplification of therapy  Targeted therapy    History of Present Illness:   Initial history:  Lee Mejia is a 63 y.o.-year-old male admitted w severe chest pain and NSTEMI  Over exerts himself and has COPD  Taken to card cath and had a complete HB and pacer placed  He is intubated w resp failure and P edema  on CXR  WBC elevated and no clear infection  Tested + covid and ID called for clearance for surgery  He is on pressors for card failure and cardiogenic shock  According to the girl friend she had worked  long hours last 4 days and can't tell about cough or other symptoms -      Pt now is on the vent - no cellulitis -  UA ng  BC pend          Interval changes  12/28/2023   Patient Vitals for the past 8 hrs:   BP Temp Pulse SpO2   12/28/23 2130 -- 100 °F (37.8 °C) 73 97 %  chloride flush  5-40 mL IntraVENous 2 times per day    aspirin  81 mg Oral Daily    clopidogrel  75 mg Oral Daily    mupirocin   Each Nostril BID    polyethylene glycol  17 g Oral Daily    sennosides-docusate sodium  1 tablet Oral BID    metoprolol tartrate  12.5 mg Oral BID    atorvastatin  20 mg Oral Nightly       Social History:     Social History     Socioeconomic History    Marital status: Single     Spouse name: Not on file    Number of children: Not on file    Years of education: Not on file    Highest education level: Not on file   Occupational History    Not on file   Tobacco Use    Smoking status: Every Day     Current packs/day: 0.50     Average packs/day: 0.5 packs/day for 20.0 years (10.0 ttl pk-yrs)     Types: Cigarettes    Smokeless tobacco: Never   Substance and Sexual Activity    Alcohol use: No    Drug use: No    Sexual activity: Not on file   Other Topics Concern    Not on file   Social History Narrative    Not on file     Social Determinants of Health     Financial Resource Strain: Not on file   Food Insecurity: Not on file   Transportation Needs: Not on file   Physical Activity: Not on file   Stress: Not on file   Social Connections: Not on file   Intimate Partner Violence: Not on file   Housing Stability: Not on file       Family History:     Family History   Problem Relation Age of Onset    High Blood Pressure Mother     High Blood Pressure Father     Cancer Brother       Medical Decision Making:   I have independently reviewed/ordered the following labs:    CBC with Differential:   Recent Labs     12/26/23  1642 12/27/23  0522 12/27/23  1430 12/27/23  2038 12/28/23  0604 12/28/23  1515   WBC 31.0*   < > 12.4*   < > 15.5* 13.8*   HGB 15.2   < > 8.6*   < > 8.5* 8.2*   HCT 45.8   < > 26.8*   < > 26.4* 25.4*      < > See Reflexed IPF Result   < > See Reflexed IPF Result See Reflexed IPF Result   LYMPHOPCT 6*  --  8*  --   --   --    MONOPCT 4  --  5  --   --   --     < > = values in this

## 2023-12-29 NOTE — PROGRESS NOTES
Comprehensive Nutrition Assessment    Type and Reason for Visit:  Reassess    Nutrition Recommendations/Plan:   Continue NPO.  If extubated, recommend advancing diet as tolerated.  If not extubated, and pt is hemodynamically stable, recommend starting trickle feed of Standard with Fiber formula at 10 mL/hr continuous. Increase rate by 10 mL/hr every 24 hours to a goal rate of 35 mL/hr. Goal rate provides 1260 kcal, 54 gm protein, and 638 mL free water.   Monitor plan of care and nutritional status.     Malnutrition Assessment:  Malnutrition Status:  Insufficient data (12/27/23 1440)    Context:  Acute Illness     Findings of the 6 clinical characteristics of malnutrition:  Energy Intake:  Unable to assess  Weight Loss:  Unable to assess     Body Fat Loss:  Unable to assess     Muscle Mass Loss:  Unable to assess    Fluid Accumulation:  Unable to assess     Strength:  Not Performed    Nutrition Assessment:    Chart reviewed for reassessment. Patient continues intubated/sedated. +OGT. +IABP. Per notes, pt has shock liver, elevated LFTs. ALT 3598. Propofol running at 11 mL/hr providing 290 kcal/day. Per RN, will extubate today or tomorrow. Discussed TF recs in case pt is not extubated.    Nutrition Related Findings:    Labs/meds reviewed. Wound Type: Surgical Incision       Current Nutrition Intake & Therapies:    Average Meal Intake: NPO  Average Supplements Intake: NPO  No diet orders on file  Additional Calorie Sources:  propofol at 11 mL/hr = 290 kcal/day    Anthropometric Measures:  Height: 162.6 cm (5' 4\")  Ideal Body Weight (IBW): 130 lbs (59 kg)    Admission Body Weight: 53.1 kg (117 lb 1 oz) (estimated)  Current Body Weight: 70.9 kg (156 lb 4.9 oz),   IBW. Weight Source: Bed Scale  Current BMI (kg/m2): 26.8  BMI Categories: Overweight (BMI 25.0-29.9)    Estimated Daily Nutrient Needs:  Energy Requirements Based On: Kcal/kg  Weight Used for Energy Requirements: Current  Energy (kcal/day): 4165-9369  Weight  Used for Protein Requirements: Ideal  Protein (g/day):   Method Used for Fluid Requirements: 1 ml/kcal (or per MD)  Fluid (ml/day): 7111-6934    Nutrition Diagnosis:   Inadequate oral intake related to impaired respiratory function as evidenced by NPO or clear liquid status due to medical condition, intubation    Nutrition Interventions:   Food and/or Nutrient Delivery: Start Tube Feeding  Nutrition Education/Counseling: No recommendation at this time  Coordination of Nutrition Care: Continue to monitor while inpatient  Plan of Care discussed with: RN    Goals:  Previous Goal Met: No Progress toward Goal(s)  Goals: Initiate PO diet, within 2 days       Nutrition Monitoring and Evaluation:   Behavioral-Environmental Outcomes: None Identified  Food/Nutrient Intake Outcomes: Diet Advancement/Tolerance  Physical Signs/Symptoms Outcomes: Biochemical Data, GI Status, Fluid Status or Edema, Hemodynamic Status, Nutrition Focused Physical Findings, Skin, Weight    Discharge Planning:    Too soon to determine     Yaritza Smart RD  Contact: [phone]

## 2023-12-29 NOTE — PROGRESS NOTES
[facility]   Progress Note                   Date:   12/29/2023  Patient name: Lee Mejia  Date of admission:  12/26/2023  1:07 PM  MRN:   [id number]  YOB: 1960  PCP: Dee Morrison DO    Reason for Admission: stemi     Subjective:       Patient seen and examined at bedside.  Remains intubated.  IABP in place, 1:1 augmentation.  Maintained on low-dose epinephrine by CT surgery.  Off Levophed.  IABP could not be removed yesterday due to INR of 5.1.  INR was 3.8 overnight and was given another FFP along with 10 mg of vitamin K.  INR still 3.2 this morning.  Noted uptrending AST and ALT.  Creatinine 2.2, hemoglobin 8, platelet 58.    Medications:   Scheduled Meds:   chlorothiazide  250 mg IntraVENous BID    pantoprazole  40 mg IntraVENous Daily    sodium chloride flush  5-40 mL IntraVENous 2 times per day    aspirin  81 mg Oral Daily    clopidogrel  75 mg Oral Daily    mupirocin   Each Nostril BID    polyethylene glycol  17 g Oral Daily    sennosides-docusate sodium  1 tablet Oral BID    metoprolol tartrate  12.5 mg Oral BID    atorvastatin  20 mg Oral Nightly     Continuous Infusions:   sodium chloride      sodium chloride      sodium chloride      sodium chloride 25 mL/hr at 12/29/23 0810    vasopressin 20 Units in sodium chloride 0.9 % 100 mL infusion Stopped (12/28/23 1837)    sodium chloride      dexmedeTOMIDine Stopped (12/28/23 0312)    heparin (PORCINE) Infusion Stopped (12/28/23 0830)    sodium chloride 10 mL/hr at 12/29/23 0721    propofol 35 mcg/kg/min (12/29/23 0721)    norepinephrine Stopped (12/28/23 1111)    EPINEPHrine Stopped (12/29/23 0657)    insulin Stopped (12/27/23 1245)    dextrose       CBC:   Recent Labs     12/28/23  0604 12/28/23  1515 12/29/23  0529   WBC 15.5* 13.8* 10.6   HGB 8.5* 8.2* 8.0*   PLT See Reflexed IPF Result See Reflexed IPF Result 58*       BMP:    Recent Labs     12/28/23  2348 12/29/23  0529 12/29/23  0855   * 147* 145*   K 3.9 3.5*  failure secondary to cardiac arrest, intubated and on mechanical ventilation  DM  Shock liver  Elevated INR    Plan:   INR still elevated at 3.8 this morning. Will give 2 FFPs and vitamin K this morning  Hold hepatotoxic drugs  Continue to hold heparin drip  Keep IABP 1:1 augmentation.  Not safe to remove IABP at this point consideringelevated INR.  We will remove IABP once INR<2.   Pulmonology following, appreciate recommendations  Nephrology following for SANDHYA, appreciate recommendations  Monitor INR, LFTs, hemoglobin, platelets and creatinine      Please follow the rounding attending's attestation for final recommendations.       Benito Gillis MD  Fellow, cardiovascular diseases  [facility]     I performed a history and physical examination of the patient and discussed management with the resident. I reviewed the resident’s note and agree with the documented findings and plan of care. Any areas of disagreement are noted on the chart. I was personally present for the key portions of any procedures. I have documented in the chart those procedures where I was not present during the key portions. I have personally evaluated this patient and have completed at least one if not all key elements of the E/M (history, physical exam, and MDM). Additional findings are as noted.    Taj Daly MD MD

## 2023-12-29 NOTE — PROGRESS NOTES
Messaged Dr. Gillis to update with patients recent INR of 2.4. writer, asked for plan of care for patient moving forward to facilitate getting the IABP removed safely.

## 2023-12-29 NOTE — PROGRESS NOTES
NEPHROLOGY PROGRESS NOTE      ASSESSMENT     Acute kidney injury nonoliguric secondary to ischemic hypoperfusion (tach arrest/hypotension/STEMI) and nephrotoxic (contrast) ATN - Evolving.Baseline creat normal  Post emergent CABG ejection fraction 35%  Status post cardiac arrest  Circulatory shock on IABP/pressors  COVID-19 positive  Complete heart block with pacemaker in place  COPD    PLAN     IV Diuril  Stop IV fluids  Check complement levels  Free water  Anticipate improvement in renal function      SUBJECTIVE   Presented with chest pain followed by cardiac arrest.  Sustained an ST EARL emergent cardiac cath followed by emergency CABG.      Currently on mechanical ventilation FiO2 80% PEEP of 12  IV fluids containing bicarb running.  He is in positive cumulative balance.  Chest x-ray reviewed.  On low-dose pressors and IABP  Received 1 dose of IV Lasix urine output decent.  Sodium on the higher side  Renal function seems to be plateauing    OBJECTIVE     Vitals:    12/29/23 0600 12/29/23 0615 12/29/23 0650 12/29/23 0700   BP: 137/70  137/70 (!) 149/78   Pulse: 64 65 64 63   Resp:   18    Temp: 98.4 °F (36.9 °C) 98.4 °F (36.9 °C) 98.4 °F (36.9 °C) 98.4 °F (36.9 °C)   TempSrc:       SpO2: 94% (!) 87% (!) 88%    Weight:       Height:         24HR INTAKE/OUTPUT:    Intake/Output Summary (Last 24 hours) at 12/29/2023 0839  Last data filed at 12/29/2023 0801  Gross per 24 hour   Intake 4122.82 ml   Output 2255 ml   Net 1867.82 ml       General appearance:MV  Respiratory::vesicular breath sounds,no wheeze/crackles  Cardiovascular:S1 S2 normal,no gallop or organic murmur.  Abdomen:/non distended.  Extremities: No Cyanosis or Clubbing,present Lower extremity edema  Neurological:AMV      MEDICATIONS     Scheduled Meds:    chlorothiazide  250 mg IntraVENous BID    pantoprazole  40 mg IntraVENous Daily    sodium chloride flush  5-40 mL IntraVENous 2 times per day    aspirin  81 mg Oral Daily    clopidogrel  75 mg Oral Daily

## 2023-12-29 NOTE — PROGRESS NOTES
[facility] Cardiothoracic Surgery  Progress Note    12/29/2023 11:22 AM  Surgeon: Surgeon CTS: Dr. Sarmad ESCOBAR   POD # 3  S/P: emergent CABG with IABP     Subjective:  Mr. Mejia   Intubated with no acute distress.   IABP In place.   Well sedated.   No acute distress. He is off chemical pressor support at this time. BP stable  Objective:  BP (!) 149/78   Pulse 62   Temp 98.4 °F (36.9 °C)   Resp 18   Ht 1.626 m (5' 4\")   Wt 70.9 kg (156 lb 4.9 oz)   SpO2 94%   BMI 26.83 kg/m²   Chest: pacing wires: yes, chest tubes:yes, air leak no, 3 +  CV: no murmur noted, Normal S1, S2,   Lungs: clear to auscultation, no wheezes, rales, or rhonchi  Abd: normal bowel sounds  Lower Extremities: Trace edema  Saph Incison: no sign of drainage or infection  Sternal Incison: dressing applied-no excessive drainage or signs of infection noted  Vascular following for left leg intermittent pulses. Color normal. Normal cap return.  Continue to monitor. Lactic acid normal.   CXR-stable CXR with no significant effusion. IABP in normal placement. No pneumothorax. Improved from yesterdays CXR    Labs: Labs reviewed today    CBC:   Recent Labs     12/28/23  0604 12/28/23  1515 12/29/23  0529   WBC 15.5* 13.8* 10.6   HGB 8.5* 8.2* 8.0*   HCT 26.4* 25.4* 24.3*   MCV 90.1 90.7 89.7   PLT See Reflexed IPF Result See Reflexed IPF Result 58*     BMP:   Recent Labs     12/28/23  2348 12/29/23  0529 12/29/23  0855   * 147* 145*   K 3.9 3.5* 3.3*   * 111* 110*   CO2 27 27 27   BUN 61* 65* 65*   CREATININE 2.3* 2.3* 2.2*       I/O: I/O last 3 completed shifts:  In: 4501 [I.V.:3252.4; Blood:629.2; NG/GT:60; IV Piggyback:559.5]  Out: 2740 [Urine:1685; Emesis/NG output:100; Chest Tube:955]  Scheduled Meds:   chlorothiazide  250 mg IntraVENous BID    phytonadione  5 mg Oral Once    pantoprazole  40 mg IntraVENous Daily    sodium chloride flush  5-40 mL IntraVENous 2 times per day    aspirin  81 mg Oral Daily    clopidogrel  75 mg Oral Daily     mupirocin   Each Nostril BID    polyethylene glycol  17 g Oral Daily    sennosides-docusate sodium  1 tablet Oral BID    metoprolol tartrate  12.5 mg Oral BID    [Held by provider] atorvastatin  20 mg Oral Nightly     Continuous Infusions:   sodium chloride      sodium chloride      sodium chloride      sodium chloride 25 mL/hr at 12/29/23 0810    vasopressin 20 Units in sodium chloride 0.9 % 100 mL infusion Stopped (12/28/23 1837)    sodium chloride      dexmedeTOMIDine Stopped (12/28/23 0312)    heparin (PORCINE) Infusion Stopped (12/28/23 0830)    sodium chloride 10 mL/hr at 12/29/23 0721    propofol 35 mcg/kg/min (12/29/23 1002)    norepinephrine Stopped (12/28/23 1111)    EPINEPHrine Stopped (12/29/23 0657)    insulin Stopped (12/27/23 1245)    dextrose       PRN Meds:sodium chloride, sodium chloride, sodium chloride, sodium chloride, sodium chloride, heparin (porcine), heparin (porcine), sodium chloride flush, sodium chloride, ondansetron **OR** ondansetron, oxyCODONE-acetaminophen **OR** oxyCODONE-acetaminophen, fentanNYL **OR** fentanNYL, hydrALAZINE, metoprolol, sodium bicarbonate, diphenhydrAMINE, magnesium hydroxide, bisacodyl, potassium chloride, magnesium sulfate, calcium chloride 1,000 mg in sodium chloride 0.9 % 100 mL IVPB **OR** calcium chloride 2,000 mg in sodium chloride 0.9 % 100 mL IVPB, ipratropium 0.5 mg-albuterol 2.5 mg, albumin human 5%, albumin human 25%, norepinephrine, EPINEPHrine, glucose, dextrose bolus **OR** dextrose bolus, glucagon (rDNA), dextrose, sodium chloride    Beta- Blocker: Yes (hold)  Aspirin: Yes (hold)  Lovenox: No  GI: Yes  Plavix: Yes (hold)  Coumadin: No  Statin: Yes (hold)  ACE: No    Daily Nursing Care:  Please keep SCDS in place as DVT prophylaxis  If not intubated, Please have patient use IS and acapella 10X/hr or during commercial breaks  Please continue BM management  Daily labs and CXR  Daily PT/OT and ambulation  Continue with Case Management for DC

## 2023-12-29 NOTE — PLAN OF CARE
Problem: Chronic Conditions and Co-morbidities  Goal: Patient's chronic conditions and co-morbidity symptoms are monitored and maintained or improved  12/29/2023 1333 by Ronda Acevedo RN  Outcome: Progressing  12/29/2023 0457 by Carline Urrutia RN  Outcome: Progressing     Problem: Discharge Planning  Goal: Discharge to home or other facility with appropriate resources  Outcome: Progressing     Problem: Respiratory - Adult  Goal: Achieves optimal ventilation and oxygenation  Outcome: Progressing  Flowsheets (Taken 12/29/2023 0820 by Jimbo Brody)  Achieves optimal ventilation and oxygenation:   Assess for changes in respiratory status   Assess for changes in mentation and behavior   Position to facilitate oxygenation and minimize respiratory effort   Oxygen supplementation based on oxygen saturation or arterial blood gases   Encourage broncho-pulmonary hygiene including cough, deep breathe, incentive spirometry   Assess the need for suctioning and aspirate as needed   Assess and instruct to report shortness of breath or any respiratory difficulty   Respiratory therapy support as indicated     Problem: Safety - Adult  Goal: Free from fall injury  12/29/2023 1333 by Ronda Acevedo RN  Outcome: Progressing  12/29/2023 0457 by Carline Urrutia RN  Outcome: Progressing     Problem: Safety - Medical Restraint  Goal: Remains free of injury from restraints (Restraint for Interference with Medical Device)  Description: INTERVENTIONS:  1. Determine that other, less restrictive measures have been tried or would not be effective before applying the restraint  2. Evaluate the patient's condition at the time of restraint application  3. Inform patient/family regarding the reason for restraint  4. Q2H: Monitor safety, psychosocial status, comfort, nutrition and hydration  12/29/2023 1333 by Ronda Acevedo RN  Outcome: Progressing  Flowsheets  Taken 12/29/2023 1200  Remains free of injury from restraints (restraint  Determine that other, less restrictive measures have been tried or would not be effective before applying the restraint  Taken 12/28/2023 1800 by Ronda Acevedo RN  Remains free of injury from restraints (restraint for interference with medical device):   Determine that other, less restrictive measures have been tried or would not be effective before applying the restraint   Evaluate the patient's condition at the time of restraint application   Inform patient/family regarding the reason for restraint   Every 2 hours: Monitor safety, psychosocial status, comfort, nutrition and hydration  Taken 12/28/2023 1600 by Ronda Acevedo RN  Remains free of injury from restraints (restraint for interference with medical device):   Determine that other, less restrictive measures have been tried or would not be effective before applying the restraint   Evaluate the patient's condition at the time of restraint application   Inform patient/family regarding the reason for restraint   Every 2 hours: Monitor safety, psychosocial status, comfort, nutrition and hydration     Problem: Nutrition Deficit:  Goal: Optimize nutritional status  12/29/2023 1333 by Ronda Acevedo RN  Outcome: Progressing  12/29/2023 0457 by Carline Urrutia RN  Outcome: Progressing     Problem: Pain  Goal: Verbalizes/displays adequate comfort level or baseline comfort level  12/29/2023 1333 by Ronda Acevedo RN  Outcome: Progressing  12/29/2023 0457 by Carline Urrutia RN  Outcome: Progressing     Problem: Skin/Tissue Integrity  Goal: Absence of new skin breakdown  Description: 1.  Monitor for areas of redness and/or skin breakdown  2.  Assess vascular access sites hourly  3.  Every 4-6 hours minimum:  Change oxygen saturation probe site  4.  Every 4-6 hours:  If on nasal continuous positive airway pressure, respiratory therapy assess nares and determine need for appliance change or resting period.  12/29/2023 1333 by Ronda Acevedo

## 2023-12-30 ENCOUNTER — OFFICE VISIT (OUTPATIENT)
Dept: OPERATING ROOM | Age: 63
End: 2023-12-30
Attending: INTERNAL MEDICINE

## 2023-12-30 ENCOUNTER — APPOINTMENT (OUTPATIENT)
Dept: GENERAL RADIOLOGY | Age: 63
DRG: 165 | End: 2023-12-30
Attending: INTERNAL MEDICINE
Payer: MEDICAID

## 2023-12-30 PROBLEM — I21.4 NSTEMI (NON-ST ELEVATION MYOCARDIAL INFARCTION) (HCC): Status: ACTIVE | Noted: 2023-12-26

## 2023-12-30 LAB
ALBUMIN SERPL-MCNC: 3.6 G/DL (ref 3.5–5.2)
ALBUMIN/GLOB SERPL: 2.1 {RATIO} (ref 1–2.5)
ALLEN TEST: ABNORMAL
ALP SERPL-CCNC: 100 U/L (ref 40–129)
ALT SERPL-CCNC: 1650 U/L (ref 5–41)
ANION GAP SERPL CALCULATED.3IONS-SCNC: 11 MMOL/L (ref 9–17)
AST SERPL-CCNC: 3070 U/L
BILIRUB DIRECT SERPL-MCNC: 1.2 MG/DL
BILIRUB INDIRECT SERPL-MCNC: 1 MG/DL (ref 0–1)
BILIRUB SERPL-MCNC: 2.2 MG/DL (ref 0.3–1.2)
BLOOD BANK BLOOD PRODUCT EXPIRATION DATE: NORMAL
BLOOD BANK DISPENSE STATUS: NORMAL
BLOOD BANK ISBT PRODUCT BLOOD TYPE: 7300
BLOOD BANK PRODUCT CODE: NORMAL
BLOOD BANK UNIT TYPE AND RH: NORMAL
BPU ID: NORMAL
BUN SERPL-MCNC: 69 MG/DL (ref 8–23)
CALCIUM SERPL-MCNC: 8 MG/DL (ref 8.6–10.4)
CHLORIDE SERPL-SCNC: 107 MMOL/L (ref 98–107)
CO2 SERPL-SCNC: 26 MMOL/L (ref 20–31)
COMPONENT: NORMAL
CREAT SERPL-MCNC: 1.8 MG/DL (ref 0.7–1.2)
ECHO BSA: 1.55 M2
ECHO BSA: 1.55 M2
ERYTHROCYTE [DISTWIDTH] IN BLOOD BY AUTOMATED COUNT: 13.8 % (ref 11.8–14.4)
FIO2: 40
GFR SERPL CREATININE-BSD FRML MDRD: 41 ML/MIN/1.73M2
GLUCOSE BLD-MCNC: 89 MG/DL (ref 74–100)
GLUCOSE SERPL-MCNC: 103 MG/DL (ref 70–99)
HCT VFR BLD AUTO: 26.9 % (ref 40.7–50.3)
HGB BLD-MCNC: 8.6 G/DL (ref 13–17)
INR PPP: 1.8
INR PPP: 1.9
INR PPP: 2
LACTIC ACID, WHOLE BLOOD: 1.7 MMOL/L (ref 0.7–2.1)
LACTIC ACID, WHOLE BLOOD: 1.8 MMOL/L (ref 0.7–2.1)
LACTIC ACID, WHOLE BLOOD: 1.9 MMOL/L (ref 0.7–2.1)
MAGNESIUM SERPL-MCNC: 3 MG/DL (ref 1.6–2.6)
MCH RBC QN AUTO: 28.9 PG (ref 25.2–33.5)
MCHC RBC AUTO-ENTMCNC: 32 G/DL (ref 28.4–34.8)
MCV RBC AUTO: 90.3 FL (ref 82.6–102.9)
MODE: ABNORMAL
NRBC BLD-RTO: 1.1 PER 100 WBC
O2 DELIVERY DEVICE: ABNORMAL
PLATELET # BLD AUTO: ABNORMAL K/UL (ref 138–453)
PLATELET, FLUORESCENCE: 74 K/UL (ref 138–453)
PLATELETS.RETICULATED NFR BLD AUTO: 13.8 % (ref 1.1–10.3)
POC HCO3: 26.6 MMOL/L (ref 21–28)
POC LACTIC ACID: 1.2 MMOL/L (ref 0.56–1.39)
POC O2 SATURATION: 97.6 % (ref 94–98)
POC PCO2: 34.7 MM HG (ref 35–48)
POC PH: 7.49 (ref 7.35–7.45)
POC PO2: 88.1 MM HG (ref 83–108)
POSITIVE BASE EXCESS, ART: 3.3 MMOL/L (ref 0–3)
POTASSIUM SERPL-SCNC: 3.8 MMOL/L (ref 3.7–5.3)
PROT SERPL-MCNC: 5.3 G/DL (ref 6.4–8.3)
PROTHROMBIN TIME: 20.7 SEC (ref 11.7–14.9)
PROTHROMBIN TIME: 21.4 SEC (ref 11.7–14.9)
PROTHROMBIN TIME: 22.7 SEC (ref 11.7–14.9)
RBC # BLD AUTO: 2.98 M/UL (ref 4.21–5.77)
SAMPLE SITE: ABNORMAL
SODIUM SERPL-SCNC: 144 MMOL/L (ref 135–144)
TRANSFUSION STATUS: NORMAL
UNIT DIVISION: 0
UNIT ISSUE DATE/TIME: NORMAL
WBC OTHER # BLD: 9.5 K/UL (ref 3.5–11.3)

## 2023-12-30 PROCEDURE — 33234 REMOVAL OF PACEMAKER SYSTEM: CPT | Performed by: INTERNAL MEDICINE

## 2023-12-30 PROCEDURE — 99024 POSTOP FOLLOW-UP VISIT: CPT | Performed by: NURSE PRACTITIONER

## 2023-12-30 PROCEDURE — 37799 UNLISTED PX VASCULAR SURGERY: CPT

## 2023-12-30 PROCEDURE — 99233 SBSQ HOSP IP/OBS HIGH 50: CPT | Performed by: INTERNAL MEDICINE

## 2023-12-30 PROCEDURE — 99291 CRITICAL CARE FIRST HOUR: CPT | Performed by: INTERNAL MEDICINE

## 2023-12-30 PROCEDURE — 6370000000 HC RX 637 (ALT 250 FOR IP): Performed by: THORACIC SURGERY (CARDIOTHORACIC VASCULAR SURGERY)

## 2023-12-30 PROCEDURE — 2700000000 HC OXYGEN THERAPY PER DAY

## 2023-12-30 PROCEDURE — 99152 MOD SED SAME PHYS/QHP 5/>YRS: CPT | Performed by: INTERNAL MEDICINE

## 2023-12-30 PROCEDURE — 2580000003 HC RX 258: Performed by: NURSE PRACTITIONER

## 2023-12-30 PROCEDURE — 83605 ASSAY OF LACTIC ACID: CPT

## 2023-12-30 PROCEDURE — 83735 ASSAY OF MAGNESIUM: CPT

## 2023-12-30 PROCEDURE — 2100000001 HC CVICU R&B

## 2023-12-30 PROCEDURE — 94003 VENT MGMT INPAT SUBQ DAY: CPT

## 2023-12-30 PROCEDURE — 80076 HEPATIC FUNCTION PANEL: CPT

## 2023-12-30 PROCEDURE — 75710 ARTERY X-RAYS ARM/LEG: CPT | Performed by: INTERNAL MEDICINE

## 2023-12-30 PROCEDURE — 99153 MOD SED SAME PHYS/QHP EA: CPT | Performed by: INTERNAL MEDICINE

## 2023-12-30 PROCEDURE — 2709999900 HC NON-CHARGEABLE SUPPLY: Performed by: INTERNAL MEDICINE

## 2023-12-30 PROCEDURE — 85610 PROTHROMBIN TIME: CPT

## 2023-12-30 PROCEDURE — 99232 SBSQ HOSP IP/OBS MODERATE 35: CPT | Performed by: INTERNAL MEDICINE

## 2023-12-30 PROCEDURE — 82803 BLOOD GASES ANY COMBINATION: CPT

## 2023-12-30 PROCEDURE — 85055 RETICULATED PLATELET ASSAY: CPT

## 2023-12-30 PROCEDURE — C9113 INJ PANTOPRAZOLE SODIUM, VIA: HCPCS | Performed by: INTERNAL MEDICINE

## 2023-12-30 PROCEDURE — 6370000000 HC RX 637 (ALT 250 FOR IP): Performed by: INTERNAL MEDICINE

## 2023-12-30 PROCEDURE — 6360000002 HC RX W HCPCS: Performed by: INTERNAL MEDICINE

## 2023-12-30 PROCEDURE — C1894 INTRO/SHEATH, NON-LASER: HCPCS | Performed by: INTERNAL MEDICINE

## 2023-12-30 PROCEDURE — 71045 X-RAY EXAM CHEST 1 VIEW: CPT

## 2023-12-30 PROCEDURE — 85027 COMPLETE CBC AUTOMATED: CPT

## 2023-12-30 PROCEDURE — 33968 REMOVE AORTIC ASSIST DEVICE: CPT | Performed by: INTERNAL MEDICINE

## 2023-12-30 PROCEDURE — 80048 BASIC METABOLIC PNL TOTAL CA: CPT

## 2023-12-30 PROCEDURE — 94761 N-INVAS EAR/PLS OXIMETRY MLT: CPT

## 2023-12-30 PROCEDURE — 6360000002 HC RX W HCPCS: Performed by: NURSE PRACTITIONER

## 2023-12-30 PROCEDURE — 6370000000 HC RX 637 (ALT 250 FOR IP): Performed by: NURSE PRACTITIONER

## 2023-12-30 PROCEDURE — 82947 ASSAY GLUCOSE BLOOD QUANT: CPT

## 2023-12-30 RX ORDER — CALCIUM GLUCONATE 20 MG/ML
2000 INJECTION, SOLUTION INTRAVENOUS ONCE
Status: COMPLETED | OUTPATIENT
Start: 2023-12-30 | End: 2023-12-30

## 2023-12-30 RX ORDER — OXYCODONE HYDROCHLORIDE 5 MG/1
10 TABLET ORAL EVERY 4 HOURS PRN
Status: DISCONTINUED | OUTPATIENT
Start: 2023-12-30 | End: 2024-01-05

## 2023-12-30 RX ORDER — OXYCODONE HYDROCHLORIDE 5 MG/1
5 TABLET ORAL EVERY 4 HOURS PRN
Status: DISCONTINUED | OUTPATIENT
Start: 2023-12-30 | End: 2024-01-05

## 2023-12-30 RX ORDER — SPIRONOLACTONE 25 MG/1
25 TABLET ORAL DAILY
Status: DISCONTINUED | OUTPATIENT
Start: 2023-12-30 | End: 2024-01-11 | Stop reason: HOSPADM

## 2023-12-30 RX ADMIN — SODIUM CHLORIDE, PRESERVATIVE FREE 10 ML: 5 INJECTION INTRAVENOUS at 19:55

## 2023-12-30 RX ADMIN — POLYETHYLENE GLYCOL 3350 17 G: 17 POWDER, FOR SOLUTION ORAL at 07:59

## 2023-12-30 RX ADMIN — HYDRALAZINE HYDROCHLORIDE 5 MG: 20 INJECTION INTRAMUSCULAR; INTRAVENOUS at 17:23

## 2023-12-30 RX ADMIN — FENTANYL CITRATE 50 MCG: 50 INJECTION, SOLUTION INTRAMUSCULAR; INTRAVENOUS at 12:41

## 2023-12-30 RX ADMIN — OXYCODONE AND ACETAMINOPHEN 2 TABLET: 5; 325 TABLET ORAL at 05:47

## 2023-12-30 RX ADMIN — OXYCODONE HYDROCHLORIDE 10 MG: 5 TABLET ORAL at 12:41

## 2023-12-30 RX ADMIN — METOPROLOL TARTRATE 12.5 MG: 25 TABLET, FILM COATED ORAL at 07:59

## 2023-12-30 RX ADMIN — SENNOSIDES AND DOCUSATE SODIUM 1 TABLET: 50; 8.6 TABLET ORAL at 19:54

## 2023-12-30 RX ADMIN — POTASSIUM CHLORIDE 20 MEQ: 29.8 INJECTION, SOLUTION INTRAVENOUS at 07:26

## 2023-12-30 RX ADMIN — SENNOSIDES AND DOCUSATE SODIUM 1 TABLET: 50; 8.6 TABLET ORAL at 07:59

## 2023-12-30 RX ADMIN — MUPIROCIN: 20 OINTMENT TOPICAL at 19:55

## 2023-12-30 RX ADMIN — MUPIROCIN: 20 OINTMENT TOPICAL at 07:59

## 2023-12-30 RX ADMIN — PROPOFOL 25 MCG/KG/MIN: 10 INJECTION, EMULSION INTRAVENOUS at 00:17

## 2023-12-30 RX ADMIN — FENTANYL CITRATE 50 MCG: 50 INJECTION, SOLUTION INTRAMUSCULAR; INTRAVENOUS at 11:50

## 2023-12-30 RX ADMIN — SPIRONOLACTONE 25 MG: 25 TABLET, FILM COATED ORAL at 19:54

## 2023-12-30 RX ADMIN — SODIUM CHLORIDE, PRESERVATIVE FREE 10 ML: 5 INJECTION INTRAVENOUS at 07:59

## 2023-12-30 RX ADMIN — PROPOFOL 10 MCG/KG/MIN: 10 INJECTION, EMULSION INTRAVENOUS at 09:25

## 2023-12-30 RX ADMIN — ONDANSETRON 4 MG: 2 INJECTION INTRAMUSCULAR; INTRAVENOUS at 19:55

## 2023-12-30 RX ADMIN — OXYCODONE HYDROCHLORIDE 10 MG: 5 TABLET ORAL at 19:54

## 2023-12-30 RX ADMIN — METOPROLOL TARTRATE 12.5 MG: 25 TABLET, FILM COATED ORAL at 19:54

## 2023-12-30 RX ADMIN — CALCIUM GLUCONATE 2000 MG: 20 INJECTION, SOLUTION INTRAVENOUS at 12:45

## 2023-12-30 RX ADMIN — PANTOPRAZOLE SODIUM 40 MG: 40 INJECTION, POWDER, FOR SOLUTION INTRAVENOUS at 07:59

## 2023-12-30 RX ADMIN — POTASSIUM CHLORIDE 20 MEQ: 29.8 INJECTION, SOLUTION INTRAVENOUS at 06:33

## 2023-12-30 ASSESSMENT — PULMONARY FUNCTION TESTS
PIF_VALUE: 28
PIF_VALUE: 30
PIF_VALUE: 30
PIF_VALUE: 29
PIF_VALUE: 26
PIF_VALUE: 27
PIF_VALUE: 29
PIF_VALUE: 26
PIF_VALUE: 33
PIF_VALUE: 27
PIF_VALUE: 29
PIF_VALUE: 12
PIF_VALUE: 30
PIF_VALUE: 29
PIF_VALUE: 27
PIF_VALUE: 30
PIF_VALUE: 27
PIF_VALUE: 28
PIF_VALUE: 26
PIF_VALUE: 26
PIF_VALUE: 27
PIF_VALUE: 30
PIF_VALUE: 29

## 2023-12-30 ASSESSMENT — PAIN DESCRIPTION - ONSET: ONSET: ON-GOING

## 2023-12-30 ASSESSMENT — PAIN DESCRIPTION - DESCRIPTORS: DESCRIPTORS: ACHING;DISCOMFORT

## 2023-12-30 ASSESSMENT — PAIN DESCRIPTION - ORIENTATION: ORIENTATION: MID

## 2023-12-30 ASSESSMENT — PAIN SCALES - GENERAL: PAINLEVEL_OUTOF10: 7

## 2023-12-30 ASSESSMENT — PAIN DESCRIPTION - LOCATION: LOCATION: RIB CAGE

## 2023-12-30 NOTE — PROGRESS NOTES
[facility]   Progress Note                   Date:   12/30/2023  Patient name: Lee Mejia  Date of admission:  12/26/2023  1:07 PM  MRN:   [id number]  YOB: 1960  PCP: Dee Morrison DO    Reason for Admission: stemi     Subjective:       Patient seen and examined at bedside: No acute events overnight, currently intubated, NSR 70, /75.    Medications:   Scheduled Meds:   pantoprazole  40 mg IntraVENous Daily    sodium chloride flush  5-40 mL IntraVENous 2 times per day    aspirin  81 mg Oral Daily    clopidogrel  75 mg Oral Daily    mupirocin   Each Nostril BID    polyethylene glycol  17 g Oral Daily    sennosides-docusate sodium  1 tablet Oral BID    metoprolol tartrate  12.5 mg Oral BID    [Held by provider] atorvastatin  20 mg Oral Nightly     Continuous Infusions:   sodium chloride      sodium chloride      sodium chloride      sodium chloride 10 mL/hr at 12/29/23 1122    vasopressin 20 Units in sodium chloride 0.9 % 100 mL infusion Stopped (12/28/23 1837)    sodium chloride      dexmedeTOMIDine Stopped (12/28/23 0312)    heparin (PORCINE) Infusion Stopped (12/28/23 0830)    sodium chloride Stopped (12/29/23 0947)    propofol 25 mcg/kg/min (12/30/23 0017)    norepinephrine Stopped (12/28/23 1111)    EPINEPHrine Stopped (12/29/23 0657)    insulin Stopped (12/27/23 1245)    dextrose       CBC:   Recent Labs     12/29/23  0529 12/29/23  1736 12/30/23  0444   WBC 10.6 8.9 9.5   HGB 8.0* 8.3* 8.6*   PLT 58* 54* See Reflexed IPF Result     BMP:    Recent Labs     12/29/23  0855 12/29/23  1737 12/30/23  0444   * 147* 144   K 3.3* 3.8 3.8   * 111* 107   CO2 27 27 26   BUN 65* 67* 69*   CREATININE 2.2* 2.1* 1.8*   GLUCOSE 123* 96 103*     Hepatic:   Recent Labs     12/29/23  0232 12/29/23  0529 12/30/23  0444   AST >7,000* >7,000* PENDING   ALT 4,109* 3,598* PENDING   BILITOT 2.1* 2.0* 2.2*   ALKPHOS 66 69 100     Troponin: No results for input(s): \"TROPONINI\" in the  obtaained:   LV fx appears to be much improved,  LVEF visually estimated at 45%   LV anterior segment contractility is improved.  No aortic dissection.  No significant valvular abnormality.  These findings were shared with CTS.    Cardiac Angiography:   11/26/23  Findings:     Left main: 80% distal left main extending into ostial LAD     LAD: 90% ostial LAD with TESSIE-3 flow     LCX: Dominant with luminal irregularities of 20 to 30%     RCA: Luminal irregularities of 20 to 30%     The LV gram was performed in the WITT 30 position.   LVEF: 35%. LV Wall Motion: Anteroapical akinesis     Conclusions:  High-grade left main and ostial LAD disease.  Dominant left circumflex system  Moderately reduced LV function  Cardiogenic shock  Short episode of complete heart block during cardiac cath     Recommendation:  CT consult for emergent CABG  Intra-aortic balloon pump was inserted via her left femoral approach  Transvenous temporary pacemaker inserted via left femoral vein  Supportive care on vent and vasopressors  Transfer emergently to [facility] for CABG    Assessment:   Anterolateral STEMI s/p LHC with high-grade left main and ostial LAD disease s/p emergent CABG x3 12/26/23  V-fib cardiac arrest requiring defibrillation x 1 before cardiac cath.  PEA cardiac arrest after cardiac cath requiring CPR for less than a minute with achievement of ROSC.  Flash pulmonary edema  Ischemic cardiomyopathy with initial EF 35-40% with some improvement in EF on post CABG ANGELLA  Cardiogenic shock - IABP placed 12/26/23  Complete heart block during cardiac cath s/p TPM 12/26/23  Respiratory failure secondary to cardiac arrest, intubated and on mechanical ventilation  DM  Shock liver  Elevated INR    Plan:   INR still elevated at 2.0.   Hold hepatotoxic drugs  Continue to hold heparin drip  Keep IABP 1:1 augmentation.  Not safe to remove IABP at this point consideringelevated INR.  We will remove IABP once INR<2.   Pulmonology

## 2023-12-30 NOTE — PROGRESS NOTES
Vax.  Cardiology- to manage IABP working on correcting acidosis to improve BP. Eval throughout day for removal of IABP. May consider echocardiogram. Once gases improve and metabolic acidosis managed.  GI-noted elevated LFTs and INR-likely cause of perfusion causing shock liver. Switch BMP to CMPs. Monitor trend. Ordered 2 unit FFP in the envent that patient would get IABP removed.   Discussed with pulmonary about ruling out HIT.  Vascular-following regarding lower extremity pulses intermittent in extremity with IABP. Await better evaluation when IABP removed.  Nephro-noted likely prerenal insult. Elevated Scr BUN and drop in Bicarb. Started bicard infusion yday. Intermittent oliguria. Pt was Hypotensive preop and post op. Elytes stable. Contacted nephrology for assistance          12-27-23  Removal of IABP today by cardiology early morning  Transfuse albumin to get CVP 15-17 and to help wean off pressure support  Plan to extubate patient midday-eval with pulmonary on plan for extubation.  Appreciate ID input regarding COVID treatment  Monitor lower extremity pulses. Vascular called last night. Monitor lactic acid increase.  Monitor pulses when IABP out  Begin PT OT today once IABP removed and extubated.     RANDALL PARRY, APRN - NP

## 2023-12-30 NOTE — PLAN OF CARE
Problem: Respiratory - Adult  Goal: Achieves optimal ventilation and oxygenation  12/30/2023 0840 by Kristin Arteaga, AVEL  Outcome: Progressing

## 2023-12-30 NOTE — PROGRESS NOTES
for input(s): \"RPR\" in the last 72 hours.  No results for input(s): \"HIV\" in the last 72 hours.  No results for input(s): \"BC\" in the last 72 hours.  Lab Results   Component Value Date/Time    CREATININE 2.1 12/29/2023 05:37 PM    GLUCOSE 96 12/29/2023 05:37 PM       Detailed results:        Thank you for allowing us to participate in the care of this patient.Please call with questions.    This note is created with the assistance of a speech recognition program.  While intending to generate adocument that actually reflects the content of the visit, the document can still have some errors including those of syntax and sound a like substitutions which may escape proof reading.  It such instances, actual meaningcan be extrapolated by contextual diversion.    Milli Lim MD  Office: [phone]  Perfect serve / office [phone]

## 2023-12-30 NOTE — PLAN OF CARE
Problem: Respiratory - Adult  Goal: Achieves optimal ventilation and oxygenation  12/30/2023 0250 by Latoya Coats RCP  Outcome: Progressing     Problem: OXYGENATION/RESPIRATORY FUNCTION  Goal: Patient will maintain patent airway  Outcome: Ongoing  Goal: Patient will achieve/maintain normal respiratory rate/effort  Respiratory rate and effort will be within normal limits for the patient  Outcome: Ongoing    Problem: MECHANICAL VENTILATION  Goal: Patient will maintain patent airway  Outcome: Ongoing  Goal: Oral health is maintained or improved  Outcome: Ongoing  Goal: ET tube will be managed safely  Outcome: Ongoing  Goal: Ability to express needs and understand communication  Outcome: Ongoing  Goal: Mobility/activity is maintained at optimum level for patient  Outcome: Ongoing    Problem: ASPIRATION PRECAUTIONS  Goal: Patient’s risk of aspiration is minimized  Outcome: Ongoing    Problem: SKIN INTEGRITY  Goal: Skin integrity is maintained or improved  Outcome: Ongoing

## 2023-12-30 NOTE — PROGRESS NOTES
Renal Progress Note    Patient :  Lee Mejia; 63 y.o. MRN# [id number]  Location:  1028/1028-01  Attending:  Db Sanchez MD  Admit Date:  12/26/2023   Hospital Day: 4      Subjective:     Following for acute kidney injury ischemic ATN secondary to hypoperfusion ventricular tachycardia arrest, STEMI and contrast exposure.  Baseline creatinine is normal.  He initially presented with chest pain followed by a cardiac arrest at outlying facility and transferred to [facility] due to STEMI and need for emergent cardiac catheterization resulting in emergency CABG x 3 on December 26.  Fraction 35 to 40%.  Incidental finding that he has COVID.   Balloon pump removed this morning with plans for extubation.  Undergoing CPAP, tolerating it well currently FIO2 40%    Labs this morning: Sodium 144, potassium 3.8, BUN 69, creatinine 1.8 down from 2.1  Patient received Diuril 250 mg twice daily x 2 doses yesterday  Urine output 1430 in the last 24 hours +700 today.  Patient is 2.2 L positive since admission  Blood pressures stable without pressors.  Blood pressures running somewhat high.    Outpatient Medications:     Medications Prior to Admission: albuterol-ipratropium (COMBIVENT)  MCG/ACT inhaler, Inhale 2 puffs into the lungs every 6 hours as needed for Wheezing  nicotine (NICODERM CQ) 21 MG/24HR, Place 1 patch onto the skin every 24 hours.  aspirin EC 81 MG EC tablet, Take 1 tablet by mouth daily.  albuterol (VENTOLIN HFA) 108 (90 BASE) MCG/ACT inhaler, Inhale 2 puffs into the lungs every 6 hours as needed for Wheezing.    Current Medications:     Scheduled Meds:    pantoprazole  40 mg IntraVENous Daily    sodium chloride flush  5-40 mL IntraVENous 2 times per day    aspirin  81 mg Oral Daily    clopidogrel  75 mg Oral Daily    mupirocin   Each Nostril BID    polyethylene glycol  17 g Oral Daily    sennosides-docusate sodium  1 tablet Oral BID    metoprolol tartrate  12.5 mg Oral BID    [Held by provider] atorvastatin

## 2023-12-30 NOTE — PLAN OF CARE
Problem: Chronic Conditions and Co-morbidities  Goal: Patient's chronic conditions and co-morbidity symptoms are monitored and maintained or improved  Outcome: Progressing     Problem: Discharge Planning  Goal: Discharge to home or other facility with appropriate resources  Outcome: Progressing     Problem: Respiratory - Adult  Goal: Achieves optimal ventilation and oxygenation  12/30/2023 1806 by Oanh Fragoso RN  Outcome: Progressing  12/30/2023 0840 by Kristin Arteaga RCP  Outcome: Progressing     Problem: Safety - Adult  Goal: Free from fall injury  Outcome: Progressing     Problem: Safety - Medical Restraint  Goal: Remains free of injury from restraints (Restraint for Interference with Medical Device)  Description: INTERVENTIONS:  1. Determine that other, less restrictive measures have been tried or would not be effective before applying the restraint  2. Evaluate the patient's condition at the time of restraint application  3. Inform patient/family regarding the reason for restraint  4. Q2H: Monitor safety, psychosocial status, comfort, nutrition and hydration  Outcome: Progressing  Flowsheets  Taken 12/30/2023 1700 by Oanh Fragoso RN  Remains free of injury from restraints (restraint for interference with medical device):   Determine that other, less restrictive measures have been tried or would not be effective before applying the restraint   Evaluate the patient's condition at the time of restraint application   Inform patient/family regarding the reason for restraint   Every 2 hours: Monitor safety, psychosocial status, comfort, nutrition and hydration  Taken 12/30/2023 1600 by Oanh Fragoso RN  Remains free of injury from restraints (restraint for interference with medical device):   Determine that other, less restrictive measures have been tried or would not be effective before applying the restraint   Evaluate the patient's condition at the time of restraint application   Inform  0700 by Oanh Fragoso RN  Remains free of injury from restraints (restraint for interference with medical device): Every 2 hours: Monitor safety, psychosocial status, comfort, nutrition and hydration  Taken 12/30/2023 0600 by Cuco Randolph RN  Remains free of injury from restraints (restraint for interference with medical device):   Every 2 hours: Monitor safety, psychosocial status, comfort, nutrition and hydration   Inform patient/family regarding the reason for restraint   Evaluate the patient's condition at the time of restraint application   Determine that other, less restrictive measures have been tried or would not be effective before applying the restraint     Problem: Nutrition Deficit:  Goal: Optimize nutritional status  Outcome: Progressing     Problem: Pain  Goal: Verbalizes/displays adequate comfort level or baseline comfort level  Outcome: Progressing  Flowsheets (Taken 12/30/2023 0800)  Verbalizes/displays adequate comfort level or baseline comfort level: Assess pain using appropriate pain scale     Problem: Skin/Tissue Integrity  Goal: Absence of new skin breakdown  Description: 1.  Monitor for areas of redness and/or skin breakdown  2.  Assess vascular access sites hourly  3.  Every 4-6 hours minimum:  Change oxygen saturation probe site  4.  Every 4-6 hours:  If on nasal continuous positive airway pressure, respiratory therapy assess nares and determine need for appliance change or resting period.  Outcome: Progressing     Problem: ABCDS Injury Assessment  Goal: Absence of physical injury  Outcome: Progressing

## 2023-12-30 NOTE — PROGRESS NOTES
PULMONARY & CRITICAL CARE MEDICINE PROGRESS NOTE     Patient:  Lee Mejia  MRN: [id number]  Admit date: 12/26/2023  Primary Care Physician: Dee Morrison DO  Consulting Physician: Db Sanchez MD  CODE Status: Full Code  LOS: 4     SUBJECTIVE     CHIEF COMPLAINT/REASON FOR INITIAL CONSULT:    BRIEF HOSPITAL COURSE:   The patient is a 63 y.o. male with chronic smoking history and COPD, presented to [facility] with severe chest pain.  He went into V-fib arrest while in the ER at [facility] requiring defibrillation.  He converted into sinus rhythm.  EKG was obtained which was consistent with anterolateral STEMI and the patient was emergently taken to Cath Lab.  He underwent cardiac cath which showed high-grade left main and ostial LAD disease with a dominant left circumflex system.  He was in cardiogenic shock, intra-aortic balloon pump was placed.  He also developed complete heart block during cardiac cath, temporary pacemaker was inserted.  He was admitted in medical ICU at the same [facility] facility where he coded again, had brief CPR after which ROSC was achieved.  He was subsequently transferred to [facility] for CT surgery evaluation for emergent CABG.  Nasal swab also came back positive for COVID.  CT surgery planning on bypass today.  Pulmonology consulted for recommendations prior to surgery, COVID infection and for vent management.  History obtained from the family member/girlfriend.  She denies any sick contact in the family.  But he does go out and meet several people.  She does not think that the patient had cough, fever or breathing difficulty.  Patient is heavy smoker, has been smoking 1-.5 pack per day for last several years.  He uses inhaler at home.  Denies any other significant past medical history.    12/30/2023   Subjective    Post 3 vessel cabg 12/26/23   Off pressors   Iabp removed   Sec moderate   Fio2 dec 40 % peep 8       REVIEW OF  26.4* 25.4* 24.3* 25.6* 26.9*   MCV 90.2   < > 90.1 90.7 89.7 90.1 90.3   PLT See Reflexed IPF Result   < > See Reflexed IPF Result See Reflexed IPF Result 58* 54* See Reflexed IPF Result   LYMPHOPCT 8*  --   --   --   --   --   --    RBC 2.97*   < > 2.93* 2.80* 2.71* 2.84* 2.98*   MCH 29.0   < > 29.0 29.3 29.5 29.2 28.9   MCHC 32.1   < > 32.2 32.3 32.9 32.4 32.0   RDW 13.7   < > 14.2 14.2 14.0 13.9 13.8    < > = values in this interval not displayed.     CRP:   No results for input(s): \"CRP\" in the last 72 hours.  LDH:   No results for input(s): \"LDH\" in the last 72 hours.  BMP:   Recent Labs     12/28/23  2348 12/29/23  0529 12/29/23  0855 12/29/23  1737 12/30/23  0444   * 147* 145* 147* 144   K 3.9 3.5* 3.3* 3.8 3.8   * 111* 110* 111* 107   CO2 27 27 27 27 26   BUN 61* 65* 65* 67* 69*   CREATININE 2.3* 2.3* 2.2* 2.1* 1.8*   GLUCOSE 126* 131* 123* 96 103*   MG 3.1* 3.0* 3.0* 3.0* 3.0*     Liver Function Test:   Recent Labs     12/28/23  0604 12/29/23  0232 12/29/23  0529 12/30/23  0444   PROT 4.4* 4.8* 4.8* 5.3*   LABALBU 3.8 3.4* 3.5 3.6   ALT 1,585* 4,109* 3,598* 1,650*   AST 2,550* >7,000* >7,000* 3,070*   ALKPHOS 35* 66 69 100   BILITOT 2.2* 2.1* 2.0* 2.2*     Coagulation Profile:   Recent Labs     12/27/23  1710 12/28/23  0604 12/29/23  0529 12/29/23  0855 12/29/23  1139 12/29/23  1438 12/29/23  2014 12/30/23  0203 12/30/23  0753   INR 2.9   < > 3.2   < > 2.5 2.4 2.1 2.0 1.9   PROTIME 30.0*   < > 32.2*   < > 26.9* 25.8* 23.3* 22.7* 21.4*   APTT 59.4*  --  38.8*  --   --   --   --   --   --     < > = values in this interval not displayed.     D-Dimer:  No results for input(s): \"DDIMER\" in the last 72 hours.  Lactic Acid:  No results for input(s): \"LACTA\" in the last 72 hours.  Cardiac Enzymes:  No results for input(s): \"CKTOTAL\", \"CKMB\", \"CKMBINDEX\", \"TROPONINI\" in the last 72 hours.    Invalid input(s): \"TROPONIN\", \"HSTROP\"  BNP/ProBNP:   No results for input(s): \"BNP\", \"PROBNP\" in the last 72

## 2023-12-30 NOTE — PROGRESS NOTES
Order obtained for extubation.  SpO2 of 98 on 40% FiO2.   Patient extubated and placed on 6 liters/min via nasal cannula.   Post extubation SpO2 is 98% with HR  80 bpm and RR 20 breaths/min.    Patient had moderate cough that was productive of white sputum.  Extubation Well tolerated by patient..   Breath Sounds: diminished    JUDY VELAZQUEZ RCP   6:06 PM

## 2023-12-30 NOTE — PROGRESS NOTES
[facility] -   Infectious diseases evaluation  admission date 12/26/2023    reason for consultation:   Covid  - surg clearance    Impression :   Current:  Anterolateral NSTEMI  Card cath complicated w complete AV block and placed a temporary pace maker  High grade left main ostial disease 12/26  LCX 20-30 - RCA 20 - 30LVEF 35  Card arrest due to the AV block -  CPR less than a min to achieve ROSC  CABG 12/26 emergent bypass x  3   Cardiogenic shock  Intra aortic balloon placed  Bandemia 31 - better  Shock liver 12/28  Flash Pulm edema - sp white red, no clear bacterial pneumonia   Resp failure from the card arrest   intubated  Covid test +  COPD  DM    Other:    Discussion / summary of stay / plan of care     Recommendations     COVID + but not clear if causing disease  12/26 remdesevir after the CABG - stopped 12/28 due to shock liver and elevated Liver enzymes  Emergent CABG 12/26 - 3 vessels  Bandemia is reactive to the MI and arrest - improved  Disc w RN and pulm     Infection Control Recommendations   [place] Precautions  Contact Isolation   Antimicrobial Stewardship Recommendations   Simplification of therapy  Targeted therapy    History of Present Illness:   Initial history:  Lee Mejia is a 63 y.o.-year-old male admitted w severe chest pain and NSTEMI  Over exerts himself and has COPD  Taken to card cath and had a complete HB and pacer placed  He is intubated w resp failure and P edema  on CXR  WBC elevated and no clear infection  Tested + covid and ID called for clearance for surgery  He is on pressors for card failure and cardiogenic shock  According to the girl friend she had worked  long hours last 4 days and can't tell about cough or other symptoms -      Pt now is on the vent - no cellulitis -  UA ng  BC pend          Interval changes  12/30/2023   Patient Vitals for the past 8 hrs:   BP Temp Pulse Resp SpO2   12/30/23 1800 -- 99 °F (37.2 °C) 80 --  (37.2 °C) 80 -- 100 %   12/30/23 1715 (!) 168/84 98.4 °F (36.9 °C) 69 -- 100 %   12/30/23 1700 -- 98.4 °F (36.9 °C) 65 -- --   12/30/23 1611 -- -- 64 18 100 %   12/30/23 1600 132/75 98.4 °F (36.9 °C) 65 -- 100 %   12/30/23 1500 -- 98.2 °F (36.8 °C) 64 -- 100 %   12/30/23 1400 -- 98.2 °F (36.8 °C) 66 -- 100 %   12/30/23 1345 -- 98.1 °F (36.7 °C) 66 -- 100 %   12/30/23 1330 -- 98.2 °F (36.8 °C) 65 -- 100 %   12/30/23 1315 -- 98.2 °F (36.8 °C) 64 -- 100 %   12/30/23 1300 -- 98.2 °F (36.8 °C) 66 -- 100 %   12/30/23 1245 (!) 148/84 98.2 °F (36.8 °C) 68 -- 100 %   12/30/23 1230 -- 98.4 °F (36.9 °C) 66 -- 100 %   12/30/23 1220 -- 98.4 °F (36.9 °C) 67 18 100 %   12/30/23 1215 -- 98.4 °F (36.9 °C) 66 -- 100 %   12/30/23 1200 (!) 153/81 98.6 °F (37 °C) 66 -- 99 %   12/30/23 1145 (!) 113/93 98.6 °F (37 °C) 64 -- 99 %   12/30/23 1130 -- 98.8 °F (37.1 °C) 66 -- 90 %   12/30/23 1122 -- -- 66 18 --   12/30/23 1117 -- -- 66 -- --     12/27  No fever - post CABG emergent  12/26 - on the vent   Abd soft no  tender  Aortic balloon +    Pressors off  WBC better  Lactic acid still elevated    12/28  Post op styill on the vent - shock liver w elevated Liver enz -   Abd soft  -no rash - WBc better 11  Still vented FIo2  30 w aortic balloon in place - skin no rash and chest wound dry    12/29  On the vent - stable and no fever - VSS - no npressors - sp small  Abd soft non tender  W normal  CXR left atelectasis -  Disc w fam on bedside    12/30  Extubated and unable to talk much - still sluggish though awake alert -no fever - all cx neg  No fever      Summary of relevant labs:  Labs:  WBC 31 - 18-11  Lactic acid  6.9    Micro:  BC  12/26 x 2  UA neg 12/26    Imaging:  CXR  - P edema    I have personally reviewed the past medical history, past surgical history, medications, social history, and family history, and I haveupdated the database accordingly.      Allergies:   Patient has no known allergies.     Review of Systems:     Review of

## 2023-12-31 ENCOUNTER — APPOINTMENT (OUTPATIENT)
Dept: GENERAL RADIOLOGY | Age: 63
DRG: 165 | End: 2023-12-31
Attending: INTERNAL MEDICINE
Payer: MEDICAID

## 2023-12-31 LAB
ALBUMIN SERPL-MCNC: 3.7 G/DL (ref 3.5–5.2)
ALBUMIN/GLOB SERPL: 1.9 {RATIO} (ref 1–2.5)
ALP SERPL-CCNC: 105 U/L (ref 40–129)
ALT SERPL-CCNC: 1210 U/L (ref 5–41)
ANION GAP SERPL CALCULATED.3IONS-SCNC: 14 MMOL/L (ref 9–17)
AST SERPL-CCNC: 1176 U/L
BILIRUB DIRECT SERPL-MCNC: 1.2 MG/DL
BILIRUB INDIRECT SERPL-MCNC: 1.1 MG/DL (ref 0–1)
BILIRUB SERPL-MCNC: 2.3 MG/DL (ref 0.3–1.2)
BUN SERPL-MCNC: 71 MG/DL (ref 8–23)
CALCIUM SERPL-MCNC: 8.2 MG/DL (ref 8.6–10.4)
CHLORIDE SERPL-SCNC: 111 MMOL/L (ref 98–107)
CO2 SERPL-SCNC: 24 MMOL/L (ref 20–31)
CREAT SERPL-MCNC: 1.6 MG/DL (ref 0.7–1.2)
ERYTHROCYTE [DISTWIDTH] IN BLOOD BY AUTOMATED COUNT: 13.8 % (ref 11.8–14.4)
GFR SERPL CREATININE-BSD FRML MDRD: 48 ML/MIN/1.73M2
GLUCOSE SERPL-MCNC: 112 MG/DL (ref 70–99)
HCT VFR BLD AUTO: 28.5 % (ref 40.7–50.3)
HGB BLD-MCNC: 9 G/DL (ref 13–17)
INR PPP: 1.5
LACTIC ACID, WHOLE BLOOD: 2.4 MMOL/L (ref 0.7–2.1)
MAGNESIUM SERPL-MCNC: 3 MG/DL (ref 1.6–2.6)
MCH RBC QN AUTO: 28.4 PG (ref 25.2–33.5)
MCHC RBC AUTO-ENTMCNC: 31.6 G/DL (ref 28.4–34.8)
MCV RBC AUTO: 89.9 FL (ref 82.6–102.9)
NRBC BLD-RTO: 1.4 PER 100 WBC
PLATELET # BLD AUTO: 102 K/UL (ref 138–453)
PMV BLD AUTO: 12.8 FL (ref 8.1–13.5)
POTASSIUM SERPL-SCNC: 4.7 MMOL/L (ref 3.7–5.3)
PROT SERPL-MCNC: 5.6 G/DL (ref 6.4–8.3)
PROTHROMBIN TIME: 18.1 SEC (ref 11.7–14.9)
RBC # BLD AUTO: 3.17 M/UL (ref 4.21–5.77)
SODIUM SERPL-SCNC: 149 MMOL/L (ref 135–144)
WBC OTHER # BLD: 13.2 K/UL (ref 3.5–11.3)

## 2023-12-31 PROCEDURE — C9113 INJ PANTOPRAZOLE SODIUM, VIA: HCPCS | Performed by: INTERNAL MEDICINE

## 2023-12-31 PROCEDURE — 6370000000 HC RX 637 (ALT 250 FOR IP): Performed by: NURSE PRACTITIONER

## 2023-12-31 PROCEDURE — 80076 HEPATIC FUNCTION PANEL: CPT

## 2023-12-31 PROCEDURE — 71045 X-RAY EXAM CHEST 1 VIEW: CPT

## 2023-12-31 PROCEDURE — 6360000002 HC RX W HCPCS: Performed by: NURSE PRACTITIONER

## 2023-12-31 PROCEDURE — 85610 PROTHROMBIN TIME: CPT

## 2023-12-31 PROCEDURE — 6360000002 HC RX W HCPCS: Performed by: INTERNAL MEDICINE

## 2023-12-31 PROCEDURE — 2580000003 HC RX 258: Performed by: NURSE PRACTITIONER

## 2023-12-31 PROCEDURE — 6370000000 HC RX 637 (ALT 250 FOR IP): Performed by: INTERNAL MEDICINE

## 2023-12-31 PROCEDURE — 85027 COMPLETE CBC AUTOMATED: CPT

## 2023-12-31 PROCEDURE — 6370000000 HC RX 637 (ALT 250 FOR IP): Performed by: STUDENT IN AN ORGANIZED HEALTH CARE EDUCATION/TRAINING PROGRAM

## 2023-12-31 PROCEDURE — 6370000000 HC RX 637 (ALT 250 FOR IP): Performed by: THORACIC SURGERY (CARDIOTHORACIC VASCULAR SURGERY)

## 2023-12-31 PROCEDURE — 6360000002 HC RX W HCPCS: Performed by: STUDENT IN AN ORGANIZED HEALTH CARE EDUCATION/TRAINING PROGRAM

## 2023-12-31 PROCEDURE — 83605 ASSAY OF LACTIC ACID: CPT

## 2023-12-31 PROCEDURE — 80048 BASIC METABOLIC PNL TOTAL CA: CPT

## 2023-12-31 PROCEDURE — 2100000001 HC CVICU R&B

## 2023-12-31 PROCEDURE — 99232 SBSQ HOSP IP/OBS MODERATE 35: CPT | Performed by: INTERNAL MEDICINE

## 2023-12-31 PROCEDURE — 5A0945A ASSISTANCE WITH RESPIRATORY VENTILATION, 24-96 CONSECUTIVE HOURS, HIGH NASAL FLOW/VELOCITY: ICD-10-PCS | Performed by: INTERNAL MEDICINE

## 2023-12-31 PROCEDURE — 2580000003 HC RX 258: Performed by: INTERNAL MEDICINE

## 2023-12-31 PROCEDURE — 94761 N-INVAS EAR/PLS OXIMETRY MLT: CPT

## 2023-12-31 PROCEDURE — 99233 SBSQ HOSP IP/OBS HIGH 50: CPT | Performed by: INTERNAL MEDICINE

## 2023-12-31 PROCEDURE — 99291 CRITICAL CARE FIRST HOUR: CPT | Performed by: INTERNAL MEDICINE

## 2023-12-31 PROCEDURE — 2700000000 HC OXYGEN THERAPY PER DAY

## 2023-12-31 PROCEDURE — 83735 ASSAY OF MAGNESIUM: CPT

## 2023-12-31 RX ORDER — CARVEDILOL 3.12 MG/1
6.25 TABLET ORAL 2 TIMES DAILY WITH MEALS
Status: DISCONTINUED | OUTPATIENT
Start: 2023-12-31 | End: 2024-01-08

## 2023-12-31 RX ORDER — ALPRAZOLAM 0.25 MG/1
0.25 TABLET ORAL 4 TIMES DAILY PRN
Status: DISCONTINUED | OUTPATIENT
Start: 2023-12-31 | End: 2024-01-11 | Stop reason: HOSPADM

## 2023-12-31 RX ORDER — LABETALOL HYDROCHLORIDE 5 MG/ML
20 INJECTION, SOLUTION INTRAVENOUS ONCE
Status: COMPLETED | OUTPATIENT
Start: 2023-12-31 | End: 2023-12-31

## 2023-12-31 RX ORDER — MAGNESIUM HYDROXIDE/ALUMINUM HYDROXICE/SIMETHICONE 120; 1200; 1200 MG/30ML; MG/30ML; MG/30ML
30 SUSPENSION ORAL EVERY 6 HOURS PRN
Status: DISCONTINUED | OUTPATIENT
Start: 2023-12-31 | End: 2024-01-11 | Stop reason: HOSPADM

## 2023-12-31 RX ORDER — FUROSEMIDE 10 MG/ML
40 INJECTION INTRAMUSCULAR; INTRAVENOUS ONCE
Status: COMPLETED | OUTPATIENT
Start: 2023-12-31 | End: 2023-12-31

## 2023-12-31 RX ADMIN — OXYCODONE HYDROCHLORIDE 10 MG: 5 TABLET ORAL at 00:08

## 2023-12-31 RX ADMIN — ALPRAZOLAM 0.25 MG: 0.25 TABLET ORAL at 11:41

## 2023-12-31 RX ADMIN — POLYETHYLENE GLYCOL 3350 17 G: 17 POWDER, FOR SOLUTION ORAL at 08:39

## 2023-12-31 RX ADMIN — SENNOSIDES AND DOCUSATE SODIUM 1 TABLET: 50; 8.6 TABLET ORAL at 08:36

## 2023-12-31 RX ADMIN — OXYCODONE HYDROCHLORIDE 10 MG: 5 TABLET ORAL at 22:10

## 2023-12-31 RX ADMIN — METOPROLOL TARTRATE 12.5 MG: 25 TABLET, FILM COATED ORAL at 08:36

## 2023-12-31 RX ADMIN — ASPIRIN 81 MG: 81 TABLET, COATED ORAL at 09:35

## 2023-12-31 RX ADMIN — PANTOPRAZOLE SODIUM 40 MG: 40 INJECTION, POWDER, FOR SOLUTION INTRAVENOUS at 08:39

## 2023-12-31 RX ADMIN — OXYCODONE HYDROCHLORIDE 10 MG: 5 TABLET ORAL at 05:57

## 2023-12-31 RX ADMIN — DIPHENHYDRAMINE HYDROCHLORIDE 25 MG: 25 TABLET ORAL at 22:10

## 2023-12-31 RX ADMIN — SPIRONOLACTONE 25 MG: 25 TABLET, FILM COATED ORAL at 20:47

## 2023-12-31 RX ADMIN — CARVEDILOL 6.25 MG: 3.12 TABLET, FILM COATED ORAL at 11:41

## 2023-12-31 RX ADMIN — DEXTROSE MONOHYDRATE AND SODIUM CHLORIDE: 5; .3 INJECTION, SOLUTION INTRAVENOUS at 20:43

## 2023-12-31 RX ADMIN — SENNOSIDES AND DOCUSATE SODIUM 1 TABLET: 50; 8.6 TABLET ORAL at 20:47

## 2023-12-31 RX ADMIN — HYDRALAZINE HYDROCHLORIDE 5 MG: 20 INJECTION INTRAMUSCULAR; INTRAVENOUS at 01:53

## 2023-12-31 RX ADMIN — DIPHENHYDRAMINE HYDROCHLORIDE 25 MG: 25 TABLET ORAL at 00:08

## 2023-12-31 RX ADMIN — SODIUM CHLORIDE, PRESERVATIVE FREE 10 ML: 5 INJECTION INTRAVENOUS at 20:49

## 2023-12-31 RX ADMIN — OXYCODONE HYDROCHLORIDE 10 MG: 5 TABLET ORAL at 09:35

## 2023-12-31 RX ADMIN — CLOPIDOGREL BISULFATE 75 MG: 75 TABLET ORAL at 09:35

## 2023-12-31 RX ADMIN — MUPIROCIN: 20 OINTMENT TOPICAL at 10:45

## 2023-12-31 RX ADMIN — FENTANYL CITRATE 50 MCG: 50 INJECTION, SOLUTION INTRAMUSCULAR; INTRAVENOUS at 01:43

## 2023-12-31 RX ADMIN — FUROSEMIDE 40 MG: 10 INJECTION, SOLUTION INTRAMUSCULAR; INTRAVENOUS at 20:48

## 2023-12-31 RX ADMIN — Medication 20 MG: at 05:57

## 2023-12-31 RX ADMIN — FENTANYL CITRATE 50 MCG: 50 INJECTION, SOLUTION INTRAMUSCULAR; INTRAVENOUS at 03:52

## 2023-12-31 ASSESSMENT — PAIN DESCRIPTION - ORIENTATION
ORIENTATION: MID

## 2023-12-31 ASSESSMENT — PAIN DESCRIPTION - DESCRIPTORS
DESCRIPTORS: ACHING;DISCOMFORT

## 2023-12-31 ASSESSMENT — PAIN DESCRIPTION - LOCATION
LOCATION: RIB CAGE
LOCATION: STERNUM
LOCATION: BACK;CHEST
LOCATION: INCISION
LOCATION: STERNUM

## 2023-12-31 ASSESSMENT — PAIN SCALES - GENERAL
PAINLEVEL_OUTOF10: 8
PAINLEVEL_OUTOF10: 7
PAINLEVEL_OUTOF10: 10
PAINLEVEL_OUTOF10: 10
PAINLEVEL_OUTOF10: 7
PAINLEVEL_OUTOF10: 7

## 2023-12-31 ASSESSMENT — PAIN DESCRIPTION - ONSET: ONSET: ON-GOING

## 2023-12-31 NOTE — PROGRESS NOTES
PULMONARY & CRITICAL CARE MEDICINE PROGRESS NOTE     Patient:  Lee Mejia  MRN: [id number]  Admit date: 12/26/2023  Primary Care Physician: Dee Morrison DO  Consulting Physician: Db Sanchez MD  CODE Status: Full Code  LOS: 5     SUBJECTIVE     CHIEF COMPLAINT/REASON FOR INITIAL CONSULT:  STEMI  BRIEF HOSPITAL COURSE:   The patient is a 63 y.o. male with chronic smoking history and COPD, presented to [facility] with severe chest pain.  He went into V-fib arrest while in the ER at [facility] requiring defibrillation.  He converted into sinus rhythm.  EKG was obtained which was consistent with anterolateral STEMI and the patient was emergently taken to Cath Lab.  He underwent cardiac cath which showed high-grade left main and ostial LAD disease with a dominant left circumflex system.  He was in cardiogenic shock, intra-aortic balloon pump was placed.  He also developed complete heart block during cardiac cath, temporary pacemaker was inserted.  He was admitted in medical ICU at the same [facility] facility where he coded again, had brief CPR after which ROSC was achieved.  He was subsequently transferred to [facility] for CT surgery evaluation for emergent CABG.  Nasal swab also came back positive for COVID.  CT surgery planning on bypass today.  Pulmonology consulted for recommendations prior to surgery, COVID infection and for vent management.  History obtained from the family member/girlfriend.  She denies any sick contact in the family.  But he does go out and meet several people.  She does not think that the patient had cough, fever or breathing difficulty.  Patient is heavy smoker, has been smoking 1-.5 pack per day for last several years.  He uses inhaler at home.  Denies any other significant past medical history.    12/31/2023   Subjective    Post 3 vessel cabg 12/26/23   Tolerated extubation  No work of breathing on 4 L oxygen      REVIEW OF SYSTEMS:  Review of  failure-extubated  //Left lower lobe atelectasis  // post cabg x3 emergent on 12/26/23   // v fib arrest   // cardiogenic shock resolved   // IABP removed   //shock liver improving   // coagulopathy improving   // thrombocytopenia   // anterolateral STEMI  //chb - post TPP - currently not requiring pacing .  // covid -19 infection on remdesivir   //copd   // kaycee       Plan:  Incentive spirometry and deep breathing.  Continue diuresis.  PT OT.  Keep saturation greater than 92%          D/w Rt and Rn  Total critical care time caring for this patient with life threatening, unstable organ failure, including direct patient contact, management of life support systems, review of data including imaging and labs, discussions with other team members and physicians at least 30 Min so far today, excluding procedures.     Electronically signed by FELIPA PURDY MD on 12/31/2023 at 4:41 PM   Please note that this chart was generated using voice recognition Dragon dictation software.  Although every effort was made to ensure the accuracy of this automated transcription, some errors in transcription may have occurred.

## 2023-12-31 NOTE — PROGRESS NOTES
be bradycardic this AM. Eval about pacing, as preop he responded better   Neuro-pt noted to follow commands Yday with drug holiday. No down time. Following commands. No appreciated neuro deficit.  Pulmonary-appreciate pulmonary input with vent and lung status. Requires high FIO2 and PEEP. Concerns for pulmonary edema. Concern for onset Covid pneumonia. Pt Hx of 3 Covid Vax.  Cardiology- to manage IABP working on correcting acidosis to improve BP. Eval throughout day for removal of IABP. May consider echocardiogram. Once gases improve and metabolic acidosis managed.  GI-noted elevated LFTs and INR-likely cause of perfusion causing shock liver. Switch BMP to CMPs. Monitor trend. Ordered 2 unit FFP in the envent that patient would get IABP removed.   Discussed with pulmonary about ruling out HIT.  Vascular-following regarding lower extremity pulses intermittent in extremity with IABP. Await better evaluation when IABP removed.  Nephro-noted likely prerenal insult. Elevated Scr BUN and drop in Bicarb. Started bicard infusion yday. Intermittent oliguria. Pt was Hypotensive preop and post op. Elytes stable. Contacted nephrology for assistance          12-27-23  Removal of IABP today by cardiology early morning  Transfuse albumin to get CVP 15-17 and to help wean off pressure support  Plan to extubate patient midday-eval with pulmonary on plan for extubation.  Appreciate ID input regarding COVID treatment  Monitor lower extremity pulses. Vascular called last night. Monitor lactic acid increase.  Monitor pulses when IABP out  Begin PT OT today once IABP removed and extubated.     RANDALL PARRY, APRN - NP

## 2023-12-31 NOTE — PROGRESS NOTES
[facility]   Progress Note                   Date:   12/31/2023  Patient name: Lee Mejia  Date of admission:  12/26/2023  1:07 PM  MRN:   [id number]  YOB: 1960  PCP: Dee Morrison DO    Reason for Admission: STEMI    Subjective:       Patient seen and examined at bedside: Patient was reporting weakness in the left lower limb but on my examination patient has normal strength in both lower limbs.  Currently in NSR 88, on room air, /90.    BP now 108/68    NA  149    Medications:   Scheduled Meds:   spironolactone  25 mg Oral Daily    pantoprazole  40 mg IntraVENous Daily    sodium chloride flush  5-40 mL IntraVENous 2 times per day    aspirin  81 mg Oral Daily    clopidogrel  75 mg Oral Daily    mupirocin   Each Nostril BID    polyethylene glycol  17 g Oral Daily    sennosides-docusate sodium  1 tablet Oral BID    metoprolol tartrate  12.5 mg Oral BID    [Held by provider] atorvastatin  20 mg Oral Nightly     Continuous Infusions:   sodium chloride 10 mL/hr at 12/29/23 1122    vasopressin 20 Units in sodium chloride 0.9 % 100 mL infusion Stopped (12/28/23 1837)    dexmedeTOMIDine Stopped (12/28/23 0312)    heparin (PORCINE) Infusion Stopped (12/28/23 0830)    sodium chloride Stopped (12/29/23 0947)    propofol Stopped (12/30/23 1707)    norepinephrine Stopped (12/28/23 1111)    EPINEPHrine Stopped (12/29/23 0657)    insulin Stopped (12/27/23 1245)    dextrose       CBC:   Recent Labs     12/29/23 1736 12/30/23 0444 12/31/23 0410   WBC 8.9 9.5 13.2*   HGB 8.3* 8.6* 9.0*   PLT 54* See Reflexed IPF Result 102*     BMP:    Recent Labs     12/29/23 1737 12/30/23 0444 12/31/23 0410   * 144 149*   K 3.8 3.8 4.7   * 107 111*   CO2 27 26 24   BUN 67* 69* 71*   CREATININE 2.1* 1.8* 1.6*   GLUCOSE 96 103* 112*     Hepatic:   Recent Labs     12/29/23  0529 12/30/23 0444 12/31/23 0410   AST >7,000* 3,070* 1,176*   ALT 3,598* 1,650* 1,210*   BILITOT 2.0* 2.2* 2.3*

## 2023-12-31 NOTE — PLAN OF CARE
Problem: Chronic Conditions and Co-morbidities  Goal: Patient's chronic conditions and co-morbidity symptoms are monitored and maintained or improved  12/30/2023 2301 by Cuco Randolph RN  Outcome: Progressing  12/30/2023 1806 by Oanh Fragoso RN  Outcome: Progressing     Problem: Discharge Planning  Goal: Discharge to home or other facility with appropriate resources  12/30/2023 2301 by Cuco Randolph RN  Outcome: Progressing  12/30/2023 1806 by Oanh Fragoso RN  Outcome: Progressing     Problem: Respiratory - Adult  Goal: Achieves optimal ventilation and oxygenation  12/30/2023 2301 by Cuco Randolph RN  Outcome: Progressing  12/30/2023 1806 by Oanh Fragoso RN  Outcome: Progressing     Problem: Safety - Adult  Goal: Free from fall injury  12/30/2023 2301 by Cuco Randolph RN  Outcome: Progressing  12/30/2023 1806 by Oanh Fragoso RN  Outcome: Progressing     Problem: Safety - Medical Restraint  Goal: Remains free of injury from restraints (Restraint for Interference with Medical Device)  Description: INTERVENTIONS:  1. Determine that other, less restrictive measures have been tried or would not be effective before applying the restraint  2. Evaluate the patient's condition at the time of restraint application  3. Inform patient/family regarding the reason for restraint  4. Q2H: Monitor safety, psychosocial status, comfort, nutrition and hydration  12/30/2023 2301 by Cuco Randolph RN  Outcome: Progressing  12/30/2023 1806 by Oanh Fragoso RN  Outcome: Progressing  Flowsheets  Taken 12/30/2023 1700 by Oanh Fragoso RN  Remains free of injury from restraints (restraint for interference with medical device):   Determine that other, less restrictive measures have been tried or would not be effective before applying the restraint   Evaluate the patient's condition at the time of restraint application   Inform patient/family regarding the reason for restraint   Every 2 hours: Monitor safety,  injury from restraints (restraint for interference with medical device): Every 2 hours: Monitor safety, psychosocial status, comfort, nutrition and hydration  Taken 12/30/2023 0800 by Oanh Fragoso RN  Remains free of injury from restraints (restraint for interference with medical device): Every 2 hours: Monitor safety, psychosocial status, comfort, nutrition and hydration  Taken 12/30/2023 0700 by Oanh Fragoso RN  Remains free of injury from restraints (restraint for interference with medical device): Every 2 hours: Monitor safety, psychosocial status, comfort, nutrition and hydration  Taken 12/30/2023 0600 by Cuco Randolph RN  Remains free of injury from restraints (restraint for interference with medical device):   Every 2 hours: Monitor safety, psychosocial status, comfort, nutrition and hydration   Inform patient/family regarding the reason for restraint   Evaluate the patient's condition at the time of restraint application   Determine that other, less restrictive measures have been tried or would not be effective before applying the restraint     Problem: Nutrition Deficit:  Goal: Optimize nutritional status  12/30/2023 2301 by Cuco Randolph RN  Outcome: Progressing  12/30/2023 1806 by Oanh Fragoso RN  Outcome: Progressing     Problem: Pain  Goal: Verbalizes/displays adequate comfort level or baseline comfort level  12/30/2023 2301 by Cuco Randolph RN  Outcome: Progressing  12/30/2023 1806 by Oanh Fragoso RN  Outcome: Progressing  Flowsheets (Taken 12/30/2023 0800)  Verbalizes/displays adequate comfort level or baseline comfort level: Assess pain using appropriate pain scale     Problem: Skin/Tissue Integrity  Goal: Absence of new skin breakdown  Description: 1.  Monitor for areas of redness and/or skin breakdown  2.  Assess vascular access sites hourly  3.  Every 4-6 hours minimum:  Change oxygen saturation probe site  4.  Every 4-6 hours:  If on nasal continuous positive airway pressure,

## 2023-12-31 NOTE — PLAN OF CARE
Problem: Chronic Conditions and Co-morbidities  Goal: Patient's chronic conditions and co-morbidity symptoms are monitored and maintained or improved  Outcome: Progressing  Flowsheets  Taken 12/31/2023 1200  Care Plan - Patient's Chronic Conditions and Co-Morbidity Symptoms are Monitored and Maintained or Improved: Monitor and assess patient's chronic conditions and comorbid symptoms for stability, deterioration, or improvement  Taken 12/31/2023 0800  Care Plan - Patient's Chronic Conditions and Co-Morbidity Symptoms are Monitored and Maintained or Improved: Monitor and assess patient's chronic conditions and comorbid symptoms for stability, deterioration, or improvement     Problem: Discharge Planning  Goal: Discharge to home or other facility with appropriate resources  Outcome: Progressing  Flowsheets  Taken 12/31/2023 1200  Discharge to home or other facility with appropriate resources: Identify barriers to discharge with patient and caregiver  Taken 12/31/2023 0800  Discharge to home or other facility with appropriate resources: Identify barriers to discharge with patient and caregiver     Problem: Respiratory - Adult  Goal: Achieves optimal ventilation and oxygenation  Outcome: Progressing  Flowsheets (Taken 12/31/2023 0800)  Achieves optimal ventilation and oxygenation: Assess for changes in respiratory status  Note: Educated patient on importance of deep breathing and coughing. Also educated on IS and acapella importance      Problem: Safety - Adult  Goal: Free from fall injury  Outcome: Progressing     Problem: Nutrition Deficit:  Goal: Optimize nutritional status  Outcome: Progressing     Problem: Pain  Goal: Verbalizes/displays adequate comfort level or baseline comfort level  Outcome: Progressing

## 2023-12-31 NOTE — PROGRESS NOTES
Coarse rhonchi breath sounds, no rales or wheezes.  Cardiac:  S1 S2 RR, no murmurs, gallops or rubs, JVP not raised.  Abdomen: Soft, non-tender, no masses or organomegaly, BS audible.  :   No suprapubic or flank tenderness.  Neuro:              Sedated  SKIN:  No rashes, good skin turgor.  Extremities:  No edema, palpable peripheral pulses, no calf tenderness.    Labs:       Recent Labs     12/29/23 0529 12/29/23 1736 12/30/23 0444 12/31/23 0410   WBC 10.6 8.9 9.5 13.2*   RBC 2.71* 2.84* 2.98* 3.17*   HGB 8.0* 8.3* 8.6* 9.0*   HCT 24.3* 25.6* 26.9* 28.5*   MCV 89.7 90.1 90.3 89.9   MCH 29.5 29.2 28.9 28.4   MCHC 32.9 32.4 32.0 31.6   RDW 14.0 13.9 13.8 13.8   PLT 58* 54* See Reflexed IPF Result 102*   MPV 13.0 12.7  --  12.8        BMP:   Recent Labs     12/29/23 1737 12/30/23 0444 12/31/23 0410   * 144 149*   K 3.8 3.8 4.7   * 107 111*   CO2 27 26 24   BUN 67* 69* 71*   CREATININE 2.1* 1.8* 1.6*   GLUCOSE 96 103* 112*   CALCIUM 8.1* 8.0* 8.2*       Magnesium:    Recent Labs     12/29/23 1737 12/30/23 0444 12/31/23 0410   MG 3.0* 3.0* 3.0*       Albumin:    Recent Labs     12/29/23 0529 12/30/23 0444 12/31/23 0410   LABALBU 3.5 3.6 3.7       SPEP:  Lab Results   Component Value Date/Time    PROT 5.6 12/31/2023 04:10 AM     C3:     Lab Results   Component Value Date/Time    C3 34 12/29/2023 08:55 AM     C4:     Lab Results   Component Value Date/Time    C4 3 12/29/2023 08:55 AM       Urinalysis/Chemistries:      Lab Results   Component Value Date/Time    NITRU NEGATIVE 12/26/2023 05:17 PM    COLORU Yellow 12/26/2023 05:17 PM    PHUR 7.0 12/26/2023 05:17 PM    WBCUA None 12/26/2023 05:17 PM    RBCUA 20 TO 50 12/26/2023 05:17 PM    MUCUS NOT REPORTED 03/22/2021 02:10 AM    TRICHOMONAS NOT REPORTED 03/22/2021 02:10 AM    YEAST NOT REPORTED 03/22/2021 02:10 AM    BACTERIA None 12/26/2023 05:17 PM    SPECGRAV 1.009 12/26/2023 05:17 PM    LEUKOCYTESUR NEGATIVE 12/26/2023 05:17 PM    UROBILINOGEN  Normal 12/26/2023 05:17 PM    BILIRUBINUR NEGATIVE 12/26/2023 05:17 PM    GLUCOSEU NEGATIVE 12/26/2023 05:17 PM    KETUA NEGATIVE 12/26/2023 05:17 PM    AMORPHOUS 1+ 03/22/2021 02:10 AM       Radiology:     CXR:   IMPRESSION:  1. No significant interval change.  2. Tubes and lines as above  Assessment:     Acute kidney injury nonoliguric secondary to ischemic hypoperfusion (tach arrest/hypotension/STEMI) and nephrotoxic (contrast) ATN - Baseline creat normal, creatinine peaked at 2.3 improving down to 1.8 today.  STEMI status post left heart cath along with emergent CABG December 26 with ejection fraction 35%  Status post cardiac arrest  Circulatory shock on IABP/pressors  COVID-19 positive  Complete heart block with pacemaker in place  COPD  Hypoxia  Hypernatremia, resolving ATN    Plan:   1. Give one dose of Lasix IV 40mg X1  2.  Start D50.3 At 50 mill an hour for hypernatremia  2. Continue Aldactone 25 mg daily   3. Avoid hypotension.   4. NO ACE/ARB or further contrast  5. Will continue to follow    Nutrition   Please ensure that patient is on a renal diet/TF. Avoid nephrotoxic drugs/contrast exposure.    We will continue to follow along with you.     Radha Andrade CNP       Patient seen with nurse practitioner.  Urine output good.  Was extubated yesterday.  Refusing to do incentive spirometry.  Chest x-ray showing increased vascular congestion.  Oxygen requirements have gone up.  Creatinine down to 1.6 hemodynamically stable  Clinical exam unremarkable except for 1+ edema  Plan  1.  Lasix 40 mg IV x 1 dose  2.  Start D50.3 At 50 mill an hour  3.  Continue Aldactone 25 daily  4.  Avoid ACE inhibitors and ARB's for now  5.  Follow renal function  6.  Avoid nephrotoxic agents  Attending Physician Statement  I have discussed the care of Lee Mejia, including pertinent history and exam findings with the resident/fellow. I have reviewed the key elements of all parts of the encounter with the resident/fellow. I

## 2024-01-01 ENCOUNTER — APPOINTMENT (OUTPATIENT)
Dept: GENERAL RADIOLOGY | Age: 64
DRG: 165 | End: 2024-01-01
Attending: INTERNAL MEDICINE
Payer: MEDICAID

## 2024-01-01 LAB
ALBUMIN SERPL-MCNC: 3.7 G/DL (ref 3.5–5.2)
ALBUMIN/GLOB SERPL: 1.8 {RATIO} (ref 1–2.5)
ALP SERPL-CCNC: 100 U/L (ref 40–129)
ALT SERPL-CCNC: 882 U/L (ref 5–41)
ANION GAP SERPL CALCULATED.3IONS-SCNC: 8 MMOL/L (ref 9–17)
AST SERPL-CCNC: 579 U/L
BILIRUB DIRECT SERPL-MCNC: 0.8 MG/DL
BILIRUB INDIRECT SERPL-MCNC: 1.1 MG/DL (ref 0–1)
BILIRUB SERPL-MCNC: 1.9 MG/DL (ref 0.3–1.2)
BUN SERPL-MCNC: 73 MG/DL (ref 8–23)
CALCIUM SERPL-MCNC: 8.3 MG/DL (ref 8.6–10.4)
CHLORIDE SERPL-SCNC: 106 MMOL/L (ref 98–107)
CO2 SERPL-SCNC: 30 MMOL/L (ref 20–31)
CREAT SERPL-MCNC: 1.6 MG/DL (ref 0.7–1.2)
CREAT UR-MCNC: 86.1 MG/DL (ref 39–259)
EOSINOPHIL,URINE: NORMAL
ERYTHROCYTE [DISTWIDTH] IN BLOOD BY AUTOMATED COUNT: 14.1 % (ref 11.8–14.4)
GFR SERPL CREATININE-BSD FRML MDRD: 48 ML/MIN/1.73M2
GLUCOSE SERPL-MCNC: 120 MG/DL (ref 70–99)
HCT VFR BLD AUTO: 28.6 % (ref 40.7–50.3)
HGB BLD-MCNC: 8.9 G/DL (ref 13–17)
MAGNESIUM SERPL-MCNC: 3 MG/DL (ref 1.6–2.6)
MCH RBC QN AUTO: 29.4 PG (ref 25.2–33.5)
MCHC RBC AUTO-ENTMCNC: 31.1 G/DL (ref 28.4–34.8)
MCV RBC AUTO: 94.4 FL (ref 82.6–102.9)
NRBC BLD-RTO: 1.5 PER 100 WBC
PLATELET # BLD AUTO: ABNORMAL K/UL (ref 138–453)
PLATELET, FLUORESCENCE: 133 K/UL (ref 138–453)
PLATELETS.RETICULATED NFR BLD AUTO: 13.7 % (ref 1.1–10.3)
POTASSIUM SERPL-SCNC: 4.7 MMOL/L (ref 3.7–5.3)
PROT SERPL-MCNC: 5.8 G/DL (ref 6.4–8.3)
RBC # BLD AUTO: 3.03 M/UL (ref 4.21–5.77)
SODIUM SERPL-SCNC: 144 MMOL/L (ref 135–144)
TOTAL PROTEIN, URINE: 31 MG/DL
WBC OTHER # BLD: 14.5 K/UL (ref 3.5–11.3)

## 2024-01-01 PROCEDURE — 97530 THERAPEUTIC ACTIVITIES: CPT

## 2024-01-01 PROCEDURE — 97163 PT EVAL HIGH COMPLEX 45 MIN: CPT

## 2024-01-01 PROCEDURE — 85027 COMPLETE CBC AUTOMATED: CPT

## 2024-01-01 PROCEDURE — 2700000000 HC OXYGEN THERAPY PER DAY

## 2024-01-01 PROCEDURE — 82570 ASSAY OF URINE CREATININE: CPT

## 2024-01-01 PROCEDURE — 99233 SBSQ HOSP IP/OBS HIGH 50: CPT | Performed by: INTERNAL MEDICINE

## 2024-01-01 PROCEDURE — 6370000000 HC RX 637 (ALT 250 FOR IP): Performed by: STUDENT IN AN ORGANIZED HEALTH CARE EDUCATION/TRAINING PROGRAM

## 2024-01-01 PROCEDURE — 36415 COLL VENOUS BLD VENIPUNCTURE: CPT

## 2024-01-01 PROCEDURE — 80048 BASIC METABOLIC PNL TOTAL CA: CPT

## 2024-01-01 PROCEDURE — 6370000000 HC RX 637 (ALT 250 FOR IP): Performed by: NURSE PRACTITIONER

## 2024-01-01 PROCEDURE — 6370000000 HC RX 637 (ALT 250 FOR IP): Performed by: INTERNAL MEDICINE

## 2024-01-01 PROCEDURE — 6370000000 HC RX 637 (ALT 250 FOR IP): Performed by: THORACIC SURGERY (CARDIOTHORACIC VASCULAR SURGERY)

## 2024-01-01 PROCEDURE — 84156 ASSAY OF PROTEIN URINE: CPT

## 2024-01-01 PROCEDURE — 97166 OT EVAL MOD COMPLEX 45 MIN: CPT

## 2024-01-01 PROCEDURE — 99024 POSTOP FOLLOW-UP VISIT: CPT | Performed by: NURSE PRACTITIONER

## 2024-01-01 PROCEDURE — 87205 SMEAR GRAM STAIN: CPT

## 2024-01-01 PROCEDURE — 85055 RETICULATED PLATELET ASSAY: CPT

## 2024-01-01 PROCEDURE — 97535 SELF CARE MNGMENT TRAINING: CPT

## 2024-01-01 PROCEDURE — 97110 THERAPEUTIC EXERCISES: CPT

## 2024-01-01 PROCEDURE — 2580000003 HC RX 258: Performed by: INTERNAL MEDICINE

## 2024-01-01 PROCEDURE — 6360000002 HC RX W HCPCS: Performed by: INTERNAL MEDICINE

## 2024-01-01 PROCEDURE — 2580000003 HC RX 258: Performed by: STUDENT IN AN ORGANIZED HEALTH CARE EDUCATION/TRAINING PROGRAM

## 2024-01-01 PROCEDURE — 99291 CRITICAL CARE FIRST HOUR: CPT | Performed by: INTERNAL MEDICINE

## 2024-01-01 PROCEDURE — 2100000001 HC CVICU R&B

## 2024-01-01 PROCEDURE — 83735 ASSAY OF MAGNESIUM: CPT

## 2024-01-01 PROCEDURE — 94761 N-INVAS EAR/PLS OXIMETRY MLT: CPT

## 2024-01-01 PROCEDURE — 71045 X-RAY EXAM CHEST 1 VIEW: CPT

## 2024-01-01 PROCEDURE — C9113 INJ PANTOPRAZOLE SODIUM, VIA: HCPCS | Performed by: INTERNAL MEDICINE

## 2024-01-01 PROCEDURE — 80076 HEPATIC FUNCTION PANEL: CPT

## 2024-01-01 PROCEDURE — 99232 SBSQ HOSP IP/OBS MODERATE 35: CPT | Performed by: INTERNAL MEDICINE

## 2024-01-01 RX ORDER — FUROSEMIDE 10 MG/ML
40 INJECTION INTRAMUSCULAR; INTRAVENOUS ONCE
Status: COMPLETED | OUTPATIENT
Start: 2024-01-01 | End: 2024-01-01

## 2024-01-01 RX ORDER — SODIUM CHLORIDE 9 MG/ML
INJECTION, SOLUTION INTRAVENOUS CONTINUOUS
Status: DISCONTINUED | OUTPATIENT
Start: 2024-01-01 | End: 2024-01-02

## 2024-01-01 RX ADMIN — OXYCODONE HYDROCHLORIDE 10 MG: 5 TABLET ORAL at 03:35

## 2024-01-01 RX ADMIN — SODIUM CHLORIDE: 9 INJECTION, SOLUTION INTRAVENOUS at 15:03

## 2024-01-01 RX ADMIN — SENNOSIDES AND DOCUSATE SODIUM 1 TABLET: 50; 8.6 TABLET ORAL at 19:52

## 2024-01-01 RX ADMIN — SENNOSIDES AND DOCUSATE SODIUM 1 TABLET: 50; 8.6 TABLET ORAL at 08:42

## 2024-01-01 RX ADMIN — NALOXEGOL OXALATE 12.5 MG: 12.5 TABLET, FILM COATED ORAL at 06:27

## 2024-01-01 RX ADMIN — ALPRAZOLAM 0.25 MG: 0.25 TABLET ORAL at 03:36

## 2024-01-01 RX ADMIN — CARVEDILOL 6.25 MG: 3.12 TABLET, FILM COATED ORAL at 17:14

## 2024-01-01 RX ADMIN — FUROSEMIDE 40 MG: 10 INJECTION, SOLUTION INTRAMUSCULAR; INTRAVENOUS at 17:13

## 2024-01-01 RX ADMIN — ASPIRIN 81 MG: 81 TABLET, COATED ORAL at 08:42

## 2024-01-01 RX ADMIN — PANTOPRAZOLE SODIUM 40 MG: 40 INJECTION, POWDER, FOR SOLUTION INTRAVENOUS at 08:42

## 2024-01-01 RX ADMIN — SPIRONOLACTONE 25 MG: 25 TABLET, FILM COATED ORAL at 19:52

## 2024-01-01 RX ADMIN — OXYCODONE HYDROCHLORIDE 5 MG: 5 TABLET ORAL at 19:52

## 2024-01-01 RX ADMIN — CLOPIDOGREL BISULFATE 75 MG: 75 TABLET ORAL at 08:42

## 2024-01-01 RX ADMIN — CARVEDILOL 6.25 MG: 3.12 TABLET, FILM COATED ORAL at 08:42

## 2024-01-01 RX ADMIN — DEXTROSE MONOHYDRATE AND SODIUM CHLORIDE: 5; .3 INJECTION, SOLUTION INTRAVENOUS at 04:54

## 2024-01-01 ASSESSMENT — PAIN DESCRIPTION - ORIENTATION: ORIENTATION: LEFT

## 2024-01-01 ASSESSMENT — PAIN DESCRIPTION - LOCATION
LOCATION: GENERALIZED
LOCATION: GENERALIZED;LEG
LOCATION: BACK;CHEST

## 2024-01-01 ASSESSMENT — PAIN SCALES - GENERAL
PAINLEVEL_OUTOF10: 4
PAINLEVEL_OUTOF10: 6
PAINLEVEL_OUTOF10: 7
PAINLEVEL_OUTOF10: 4
PAINLEVEL_OUTOF10: 6
PAINLEVEL_OUTOF10: 6

## 2024-01-01 ASSESSMENT — PAIN DESCRIPTION - DESCRIPTORS
DESCRIPTORS: ACHING
DESCRIPTORS: ACHING

## 2024-01-01 ASSESSMENT — ENCOUNTER SYMPTOMS
WHEEZING: 0
SHORTNESS OF BREATH: 1
GASTROINTESTINAL NEGATIVE: 1
COUGH: 1

## 2024-01-01 NOTE — PROGRESS NOTES
Occupational Therapy  Facility/Department: [facility]  Occupational Therapy Initial Assessment    Name: Lee Mejia  : 1960  MRN: [id number]  Date of Service: 2024    HISTORY OF PRESENT ILLNESS   This is a 63-year-old male that presents the emergency department with complaints of chest pain.  The clinical history was taken from the patient's wife, the patient was not able to give any clinical history.  The wife states that he woke up today and woke her up from a dead sleep complaining of severe chest pain.  The patient was brought to the emergency department, while he was being brought back from triage she became unresponsive and went to cardiac arrest.        Discharge Recommendations:  Patient would benefit from continued therapy after discharge  OT Equipment Recommendations  Equipment Needed: Yes (Please CTA as pt progresses)       Patient Diagnosis(es): The primary encounter diagnosis was Left main coronary artery disease. Diagnoses of S/P CABG x 3, NSTEMI (non-ST elevation myocardial infarction) (HCC), and COVID were also pertinent to this visit.  Past Medical History:  has a past medical history of ATN (acute tubular necrosis) ([facility]), COPD (chronic obstructive pulmonary disease) ([facility]), COPD (chronic obstructive pulmonary disease) (HCC), Diabetes mellitus (HCC), Diabetes mellitus (HCC), Left main coronary artery disease, and Mixed hyperlipidemia.  Past Surgical History:  has a past surgical history that includes Tooth Extraction (14); Colonoscopy (2015); Cardiac procedure (N/A, 2023); Cardiac procedure (N/A, 2023); Cardiac procedure (N/A, 2023); Cardiac procedure (N/A, 2023); and Coronary artery bypass graft (N/A, 2023).           Assessment   Performance deficits / Impairments: Decreased functional mobility ;Decreased endurance;Decreased ADL status;Decreased coordination;Decreased ROM;Decreased balance;Decreased strength;Decreased safe  improved carryover  Education Method: Demonstration;Verbal  Barriers to Learning: Cognition  Education Outcome: Demonstrated understanding;Continued education needed           Hand Dominance  Hand Dominance: Right         AM-PAC - ADL  AM-PAC Daily Activity - Inpatient   How much help is needed for putting on and taking off regular lower body clothing?: Total  How much help is needed for bathing (which includes washing, rinsing, drying)?: Total  How much help is needed for toileting (which includes using toilet, bedpan, or urinal)?: Total  How much help is needed for putting on and taking off regular upper body clothing?: A Lot  How much help is needed for taking care of personal grooming?: A Lot  How much help for eating meals?: A Lot  AM-PAC Inpatient Daily Activity Raw Score: 9  AM-PAC Inpatient ADL T-Scale Score : 25.33  ADL Inpatient CMS 0-100% Score: 79.59  ADL Inpatient CMS G-Code Modifier : CL      Goals  Short Term Goals  Time Frame for Short Term Goals: by discharge  Short Term Goal 1: demo MOD A for bed mobility to increase IND with ADLs  Short Term Goal 2: demo MOD A for UB bathing/dressing  Short Term Goal 3: demo MIN A for self feeding/grooming  Short Term Goal 4: demo 100% adherence to sternal precautions with cues provided PRN to facilitate increased IND during ADLs  Short Term Goal 5: demo functional transfer MAX A with LRAD to increase IND during meaningful occupations  Short Term Goal 6: demo seated dynamic balance for 7 minutes CGA to facilitate increase engagement during meaningful ADLs  Short Term Goal 7: demo AROM/AAROM to BUE through all planes as tolerated x 10 reps  to facilitate increased ROM/strength for ADLs  Short Term Goal 8: PLEASE NOTIFY OTR FOR UPDATE OF GOALS AS APPROPRIATE       Therapy Time   Individual Concurrent Group Co-treatment   Time In 0900         Time Out 0944         Minutes 44         Timed Code Treatment Minutes: 24 Minutes     Co- Eval with PT warranted secondary to

## 2024-01-01 NOTE — PROGRESS NOTES
[facility] Cardiothoracic Surgery  Progress Note    1/1/2024 3:24 PM  Surgeon: Surgeon CTS: Dr. Sarmad ESCOBAR     S/P: emergent CABG with IABP     Subjective:  Mr. Mejia   Sitting up in bed with wife bedside. No CP or SOB. No acute distress  Objective:  BP 91/74   Pulse 75   Temp 99.1 °F (37.3 °C)   Resp 23   Ht 1.626 m (5' 4\")   Wt 70.9 kg (156 lb 4.9 oz)   SpO2 95%   BMI 26.83 kg/m²   Chest: pacing wires: yes, chest tubes:yes, air leak no, 3 +  CV: no murmur noted, Normal S1, S2,   Lungs: clear to auscultation, no wheezes, rales, or rhonchi  Abd: normal bowel sounds  Lower Extremities: Trace edema  Saph Incison: no sign of drainage or infection  Sternal Incison: dressing applied-no excessive drainage or signs of infection noted  Vascular following for left leg intermittent pulses. Color normal. Normal cap return.  Continue to monitor. Lactic acid normal.   CXR-stable CXR with no significant effusion.   Labs: Labs reviewed today    CBC:   Recent Labs     12/30/23 0444 12/31/23 0410 01/01/24  0217   WBC 9.5 13.2* 14.5*   HGB 8.6* 9.0* 8.9*   HCT 26.9* 28.5* 28.6*   MCV 90.3 89.9 94.4   PLT See Reflexed IPF Result 102* See Reflexed IPF Result     BMP:   Recent Labs     12/30/23 0444 12/31/23 0410 01/01/24  0217    149* 144   K 3.8 4.7 4.7    111* 106   CO2 26 24 30   BUN 69* 71* 73*   CREATININE 1.8* 1.6* 1.6*       I/O: I/O last 3 completed shifts:  In: 1100 [P.O.:1100]  Out: 4040 [Urine:2940; Chest Tube:1100]  Scheduled Meds:   furosemide  40 mg IntraVENous Once    carvedilol  6.25 mg Oral BID WC    naloxegol  12.5 mg Oral QAM AC    spironolactone  25 mg Oral Daily    pantoprazole  40 mg IntraVENous Daily    sodium chloride flush  5-40 mL IntraVENous 2 times per day    aspirin  81 mg Oral Daily    clopidogrel  75 mg Oral Daily    polyethylene glycol  17 g Oral Daily    sennosides-docusate sodium  1 tablet Oral BID    [Held by provider] atorvastatin  20 mg Oral Nightly     Continuous Infusions:    with cardiology on IABP removal. IABP 1:2 currently.   Noted as HR increases BP increasing as well. Concern patient was being blocked down this AM.        Patient noted to be bradycardic this AM. Eval about pacing, as preop he responded better   Neuro-pt noted to follow commands Yday with drug holiday. No down time. Following commands. No appreciated neuro deficit.  Pulmonary-appreciate pulmonary input with vent and lung status. Requires high FIO2 and PEEP. Concerns for pulmonary edema. Concern for onset Covid pneumonia. Pt Hx of 3 Covid Vax.  Cardiology- to manage IABP working on correcting acidosis to improve BP. Eval throughout day for removal of IABP. May consider echocardiogram. Once gases improve and metabolic acidosis managed.  GI-noted elevated LFTs and INR-likely cause of perfusion causing shock liver. Switch BMP to CMPs. Monitor trend. Ordered 2 unit FFP in the envent that patient would get IABP removed.   Discussed with pulmonary about ruling out HIT.  Vascular-following regarding lower extremity pulses intermittent in extremity with IABP. Await better evaluation when IABP removed.  Nephro-noted likely prerenal insult. Elevated Scr BUN and drop in Bicarb. Started bicard infusion yday. Intermittent oliguria. Pt was Hypotensive preop and post op. Elytes stable. Contacted nephrology for assistance          12-27-23  Removal of IABP today by cardiology early morning  Transfuse albumin to get CVP 15-17 and to help wean off pressure support  Plan to extubate patient midday-eval with pulmonary on plan for extubation.  Appreciate ID input regarding COVID treatment  Monitor lower extremity pulses. Vascular called last night. Monitor lactic acid increase.  Monitor pulses when IABP out  Begin PT OT today once IABP removed and extubated.     RANDALL PARRY, APRN - NP

## 2024-01-01 NOTE — PROGRESS NOTES
Physical Therapy  Facility/Department: [facility] CAR 1- SICU  Physical Therapy Initial Assessment    Name: Lee Mejia  : 1960  MRN: [id number]  Date of Service: 2024    Discharge Recommendations:  Patient would benefit from continued therapy after discharge          Patient Diagnosis(es): The primary encounter diagnosis was Left main coronary artery disease. Diagnoses of S/P CABG x 3, NSTEMI (non-ST elevation myocardial infarction) (HCC), and COVID were also pertinent to this visit.  Past Medical History:  has a past medical history of ATN (acute tubular necrosis) (HCC), COPD (chronic obstructive pulmonary disease) (HCC), COPD (chronic obstructive pulmonary disease) (HCC), Diabetes mellitus (HCC), Diabetes mellitus (HCC), Left main coronary artery disease, and Mixed hyperlipidemia.  Past Surgical History:  has a past surgical history that includes Tooth Extraction (14); Colonoscopy (2015); Cardiac procedure (N/A, 2023); Cardiac procedure (N/A, 2023); Cardiac procedure (N/A, 2023); Cardiac procedure (N/A, 2023); and Coronary artery bypass graft (N/A, 2023).    Assessment   Body Structures, Functions, Activity Limitations Requiring Skilled Therapeutic Intervention: Decreased functional mobility ;Decreased endurance;Decreased posture;Decreased balance;Decreased strength;Decreased ROM;Decreased coordination;Decreased safe awareness;Decreased cognition  Assessment: Patient did poorly, only able to sit edge of bed. Two staff needed to sit him up, support him in upright.  Very little balance reactions present. Left foot drop appears to be new.  Limited by edema in trunk and left thigh.  Patient unable to attempt standing or gait at all.  Therapy Prognosis: Good  Decision Making: Medium Complexity  Clinical Presentation: evolving  Requires PT Follow-Up: Yes     Plan   Physical Therapy Plan  General Plan: 6-7 times per week  Current Treatment Recommendations: Strengthening,

## 2024-01-01 NOTE — PROGRESS NOTES
Renal Progress Note    Patient :  Lee Mejia; 63 y.o. MRN# [id number]  Location:  1028/1028-01  Attending:  Db Sanchez MD  Admit Date:  12/26/2023   Hospital Day: 6    Subjective:       No acute issues overnight, patient seen and examined bedside  He is awake, lethargic, answering questions appropriately, slow in following commands and answering questions.  As per the nursing staff patient is very weak, poor oral intake.  IV fluids continue to maintain circulating volume.  Chest x-ray showing vascular congestion and pleural effusions.  Chest tube still in place.    Has been afebrile, heart rate and blood pressures okay  On supplemental oxygen heated high flow nasal cannula 60% FiO2` 30 L flow rate, coming down oxygen requirement  Chest x-ray this morning shows mild pulmonary edema and small bilateral effusions    Labs this morning showed sodium 144, potassium 4.5, chloride 106, bicarb 30, BUN 73, creatinine 1.6, creatinine appears to have plateaued.  Baseline creatinine 0.7-0.9.  Peak creatinine during this admission 2.3, renal function improving  Shock liver, acute liver injury from remdesivir, LFTs improving  Platelets 133, improving.  Has chronic thrombocytopenia and follows up with hematology oncology  Hemoglobin 8.9  Urine output last 24 hours 2.3 L, fluid balance since admission -1.1 L, on D5 -0.3% saline at 50 cc/h, received 1 dose of Lasix 40 mg IV yesterday, and Aldactone 25 mg daily      HPI - patient with past medical history of COPD, hyperlipidemia, diabetes mellitus presented with chest pain, found to have STEMI s/p left heart cath with left main and ostial LAD disease s/p emergent CABG x 3 on 12/26/2023.  V-fib cardiac arrest requiring one-time defibrillation before the cardiac cath, PEA cardiac arrest following cardiac cath requiring CPR.  Nephrology following for SANDHYA  Creatinine prior to procedure was 0.9 subsequently peaked up to 2.3.  Urine output declined as well.  Workup showed low      C3:     Lab Results   Component Value Date/Time    C3 34 12/29/2023 08:55 AM     C4:     Lab Results   Component Value Date/Time    C4 3 12/29/2023 08:55 AM       Urinalysis/Chemistries:      Lab Results   Component Value Date/Time    NITRU NEGATIVE 12/26/2023 05:17 PM    COLORU Yellow 12/26/2023 05:17 PM    PHUR 7.0 12/26/2023 05:17 PM    WBCUA None 12/26/2023 05:17 PM    RBCUA 20 TO 50 12/26/2023 05:17 PM    MUCUS NOT REPORTED 03/22/2021 02:10 AM    TRICHOMONAS NOT REPORTED 03/22/2021 02:10 AM    YEAST NOT REPORTED 03/22/2021 02:10 AM    BACTERIA None 12/26/2023 05:17 PM    SPECGRAV 1.009 12/26/2023 05:17 PM    LEUKOCYTESUR NEGATIVE 12/26/2023 05:17 PM    UROBILINOGEN Normal 12/26/2023 05:17 PM    BILIRUBINUR NEGATIVE 12/26/2023 05:17 PM    GLUCOSEU NEGATIVE 12/26/2023 05:17 PM    KETUA NEGATIVE 12/26/2023 05:17 PM    AMORPHOUS 1+ 03/22/2021 02:10 AM       Radiology:     CXR:   IMPRESSION:  Cardiomegaly with mild pulmonary edema and small bilateral pleural effusions.     Right perihilar atelectasis.     No pneumothorax.     Assessment:     Acute kidney injury nonoliguric secondary to ischemic ATN from hypoperfusion (V-fib arrest/hypotension/STEMI) and nephrotoxic (contrast) ATN - Baseline creatinine 0.6-0.7, creatinine peaked at 2.3 improving down to 1.6 today.  Appears to be plateauing.  May be his new baseline as well.  Complements low, possibility of atheroembolism not ruled out  2.  STEMI status post emergent CABG on 12/26/2023  3.  ischemic cardiomyopathy, EF 30 to 35%  4.  Status post PEA cardiac arrest  5.  Cardiogenic shock on IABP/pressors - resolved  6.  COVID-19 positive, received remdesevir, stopped due to transaminitis  7.  COPD  8.  Acute hypoxic respiratory failure, secondary to ischemic cardiomyopathy, COVID, COPD - oxygen requirement coming down  9.   hypernatremia - improving  10. Shock liver - improving  11.  Thrombocytopenia - improving    Plan:     Continue normal saline at 50 mill an

## 2024-01-01 NOTE — PROGRESS NOTES
[facility]   Progress Note                   Date:   1/1/2024  Patient name: Lee Mejia  Date of admission:  12/26/2023  1:07 PM  MRN:   [id number]  YOB: 1960  PCP: Dee Morrison DO    Reason for Admission: STEMI    Subjective:       Patient seen and examined at bedside: No acute events overnight, currently on oxygen through high flow nasal cannula, NSR 71, /68, sodium improved to 144, creatinine improved from peak of 2.3-1.6.    Medications:   Scheduled Meds:   carvedilol  6.25 mg Oral BID WC    naloxegol  12.5 mg Oral QAM AC    spironolactone  25 mg Oral Daily    pantoprazole  40 mg IntraVENous Daily    sodium chloride flush  5-40 mL IntraVENous 2 times per day    aspirin  81 mg Oral Daily    clopidogrel  75 mg Oral Daily    polyethylene glycol  17 g Oral Daily    sennosides-docusate sodium  1 tablet Oral BID    [Held by provider] atorvastatin  20 mg Oral Nightly     Continuous Infusions:   dextrose 5 % and 0.3 % NaCl 500 mL infusion 50 mL/hr at 01/01/24 0454    sodium chloride 10 mL/hr at 12/29/23 1122    vasopressin 20 Units in sodium chloride 0.9 % 100 mL infusion Stopped (12/28/23 1837)    dexmedeTOMIDine Stopped (12/28/23 0312)    heparin (PORCINE) Infusion Stopped (12/28/23 0830)    sodium chloride Stopped (12/29/23 0947)    propofol Stopped (12/30/23 1707)    norepinephrine Stopped (12/28/23 1111)    EPINEPHrine Stopped (12/29/23 0657)    insulin Stopped (12/27/23 1245)    dextrose       CBC:   Recent Labs     12/30/23 0444 12/31/23 0410 01/01/24  0217   WBC 9.5 13.2* 14.5*   HGB 8.6* 9.0* 8.9*   PLT See Reflexed IPF Result 102* See Reflexed IPF Result       BMP:    Recent Labs     12/30/23 0444 12/31/23 0410 01/01/24  0217    149* 144   K 3.8 4.7 4.7    111* 106   CO2 26 24 30   BUN 69* 71* 73*   CREATININE 1.8* 1.6* 1.6*   GLUCOSE 103* 112* 120*       Hepatic:   Recent Labs     12/30/23  0444 12/31/23  0410 01/01/24  0217   AST 3,070* 1,176* 579*

## 2024-01-02 ENCOUNTER — APPOINTMENT (OUTPATIENT)
Age: 64
DRG: 165 | End: 2024-01-02
Attending: INTERNAL MEDICINE
Payer: MEDICAID

## 2024-01-02 ENCOUNTER — APPOINTMENT (OUTPATIENT)
Dept: ULTRASOUND IMAGING | Age: 64
DRG: 165 | End: 2024-01-02
Attending: INTERNAL MEDICINE
Payer: MEDICAID

## 2024-01-02 ENCOUNTER — APPOINTMENT (OUTPATIENT)
Dept: GENERAL RADIOLOGY | Age: 64
DRG: 165 | End: 2024-01-02
Attending: INTERNAL MEDICINE
Payer: MEDICAID

## 2024-01-02 LAB
ALBUMIN SERPL-MCNC: 3.2 G/DL (ref 3.5–5.2)
ALP SERPL-CCNC: 106 U/L (ref 40–129)
ALT SERPL-CCNC: 525 U/L (ref 5–41)
ANION GAP SERPL CALCULATED.3IONS-SCNC: 14 MMOL/L (ref 9–17)
AST SERPL-CCNC: 284 U/L
BILIRUB DIRECT SERPL-MCNC: 0.7 MG/DL
BILIRUB INDIRECT SERPL-MCNC: 1.1 MG/DL (ref 0–1)
BILIRUB SERPL-MCNC: 1.8 MG/DL (ref 0.3–1.2)
BUN SERPL-MCNC: 68 MG/DL (ref 8–23)
CALCIUM SERPL-MCNC: 7.8 MG/DL (ref 8.6–10.4)
CHLORIDE SERPL-SCNC: 103 MMOL/L (ref 98–107)
CO2 SERPL-SCNC: 23 MMOL/L (ref 20–31)
CREAT SERPL-MCNC: 1.3 MG/DL (ref 0.7–1.2)
ECHO BSA: 1.55 M2
ECHO LV EDV A2C: 23 ML
ECHO LV EDV A4C: 33 ML
ECHO LV EDV INDEX A4C: 19 ML/M2
ECHO LV EDV NDEX A2C: 14 ML/M2
ECHO LV EJECTION FRACTION A2C: 71 %
ECHO LV EJECTION FRACTION A4C: 59 %
ECHO LV EJECTION FRACTION BIPLANE: 66 % (ref 55–100)
ECHO LV ESV A2C: 7 ML
ECHO LV ESV A4C: 14 ML
ECHO LV ESV INDEX A2C: 4 ML/M2
ECHO LV ESV INDEX A4C: 8 ML/M2
ERYTHROCYTE [DISTWIDTH] IN BLOOD BY AUTOMATED COUNT: 14.5 % (ref 11.8–14.4)
GFR SERPL CREATININE-BSD FRML MDRD: >60 ML/MIN/1.73M2
GLUCOSE BLD-MCNC: 100 MG/DL (ref 75–110)
GLUCOSE BLD-MCNC: 148 MG/DL (ref 75–110)
GLUCOSE SERPL-MCNC: 102 MG/DL (ref 70–99)
HCT VFR BLD AUTO: 33.1 % (ref 40.7–50.3)
HGB BLD-MCNC: 10 G/DL (ref 13–17)
MAGNESIUM SERPL-MCNC: 3 MG/DL (ref 1.6–2.6)
MCH RBC QN AUTO: 29.3 PG (ref 25.2–33.5)
MCHC RBC AUTO-ENTMCNC: 30.2 G/DL (ref 28.4–34.8)
MCV RBC AUTO: 97.1 FL (ref 82.6–102.9)
NRBC BLD-RTO: 0.5 PER 100 WBC
PLATELET # BLD AUTO: ABNORMAL K/UL (ref 138–453)
PLATELET, FLUORESCENCE: ABNORMAL K/UL (ref 138–453)
POTASSIUM SERPL-SCNC: 4.6 MMOL/L (ref 3.7–5.3)
PROT SERPL-MCNC: 5.4 G/DL (ref 6.4–8.3)
RBC # BLD AUTO: 3.41 M/UL (ref 4.21–5.77)
SODIUM SERPL-SCNC: 140 MMOL/L (ref 135–144)
WBC OTHER # BLD: 17.8 K/UL (ref 3.5–11.3)

## 2024-01-02 PROCEDURE — 85055 RETICULATED PLATELET ASSAY: CPT

## 2024-01-02 PROCEDURE — 80048 BASIC METABOLIC PNL TOTAL CA: CPT

## 2024-01-02 PROCEDURE — 99232 SBSQ HOSP IP/OBS MODERATE 35: CPT | Performed by: INTERNAL MEDICINE

## 2024-01-02 PROCEDURE — 71045 X-RAY EXAM CHEST 1 VIEW: CPT

## 2024-01-02 PROCEDURE — 97110 THERAPEUTIC EXERCISES: CPT

## 2024-01-02 PROCEDURE — 2100000001 HC CVICU R&B

## 2024-01-02 PROCEDURE — 97530 THERAPEUTIC ACTIVITIES: CPT

## 2024-01-02 PROCEDURE — 97535 SELF CARE MNGMENT TRAINING: CPT

## 2024-01-02 PROCEDURE — 6370000000 HC RX 637 (ALT 250 FOR IP): Performed by: THORACIC SURGERY (CARDIOTHORACIC VASCULAR SURGERY)

## 2024-01-02 PROCEDURE — 6370000000 HC RX 637 (ALT 250 FOR IP): Performed by: INTERNAL MEDICINE

## 2024-01-02 PROCEDURE — 99233 SBSQ HOSP IP/OBS HIGH 50: CPT | Performed by: INTERNAL MEDICINE

## 2024-01-02 PROCEDURE — 76770 US EXAM ABDO BACK WALL COMP: CPT

## 2024-01-02 PROCEDURE — 83735 ASSAY OF MAGNESIUM: CPT

## 2024-01-02 PROCEDURE — 6360000002 HC RX W HCPCS: Performed by: INTERNAL MEDICINE

## 2024-01-02 PROCEDURE — 93308 TTE F-UP OR LMTD: CPT | Performed by: INTERNAL MEDICINE

## 2024-01-02 PROCEDURE — 94761 N-INVAS EAR/PLS OXIMETRY MLT: CPT

## 2024-01-02 PROCEDURE — 6370000000 HC RX 637 (ALT 250 FOR IP): Performed by: NURSE PRACTITIONER

## 2024-01-02 PROCEDURE — C9113 INJ PANTOPRAZOLE SODIUM, VIA: HCPCS | Performed by: INTERNAL MEDICINE

## 2024-01-02 PROCEDURE — 2700000000 HC OXYGEN THERAPY PER DAY

## 2024-01-02 PROCEDURE — 85027 COMPLETE CBC AUTOMATED: CPT

## 2024-01-02 PROCEDURE — 82947 ASSAY GLUCOSE BLOOD QUANT: CPT

## 2024-01-02 PROCEDURE — 6370000000 HC RX 637 (ALT 250 FOR IP): Performed by: STUDENT IN AN ORGANIZED HEALTH CARE EDUCATION/TRAINING PROGRAM

## 2024-01-02 PROCEDURE — 80076 HEPATIC FUNCTION PANEL: CPT

## 2024-01-02 PROCEDURE — 93308 TTE F-UP OR LMTD: CPT

## 2024-01-02 PROCEDURE — 36415 COLL VENOUS BLD VENIPUNCTURE: CPT

## 2024-01-02 RX ORDER — FUROSEMIDE 10 MG/ML
40 INJECTION INTRAMUSCULAR; INTRAVENOUS ONCE
Status: COMPLETED | OUTPATIENT
Start: 2024-01-02 | End: 2024-01-02

## 2024-01-02 RX ADMIN — CARVEDILOL 6.25 MG: 3.12 TABLET, FILM COATED ORAL at 08:22

## 2024-01-02 RX ADMIN — PANTOPRAZOLE SODIUM 40 MG: 40 INJECTION, POWDER, FOR SOLUTION INTRAVENOUS at 08:22

## 2024-01-02 RX ADMIN — SPIRONOLACTONE 25 MG: 25 TABLET, FILM COATED ORAL at 20:44

## 2024-01-02 RX ADMIN — POLYETHYLENE GLYCOL 3350 17 G: 17 POWDER, FOR SOLUTION ORAL at 08:22

## 2024-01-02 RX ADMIN — ASPIRIN 81 MG: 81 TABLET, COATED ORAL at 08:22

## 2024-01-02 RX ADMIN — CLOPIDOGREL BISULFATE 75 MG: 75 TABLET ORAL at 08:22

## 2024-01-02 RX ADMIN — FUROSEMIDE 40 MG: 10 INJECTION, SOLUTION INTRAMUSCULAR; INTRAVENOUS at 17:34

## 2024-01-02 RX ADMIN — NALOXEGOL OXALATE 12.5 MG: 12.5 TABLET, FILM COATED ORAL at 08:23

## 2024-01-02 RX ADMIN — SENNOSIDES AND DOCUSATE SODIUM 1 TABLET: 50; 8.6 TABLET ORAL at 20:44

## 2024-01-02 RX ADMIN — SENNOSIDES AND DOCUSATE SODIUM 1 TABLET: 50; 8.6 TABLET ORAL at 08:22

## 2024-01-02 RX ADMIN — OXYCODONE HYDROCHLORIDE 10 MG: 5 TABLET ORAL at 11:57

## 2024-01-02 RX ADMIN — CARVEDILOL 6.25 MG: 3.12 TABLET, FILM COATED ORAL at 17:34

## 2024-01-02 ASSESSMENT — ENCOUNTER SYMPTOMS
ABDOMINAL DISTENTION: 0
SHORTNESS OF BREATH: 1
EYE DISCHARGE: 0
COLOR CHANGE: 0
APNEA: 0

## 2024-01-02 NOTE — PLAN OF CARE
Problem: Respiratory - Adult  Goal: Achieves optimal ventilation and oxygenation  1/1/2024 2152 by Genet Lopez RCP  Outcome: Progressing  Flowsheets (Taken 1/1/2024 2152)  Achieves optimal ventilation and oxygenation:   Assess for changes in respiratory status   Respiratory therapy support as indicated  1/1/2024 0904 by Kristin Arteaga RCP  Outcome: Progressing

## 2024-01-02 NOTE — PROGRESS NOTES
[facility] -   Infectious diseases evaluation  admission date 12/26/2023    reason for consultation:   Covid  - surg clearance    Impression :   Current:  Anterolateral NSTEMI  Card cath complicated w complete AV block and placed a temporary pace maker  High grade left main ostial disease 12/26  LCX 20-30 - RCA 20 - 30LVEF 35  Card arrest due to the AV block -  CPR less than a min to achieve ROSC  CABG 12/26 emergent bypass x  3   Cardiogenic shock  Intra aortic balloon placed  Bandemia 31 - better  Shock liver 12/28  Flash Pulm edema - sp white red, no clear bacterial pneumonia   Resp failure from the card arrest   intubated  Covid test +  COPD  DM    Other:    Discussion / summary of stay / plan of care     Recommendations     COVID + but not clear if causing disease  12/26 remdesevir after the CABG - stopped 12/28 due to shock liver and elevated Liver enzymes  Emergent CABG 12/26 - 3 vessels  Bandemia is reactive to the MI and arrest - improved  Disc w RN and pulm       1/2/24 -WBc 17 from atelectasis - 2 periph ivs also pulled - use spirometer    Infection Control Recommendations   [place] Precautions  Contact Isolation  plus till 1/4/24  Antimicrobial Stewardship Recommendations   Simplification of therapy  Targeted therapy    History of Present Illness:   Initial history:  Lee Mejia is a 63 y.o.-year-old male admitted w severe chest pain and NSTEMI  Over exerts himself and has COPD  Taken to card cath and had a complete HB and pacer placed  He is intubated w resp failure and P edema  on CXR  WBC elevated and no clear infection  Tested + covid and ID called for clearance for surgery  He is on pressors for card failure and cardiogenic shock  According to the girl friend she had worked  long hours last 4 days and can't tell about cough or other symptoms -      Pt now is on the vent - no cellulitis -  UA ng  BC pend          Interval changes  1/2/2024

## 2024-01-02 NOTE — PROGRESS NOTES
[facility] Cardiothoracic Surgery  Progress Note    1/2/2024 11:51 AM  Surgeon: Surgeon CTS: Dr. Sarmad ESCOBAR     S/P: emergent CABG with IABP     Subjective:  Mr. Mejia   Sitting up in bed with wife bedside. No CP or SOB. No acute distress  Objective:  /78   Pulse 79   Temp 99.5 °F (37.5 °C) (Bladder)   Resp 23   Ht 1.626 m (5' 4\")   Wt 65.5 kg (144 lb 6.4 oz)   SpO2 91%   BMI 24.79 kg/m²   Chest: pacing wires: yes, chest tubes:yes, air leak no, 3 +  CV: no murmur noted, Normal S1, S2,   Lungs: clear to auscultation, no wheezes, rales, or rhonchi  Abd: normal bowel sounds  Lower Extremities: Trace edema  Saph Incison: no sign of drainage or infection  Sternal Incison: dressing applied-no excessive drainage or signs of infection noted  Vascular following for left leg intermittent pulses. Color normal. Normal cap return.    CXR  IMPRESSION:  Small left-sided pneumothorax, similar in appearance to the previous exam.     Slightly improved central vascular congestion.     Unchanged left basilar consolidation and layering right-sided pleural  effusion.     Slightly increased right perihilar airspace opacity.    Labs: Labs reviewed today    CBC:   Recent Labs     12/31/23 0410 01/01/24  0217 01/02/24  0707   WBC 13.2* 14.5* 17.8*   HGB 9.0* 8.9* 10.0*   HCT 28.5* 28.6* 33.1*   MCV 89.9 94.4 97.1   * See Reflexed IPF Result See Reflexed IPF Result       BMP:   Recent Labs     12/31/23 0410 01/01/24  0217   * 144   K 4.7 4.7   * 106   CO2 24 30   BUN 71* 73*   CREATININE 1.6* 1.6*         I/O: I/O last 3 completed shifts:  In: 2532.7 [P.O.:2140; I.V.:392.7]  Out: 5426 [Urine:4096; Chest Tube:1330]  Scheduled Meds:   carvedilol  6.25 mg Oral BID WC    naloxegol  12.5 mg Oral QAM AC    spironolactone  25 mg Oral Daily    pantoprazole  40 mg IntraVENous Daily    sodium chloride flush  5-40 mL IntraVENous 2 times per day    aspirin  81 mg Oral Daily    clopidogrel  75 mg Oral Daily    polyethylene

## 2024-01-02 NOTE — PROGRESS NOTES
Wheezing.    Current Medications:     Scheduled Meds:    carvedilol  6.25 mg Oral BID WC    naloxegol  12.5 mg Oral QAM AC    spironolactone  25 mg Oral Daily    pantoprazole  40 mg IntraVENous Daily    sodium chloride flush  5-40 mL IntraVENous 2 times per day    aspirin  81 mg Oral Daily    clopidogrel  75 mg Oral Daily    polyethylene glycol  17 g Oral Daily    sennosides-docusate sodium  1 tablet Oral BID    [Held by provider] atorvastatin  20 mg Oral Nightly     Continuous Infusions:    sodium chloride 50 mL/hr at 24 1755    sodium chloride 10 mL/hr at 23 1122    vasopressin 20 Units in sodium chloride 0.9 % 100 mL infusion Stopped (23 1837)    dexmedeTOMIDine Stopped (23 0312)    heparin (PORCINE) Infusion Stopped (23 0830)    sodium chloride Stopped (23 0947)    norepinephrine Stopped (23 1111)    EPINEPHrine Stopped (23 0657)    insulin Stopped (23 1245)    dextrose       PRN Meds:  aluminum & magnesium hydroxide-simethicone, ALPRAZolam, oxyCODONE **OR** oxyCODONE, sodium chloride, heparin (porcine), heparin (porcine), sodium chloride flush, sodium chloride, ondansetron **OR** ondansetron, fentanNYL **OR** fentanNYL, hydrALAZINE, metoprolol, diphenhydrAMINE, magnesium hydroxide, bisacodyl, potassium chloride, magnesium sulfate, calcium chloride 1,000 mg in sodium chloride 0.9 % 100 mL IVPB **OR** calcium chloride 2,000 mg in sodium chloride 0.9 % 100 mL IVPB, ipratropium 0.5 mg-albuterol 2.5 mg, albumin human 5%, albumin human 25%, norepinephrine, EPINEPHrine, glucose, dextrose bolus **OR** dextrose bolus, glucagon (rDNA), dextrose, sodium chloride    Input/Output:       I/O last 3 completed shifts:  In: 2532.7 [P.O.:2140; I.V.:392.7]  Out: 5426 [Urine:4096; Chest Tube:1330]    Vital Signs:   Temperature:  Temp: 98.2 °F (36.8 °C)  TMax:   Temp (24hrs), Av.9 °F (37.2 °C), Min:98.2 °F (36.8 °C), Max:99.5 °F (37.5 °C)    Respirations:  Respirations:  the problem list as documented.      .  Electronically signed by Catherine Gunderson MD on 1/2/2024 at 5:16 PM

## 2024-01-02 NOTE — PROGRESS NOTES
PULMONARY & CRITICAL CARE MEDICINE PROGRESS NOTE     Patient:  Lee Mejia  MRN: [id number]  Admit date: 12/26/2023  Primary Care Physician: Dee Morrison DO  Consulting Physician: Db Sanchez MD  CODE Status: Full Code  LOS: 6     SUBJECTIVE     CHIEF COMPLAINT/REASON FOR INITIAL CONSULT:  STEMI  BRIEF HOSPITAL COURSE:   The patient is a 63 y.o. male with chronic smoking history and COPD, presented to [facility] with severe chest pain.  He went into V-fib arrest while in the ER at [facility] requiring defibrillation.  He converted into sinus rhythm.  EKG was obtained which was consistent with anterolateral STEMI and the patient was emergently taken to Cath Lab.  He underwent cardiac cath which showed high-grade left main and ostial LAD disease with a dominant left circumflex system.  He was in cardiogenic shock, intra-aortic balloon pump was placed.  He also developed complete heart block during cardiac cath, temporary pacemaker was inserted.  He was admitted in medical ICU at the same [facility] facility where he coded again, had brief CPR after which ROSC was achieved.  He was subsequently transferred to [facility] for CT surgery evaluation for emergent CABG.  Nasal swab also came back positive for COVID.  CT surgery planning on bypass today.  Pulmonology consulted for recommendations prior to surgery, COVID infection and for vent management.  History obtained from the family member/girlfriend.  She denies any sick contact in the family.  But he does go out and meet several people.  She does not think that the patient had cough, fever or breathing difficulty.  Patient is heavy smoker, has been smoking 1-.5 pack per day for last several years.  He uses inhaler at home.  Denies any other significant past medical history.    1/1/2024   Subjective    Post 3 vessel cabg 12/26/23   On high flow       REVIEW OF SYSTEMS:  Review of Systems   Constitutional:  Positive for fatigue.

## 2024-01-02 NOTE — PROGRESS NOTES
PULMONARY & CRITICAL CARE MEDICINE PROGRESS NOTE     Patient:  Lee Mejia  MRN: [id number]  Admit date: 12/26/2023  Primary Care Physician: Dee Morrison DO  Consulting Physician: Db Sanchez MD  CODE Status: Full Code  LOS: 7     SUBJECTIVE     CHIEF COMPLAINT/REASON FOR INITIAL CONSULT:  STEMI  BRIEF HOSPITAL COURSE:   The patient is a 63 y.o. male with chronic smoking history and COPD, presented to [facility] with severe chest pain.  He went into V-fib arrest while in the ER at [facility] requiring defibrillation.  He converted into sinus rhythm.  EKG was obtained which was consistent with anterolateral STEMI and the patient was emergently taken to Cath Lab.  He underwent cardiac cath which showed high-grade left main and ostial LAD disease with a dominant left circumflex system.  He was in cardiogenic shock, intra-aortic balloon pump was placed.  He also developed complete heart block during cardiac cath, temporary pacemaker was inserted.  He was admitted in medical ICU at the same [facility] facility where he coded again, had brief CPR after which ROSC was achieved.  He was subsequently transferred to [facility] for CT surgery evaluation for emergent CABG.  Nasal swab also came back positive for COVID.  CT surgery planning on bypass today.  Pulmonology consulted for recommendations prior to surgery, COVID infection and for vent management.  History obtained from the family member/girlfriend.  She denies any sick contact in the family.  But he does go out and meet several people.  She does not think that the patient had cough, fever or breathing difficulty.  Patient is heavy smoker, has been smoking 1-.5 pack per day for last several years.  He uses inhaler at home.  Denies any other significant past medical history.    1/2/2024   Subjective    Post 3 vessel cabg 12/26/23   On high flow at 35 l and 40 %  Fluid balance -2.6 L  Shortness of breath is improving  Not much  the bases    CVS- S1, S2 regular.  No S3 no S4, no murmurs    Abdomen-nontender, nondistended.  Bowel sounds are present.  No organomegaly    Lower extremity-no edema    Upper extremity-no edema    Neurological-no overt motor deficit   DATA REVIEW     Medications:  Scheduled Meds:   carvedilol  6.25 mg Oral BID WC    naloxegol  12.5 mg Oral QAM AC    spironolactone  25 mg Oral Daily    pantoprazole  40 mg IntraVENous Daily    sodium chloride flush  5-40 mL IntraVENous 2 times per day    aspirin  81 mg Oral Daily    clopidogrel  75 mg Oral Daily    polyethylene glycol  17 g Oral Daily    sennosides-docusate sodium  1 tablet Oral BID    [Held by provider] atorvastatin  20 mg Oral Nightly     Continuous Infusions:   sodium chloride 50 mL/hr at 01/01/24 1755    sodium chloride 10 mL/hr at 12/29/23 1122    vasopressin 20 Units in sodium chloride 0.9 % 100 mL infusion Stopped (12/28/23 1837)    dexmedeTOMIDine Stopped (12/28/23 0312)    heparin (PORCINE) Infusion Stopped (12/28/23 0830)    sodium chloride Stopped (12/29/23 0947)    propofol Stopped (12/30/23 1707)    norepinephrine Stopped (12/28/23 1111)    EPINEPHrine Stopped (12/29/23 0657)    insulin Stopped (12/27/23 1245)    dextrose         INPUT/OUTPUT:  In: 1932.7 [P.O.:1540; I.V.:392.7]  Out: 3321 [Urine:2521]  Date 01/02/24 0000 - 01/02/24 2359   Shift 2171-4106 9828-9350 7588-2392 24 Hour Total   INTAKE   Shift Total(mL/kg)       OUTPUT   Urine(mL/kg/hr) 501(1)   501   Chest Tube 180   180   Shift Total(mL/kg) 681(10.4)   681(10.4)   Weight (kg) 65.5 65.5 65.5 65.5          LABS:  ABGs:   No results for input(s): \"POCPH\", \"POCPCO2\", \"POCPO2\", \"POCHCO3\", \"GZBP5EGU\" in the last 72 hours.    CBC:   Recent Labs     12/31/23  0410 01/01/24  0217 01/02/24  0707   WBC 13.2* 14.5* 17.8*   HGB 9.0* 8.9* 10.0*   HCT 28.5* 28.6* 33.1*   MCV 89.9 94.4 97.1   * See Reflexed IPF Result See Reflexed IPF Result   RBC 3.17* 3.03* 3.41*   MCH 28.4 29.4 29.3   MCHC 31.6

## 2024-01-02 NOTE — PROGRESS NOTES
[facility]   Progress Note                   Date:   1/2/2024  Patient name: Lee Mejia  Date of admission:  12/26/2023  1:07 PM  MRN:   [id number]  YOB: 1960  PCP: Dee Morrison DO    Reason for Admission: STEMI    Subjective:       Patient seen and examined at bedside: No acute events overnight, on covid isolation. Vitals stable.     Medications:   Scheduled Meds:   carvedilol  6.25 mg Oral BID WC    naloxegol  12.5 mg Oral QAM AC    spironolactone  25 mg Oral Daily    pantoprazole  40 mg IntraVENous Daily    sodium chloride flush  5-40 mL IntraVENous 2 times per day    aspirin  81 mg Oral Daily    clopidogrel  75 mg Oral Daily    polyethylene glycol  17 g Oral Daily    sennosides-docusate sodium  1 tablet Oral BID    [Held by provider] atorvastatin  20 mg Oral Nightly     Continuous Infusions:   sodium chloride 50 mL/hr at 01/01/24 1755    sodium chloride 10 mL/hr at 12/29/23 1122    vasopressin 20 Units in sodium chloride 0.9 % 100 mL infusion Stopped (12/28/23 1837)    dexmedeTOMIDine Stopped (12/28/23 0312)    heparin (PORCINE) Infusion Stopped (12/28/23 0830)    sodium chloride Stopped (12/29/23 0947)    propofol Stopped (12/30/23 1707)    norepinephrine Stopped (12/28/23 1111)    EPINEPHrine Stopped (12/29/23 0657)    insulin Stopped (12/27/23 1245)    dextrose       CBC:   Recent Labs     12/31/23 0410 01/01/24 0217   WBC 13.2* 14.5*   HGB 9.0* 8.9*   * See Reflexed IPF Result     BMP:    Recent Labs     12/31/23 0410 01/01/24 0217   * 144   K 4.7 4.7   * 106   CO2 24 30   BUN 71* 73*   CREATININE 1.6* 1.6*   GLUCOSE 112* 120*     Hepatic:   Recent Labs     12/31/23 0410 01/01/24 0217   AST 1,176* 579*   ALT 1,210* 882*   BILITOT 2.3* 1.9*   ALKPHOS 105 100     Troponin: No results for input(s): \"TROPONINI\" in the last 72 hours.  BNP: No results for input(s): \"BNP\" in the last 72 hours.  Lipids: No results for input(s): \"CHOL\", \"HDL\" in the last  drugs  IABP was removed 12/30/23. Extubated on 12/30/23  Patient need to on GDMT on discharge  Pulmonology following, appreciate recommendations  Nephrology following for SANDHYA, appreciate recommendations  Monitor INR, LFTs, hemoglobin, platelets and creatinine    Gina Mosher MD   Cardiovascular Fellow,   [facility], [place], [place]      Attending Physician Statement  I have discussed the care of Lee Mejia, including pertinent history and exam findings,  with the cardiology fellow/resident. I have seen and examined the patient and the key elements of all parts of the encounter have been performed by me. I have completed at least one if not all key elements of the E/M (history, physical exam, and MDM). I agree with the assessment, plan and orders as documented by the resident with additional recommendations as below:     Hemodynamically stable and in nsr. In no distress on high flow O2 (35%, 35 L). Repeat TTE shows preserved LVEF. LFTs trending down. Continue asa, plavix and Coreg. Diuretics per nephrology. PT/OT. Discussed with patient and family at bedside.     Sabina Sanchez MD, FACC, [facility]

## 2024-01-02 NOTE — PLAN OF CARE
Problem: Chronic Conditions and Co-morbidities  Goal: Patient's chronic conditions and co-morbidity symptoms are monitored and maintained or improved  Outcome: Progressing     Problem: Discharge Planning  Goal: Discharge to home or other facility with appropriate resources  Outcome: Progressing     Problem: Respiratory - Adult  Goal: Achieves optimal ventilation and oxygenation  1/2/2024 0731 by Yaritza Oviedo RN  Outcome: Progressing  1/1/2024 2152 by Genet Lopez RCP  Outcome: Progressing  Flowsheets (Taken 1/1/2024 2152)  Achieves optimal ventilation and oxygenation:   Assess for changes in respiratory status   Respiratory therapy support as indicated     Problem: Safety - Adult  Goal: Free from fall injury  Outcome: Progressing     Problem: Safety - Medical Restraint  Goal: Remains free of injury from restraints (Restraint for Interference with Medical Device)  Description: INTERVENTIONS:  1. Determine that other, less restrictive measures have been tried or would not be effective before applying the restraint  2. Evaluate the patient's condition at the time of restraint application  3. Inform patient/family regarding the reason for restraint  4. Q2H: Monitor safety, psychosocial status, comfort, nutrition and hydration  Outcome: Progressing     Problem: Nutrition Deficit:  Goal: Optimize nutritional status  Outcome: Progressing     Problem: Pain  Goal: Verbalizes/displays adequate comfort level or baseline comfort level  Outcome: Progressing     Problem: Skin/Tissue Integrity  Goal: Absence of new skin breakdown  Description: 1.  Monitor for areas of redness and/or skin breakdown  2.  Assess vascular access sites hourly  3.  Every 4-6 hours minimum:  Change oxygen saturation probe site  4.  Every 4-6 hours:  If on nasal continuous positive airway pressure, respiratory therapy assess nares and determine need for appliance change or resting period.  Outcome: Progressing     Problem: ABCDS Injury

## 2024-01-02 NOTE — CARE COORDINATION
Transition Planning    Pt/daughter have not made choices. Encourage them to make choices. Requested referral to [facility]. Explained that pt would need to tolerate 3 hours of therapy per day to qualify. PS Dr Sanchez to request PM&R consult if appropriate. He defers to CT surgery team. PS Nikky Oshea- read receipt.     1550 Referrals sent per daughter's request [facility], [facility], [facility], and [facility].     1600 Connie from [facility] declined d/t insurance

## 2024-01-02 NOTE — PLAN OF CARE
Problem: Chronic Conditions and Co-morbidities  Goal: Patient's chronic conditions and co-morbidity symptoms are monitored and maintained or improved  1/2/2024 1307 by Nayeli Ba RN  Outcome: Progressing  1/2/2024 0731 by Yaritza Oviedo RN  Outcome: Progressing     Problem: Discharge Planning  Goal: Discharge to home or other facility with appropriate resources  1/2/2024 1307 by Nayeli Ba RN  Outcome: Progressing  1/2/2024 0731 by Yaritza Oviedo RN  Outcome: Progressing     Problem: Respiratory - Adult  Goal: Achieves optimal ventilation and oxygenation  1/2/2024 1307 by Nayeli Ba RN  Outcome: Progressing  1/2/2024 0731 by Yaritza Oviedo RN  Outcome: Progressing     Problem: Safety - Adult  Goal: Free from fall injury  1/2/2024 1307 by Nayeli Ba RN  Outcome: Progressing  1/2/2024 0731 by Yaritza Oviedo RN  Outcome: Progressing     Problem: Safety - Medical Restraint  Goal: Remains free of injury from restraints (Restraint for Interference with Medical Device)  Description: INTERVENTIONS:  1. Determine that other, less restrictive measures have been tried or would not be effective before applying the restraint  2. Evaluate the patient's condition at the time of restraint application  3. Inform patient/family regarding the reason for restraint  4. Q2H: Monitor safety, psychosocial status, comfort, nutrition and hydration  1/2/2024 1307 by Nayeli Ba RN  Outcome: Progressing  1/2/2024 0731 by Yaritza Oviedo RN  Outcome: Progressing     Problem: Nutrition Deficit:  Goal: Optimize nutritional status  1/2/2024 1307 by Nayeli Ba RN  Outcome: Progressing  Flowsheets (Taken 1/2/2024 1149 by Fallon Valadez, RD, LD)  Nutrient intake appropriate for improving, restoring, or maintaining nutritional needs:   Assess nutritional status and recommend course of action   Monitor oral intake, labs, and treatment plans   Recommend appropriate diets, oral nutritional supplements, and

## 2024-01-02 NOTE — PROGRESS NOTES
Physical Therapy  Facility/Department: [facility] CAR 1- SICU  Physical Therapy Treatment Note    Name: Lee Mejia  : 1960  MRN: [id number]  Date of Service: 2024    Discharge Recommendations:  Patient would benefit from continued therapy after discharge   PT Equipment Recommendations  Equipment Needed: No  Other: continue to assess      Patient Diagnosis(es): The primary encounter diagnosis was Left main coronary artery disease. Diagnoses of S/P CABG x 3, NSTEMI (non-ST elevation myocardial infarction) (HCC), and COVID were also pertinent to this visit.  Past Medical History:  has a past medical history of ATN (acute tubular necrosis) ([facility]), COPD (chronic obstructive pulmonary disease) (HCC), COPD (chronic obstructive pulmonary disease) (HCC), Diabetes mellitus (HCC), Diabetes mellitus (HCC), Left main coronary artery disease, and Mixed hyperlipidemia.  Past Surgical History:  has a past surgical history that includes Tooth Extraction (14); Colonoscopy (2015); Cardiac procedure (N/A, 2023); Cardiac procedure (N/A, 2023); Cardiac procedure (N/A, 2023); Cardiac procedure (N/A, 2023); and Coronary artery bypass graft (N/A, 2023).    Assessment   Body Structures, Functions, Activity Limitations Requiring Skilled Therapeutic Intervention: Decreased functional mobility ;Decreased endurance;Decreased posture;Decreased balance;Decreased strength;Decreased ROM;Decreased coordination;Decreased safe awareness;Decreased cognition  Assessment: Patient completed BM with maxA x2 and set edge of bed for 20 minutes and required Elida to sit upright. Very little balance reactions present. Pt would benefit from continued acute PT to address deficits.  Therapy Prognosis: Good  Activity Tolerance  Activity Tolerance: Patient limited by endurance;Patient limited by pain;Patient limited by fatigue     Plan   Physical Therapy Plan  General Plan: 6-7 times per week  Current Treatment

## 2024-01-02 NOTE — PROGRESS NOTES
ADLs  Short Term Goal 2: demo MOD A for UB bathing/dressing  Short Term Goal 3: demo MIN A for self feeding/grooming  Short Term Goal 4: demo 100% adherence to sternal precautions with cues provided PRN to facilitate increased IND during ADLs  Short Term Goal 5: demo functional transfer MAX A with LRAD to increase IND during meaningful occupations  Short Term Goal 6: demo seated dynamic balance for 7 minutes CGA to facilitate increase engagement during meaningful ADLs  Short Term Goal 7: demo AROM/AAROM to BUE through all planes as tolerated x 10 reps  to facilitate increased ROM/strength for ADLs  Short Term Goal 8: PLEASE NOTIFY OTR FOR UPDATE OF GOALS AS APPROPRIATE       Therapy Time   Individual Concurrent Group Co-treatment   Time In 0935         Time Out 1050         Minutes 75         Timed Code Treatment Minutes: 75 Minutes       EDDIE CARPIO/L

## 2024-01-02 NOTE — PROGRESS NOTES
Comprehensive Nutrition Assessment    Type and Reason for Visit:  Reassess    Nutrition Recommendations/Plan:   Continue current diet with Ensure ONS at meals.  Encourage/monitor PO intakes as tolerated.  Will monitor labs, weights, and plan of care.     Malnutrition Assessment:  Malnutrition Status:  Insufficient data (01/02/24 1158)    Context:  Acute Illness     Findings of the 6 clinical characteristics of malnutrition:  Energy Intake:  Mild decrease in energy intake  Weight Loss:  Unable to assess - Weight fluctuations noted.     Body Fat Loss:  Unable to assess   Muscle Mass Loss:  Unable to assess  Fluid Accumulation:  Mild Generalized, Extremities   Strength:  Not Performed    Nutrition Assessment:    Pt extubated on 12/30.  Pt with a decreased appetite currently.  Consuming 25% of meals.  Drinking at least 50% of Ensure supplements and taking fluids well.  Weights reviewed - fluctuations noted.  Labs reviewed.  Meds include: Movantik, Glycolax, Senokot.    Nutrition Related Findings:    Labs/Meds reviewed.  No recorded BM since admission.   Wound Type: Multiple, Surgical Incision       Current Nutrition Intake & Therapies:    Average Meal Intake: 1-25%, 26-50%  Average Supplements Intake: 51-75%  ADULT ORAL NUTRITION SUPPLEMENT; Breakfast, Lunch, Dinner; Standard High Calorie/High Protein Oral Supplement  ADULT DIET; Regular; 4 carb choices (60 gm/meal); No Pork  Additional Calorie Sources:  None    Anthropometric Measures:  Height: 162.6 cm (5' 4.02\")  Ideal Body Weight (IBW): 130 lbs (59 kg)    Admission Body Weight: 53.1 kg (117 lb 1 oz) (estimated)  Current Body Weight: 65.5 kg (144 lb 6.4 oz), 111.1 % IBW. Weight Source: Bed Scale  Current BMI (kg/m2): 24.8        Weight Adjustment For: No Adjustment                 BMI Categories: Normal Weight (BMI 18.5-24.9)    Estimated Daily Nutrient Needs:  Energy Requirements Based On: Kcal/kg  Weight Used for Energy Requirements: Current  Energy (kcal/day):  0016-0241 kcals/day  Weight Used for Protein Requirements: Ideal  Protein (g/day):   Fluid (ml/day): per MD    Nutrition Diagnosis:   Inadequate oral intake related to  (recent extubation; current condition; appetite) as evidenced by intake 0-25%, intake 26-50% (variable PO intakes; need for ONS)    Nutrition Interventions:   Food and/or Nutrient Delivery: Continue Current Diet, Continue Oral Nutrition Supplement  Nutrition Education/Counseling: No recommendation at this time  Coordination of Nutrition Care: Continue to monitor while inpatient  Plan of Care discussed with: RN    Goals:  Previous Goal Met: Progressing toward Goal(s)  Goals: Meet at least 75% of estimated needs, prior to discharge       Nutrition Monitoring and Evaluation:   Behavioral-Environmental Outcomes: None Identified  Food/Nutrient Intake Outcomes: Food and Nutrient Intake, Supplement Intake  Physical Signs/Symptoms Outcomes: Biochemical Data, GI Status, Constipation, Fluid Status or Edema, Hemodynamic Status, Weight, Skin, Nutrition Focused Physical Findings    Discharge Planning:    Too soon to determine     Fallon Valadez RD, LD  Contact: [phone]

## 2024-01-03 ENCOUNTER — APPOINTMENT (OUTPATIENT)
Dept: GENERAL RADIOLOGY | Age: 64
DRG: 165 | End: 2024-01-03
Attending: INTERNAL MEDICINE
Payer: MEDICAID

## 2024-01-03 PROBLEM — R53.81 DEBILITY: Status: ACTIVE | Noted: 2024-01-03

## 2024-01-03 LAB
ALBUMIN SERPL-MCNC: 3.4 G/DL (ref 3.5–5.2)
ALBUMIN/GLOB SERPL: 1.5 {RATIO} (ref 1–2.5)
ALP SERPL-CCNC: 96 U/L (ref 40–129)
ALT SERPL-CCNC: 358 U/L (ref 5–41)
ANION GAP SERPL CALCULATED.3IONS-SCNC: 13 MMOL/L (ref 9–17)
AST SERPL-CCNC: 198 U/L
BILIRUB DIRECT SERPL-MCNC: 0.6 MG/DL
BILIRUB INDIRECT SERPL-MCNC: 0.9 MG/DL (ref 0–1)
BILIRUB SERPL-MCNC: 1.5 MG/DL (ref 0.3–1.2)
BUN SERPL-MCNC: 52 MG/DL (ref 8–23)
CALCIUM SERPL-MCNC: 8.1 MG/DL (ref 8.6–10.4)
CHLORIDE SERPL-SCNC: 104 MMOL/L (ref 98–107)
CO2 SERPL-SCNC: 23 MMOL/L (ref 20–31)
CREAT SERPL-MCNC: 1.2 MG/DL (ref 0.7–1.2)
ERYTHROCYTE [DISTWIDTH] IN BLOOD BY AUTOMATED COUNT: 14.8 % (ref 11.8–14.4)
GFR SERPL CREATININE-BSD FRML MDRD: >60 ML/MIN/1.73M2
GLUCOSE BLD-MCNC: 105 MG/DL (ref 75–110)
GLUCOSE BLD-MCNC: 119 MG/DL (ref 75–110)
GLUCOSE BLD-MCNC: 123 MG/DL (ref 75–110)
GLUCOSE BLD-MCNC: 83 MG/DL (ref 75–110)
GLUCOSE SERPL-MCNC: 146 MG/DL (ref 70–99)
HCT VFR BLD AUTO: 32 % (ref 40.7–50.3)
HGB BLD-MCNC: 9.7 G/DL (ref 13–17)
MAGNESIUM SERPL-MCNC: 2.5 MG/DL (ref 1.6–2.6)
MCH RBC QN AUTO: 28.8 PG (ref 25.2–33.5)
MCHC RBC AUTO-ENTMCNC: 30.3 G/DL (ref 28.4–34.8)
MCV RBC AUTO: 95 FL (ref 82.6–102.9)
NRBC BLD-RTO: 0.3 PER 100 WBC
PLATELET # BLD AUTO: 211 K/UL (ref 138–453)
PMV BLD AUTO: 12.4 FL (ref 8.1–13.5)
POTASSIUM SERPL-SCNC: 3.9 MMOL/L (ref 3.7–5.3)
PROT SERPL-MCNC: 5.7 G/DL (ref 6.4–8.3)
RBC # BLD AUTO: 3.37 M/UL (ref 4.21–5.77)
SODIUM SERPL-SCNC: 140 MMOL/L (ref 135–144)
WBC OTHER # BLD: 17.7 K/UL (ref 3.5–11.3)

## 2024-01-03 PROCEDURE — 85027 COMPLETE CBC AUTOMATED: CPT

## 2024-01-03 PROCEDURE — 71045 X-RAY EXAM CHEST 1 VIEW: CPT

## 2024-01-03 PROCEDURE — 36415 COLL VENOUS BLD VENIPUNCTURE: CPT

## 2024-01-03 PROCEDURE — 99254 IP/OBS CNSLTJ NEW/EST MOD 60: CPT | Performed by: STUDENT IN AN ORGANIZED HEALTH CARE EDUCATION/TRAINING PROGRAM

## 2024-01-03 PROCEDURE — 97530 THERAPEUTIC ACTIVITIES: CPT

## 2024-01-03 PROCEDURE — 6370000000 HC RX 637 (ALT 250 FOR IP): Performed by: THORACIC SURGERY (CARDIOTHORACIC VASCULAR SURGERY)

## 2024-01-03 PROCEDURE — 2700000000 HC OXYGEN THERAPY PER DAY

## 2024-01-03 PROCEDURE — 6370000000 HC RX 637 (ALT 250 FOR IP): Performed by: NURSE PRACTITIONER

## 2024-01-03 PROCEDURE — 6370000000 HC RX 637 (ALT 250 FOR IP): Performed by: STUDENT IN AN ORGANIZED HEALTH CARE EDUCATION/TRAINING PROGRAM

## 2024-01-03 PROCEDURE — 99232 SBSQ HOSP IP/OBS MODERATE 35: CPT | Performed by: INTERNAL MEDICINE

## 2024-01-03 PROCEDURE — 80048 BASIC METABOLIC PNL TOTAL CA: CPT

## 2024-01-03 PROCEDURE — 97535 SELF CARE MNGMENT TRAINING: CPT

## 2024-01-03 PROCEDURE — 6360000002 HC RX W HCPCS: Performed by: INTERNAL MEDICINE

## 2024-01-03 PROCEDURE — 97110 THERAPEUTIC EXERCISES: CPT

## 2024-01-03 PROCEDURE — 94761 N-INVAS EAR/PLS OXIMETRY MLT: CPT

## 2024-01-03 PROCEDURE — 83735 ASSAY OF MAGNESIUM: CPT

## 2024-01-03 PROCEDURE — 6370000000 HC RX 637 (ALT 250 FOR IP): Performed by: INTERNAL MEDICINE

## 2024-01-03 PROCEDURE — 6370000000 HC RX 637 (ALT 250 FOR IP)

## 2024-01-03 PROCEDURE — 99233 SBSQ HOSP IP/OBS HIGH 50: CPT | Performed by: INTERNAL MEDICINE

## 2024-01-03 PROCEDURE — C9113 INJ PANTOPRAZOLE SODIUM, VIA: HCPCS | Performed by: INTERNAL MEDICINE

## 2024-01-03 PROCEDURE — 80076 HEPATIC FUNCTION PANEL: CPT

## 2024-01-03 PROCEDURE — 82947 ASSAY GLUCOSE BLOOD QUANT: CPT

## 2024-01-03 PROCEDURE — 2580000003 HC RX 258: Performed by: NURSE PRACTITIONER

## 2024-01-03 PROCEDURE — 2100000001 HC CVICU R&B

## 2024-01-03 PROCEDURE — 97116 GAIT TRAINING THERAPY: CPT

## 2024-01-03 RX ORDER — TORSEMIDE 20 MG/1
40 TABLET ORAL DAILY
Status: DISCONTINUED | OUTPATIENT
Start: 2024-01-04 | End: 2024-01-11 | Stop reason: HOSPADM

## 2024-01-03 RX ORDER — TORSEMIDE 20 MG/1
20 TABLET ORAL DAILY
Status: DISCONTINUED | OUTPATIENT
Start: 2024-01-03 | End: 2024-01-03

## 2024-01-03 RX ADMIN — CLOPIDOGREL BISULFATE 75 MG: 75 TABLET ORAL at 08:08

## 2024-01-03 RX ADMIN — ALPRAZOLAM 0.25 MG: 0.25 TABLET ORAL at 22:48

## 2024-01-03 RX ADMIN — NALOXEGOL OXALATE 12.5 MG: 12.5 TABLET, FILM COATED ORAL at 08:08

## 2024-01-03 RX ADMIN — SENNOSIDES AND DOCUSATE SODIUM 1 TABLET: 50; 8.6 TABLET ORAL at 08:08

## 2024-01-03 RX ADMIN — CARVEDILOL 6.25 MG: 3.12 TABLET, FILM COATED ORAL at 17:02

## 2024-01-03 RX ADMIN — Medication 2000 MG: at 12:52

## 2024-01-03 RX ADMIN — SPIRONOLACTONE 25 MG: 25 TABLET, FILM COATED ORAL at 22:48

## 2024-01-03 RX ADMIN — SENNOSIDES AND DOCUSATE SODIUM 1 TABLET: 50; 8.6 TABLET ORAL at 22:48

## 2024-01-03 RX ADMIN — SODIUM CHLORIDE, PRESERVATIVE FREE 10 ML: 5 INJECTION INTRAVENOUS at 22:48

## 2024-01-03 RX ADMIN — TORSEMIDE 20 MG: 20 TABLET ORAL at 15:25

## 2024-01-03 RX ADMIN — CARVEDILOL 6.25 MG: 3.12 TABLET, FILM COATED ORAL at 08:07

## 2024-01-03 RX ADMIN — PANTOPRAZOLE SODIUM 40 MG: 40 INJECTION, POWDER, FOR SOLUTION INTRAVENOUS at 08:08

## 2024-01-03 RX ADMIN — ALUMINUM HYDROXIDE, MAGNESIUM HYDROXIDE, AND SIMETHICONE 30 ML: 200; 200; 20 SUSPENSION ORAL at 15:25

## 2024-01-03 RX ADMIN — OXYCODONE HYDROCHLORIDE 10 MG: 5 TABLET ORAL at 22:48

## 2024-01-03 RX ADMIN — ASPIRIN 81 MG: 81 TABLET, COATED ORAL at 08:08

## 2024-01-03 RX ADMIN — POLYETHYLENE GLYCOL 3350 17 G: 17 POWDER, FOR SOLUTION ORAL at 08:08

## 2024-01-03 RX ADMIN — SODIUM CHLORIDE, PRESERVATIVE FREE 10 ML: 5 INJECTION INTRAVENOUS at 08:11

## 2024-01-03 ASSESSMENT — PAIN SCALES - GENERAL
PAINLEVEL_OUTOF10: 10
PAINLEVEL_OUTOF10: 2

## 2024-01-03 ASSESSMENT — ENCOUNTER SYMPTOMS
ABDOMINAL PAIN: 0
EYE DISCHARGE: 0
ABDOMINAL DISTENTION: 0
APNEA: 0
COLOR CHANGE: 0
SHORTNESS OF BREATH: 1

## 2024-01-03 ASSESSMENT — PAIN DESCRIPTION - LOCATION: LOCATION: INCISION

## 2024-01-03 NOTE — PLAN OF CARE
Problem: Chronic Conditions and Co-morbidities  Goal: Patient's chronic conditions and co-morbidity symptoms are monitored and maintained or improved  1/3/2024 0916 by Kelly Almendarez RN  Outcome: Progressing  1/3/2024 0815 by Yaritza Oviedo RN  Outcome: Progressing  Flowsheets (Taken 1/3/2024 0800 by Kelly Almendarez RN)  Care Plan - Patient's Chronic Conditions and Co-Morbidity Symptoms are Monitored and Maintained or Improved:   Monitor and assess patient's chronic conditions and comorbid symptoms for stability, deterioration, or improvement   Collaborate with multidisciplinary team to address chronic and comorbid conditions and prevent exacerbation or deterioration     Problem: Discharge Planning  Goal: Discharge to home or other facility with appropriate resources  1/3/2024 0916 by Kelly Almendarez RN  Outcome: Progressing  1/3/2024 0815 by Yaritza Oviedo RN  Outcome: Progressing  Flowsheets (Taken 1/3/2024 0800 by Kelly Almendarez RN)  Discharge to home or other facility with appropriate resources:   Identify barriers to discharge with patient and caregiver   Identify discharge learning needs (meds, wound care, etc)     Problem: Respiratory - Adult  Goal: Achieves optimal ventilation and oxygenation  1/3/2024 0916 by Kelly Almendarez RN  Outcome: Progressing  1/3/2024 0815 by Yaritza Oviedo RN  Outcome: Progressing  Flowsheets (Taken 1/3/2024 0800 by Kelly Almendarez RN)  Achieves optimal ventilation and oxygenation:   Assess for changes in respiratory status   Assess for changes in mentation and behavior   Position to facilitate oxygenation and minimize respiratory effort   Oxygen supplementation based on oxygen saturation or arterial blood gases  1/2/2024 2046 by Kayla Mckenzie RCP  Outcome: Progressing     Problem: Safety - Adult  Goal: Free from fall injury  1/3/2024 0916 by Kelly Almendarez RN  Outcome: Progressing  1/3/2024 0815 by Yaritza Oviedo RN  Outcome: Progressing  Flowsheets (Taken 1/3/2024 0800 by

## 2024-01-03 NOTE — PROGRESS NOTES
Physical Therapy  Facility/Department: [facility] CAR 1- SICU  Physical Therapy Daily Treatment Note    Name: Lee Mejia  : 1960  MRN: [id number]  Date of Service: 1/3/2024    Discharge Recommendations:  Patient would benefit from continued therapy after discharge   PT Equipment Recommendations  Equipment Needed: No      Patient Diagnosis(es): The primary encounter diagnosis was Left main coronary artery disease. Diagnoses of S/P CABG x 3, NSTEMI (non-ST elevation myocardial infarction) (HCC), and COVID were also pertinent to this visit.  Past Medical History:  has a past medical history of ATN (acute tubular necrosis) (HCC), COPD (chronic obstructive pulmonary disease) (HCC), COPD (chronic obstructive pulmonary disease) (HCC), Diabetes mellitus (HCC), Diabetes mellitus (HCC), Left main coronary artery disease, and Mixed hyperlipidemia.  Past Surgical History:  has a past surgical history that includes Tooth Extraction (14); Colonoscopy (2015); Cardiac procedure (N/A, 2023); Cardiac procedure (N/A, 2023); Cardiac procedure (N/A, 2023); Cardiac procedure (N/A, 2023); Coronary artery bypass graft (N/A, 2023); Cardiac procedure (N/A, 2023); Cardiac procedure (N/A, 2023); and Cardiac procedure (N/A, 2023).    Assessment   Body Structures, Functions, Activity Limitations Requiring Skilled Therapeutic Intervention: Decreased functional mobility ;Decreased endurance;Decreased posture;Decreased balance;Decreased strength;Decreased ROM;Decreased coordination;Decreased safe awareness;Decreased cognition  Assessment: Pt required modA x2 to perform STS transfers, and Elida x2 to ambulate 5ft to bed with RW. Pt unsteady with significant weakness, also BLE buckling during gait. Recommending continued intense PT to address deficits and maximize safety and independence with mobility. He is currently unsafe to return to prior living arrangements d/t being a high fall

## 2024-01-03 NOTE — PROGRESS NOTES
[facility]   Progress Note                   Date:   1/3/2024  Patient name: Lee Mejia  Date of admission:  12/26/2023  1:07 PM  MRN:   [id number]  YOB: 1960  PCP: Dee Morrison DO    Reason for Admission: STEMI    Subjective:       Seen and examined. MARIE. Stable vitals this AM    Medications:   Scheduled Meds:   carvedilol  6.25 mg Oral BID WC    naloxegol  12.5 mg Oral QAM AC    spironolactone  25 mg Oral Daily    pantoprazole  40 mg IntraVENous Daily    sodium chloride flush  5-40 mL IntraVENous 2 times per day    aspirin  81 mg Oral Daily    clopidogrel  75 mg Oral Daily    polyethylene glycol  17 g Oral Daily    sennosides-docusate sodium  1 tablet Oral BID    [Held by provider] atorvastatin  20 mg Oral Nightly     Continuous Infusions:   sodium chloride 10 mL/hr at 12/29/23 1122    vasopressin 20 Units in sodium chloride 0.9 % 100 mL infusion Stopped (12/28/23 1837)    dexmedeTOMIDine Stopped (12/28/23 0312)    heparin (PORCINE) Infusion Stopped (12/28/23 0830)    sodium chloride Stopped (12/29/23 0947)    norepinephrine Stopped (12/28/23 1111)    EPINEPHrine Stopped (12/29/23 0657)    insulin Stopped (12/27/23 1245)    dextrose       CBC:   Recent Labs     01/01/24 0217 01/02/24  0707 01/03/24  0615   WBC 14.5* 17.8* 17.7*   HGB 8.9* 10.0* 9.7*   PLT See Reflexed IPF Result See Reflexed IPF Result 211       BMP:    Recent Labs     01/01/24 0217 01/02/24  0707 01/03/24  0615    140 140   K 4.7 4.6 3.9    103 104   CO2 30 23 23   BUN 73* 68* 52*   CREATININE 1.6* 1.3* 1.2   GLUCOSE 120* 102* 146*       Hepatic:   Recent Labs     01/01/24 0217 01/02/24  0707 01/03/24  0615   * 284* 198*   * 525* 358*   BILITOT 1.9* 1.8* 1.5*   ALKPHOS 100 106 96       Troponin: No results for input(s): \"TROPONINI\" in the last 72 hours.  BNP: No results for input(s): \"BNP\" in the last 72 hours.  Lipids: No results for input(s): \"CHOL\", \"HDL\" in the last 72  shock  Short episode of complete heart block during cardiac cath     Recommendation:  CT consult for emergent CABG  Intra-aortic balloon pump was inserted via her left femoral approach  Transvenous temporary pacemaker inserted via left femoral vein  Supportive care on vent and vasopressors  Transfer emergently to [facility] for CABG    Assessment:   Anterolateral STEMI s/p [facility] with high-grade left main and ostial LAD disease s/p emergent CABG x3 12/26/23 [LIMA to the first diagonal, saphenous vein graft to the distal LAD, saphenous vein graft to the OM1.]  V-fib cardiac arrest requiring defibrillation x 1 before cardiac cath.  PEA cardiac arrest after cardiac cath requiring CPR for less than a minute with achievement of ROSC.  Improved LVEF to 65% on echo 1/2/24  Flash pulmonary edema  Cardiogenic shock - IABP placed 12/26/23 and removed on 12/30/2023  Complete heart block during cardiac cath s/p TPM 12/26/23  Respiratory failure secondary to cardiac arrest, intubated and on mechanical ventilation  DM  Shock liver  Elevated INR  Hypernatermia, resolved,   Anemia  IABP was removed 12/30/23. Extubated on 12/30/23    Plan:   On Aspirin 81 mg daily, Plavix 75 mg daily, spironolactone 25 mg daily and Coreg 6.25mg p.o. twice daily.  Not on Amiodarone and Lipitor due to elevated liver enzymes  will monitor LFTS for one more day and start statins tomorrow if LFTS continue to improve. Hold hepatotoxic drugs  Cardiac rehab  Pulmonology following, appreciate recommendations  Nephrology following for SANDHYA, appreciate recommendations      Gina Mosher MD   Cardiovascular Fellow,   [facility], [place], [place]      Attending Physician Statement  I have discussed the care of Lee Mejia, including pertinent history and exam findings,  with the cardiology fellow/resident. I have seen and examined the patient and the key elements of all parts of the encounter have been performed by me. I have completed

## 2024-01-03 NOTE — PLAN OF CARE
PROVIDE ADEQUATE OXYGENATION WITH ACCEPTABLE SP02/ABG'S    [x]  IDENTIFY APPROPRIATE OXYGEN THERAPY  [x]   MONITOR SP02/ABG'S AS NEEDED   [x]   PATIENT EDUCATION AS NEEDED

## 2024-01-03 NOTE — PLAN OF CARE
Problem: Chronic Conditions and Co-morbidities  Goal: Patient's chronic conditions and co-morbidity symptoms are monitored and maintained or improved  Outcome: Progressing     Problem: Discharge Planning  Goal: Discharge to home or other facility with appropriate resources  Outcome: Progressing     Problem: Respiratory - Adult  Goal: Achieves optimal ventilation and oxygenation  1/3/2024 0815 by Yaritza Oviedo RN  Outcome: Progressing  1/2/2024 2046 by Kayla Mckenzie, AVEL  Outcome: Progressing     Problem: Safety - Adult  Goal: Free from fall injury  Outcome: Progressing     Problem: Safety - Medical Restraint  Goal: Remains free of injury from restraints (Restraint for Interference with Medical Device)  Description: INTERVENTIONS:  1. Determine that other, less restrictive measures have been tried or would not be effective before applying the restraint  2. Evaluate the patient's condition at the time of restraint application  3. Inform patient/family regarding the reason for restraint  4. Q2H: Monitor safety, psychosocial status, comfort, nutrition and hydration  Outcome: Progressing     Problem: Nutrition Deficit:  Goal: Optimize nutritional status  Outcome: Progressing     Problem: Pain  Goal: Verbalizes/displays adequate comfort level or baseline comfort level  Outcome: Progressing     Problem: Skin/Tissue Integrity  Goal: Absence of new skin breakdown  Description: 1.  Monitor for areas of redness and/or skin breakdown  2.  Assess vascular access sites hourly  3.  Every 4-6 hours minimum:  Change oxygen saturation probe site  4.  Every 4-6 hours:  If on nasal continuous positive airway pressure, respiratory therapy assess nares and determine need for appliance change or resting period.  Outcome: Progressing     Problem: ABCDS Injury Assessment  Goal: Absence of physical injury  Outcome: Progressing

## 2024-01-03 NOTE — CARE COORDINATION
Transitional planning. Sharifa aguilera/ [facility] called, they can accept pt as long as pt is out of COVID isolation when dc'd.

## 2024-01-03 NOTE — PROGRESS NOTES
Renal Progress Note    Patient :  Lee Mejia; 63 y.o. MRN# [id number]  Location:  1028/1028-01  Attending:  Db Sanchez MD  Admit Date:  12/26/2023   Hospital Day: 8    Subjective:       No acute issues overnight  Patient seen and examined bedside, up in the chair, awake, alert following commands, answering questions appropriately    Has been afebrile, heart rate and blood pressure okay, hypoxic on heated high flow nasal cannula 40% FiO2, 35 L flow rate    Labs morning showed sodium 140, potassium 3.9, chloride 140, bicarb 23, BUN 52, creatinine 1.2, anion gap 13, magnesium 2.5  Downtrending LFTs  WBC 17.7, hemoglobin 9.7, platelets 211    Urine output 2.8 L last 24 hours, on Lasix 40 mg IV daily, Aldactone 25 mg daily    Chest x-ray this morning showed new pneumothorax on the right 1.4 cm, stable on the left, stable airspace disease, pleural effusions stable      HPI - patient with past medical history of COPD, hyperlipidemia, diabetes mellitus presented with chest pain, found to have STEMI s/p left heart cath with left main and ostial LAD disease s/p emergent CABG x 3 on 12/26/2023.  V-fib cardiac arrest requiring one-time defibrillation before the cardiac cath, PEA cardiac arrest following cardiac cath requiring CPR.  Nephrology following for SANDHYA  Creatinine prior to procedure was 0.9 subsequently peaked up to 2.3.  Urine output declined as well.  Workup showed low complements both C3 and C4 suspicion of atheroembolism being considered.    Outpatient Medications:     Medications Prior to Admission: albuterol-ipratropium (COMBIVENT)  MCG/ACT inhaler, Inhale 2 puffs into the lungs every 6 hours as needed for Wheezing  nicotine (NICODERM CQ) 21 MG/24HR, Place 1 patch onto the skin every 24 hours.  aspirin EC 81 MG EC tablet, Take 1 tablet by mouth daily.  albuterol (VENTOLIN HFA) 108 (90 BASE) MCG/ACT inhaler, Inhale 2 puffs into the lungs every 6 hours as needed for Wheezing.    Current Medications:

## 2024-01-03 NOTE — PROGRESS NOTES
[facility] -   Infectious diseases evaluation  admission date 12/26/2023    reason for consultation:   Covid  - surg clearance    Impression :   Current:  Anterolateral NSTEMI  Card cath complicated w complete AV block and placed a temporary pace maker  High grade left main ostial disease 12/26  LCX 20-30 - RCA 20 - 30LVEF 35  Card arrest due to the AV block -  CPR less than a min to achieve ROSC  CABG 12/26 emergent bypass x  3   Cardiogenic shock  Intra aortic balloon placed  Bandemia 31 - better  Shock liver 12/28  Flash Pulm edema - sp white red, no clear bacterial pneumonia   Resp failure from the card arrest   intubated  Covid test +  COPD  DM    Other:    Discussion / summary of stay / plan of care     Recommendations     COVID + but not clear if causing disease  12/26 remdesevir after the CABG - stopped 12/28 due to shock liver and elevated Liver enzymes  Emergent CABG 12/26 - 3 vessels  Bandemia is reactive to the MI and arrest - improved  Disc w RN and pulm       1/2/24 -WBc 17 from atelectasis - 2 periph ivs also pulled - use spirometer -   1/3/24 - WBC 17 -LLL same consolidation -resuming ceftriaxone    Infection Control Recommendations   Flushing Precautions  Contact Isolation  plus till 1/4/24  Antimicrobial Stewardship Recommendations   Simplification of therapy  Targeted therapy    History of Present Illness:   Initial history:  Lee Mejia is a 63 y.o.-year-old male admitted w severe chest pain and NSTEMI  Over exerts himself and has COPD  Taken to card cath and had a complete HB and pacer placed  He is intubated w resp failure and P edema  on CXR  WBC elevated and no clear infection  Tested + covid and ID called for clearance for surgery  He is on pressors for card failure and cardiogenic shock  According to the girl friend she had worked  long hours last 4 days and can't tell about cough or other symptoms -      Pt now is on the vent - no  like substitutions which may escape proof reading.  It such instances, actual meaningcan be extrapolated by contextual diversion.    Milli Lim MD  Office: [phone]  Perfect serve / office [phone]

## 2024-01-03 NOTE — CONSULTS
Physical Medicine & Rehabilitation  Consult Note      Admitting Physician:  bD Sanchez MD    Primary Care Provider:  Dee Morrison DO     Reason for Consult:  Acute Inpatient Rehabilitation    Chief Complaint:  Chest pain    History of Present Illness:  Referring Provider is requesting an evaluation for appropriate placement upon discharge from acute care. History from chart review and patient.    Lee Mejia is a 63 y.o. right-handed male with history of HLD, diabetes, COPD admitted to [facility] on 12/26/2023.      He initially presented to [place] with acute-onset crushing chest pain and went into V-fib cardiac arrest in the ED.  Defibrillation restored normal rhythm, and EKG showed anterolateral STEMI.  He underwent emergent cardiac cath, which showed high-grade left main and ostial LAD disease.  IABP was placed as well as temporary pacemaker for complete heart block.  He went into PEA cardiac arrest and ROSC was achieved after very brief CPR.  He was transferred to [place] for cardiothoracic surgery evaluation for emergent CABG.  He had 3-vessel CABG on 12/26/23 (Dr. Bañuelos).  He also tested positive for COVID-19 on 12/26/23.  Pulmonology and ID were consulted, and he was initially treated with remdesivir.  This medication was subsequently stopped due to shock liver.  ID is recommending isolation until 1/4/24.  Nephrology was consulted for SANDHYA.  Extubated 12/30/23.  Chest tubes remain in place at this time.  He is on high-flow nasal cannula oxygen.    He notes ongoing chest pain and shortness of breath.  He also notes numbness/tingling in the feet and weakness in the left lower limb.    Review of Systems:  Review of Systems   Constitutional:  Negative for fever.   Respiratory:  Positive for shortness of breath.    Cardiovascular:  Positive for chest pain.   Gastrointestinal:  Negative for abdominal pain.   Neurological:  Positive for sensory change and weakness.      Premorbid  and aspirated with normal saline.  EBL is 20 mL  Findings:  Left main: 80% distal left main extending into ostial LAD  LAD: 90% ostial LAD with TESSIE-3 flow  LCX: Dominant with luminal irregularities of 20 to 30%  RCA: Luminal irregularities of 20 to 30%  The LV gram was performed in the WITT 30 position. LVEF: 35%. LV Wall Motion: Anteroapical akinesis  Conclusions: High-grade left main and ostial LAD disease. Dominant left circumflex system Moderately reduced LV function Cardiogenic shock Short episode of complete heart block during cardiac cath  Recommendation: CT consult for emergent CABG Intra-aortic balloon pump was inserted via her left femoral approach Transvenous temporary pacemaker inserted via left femoral vein Supportive care on vent and vasopressors Transfer emergently to [facility] for CABG   Electronically signed by Radha Escobar MD on 12/26/2023 at 11:22 AM  [facility] [phone]      XR CHEST PORTABLE    Result Date: 12/26/2023  EXAMINATION: ONE XRAY VIEW OF THE CHEST 12/26/2023 9:49 am COMPARISON: None. HISTORY: ORDERING SYSTEM PROVIDED HISTORY: AMS TECHNOLOGIST PROVIDED HISTORY: AMS FINDINGS: There is chronic pulmonary change.  There is scattered right pulmonary opacification.  There is no effusion.  There is no pneumothorax.  Mediastinal structures are unremarkable.  The upper abdomen is unremarkable.  The extrathoracic soft tissues are unremarkable.  There is an endotracheal tube with the tip in the midtrachea.  There is a gastric tube and the tip is not visualized branch     Chronic pulmonary change with scattered right pulmonary opacification may relate to an infectious process.       Impression:    Debility secondary to CAD with STEMI and cardiac arrest s/p emergent CABG on 12/26  COVID-19  SANDHYA - improving  Anemia  Elevated LFTs - improving  HLD  Diabetes  COPD    Recommendations:    Diagnosis:  Debility secondary to CAD with STEMI and cardiac arrest s/p

## 2024-01-03 NOTE — PROGRESS NOTES
PULMONARY & CRITICAL CARE MEDICINE PROGRESS NOTE     Patient:  Lee Mejia  MRN: [id number]  Admit date: 12/26/2023  Primary Care Physician: Dee Morrison DO  Consulting Physician: Db Sanchez MD  CODE Status: Full Code  LOS: 8     SUBJECTIVE     CHIEF COMPLAINT/REASON FOR INITIAL CONSULT:  STEMI  BRIEF HOSPITAL COURSE:   The patient is a 63 y.o. male with chronic smoking history and COPD, presented to [facility] with severe chest pain.  He went into V-fib arrest while in the ER at [facility] requiring defibrillation.  He converted into sinus rhythm.  EKG was obtained which was consistent with anterolateral STEMI and the patient was emergently taken to Cath Lab.  He underwent cardiac cath which showed high-grade left main and ostial LAD disease with a dominant left circumflex system.  He was in cardiogenic shock, intra-aortic balloon pump was placed.  He also developed complete heart block during cardiac cath, temporary pacemaker was inserted.  He was admitted in medical ICU at the same [facility] facility where he coded again, had brief CPR after which ROSC was achieved.  He was subsequently transferred to [facility] for CT surgery evaluation for emergent CABG.  Nasal swab also came back positive for COVID.  CT surgery planning on bypass today.  Pulmonology consulted for recommendations prior to surgery, COVID infection and for vent management.  History obtained from the family member/girlfriend.  She denies any sick contact in the family.  But he does go out and meet several people.  She does not think that the patient had cough, fever or breathing difficulty.  Patient is heavy smoker, has been smoking 1-.5 pack per day for last several years.  He uses inhaler at home.  Denies any other significant past medical history.    1/3/2024   Subjective    Post 3 vessel cabg 12/26/23   On high flow at 35 l and 40 %  Fluid balance -6.4 L  Shortness of breath is improving  Not much  last 72 hours.  Cardiac Enzymes:  No results for input(s): \"CKTOTAL\", \"CKMB\", \"CKMBINDEX\", \"TROPONINI\" in the last 72 hours.    Invalid input(s): \"TROPONIN\", \"HSTROP\"  BNP/ProBNP:   No results for input(s): \"BNP\", \"PROBNP\" in the last 72 hours.    Triglycerides:  No results for input(s): \"TRIG\" in the last 72 hours.       Microbiology:  Urine Culture:  No components found for: \"CURINE\"  Blood Culture:  No components found for: \"CBLOOD\", \"CFUNGUSBL\"  Sputum Culture:  No components found for: \"CSPUTUM\"  No results for input(s): \"SPECDESC\", \"SPECIAL\", \"CULTURE\", \"STATUS\", \"ORG\", \"CDIFFTOXPCR\", \"CAMPYLOBPCR\", \"SALMONELLAPC\", \"SHIGAPCR\", \"SHIGELLAPCR\", \"MPNEUG\", \"MPNEUM\", \"LACTOQL\" in the last 72 hours.    No results for input(s): \"SPUTUM\", \"SPECDESC\", \"SPECIAL\", \"CULTURE\", \"STATUS\", \"ORG\", \"CDIFFTOXPCR\", \"MPNEUM\", \"MPNEUG\" in the last 72 hours.    Invalid input(s): \"CURINE\", \"CBLOOD\", \"CFUNGUSBL\"       Pathology:    Radiology Reports:  XR CHEST PORTABLE   Preliminary Result   Small left-sided pneumothorax, similar in appearance to the previous exam.      Slightly improved central vascular congestion.      Unchanged left basilar consolidation and layering right-sided pleural   effusion.      Slightly increased right perihilar airspace opacity.         US RENAL COMPLETE   Final Result   1. Increased renal cortical echogenicity consistent with medical renal   disease.   2. No evidence of hydronephrosis.   3. Trace bilateral perinephric fluid and small volume pelvic ascites.         XR CHEST PORTABLE   Final Result   Cardiomegaly with mild pulmonary edema and small bilateral pleural effusions.      Right perihilar atelectasis.      No pneumothorax.      No change in life support.         XR CHEST PORTABLE   Final Result   1. Interval extubation.   2. Mild left basilar atelectasis.         XR CHEST PORTABLE   Final Result   1. No significant interval change.   2. Tubes and lines as above.         XR CHEST PORTABLE   Final

## 2024-01-03 NOTE — PROGRESS NOTES
Occupational Therapy  Facility/Department: [facility] 1 SICU  Occupational Daily Treatment Note    Name: Lee Mejia  : 1960  MRN: [id number]  Date of Service: 1/3/2024    Discharge Recommendations:  Patient would benefit from continued therapy after discharge Further therapy recommended at discharge.The patient should be able to tolerate at least 3 hours of therapy per day over 5 days or 15 hours over 7 days.   This patient may benefit from a Physical Medicine and Rehab consult.    Patient Diagnosis(es): The primary encounter diagnosis was Left main coronary artery disease. Diagnoses of S/P CABG x 3, NSTEMI (non-ST elevation myocardial infarction) (HCC), and COVID were also pertinent to this visit.  Past Medical History:  has a past medical history of ATN (acute tubular necrosis) (formerly [facility]), COPD (chronic obstructive pulmonary disease) (HCC), COPD (chronic obstructive pulmonary disease) (formerly [facility]), Diabetes mellitus (HCC), Diabetes mellitus (HCC), Left main coronary artery disease, and Mixed hyperlipidemia.  Past Surgical History:  has a past surgical history that includes Tooth Extraction (14); Colonoscopy (2015); Cardiac procedure (N/A, 2023); Cardiac procedure (N/A, 2023); Cardiac procedure (N/A, 2023); Cardiac procedure (N/A, 2023); Coronary artery bypass graft (N/A, 2023); Cardiac procedure (N/A, 2023); Cardiac procedure (N/A, 2023); and Cardiac procedure (N/A, 2023).    Treatment Diagnosis: CABG X3  Assessment   Performance deficits / Impairments: Decreased functional mobility ;Decreased endurance;Decreased ADL status;Decreased coordination;Decreased ROM;Decreased balance;Decreased strength;Decreased safe awareness;Decreased cognition;Decreased fine motor control  Treatment Diagnosis: CABG X3  Prognosis: Good  Activity Tolerance  Activity Tolerance: Patient Tolerated treatment well;Patient limited by fatigue;Patient limited by pain        Plan  to complete;Minimal assistance (Wash face seated EOB.)  Additional Comments: Pt seated in chair upon therapist arrival. Pt educated on heart hugger with fair return. Pt completed sit/stand transfer using RW for static standing at chair. 2-step forward functional mobility using RW. Pt returned to seated in chair d/t tangled lines. Second sit/stand transfer using RW for static standing at chair. Short functional mobility using RW EOB/chair, turn left and ambulate across edge of bed. Pt seated EOB to complete simple grooming. Pt required verbal cues for initiation of task with fair return. Pt distracted easily. Red foam built up handle utilized on tooth brush d/t R hand swollen. Sit/supine transfer into bed. Pt/family educated on sternal precautions, verbalized understanding. Shoulder ROM R/L completed seated in bed d/t BUE weakness. Pt declined further ADL care this day stating bathing earlier this day. Pt retired to supine in bed at end of session with all needs met.     Bed mobility  Sit to Supine: Minimal assistance;2 Person assistance  Scooting: Minimal assistance;2 Person assistance (Pt able to lift bottom to assist with scooting acress bed.)  Bed Mobility Comments: Increased time needed to complete. Verbal cues to use heart hugger with fair return.  Transfers  Sit to stand: Moderate assistance;2 Person assistance  Stand to sit: Minimal assistance;2 Person assistance  Transfer Comments: Verbal cues for hand placement with fair/good return. Transfers completed x2.     Cognition  Overall Cognitive Status: Exceptions  Arousal/Alertness: Delayed responses to stimuli  Following Commands: Follows one step commands with increased time;Follows one step commands with repetition  Attention Span: Attends with cues to redirect  Safety Judgement: Decreased awareness of need for assistance;Decreased awareness of need for safety  Problem Solving: Assistance required to identify errors made;Assistance required to correct errors

## 2024-01-03 NOTE — CARE COORDINATION
Transitional Planning  LTACH Choice list with star ratings and QR code left for Daughter in patients room.  PRABHAKAR Briceno aware LTACH choice is requested today.    1236 Choice for LTACH give for [facility]. E-faxed Referral.

## 2024-01-03 NOTE — PROGRESS NOTES
[facility] Cardiothoracic Surgery  Progress Note    1/3/2024 11:51 AM  Surgeon: Surgeon CTS: Dr. Sarmad ESCOBAR     S/P: emergent CABG with IABP     Subjective:  Mr. Mejia   Sitting up in bed with wife bedside. No CP or SOB. No acute distress  Objective:  /61   Pulse 82   Temp 99 °F (37.2 °C) (Oral)   Resp 25   Ht 1.626 m (5' 4.02\")   Wt 65.5 kg (144 lb 6.4 oz)   SpO2 98%   BMI 24.77 kg/m²   Chest: pacing wires: yes, chest tubes:yes, air leak no, 3 +  CV: no murmur noted, Normal S1, S2,   Lungs: clear to auscultation, no wheezes, rales, or rhonchi  Abd: normal bowel sounds  Lower Extremities: Trace edema  Saph Incison: no sign of drainage or infection  Sternal Incison: dressing applied-no excessive drainage or signs of infection noted  Vascular following for left leg intermittent pulses. Color normal. Normal cap return.    CXR  Status post recent open heart surgery with bilateral pneumothoraces, new on  the right and stable on the left.     Stable airspace disease and pleural effusions suggesting pulmonary edema.      IMPRESSION:  Small left-sided pneumothorax, similar in appearance to the previous exam.     Slightly improved central vascular congestion.     Unchanged left basilar consolidation and layering right-sided pleural  effusion.     Slightly increased right perihilar airspace opacity.    Labs: Labs reviewed today    CBC:   Recent Labs     01/01/24 0217 01/02/24  0707 01/03/24  0615   WBC 14.5* 17.8* 17.7*   HGB 8.9* 10.0* 9.7*   HCT 28.6* 33.1* 32.0*   MCV 94.4 97.1 95.0   PLT See Reflexed IPF Result See Reflexed IPF Result 211     BMP:   Recent Labs     01/01/24  0217 01/02/24  0707 01/03/24  0615    140 140   K 4.7 4.6 3.9    103 104   CO2 30 23 23   BUN 73* 68* 52*   CREATININE 1.6* 1.3* 1.2       I/O: I/O last 3 completed shifts:  In: -   Out: 5281 [Urine:3986; Chest Tube:1295]  Scheduled Meds:   cefTRIAXone (ROCEPHIN) IV  2,000 mg IntraVENous Q24H    carvedilol  6.25 mg Oral BID WC  removed.   Discussed with pulmonary about ruling out HIT.  Vascular-following regarding lower extremity pulses intermittent in extremity with IABP. Await better evaluation when IABP removed.  Nephro-noted likely prerenal insult. Elevated Scr BUN and drop in Bicarb. Started bicard infusion yday. Intermittent oliguria. Pt was Hypotensive preop and post op. Elytes stable. Contacted nephrology for assistance          12-27-23  Removal of IABP today by cardiology early morning  Transfuse albumin to get CVP 15-17 and to help wean off pressure support  Plan to extubate patient midday-eval with pulmonary on plan for extubation.  Appreciate ID input regarding COVID treatment  Monitor lower extremity pulses. Vascular called last night. Monitor lactic acid increase.  Monitor pulses when IABP out  Begin PT OT today once IABP removed and extubated.     RANDALL PARRY, APRN - NP

## 2024-01-04 ENCOUNTER — APPOINTMENT (OUTPATIENT)
Dept: GENERAL RADIOLOGY | Age: 64
DRG: 165 | End: 2024-01-04
Attending: INTERNAL MEDICINE
Payer: MEDICAID

## 2024-01-04 ENCOUNTER — OFFICE VISIT (OUTPATIENT)
Dept: OPERATING ROOM | Age: 64
End: 2024-01-04
Attending: SURGERY

## 2024-01-04 ENCOUNTER — ANESTHESIA (OUTPATIENT)
Dept: OPERATING ROOM | Age: 64
End: 2024-01-04
Payer: MEDICAID

## 2024-01-04 ENCOUNTER — APPOINTMENT (OUTPATIENT)
Dept: VASCULAR LAB | Age: 64
DRG: 165 | End: 2024-01-04
Attending: INTERNAL MEDICINE
Payer: MEDICAID

## 2024-01-04 ENCOUNTER — ANESTHESIA EVENT (OUTPATIENT)
Dept: OPERATING ROOM | Age: 64
End: 2024-01-04
Payer: MEDICAID

## 2024-01-04 PROBLEM — I74.3: Status: ACTIVE | Noted: 2024-01-04

## 2024-01-04 PROBLEM — J98.11 ATELECTASIS: Status: ACTIVE | Noted: 2024-01-04

## 2024-01-04 LAB
ALBUMIN SERPL-MCNC: 3.3 G/DL (ref 3.5–5.2)
ALBUMIN/GLOB SERPL: 1.5 {RATIO} (ref 1–2.5)
ALP SERPL-CCNC: 82 U/L (ref 40–129)
ALT SERPL-CCNC: 264 U/L (ref 5–41)
ANION GAP SERPL CALCULATED.3IONS-SCNC: 11 MMOL/L (ref 9–17)
ARTERIAL PATENCY WRIST A: ABNORMAL
AST SERPL-CCNC: 151 U/L
BILIRUB DIRECT SERPL-MCNC: 0.3 MG/DL
BILIRUB INDIRECT SERPL-MCNC: 0.7 MG/DL (ref 0–1)
BILIRUB SERPL-MCNC: 1 MG/DL (ref 0.3–1.2)
BODY TEMPERATURE: 36.9
BUN SERPL-MCNC: 43 MG/DL (ref 8–23)
CA-I BLD-SCNC: 1.01 MMOL/L (ref 1.13–1.33)
CALCIUM SERPL-MCNC: 8 MG/DL (ref 8.6–10.4)
CHLORIDE SERPL-SCNC: 102 MMOL/L (ref 98–107)
CHLORIDE, WHOLE BLOOD: 106 MMOL/L (ref 98–110)
CO2 SERPL-SCNC: 27 MMOL/L (ref 20–31)
COHGB MFR BLD: 2.6 % (ref 0–5)
CREAT SERPL-MCNC: 1.2 MG/DL (ref 0.7–1.2)
ECHO BSA: 1.55 M2
ERYTHROCYTE [DISTWIDTH] IN BLOOD BY AUTOMATED COUNT: 15.6 % (ref 11.8–14.4)
FIO2 ON VENT: ABNORMAL %
GFR SERPL CREATININE-BSD FRML MDRD: >60 ML/MIN/1.73M2
GLUCOSE BLD-MCNC: 101 MG/DL (ref 75–110)
GLUCOSE BLD-MCNC: 104 MG/DL (ref 75–110)
GLUCOSE BLD-MCNC: 105 MG/DL (ref 75–110)
GLUCOSE BLD-MCNC: 116 MG/DL (ref 75–110)
GLUCOSE BLD-MCNC: 118 MG/DL (ref 75–110)
GLUCOSE SERPL-MCNC: 94 MG/DL (ref 70–99)
HCO3 ARTERIAL: 25.9 MMOL/L (ref 22–27)
HCT VFR BLD AUTO: 33.5 % (ref 40.7–50.3)
HCT VFR BLD CALC: 24.3 % (ref 40.7–50.3)
HEMOGLOBIN: 7.8 GM/DL (ref 13–17)
HGB BLD-MCNC: 10 G/DL (ref 13–17)
LACTIC ACID, WHOLE BLOOD: 0.9 MMOL/L (ref 0.7–2.1)
MAGNESIUM SERPL-MCNC: 2.1 MG/DL (ref 1.6–2.6)
MCH RBC QN AUTO: 29.4 PG (ref 25.2–33.5)
MCHC RBC AUTO-ENTMCNC: 29.9 G/DL (ref 28.4–34.8)
MCV RBC AUTO: 98.5 FL (ref 82.6–102.9)
NRBC BLD-RTO: 0.1 PER 100 WBC
O2 SAT, ARTERIAL: 99.8 % (ref 94–100)
PCO2 ARTERIAL: 40.3 MMHG (ref 32–45)
PH ARTERIAL: 7.42 (ref 7.35–7.45)
PLATELET # BLD AUTO: 188 K/UL (ref 138–453)
PMV BLD AUTO: 12.9 FL (ref 8.1–13.5)
PO2 ARTERIAL: 217 MMHG (ref 75–95)
POSITIVE BASE EXCESS, ART: 1.9 MMOL/L (ref 0–2)
POTASSIUM SERPL-SCNC: 4.6 MMOL/L (ref 3.7–5.3)
POTASSIUM, WHOLE BLOOD: 3.6 MMOL/L (ref 3.6–5)
PROT SERPL-MCNC: 5.5 G/DL (ref 6.4–8.3)
RBC # BLD AUTO: 3.4 M/UL (ref 4.21–5.77)
SODIUM SERPL-SCNC: 140 MMOL/L (ref 135–144)
SODIUM, WHOLE BLOOD: 138 MMOL/L (ref 136–145)
VAS LEFT ATA MID PSV: 19.4 CM/S
VAS LEFT CFA DIST PSV: 0 CM/S
VAS LEFT CFA MID PSV: 0 CM/S
VAS LEFT CFA PROX PSV: 0 CM/S
VAS LEFT COM ILIAC DIST PSV: 0 CM/S
VAS LEFT COM ILIAC MID PSV: 0 CM/S
VAS LEFT COM ILIAC PROX PSV: 0 CM/S
VAS LEFT EXT ILIAC DIST PSV: 0 CM/S
VAS LEFT EXT ILIAC MID PSV: 0 CM/S
VAS LEFT EXT ILIAC PROX PSV: 0 CM/S
VAS LEFT PERONEAL MID PSV: 16.4 CM/S
VAS LEFT POP A DIST PSV: 36 CM/S
VAS LEFT POP A PROX PSV: 46.5 CM/S
VAS LEFT POP A PROX VEL RATIO: 0.83
VAS LEFT PTA MID PSV: 41.4 CM/S
VAS LEFT SFA DIST PSV: 56.2 CM/S
VAS LEFT SFA DIST VEL RATIO: 1.08
VAS LEFT SFA MID PSV: 51.8 CM/S
VAS LEFT SFA MID VEL RATIO: 1.15
VAS LEFT SFA PROX PSV: 45.2 CM/S
WBC OTHER # BLD: 14.7 K/UL (ref 3.5–11.3)

## 2024-01-04 PROCEDURE — 94761 N-INVAS EAR/PLS OXIMETRY MLT: CPT

## 2024-01-04 PROCEDURE — 2580000003 HC RX 258: Performed by: NURSE PRACTITIONER

## 2024-01-04 PROCEDURE — 85027 COMPLETE CBC AUTOMATED: CPT

## 2024-01-04 PROCEDURE — C9113 INJ PANTOPRAZOLE SODIUM, VIA: HCPCS | Performed by: INTERNAL MEDICINE

## 2024-01-04 PROCEDURE — 6370000000 HC RX 637 (ALT 250 FOR IP): Performed by: INTERNAL MEDICINE

## 2024-01-04 PROCEDURE — 85014 HEMATOCRIT: CPT

## 2024-01-04 PROCEDURE — 83735 ASSAY OF MAGNESIUM: CPT

## 2024-01-04 PROCEDURE — 97530 THERAPEUTIC ACTIVITIES: CPT

## 2024-01-04 PROCEDURE — 2700000000 HC OXYGEN THERAPY PER DAY

## 2024-01-04 PROCEDURE — 3700000001 HC ADD 15 MINUTES (ANESTHESIA): Performed by: SURGERY

## 2024-01-04 PROCEDURE — 99232 SBSQ HOSP IP/OBS MODERATE 35: CPT | Performed by: INTERNAL MEDICINE

## 2024-01-04 PROCEDURE — 6370000000 HC RX 637 (ALT 250 FOR IP): Performed by: NURSE PRACTITIONER

## 2024-01-04 PROCEDURE — 93971 EXTREMITY STUDY: CPT

## 2024-01-04 PROCEDURE — 93926 LOWER EXTREMITY STUDY: CPT | Performed by: SURGERY

## 2024-01-04 PROCEDURE — 6370000000 HC RX 637 (ALT 250 FOR IP): Performed by: THORACIC SURGERY (CARDIOTHORACIC VASCULAR SURGERY)

## 2024-01-04 PROCEDURE — 93926 LOWER EXTREMITY STUDY: CPT

## 2024-01-04 PROCEDURE — 2500000003 HC RX 250 WO HCPCS: Performed by: NURSE PRACTITIONER

## 2024-01-04 PROCEDURE — 6370000000 HC RX 637 (ALT 250 FOR IP): Performed by: STUDENT IN AN ORGANIZED HEALTH CARE EDUCATION/TRAINING PROGRAM

## 2024-01-04 PROCEDURE — 6360000002 HC RX W HCPCS: Performed by: SURGERY

## 2024-01-04 PROCEDURE — C1725 CATH, TRANSLUMIN NON-LASER: HCPCS | Performed by: SURGERY

## 2024-01-04 PROCEDURE — 82330 ASSAY OF CALCIUM: CPT

## 2024-01-04 PROCEDURE — 2060000000 HC ICU INTERMEDIATE R&B

## 2024-01-04 PROCEDURE — 3600000007 HC SURGERY HYBRID BASE: Performed by: SURGERY

## 2024-01-04 PROCEDURE — 80076 HEPATIC FUNCTION PANEL: CPT

## 2024-01-04 PROCEDURE — 99233 SBSQ HOSP IP/OBS HIGH 50: CPT | Performed by: INTERNAL MEDICINE

## 2024-01-04 PROCEDURE — 85018 HEMOGLOBIN: CPT

## 2024-01-04 PROCEDURE — 83605 ASSAY OF LACTIC ACID: CPT

## 2024-01-04 PROCEDURE — C1769 GUIDE WIRE: HCPCS | Performed by: SURGERY

## 2024-01-04 PROCEDURE — C1757 CATH, THROMBECTOMY/EMBOLECT: HCPCS | Performed by: SURGERY

## 2024-01-04 PROCEDURE — 82947 ASSAY GLUCOSE BLOOD QUANT: CPT

## 2024-01-04 PROCEDURE — 3700000000 HC ANESTHESIA ATTENDED CARE: Performed by: SURGERY

## 2024-01-04 PROCEDURE — 80048 BASIC METABOLIC PNL TOTAL CA: CPT

## 2024-01-04 PROCEDURE — 6360000002 HC RX W HCPCS: Performed by: INTERNAL MEDICINE

## 2024-01-04 PROCEDURE — P9041 ALBUMIN (HUMAN),5%, 50ML: HCPCS | Performed by: NURSE ANESTHETIST, CERTIFIED REGISTERED

## 2024-01-04 PROCEDURE — 2580000003 HC RX 258: Performed by: SURGERY

## 2024-01-04 PROCEDURE — 0KNT0ZZ RELEASE LEFT LOWER LEG MUSCLE, OPEN APPROACH: ICD-10-PCS | Performed by: SURGERY

## 2024-01-04 PROCEDURE — A4217 STERILE WATER/SALINE, 500 ML: HCPCS | Performed by: SURGERY

## 2024-01-04 PROCEDURE — 88304 TISSUE EXAM BY PATHOLOGIST: CPT

## 2024-01-04 PROCEDURE — 2500000003 HC RX 250 WO HCPCS: Performed by: SURGERY

## 2024-01-04 PROCEDURE — C1894 INTRO/SHEATH, NON-LASER: HCPCS | Performed by: SURGERY

## 2024-01-04 PROCEDURE — 36415 COLL VENOUS BLD VENIPUNCTURE: CPT

## 2024-01-04 PROCEDURE — 97116 GAIT TRAINING THERAPY: CPT

## 2024-01-04 PROCEDURE — 71045 X-RAY EXAM CHEST 1 VIEW: CPT

## 2024-01-04 PROCEDURE — 34201 REMOVAL OF ARTERY CLOT: CPT | Performed by: SURGERY

## 2024-01-04 PROCEDURE — 93971 EXTREMITY STUDY: CPT | Performed by: SURGERY

## 2024-01-04 PROCEDURE — 6360000004 HC RX CONTRAST MEDICATION: Performed by: SURGERY

## 2024-01-04 PROCEDURE — 3600000017 HC SURGERY HYBRID ADDL 15MIN: Performed by: SURGERY

## 2024-01-04 PROCEDURE — 6370000000 HC RX 637 (ALT 250 FOR IP): Performed by: SURGERY

## 2024-01-04 PROCEDURE — 97110 THERAPEUTIC EXERCISES: CPT

## 2024-01-04 PROCEDURE — 84132 ASSAY OF SERUM POTASSIUM: CPT

## 2024-01-04 PROCEDURE — 97535 SELF CARE MNGMENT TRAINING: CPT

## 2024-01-04 PROCEDURE — 04CL0ZZ EXTIRPATION OF MATTER FROM LEFT FEMORAL ARTERY, OPEN APPROACH: ICD-10-PCS | Performed by: SURGERY

## 2024-01-04 PROCEDURE — 82805 BLOOD GASES W/O2 SATURATION: CPT

## 2024-01-04 PROCEDURE — 37220 PR REVASCULARIZATION ILIAC ARTERY ANGIOP 1ST VSL: CPT | Performed by: SURGERY

## 2024-01-04 PROCEDURE — 04CJ0ZZ EXTIRPATION OF MATTER FROM LEFT EXTERNAL ILIAC ARTERY, OPEN APPROACH: ICD-10-PCS | Performed by: SURGERY

## 2024-01-04 PROCEDURE — 2709999900 HC NON-CHARGEABLE SUPPLY: Performed by: SURGERY

## 2024-01-04 PROCEDURE — 6360000002 HC RX W HCPCS: Performed by: NURSE ANESTHETIST, CERTIFIED REGISTERED

## 2024-01-04 PROCEDURE — 99024 POSTOP FOLLOW-UP VISIT: CPT | Performed by: NURSE PRACTITIONER

## 2024-01-04 PROCEDURE — 2580000003 HC RX 258: Performed by: NURSE ANESTHETIST, CERTIFIED REGISTERED

## 2024-01-04 PROCEDURE — 27602 DECOMPRESSION OF LOWER LEG: CPT | Performed by: SURGERY

## 2024-01-04 PROCEDURE — 2500000003 HC RX 250 WO HCPCS: Performed by: NURSE ANESTHETIST, CERTIFIED REGISTERED

## 2024-01-04 RX ORDER — CALCIUM CHLORIDE 100 MG/ML
INJECTION INTRAVENOUS; INTRAVENTRICULAR PRN
Status: DISCONTINUED | OUTPATIENT
Start: 2024-01-04 | End: 2024-01-05 | Stop reason: SDUPTHER

## 2024-01-04 RX ORDER — ALBUTEROL SULFATE 90 UG/1
INHALANT RESPIRATORY (INHALATION)
Status: DISPENSED
Start: 2024-01-04 | End: 2024-01-05

## 2024-01-04 RX ORDER — IODIXANOL 320 MG/ML
INJECTION, SOLUTION INTRAVASCULAR
Status: DISPENSED
Start: 2024-01-04 | End: 2024-01-05

## 2024-01-04 RX ORDER — VANCOMYCIN HYDROCHLORIDE 1 G/20ML
INJECTION, POWDER, LYOPHILIZED, FOR SOLUTION INTRAVENOUS PRN
Status: DISCONTINUED | OUTPATIENT
Start: 2024-01-04 | End: 2024-01-05 | Stop reason: SDUPTHER

## 2024-01-04 RX ORDER — IODIXANOL 320 MG/ML
INJECTION, SOLUTION INTRAVASCULAR PRN
Status: DISCONTINUED | OUTPATIENT
Start: 2024-01-04 | End: 2024-01-05 | Stop reason: HOSPADM

## 2024-01-04 RX ORDER — VANCOMYCIN HYDROCHLORIDE 1 G/20ML
INJECTION, POWDER, LYOPHILIZED, FOR SOLUTION INTRAVENOUS
Status: COMPLETED
Start: 2024-01-04 | End: 2024-01-04

## 2024-01-04 RX ORDER — ROCURONIUM BROMIDE 10 MG/ML
INJECTION, SOLUTION INTRAVENOUS PRN
Status: DISCONTINUED | OUTPATIENT
Start: 2024-01-04 | End: 2024-01-05 | Stop reason: SDUPTHER

## 2024-01-04 RX ORDER — FENTANYL CITRATE 50 UG/ML
INJECTION, SOLUTION INTRAMUSCULAR; INTRAVENOUS PRN
Status: DISCONTINUED | OUTPATIENT
Start: 2024-01-04 | End: 2024-01-05 | Stop reason: SDUPTHER

## 2024-01-04 RX ORDER — KETAMINE HCL IN NACL, ISO-OSM 100MG/10ML
SYRINGE (ML) INJECTION PRN
Status: DISCONTINUED | OUTPATIENT
Start: 2024-01-04 | End: 2024-01-05 | Stop reason: SDUPTHER

## 2024-01-04 RX ORDER — DEXAMETHASONE SODIUM PHOSPHATE 10 MG/ML
INJECTION, SOLUTION INTRAMUSCULAR; INTRAVENOUS PRN
Status: DISCONTINUED | OUTPATIENT
Start: 2024-01-04 | End: 2024-01-05 | Stop reason: SDUPTHER

## 2024-01-04 RX ORDER — PHENYLEPHRINE HCL IN 0.9% NACL 1 MG/10 ML
SYRINGE (ML) INTRAVENOUS PRN
Status: DISCONTINUED | OUTPATIENT
Start: 2024-01-04 | End: 2024-01-05 | Stop reason: SDUPTHER

## 2024-01-04 RX ORDER — MAGNESIUM HYDROXIDE 1200 MG/15ML
LIQUID ORAL CONTINUOUS PRN
Status: COMPLETED | OUTPATIENT
Start: 2024-01-04 | End: 2024-01-05

## 2024-01-04 RX ORDER — ETOMIDATE 2 MG/ML
INJECTION INTRAVENOUS
Status: COMPLETED
Start: 2024-01-04 | End: 2024-01-04

## 2024-01-04 RX ORDER — LIDOCAINE HYDROCHLORIDE 10 MG/ML
INJECTION, SOLUTION EPIDURAL; INFILTRATION; INTRACAUDAL; PERINEURAL PRN
Status: DISCONTINUED | OUTPATIENT
Start: 2024-01-04 | End: 2024-01-05 | Stop reason: SDUPTHER

## 2024-01-04 RX ORDER — SODIUM CHLORIDE, SODIUM LACTATE, POTASSIUM CHLORIDE, CALCIUM CHLORIDE 600; 310; 30; 20 MG/100ML; MG/100ML; MG/100ML; MG/100ML
INJECTION, SOLUTION INTRAVENOUS CONTINUOUS PRN
Status: DISCONTINUED | OUTPATIENT
Start: 2024-01-04 | End: 2024-01-05 | Stop reason: SDUPTHER

## 2024-01-04 RX ORDER — ETOMIDATE 2 MG/ML
INJECTION INTRAVENOUS PRN
Status: DISCONTINUED | OUTPATIENT
Start: 2024-01-04 | End: 2024-01-05 | Stop reason: SDUPTHER

## 2024-01-04 RX ORDER — SUCCINYLCHOLINE/SOD CL,ISO/PF 100 MG/5ML
SYRINGE (ML) INTRAVENOUS PRN
Status: DISCONTINUED | OUTPATIENT
Start: 2024-01-04 | End: 2024-01-05 | Stop reason: SDUPTHER

## 2024-01-04 RX ORDER — ALBUMIN HUMAN 50 G/1000ML
SOLUTION INTRAVENOUS
Status: COMPLETED
Start: 2024-01-04 | End: 2024-01-04

## 2024-01-04 RX ORDER — PROTAMINE SULFATE 10 MG/ML
INJECTION, SOLUTION INTRAVENOUS PRN
Status: DISCONTINUED | OUTPATIENT
Start: 2024-01-04 | End: 2024-01-05 | Stop reason: SDUPTHER

## 2024-01-04 RX ORDER — SODIUM CHLORIDE 9 MG/ML
INJECTION, SOLUTION INTRAVENOUS CONTINUOUS PRN
Status: DISCONTINUED | OUTPATIENT
Start: 2024-01-04 | End: 2024-01-05 | Stop reason: SDUPTHER

## 2024-01-04 RX ORDER — HEPARIN SODIUM 1000 [USP'U]/ML
INJECTION, SOLUTION INTRAVENOUS; SUBCUTANEOUS PRN
Status: DISCONTINUED | OUTPATIENT
Start: 2024-01-04 | End: 2024-01-05 | Stop reason: SDUPTHER

## 2024-01-04 RX ORDER — ALBUMIN, HUMAN INJ 5% 5 %
SOLUTION INTRAVENOUS PRN
Status: DISCONTINUED | OUTPATIENT
Start: 2024-01-04 | End: 2024-01-05 | Stop reason: SDUPTHER

## 2024-01-04 RX ORDER — PROPOFOL 10 MG/ML
INJECTION, EMULSION INTRAVENOUS
Status: COMPLETED
Start: 2024-01-04 | End: 2024-01-05

## 2024-01-04 RX ORDER — CALCIUM CHLORIDE 100 MG/ML
INJECTION INTRAVENOUS; INTRAVENTRICULAR
Status: COMPLETED
Start: 2024-01-04 | End: 2024-01-04

## 2024-01-04 RX ORDER — ONDANSETRON 2 MG/ML
INJECTION INTRAMUSCULAR; INTRAVENOUS PRN
Status: DISCONTINUED | OUTPATIENT
Start: 2024-01-04 | End: 2024-01-05 | Stop reason: SDUPTHER

## 2024-01-04 RX ADMIN — DEXAMETHASONE SODIUM PHOSPHATE 10 MG: 10 INJECTION, SOLUTION INTRAMUSCULAR; INTRAVENOUS at 21:43

## 2024-01-04 RX ADMIN — TORSEMIDE 40 MG: 20 TABLET ORAL at 08:26

## 2024-01-04 RX ADMIN — ROCURONIUM BROMIDE 20 MG: 10 INJECTION, SOLUTION INTRAVENOUS at 22:55

## 2024-01-04 RX ADMIN — NALOXEGOL OXALATE 12.5 MG: 12.5 TABLET, FILM COATED ORAL at 08:27

## 2024-01-04 RX ADMIN — ROCURONIUM BROMIDE 30 MG: 10 INJECTION, SOLUTION INTRAVENOUS at 23:47

## 2024-01-04 RX ADMIN — HEPARIN SODIUM 5000 UNITS: 1000 INJECTION INTRAVENOUS; SUBCUTANEOUS at 22:34

## 2024-01-04 RX ADMIN — ONDANSETRON 4 MG: 2 INJECTION INTRAMUSCULAR; INTRAVENOUS at 23:29

## 2024-01-04 RX ADMIN — Medication 10 MG: at 22:04

## 2024-01-04 RX ADMIN — FENTANYL CITRATE 100 MCG: 50 INJECTION, SOLUTION INTRAMUSCULAR; INTRAVENOUS at 21:32

## 2024-01-04 RX ADMIN — Medication 100 MCG: at 23:02

## 2024-01-04 RX ADMIN — CALCIUM CHLORIDE 0.5 G: 100 INJECTION INTRAVENOUS; INTRAVENTRICULAR at 23:30

## 2024-01-04 RX ADMIN — SODIUM CHLORIDE, PRESERVATIVE FREE 10 ML: 5 INJECTION INTRAVENOUS at 08:25

## 2024-01-04 RX ADMIN — PANTOPRAZOLE SODIUM 40 MG: 40 INJECTION, POWDER, FOR SOLUTION INTRAVENOUS at 08:25

## 2024-01-04 RX ADMIN — PROPOFOL 20 MCG/KG/MIN: 10 INJECTION, EMULSION INTRAVENOUS at 23:59

## 2024-01-04 RX ADMIN — Medication 100 MCG: at 22:27

## 2024-01-04 RX ADMIN — SODIUM CHLORIDE, POTASSIUM CHLORIDE, SODIUM LACTATE AND CALCIUM CHLORIDE: 600; 310; 30; 20 INJECTION, SOLUTION INTRAVENOUS at 22:04

## 2024-01-04 RX ADMIN — SODIUM CHLORIDE: 9 INJECTION, SOLUTION INTRAVENOUS at 21:41

## 2024-01-04 RX ADMIN — Medication 30 MG: at 21:32

## 2024-01-04 RX ADMIN — CLOPIDOGREL BISULFATE 75 MG: 75 TABLET ORAL at 08:25

## 2024-01-04 RX ADMIN — Medication 10 MG: at 23:14

## 2024-01-04 RX ADMIN — Medication 0.02 MCG/KG/MIN: at 22:43

## 2024-01-04 RX ADMIN — HEPARIN SODIUM 1000 UNITS: 1000 INJECTION INTRAVENOUS; SUBCUTANEOUS at 22:51

## 2024-01-04 RX ADMIN — ETOMIDATE 20 MG: 2 INJECTION, SOLUTION INTRAVENOUS at 21:32

## 2024-01-04 RX ADMIN — ASPIRIN 81 MG: 81 TABLET, COATED ORAL at 08:25

## 2024-01-04 RX ADMIN — CARVEDILOL 6.25 MG: 3.12 TABLET, FILM COATED ORAL at 08:25

## 2024-01-04 RX ADMIN — ALBUMIN (HUMAN) 25 G: 12.5 INJECTION, SOLUTION INTRAVENOUS at 22:54

## 2024-01-04 RX ADMIN — Medication 100 MCG: at 23:01

## 2024-01-04 RX ADMIN — Medication 100 MCG: at 22:55

## 2024-01-04 RX ADMIN — Medication 100 MG: at 21:32

## 2024-01-04 RX ADMIN — Medication 2000 MG: at 12:38

## 2024-01-04 RX ADMIN — VANCOMYCIN HYDROCHLORIDE 1000 MG: 1 INJECTION, POWDER, LYOPHILIZED, FOR SOLUTION INTRAVENOUS at 22:19

## 2024-01-04 RX ADMIN — CARVEDILOL 6.25 MG: 3.12 TABLET, FILM COATED ORAL at 17:04

## 2024-01-04 RX ADMIN — OXYCODONE HYDROCHLORIDE 5 MG: 5 TABLET ORAL at 10:50

## 2024-01-04 RX ADMIN — ROCURONIUM BROMIDE 50 MG: 10 INJECTION, SOLUTION INTRAVENOUS at 21:40

## 2024-01-04 RX ADMIN — PROTAMINE SULFATE 30 MG: 10 INJECTION, SOLUTION INTRAVENOUS at 23:27

## 2024-01-04 RX ADMIN — OXYCODONE HYDROCHLORIDE 10 MG: 5 TABLET ORAL at 17:04

## 2024-01-04 RX ADMIN — CALCIUM CHLORIDE 0.5 G: 100 INJECTION INTRAVENOUS; INTRAVENTRICULAR at 23:37

## 2024-01-04 RX ADMIN — LIDOCAINE HYDROCHLORIDE 50 MG: 10 INJECTION, SOLUTION EPIDURAL; INFILTRATION; INTRACAUDAL; PERINEURAL at 21:32

## 2024-01-04 ASSESSMENT — ENCOUNTER SYMPTOMS
COLOR CHANGE: 0
APNEA: 0
FACIAL SWELLING: 0
BACK PAIN: 0
SHORTNESS OF BREATH: 1
EYE DISCHARGE: 0
COUGH: 0
CHOKING: 0
VOMITING: 0
EYE REDNESS: 0
ABDOMINAL DISTENTION: 0
EYE PAIN: 0
NAUSEA: 0

## 2024-01-04 ASSESSMENT — PAIN DESCRIPTION - ORIENTATION: ORIENTATION: ANTERIOR

## 2024-01-04 ASSESSMENT — PAIN SCALES - GENERAL: PAINLEVEL_OUTOF10: 7

## 2024-01-04 ASSESSMENT — PAIN DESCRIPTION - DESCRIPTORS: DESCRIPTORS: ACHING

## 2024-01-04 ASSESSMENT — PAIN DESCRIPTION - LOCATION: LOCATION: CHEST

## 2024-01-04 NOTE — PLAN OF CARE
Problem: Chronic Conditions and Co-morbidities  Goal: Patient's chronic conditions and co-morbidity symptoms are monitored and maintained or improved  Outcome: Progressing     Problem: Discharge Planning  Goal: Discharge to home or other facility with appropriate resources  Outcome: Progressing     Problem: Respiratory - Adult  Goal: Achieves optimal ventilation and oxygenation  1/4/2024 0405 by Ellyn Kemp RN  Outcome: Progressing  1/3/2024 2043 by Kayla Mckenzie, AVEL  Outcome: Progressing     Problem: Safety - Adult  Goal: Free from fall injury  Outcome: Progressing     Problem: Safety - Medical Restraint  Goal: Remains free of injury from restraints (Restraint for Interference with Medical Device)  Description: INTERVENTIONS:  1. Determine that other, less restrictive measures have been tried or would not be effective before applying the restraint  2. Evaluate the patient's condition at the time of restraint application  3. Inform patient/family regarding the reason for restraint  4. Q2H: Monitor safety, psychosocial status, comfort, nutrition and hydration  Outcome: Progressing     Problem: Nutrition Deficit:  Goal: Optimize nutritional status  Outcome: Progressing     Problem: Pain  Goal: Verbalizes/displays adequate comfort level or baseline comfort level  Outcome: Progressing     Problem: Skin/Tissue Integrity  Goal: Absence of new skin breakdown  Description: 1.  Monitor for areas of redness and/or skin breakdown  2.  Assess vascular access sites hourly  3.  Every 4-6 hours minimum:  Change oxygen saturation probe site  4.  Every 4-6 hours:  If on nasal continuous positive airway pressure, respiratory therapy assess nares and determine need for appliance change or resting period.  Outcome: Progressing     Problem: ABCDS Injury Assessment  Goal: Absence of physical injury  Outcome: Progressing

## 2024-01-04 NOTE — PROGRESS NOTES
[facility] -   Infectious diseases evaluation  admission date 12/26/2023    reason for consultation:   Covid  - surg clearance    Impression :   Current:  Anterolateral NSTEMI  Card cath complicated w complete AV block and placed a temporary pace maker  High grade left main ostial disease 12/26  LCX 20-30 - RCA 20 - 30LVEF 35  Card arrest due to the AV block -  CPR less than a min to achieve ROSC  CABG 12/26 emergent bypass x  3   Cardiogenic shock  Intra aortic balloon placed  Bandemia 31 - better  Shock liver 12/28  Flash Pulm edema - sp white red, no clear bacterial pneumonia   Resp failure from the card arrest   intubated  Covid test +  COPD  DM    Other:    Discussion / summary of stay / plan of care     Recommendations     COVID + but not clear if causing disease  12/26 remdesevir after the CABG - stopped 12/28 due to shock liver and elevated Liver enzymes  Emergent CABG 12/26 - 3 vessels  Bandemia is reactive to the MI and arrest - improved  Disc w RN and pulm       1/2/24 -WBc 17 from atelectasis - 2 periph ivs also pulled - use spirometer -   1/3/24 - WBC 17 -LLL same consolidation -resuming ceftriaxone  1/4/24  WBC better  14 -    Infection Control Recommendations   [place] Precautions  Contact Isolation  plus till 1/4/24  Antimicrobial Stewardship Recommendations   Simplification of therapy  Targeted therapy    History of Present Illness:   Initial history:  Lee Mejia is a 63 y.o.-year-old male admitted w severe chest pain and NSTEMI  Over exerts himself and has COPD  Taken to card cath and had a complete HB and pacer placed  He is intubated w resp failure and P edema  on CXR  WBC elevated and no clear infection  Tested + covid and ID called for clearance for surgery  He is on pressors for card failure and cardiogenic shock  According to the girl friend she had worked  long hours last 4 days and can't tell about cough or other symptoms -      Pt now  obstructive pulmonary disease) (HCC)     Diabetes mellitus (HCC)     controlled with diet    Diabetes mellitus (HCC)     Left main coronary artery disease 12/26/2023    Mixed hyperlipidemia 03/09/2020       Past Surgical  History:     Past Surgical History:   Procedure Laterality Date    CARDIAC PROCEDURE N/A 12/26/2023    Coronary angiography performed by Radha Escobar MD at [facility] CARDIAC CATH LAB    CARDIAC PROCEDURE N/A 12/26/2023    Percutaneous coronary intervention performed by Radha Escobar MD at [facility] CARDIAC CATH LAB    CARDIAC PROCEDURE N/A 12/26/2023    Intra-aortic balloon pump insertion performed by Radha Escobar MD at [facility] CARDIAC CATH LAB    CARDIAC PROCEDURE N/A 12/26/2023    Insert temporary pacemaker performed by Radha Escobar MD at [facility] CARDIAC CATH LAB    CARDIAC PROCEDURE N/A 12/30/2023    Intra-aortic balloon removal performed by Taj Daly MD at [facility] CARDIAC CATH LAB    CARDIAC PROCEDURE N/A 12/30/2023    Insert temporary pacemaker performed by Taj Daly MD at [facility] CARDIAC CATH LAB    CARDIAC PROCEDURE N/A 12/30/2023    Left heart cath performed by Taj Daly MD at [facility] CARDIAC CATH LAB    COLONOSCOPY  01/05/2015    CORONARY ARTERY BYPASS GRAFT N/A 12/26/2023    EMERGENCY CABG, CORONARY ARTERY BYPASS X3, ON PUMP, ANGELLA PER ANESTHESIA performed by David Bañuelos MD at [facility] CVOR    TOOTH EXTRACTION  11/06/14       Medications:      cefTRIAXone (ROCEPHIN) IV  2,000 mg IntraVENous Q24H    torsemide  40 mg Oral Daily    carvedilol  6.25 mg Oral BID WC    naloxegol  12.5 mg Oral QAM AC    spironolactone  25 mg Oral Daily    pantoprazole  40 mg IntraVENous Daily    sodium chloride flush  5-40 mL IntraVENous 2 times per day    aspirin  81 mg Oral Daily    clopidogrel  75 mg Oral Daily    polyethylene glycol  17 g Oral Daily    sennosides-docusate sodium  1 tablet Oral BID    [Held by provider] atorvastatin  20 mg Oral Nightly       Social History:     Social History

## 2024-01-04 NOTE — PROGRESS NOTES
Occupational Therapy  Facility/Department: [facility]  Occupational Therapy Daily Treatment Note     Name: Lee Mejia  : 1960  MRN: [id number]  Date of Service: 2024    Discharge Recommendations:  Patient would benefit from continued therapy after discharge  OT Equipment Recommendations  Equipment Needed: Yes  Mobility Devices: Walker;ADL Assistive Devices  Walker: Rolling  ADL Assistive Devices: Toileting - 3-in-1 Commode;Shower Chair with back;Reacher;Long-handled Shoe Horn;Long-handled Sponge;Sock-Aid Hard       Patient Diagnosis(es): The primary encounter diagnosis was Left main coronary artery disease. Diagnoses of S/P CABG x 3, NSTEMI (non-ST elevation myocardial infarction) ([facility]), and COVID were also pertinent to this visit.  Past Medical History:  has a past medical history of ATN (acute tubular necrosis) (Former[facility]), COPD (chronic obstructive pulmonary disease) (Former[facility]), COPD (chronic obstructive pulmonary disease) (Former[facility]), Diabetes mellitus ([facility]), Diabetes mellitus ([facility]), Left main coronary artery disease, and Mixed hyperlipidemia.  Past Surgical History:  has a past surgical history that includes Tooth Extraction (14); Colonoscopy (2015); Cardiac procedure (N/A, 2023); Cardiac procedure (N/A, 2023); Cardiac procedure (N/A, 2023); Cardiac procedure (N/A, 2023); Coronary artery bypass graft (N/A, 2023); Cardiac procedure (N/A, 2023); Cardiac procedure (N/A, 2023); and Cardiac procedure (N/A, 2023).    Treatment Diagnosis: CABG X3      Assessment   Performance deficits / Impairments: Decreased functional mobility ;Decreased endurance;Decreased ADL status;Decreased coordination;Decreased ROM;Decreased balance;Decreased strength;Decreased safe awareness;Decreased cognition;Decreased fine motor control  Assessment: Pt is expected to require skilled OT services during their acute hospitalization stay to address the above noted deficits through

## 2024-01-04 NOTE — PROGRESS NOTES
Order received by June for pacemaker wires to be removed along with chest tubes to be removed. Ali- clin lead removed pacer wires. Writer removed all three chest tubes. Patient tolerated procedure.    Steri-strips were applied to all three chest tube sites. One chest tube site on patient's left side was harder to close with steri strips related to tissue starting to grow on chest tube site.

## 2024-01-04 NOTE — CARE COORDINATION
Transition Planning  Sharifa from [facility] able to accept pt if LTAC needed at discharge. No precert needed.     1200 VM left with [facility] requesting CB.     1250 CB from [facility]- unable to accept [facility] O[facility]. Asked why pt was intubated. Pt intubated 12/26/23 during cardiac arrest. Pt extubated 12/30.23.

## 2024-01-04 NOTE — PROGRESS NOTES
[facility] Cardiothoracic Surgery  Progress Note    1/4/2024 10:05 AM  Surgeon: Surgeon CTS: Dr. Sarmad ESCOBAR     S/P: emergent CABG with IABP     Subjective:  Mr. Mejia   Sitting up in bed with wife bedside. No CP or SOB. No acute distress  Objective:  BP (!) 131/103   Pulse 94   Temp 98.6 °F (37 °C) (Axillary)   Resp 17   Ht 1.626 m (5' 4.02\")   Wt 63 kg (138 lb 14.2 oz)   SpO2 100%   BMI 23.83 kg/m²   Chest: pacing wires: yes, chest tubes:yes, air leak no, 3 +  CV: no murmur noted, Normal S1, S2,   Lungs: clear to auscultation, no wheezes, rales, or rhonchi  Abd: normal bowel sounds  Lower Extremities: Trace edema  Saph Incison: no sign of drainage or infection  Sternal Incison: dressing applied-no excessive drainage or signs of infection noted  Vascular following for left leg intermittent pulses. Color normal. Normal cap return.      CXR-1-4-24  No large pl effusions. No pneumothorax. Stable CXR. Gas pattern much more improved.     Status post recent open heart surgery with bilateral pneumothoraces, new on  the right and stable on the left.     Stable airspace disease and pleural effusions suggesting pulmonary edema.      IMPRESSION:  Small left-sided pneumothorax, similar in appearance to the previous exam.     Slightly improved central vascular congestion.     Unchanged left basilar consolidation and layering right-sided pleural  effusion.     Slightly increased right perihilar airspace opacity.    Labs: Labs reviewed today    CBC:   Recent Labs     01/02/24  0707 01/03/24 0615 01/04/24 0227   WBC 17.8* 17.7* 14.7*   HGB 10.0* 9.7* 10.0*   HCT 33.1* 32.0* 33.5*   MCV 97.1 95.0 98.5   PLT See Reflexed IPF Result 211 188     BMP:   Recent Labs     01/02/24  0707 01/03/24 0615 01/04/24 0227    140 140   K 4.6 3.9 4.6    104 102   CO2 23 23 27   BUN 68* 52* 43*   CREATININE 1.3* 1.2 1.2       I/O: I/O last 3 completed shifts:  In: -   Out: 3995 [Urine:3265; Chest Tube:730]  Scheduled Meds:

## 2024-01-04 NOTE — PROGRESS NOTES
PULMONARY & CRITICAL CARE MEDICINE PROGRESS NOTE     Patient:  Lee Mejia  MRN: [id number]  Admit date: 12/26/2023  Primary Care Physician: Dee Morrison DO  Consulting Physician: Db Sanchez MD  CODE Status: Full Code  LOS: 9     SUBJECTIVE     CHIEF COMPLAINT/REASON FOR INITIAL CONSULT:  STEMI  BRIEF HOSPITAL COURSE:   The patient is a 63 y.o. male with chronic smoking history and COPD, presented to [facility] with severe chest pain.  He went into V-fib arrest while in the ER at [facility] requiring defibrillation.  He converted into sinus rhythm.  EKG was obtained which was consistent with anterolateral STEMI and the patient was emergently taken to Cath Lab.  He underwent cardiac cath which showed high-grade left main and ostial LAD disease with a dominant left circumflex system.  He was in cardiogenic shock, intra-aortic balloon pump was placed.  He also developed complete heart block during cardiac cath, temporary pacemaker was inserted.  He was admitted in medical ICU at the same [facility] facility where he coded again, had brief CPR after which ROSC was achieved.  He was subsequently transferred to [facility] for CT surgery evaluation for emergent CABG.  Nasal swab also came back positive for COVID.  CT surgery planning on bypass today.  Pulmonology consulted for recommendations prior to surgery, COVID infection and for vent management.  History obtained from the family member/girlfriend.  She denies any sick contact in the family.  But he does go out and meet several people.  She does not think that the patient had cough, fever or breathing difficulty.  Patient is heavy smoker, has been smoking 1-.5 pack per day for last several years.  He uses inhaler at home.  Denies any other significant past medical history.    1/4/2024   Subjective    Post 3 vessel cabg 12/26/23   On high flow at 35 l and 40 %  Fluid balance - 9 L  Shortness of breath is improving  Not much    ALKPHOS 106 96 82   BILITOT 1.8* 1.5* 1.0       Coagulation Profile:   No results for input(s): \"INR\", \"PROTIME\", \"APTT\" in the last 72 hours.    D-Dimer:  No results for input(s): \"DDIMER\" in the last 72 hours.  Lactic Acid:  No results for input(s): \"LACTA\" in the last 72 hours.  Cardiac Enzymes:  No results for input(s): \"CKTOTAL\", \"CKMB\", \"CKMBINDEX\", \"TROPONINI\" in the last 72 hours.    Invalid input(s): \"TROPONIN\", \"HSTROP\"  BNP/ProBNP:   No results for input(s): \"BNP\", \"PROBNP\" in the last 72 hours.    Triglycerides:  No results for input(s): \"TRIG\" in the last 72 hours.       Microbiology:  Urine Culture:  No components found for: \"CURINE\"  Blood Culture:  No components found for: \"CBLOOD\", \"CFUNGUSBL\"  Sputum Culture:  No components found for: \"CSPUTUM\"  No results for input(s): \"SPECDESC\", \"SPECIAL\", \"CULTURE\", \"STATUS\", \"ORG\", \"CDIFFTOXPCR\", \"CAMPYLOBPCR\", \"SALMONELLAPC\", \"SHIGAPCR\", \"SHIGELLAPCR\", \"MPNEUG\", \"MPNEUM\", \"LACTOQL\" in the last 72 hours.    No results for input(s): \"SPUTUM\", \"SPECDESC\", \"SPECIAL\", \"CULTURE\", \"STATUS\", \"ORG\", \"CDIFFTOXPCR\", \"MPNEUM\", \"MPNEUG\" in the last 72 hours.    Invalid input(s): \"CURINE\", \"CBLOOD\", \"CFUNGUSBL\"       Pathology:    Radiology Reports:  XR CHEST PORTABLE   Final Result   Status post recent open heart surgery with bilateral pneumothoraces, new on   the right and stable on the left.      Stable airspace disease and pleural effusions suggesting pulmonary edema.         XR CHEST PORTABLE   Final Result   Small left-sided pneumothorax, similar in appearance to the previous exam.      Slightly improved central vascular congestion.      Unchanged left basilar consolidation and layering right-sided pleural   effusion.      Slightly increased right perihilar airspace opacity.         US RENAL COMPLETE   Final Result   1. Increased renal cortical echogenicity consistent with medical renal   disease.   2. No evidence of hydronephrosis.   3. Trace bilateral perinephric

## 2024-01-04 NOTE — PROGRESS NOTES
Primary at bedside and RN informed them of patient complaining of not being able to bear weight through left ankle, order received for STAT venous and arterial duplex scans.     Patient transferred to room 2016 via bed, daughter and spouse present during transfer. RN Cristi at bedside during transfer, patient placed on monitor. RN informed Cristi of the above.

## 2024-01-04 NOTE — PROGRESS NOTES
Patient working with therapy and complaining of extreme left ankle pain and not able to bear much weight through the ankle, Dr. Mosher informed via perfect serve.

## 2024-01-04 NOTE — PROGRESS NOTES
Physical Therapy  Facility/Department: [facility]- [facility]  Physical Therapy     Name: Lee Mejia  : 1960  MRN: [id number]  Date of Service: 2024    Discharge Recommendations:  Patient would benefit from continued therapy after discharge Further therapy recommended at discharge.The patient should be able to tolerate at least 3 hours of therapy per day over 5 days or 15 hours over 7 days.   This patient may benefit from a Physical Medicine and Rehab consult.    PT Equipment Recommendations  Equipment Needed: No  Other: continue to assess      Patient Diagnosis(es): The primary encounter diagnosis was Left main coronary artery disease. Diagnoses of S/P CABG x 3, NSTEMI (non-ST elevation myocardial infarction) (HCC), and COVID were also pertinent to this visit.  Past Medical History:  has a past medical history of ATN (acute tubular necrosis) ([facility]), COPD (chronic obstructive pulmonary disease) ([facility]), COPD (chronic obstructive pulmonary disease) ([facility]), Diabetes mellitus ([facility]), Diabetes mellitus (HCC), Left main coronary artery disease, and Mixed hyperlipidemia.  Past Surgical History:  has a past surgical history that includes Tooth Extraction (14); Colonoscopy (2015); Cardiac procedure (N/A, 2023); Cardiac procedure (N/A, 2023); Cardiac procedure (N/A, 2023); Cardiac procedure (N/A, 2023); Coronary artery bypass graft (N/A, 2023); Cardiac procedure (N/A, 2023); Cardiac procedure (N/A, 2023); and Cardiac procedure (N/A, 2023).    Assessment   Body Structures, Functions, Activity Limitations Requiring Skilled Therapeutic Intervention: Decreased functional mobility ;Decreased endurance;Decreased posture;Decreased balance;Decreased strength;Decreased ROM;Decreased coordination;Decreased safe awareness;Decreased cognition  Assessment: Pt required Elida x2 to perform STS transfers, and Elida x2 to ambulate 5ft to bed with RW. Pt with unsteady gait  with  in a flat bed without using bedrails?: A Lot  How much help is needed moving from lying on your back to sitting on the side of a flat bed without using bedrails?: A Lot  How much help is needed moving to and from a bed to a chair?: A Lot  How much help is needed standing up from a chair using your arms?: A Lot  How much help is needed walking in hospital room?: A Lot  How much help is needed climbing 3-5 steps with a railing?: Total  AM-PAC Inpatient Mobility Raw Score : 11  AM-PAC Inpatient T-Scale Score : 33.86  Mobility Inpatient CMS 0-100% Score: 72.57  Mobility Inpatient CMS G-Code Modifier : CL      Goals  Short Term Goals  Time Frame for Short Term Goals: 14 visits  Short Term Goal 1: Supine to/from sit with SBA.  Short Term Goal 2: Sit to/from stand with Mod A, following sternal precautions.  Short Term Goal 3: Ambulate 25' RW mod A.       Education  Patient Education  Education Given To: Patient;Family  Education Provided: Role of Therapy;Plan of Care;Precautions;Energy Conservation;Home Exercise Program;Transfer Training;Family Education  Education Provided Comments: Safety and mobility  Education Method: Verbal;Demonstration  Barriers to Learning: None  Education Outcome: Continued education needed;Verbalized understanding  Co- treatment with OT warranted secondary to decreased patient safety and independence with functional mobility requiring skilled physical assistance of two professionals to simultaneously address individualized discipline goals. PT is addressing balance and transfers , while OT is addressing their individualized functional mobility/self-care task.       Therapy Time   Individual Concurrent Group Co-treatment   Time In 0921         Time Out 1001         Minutes 40         Timed Code Treatment Minutes: 38 Minutes       Roberto Loaiza PTA

## 2024-01-04 NOTE — PROGRESS NOTES
Order received for chest tube removal, informed Raymon- CNP of chest tube output being 250 overnight, stated okay for removal of chest tubes, along with pacer wires.

## 2024-01-04 NOTE — PROGRESS NOTES
[facility]   Progress Note                   Date:   1/4/2024  Patient name: Lee Mejia  Date of admission:  12/26/2023  1:07 PM  MRN:   [id number]  YOB: 1960  PCP: Dee Morrison DO    Reason for Admission: STEMI    Subjective:       Seen and examined. MARIE. Stable vitals this AM. On HFNC 35L    Medications:   Scheduled Meds:   cefTRIAXone (ROCEPHIN) IV  2,000 mg IntraVENous Q24H    torsemide  40 mg Oral Daily    carvedilol  6.25 mg Oral BID WC    naloxegol  12.5 mg Oral QAM AC    spironolactone  25 mg Oral Daily    pantoprazole  40 mg IntraVENous Daily    sodium chloride flush  5-40 mL IntraVENous 2 times per day    aspirin  81 mg Oral Daily    clopidogrel  75 mg Oral Daily    polyethylene glycol  17 g Oral Daily    sennosides-docusate sodium  1 tablet Oral BID    [Held by provider] atorvastatin  20 mg Oral Nightly     Continuous Infusions:   sodium chloride 10 mL/hr at 12/29/23 1122    vasopressin 20 Units in sodium chloride 0.9 % 100 mL infusion Stopped (12/28/23 1837)    dexmedeTOMIDine Stopped (12/28/23 0312)    sodium chloride Stopped (12/29/23 0947)    norepinephrine Stopped (12/28/23 1111)    EPINEPHrine Stopped (12/29/23 0657)    insulin Stopped (12/27/23 1245)    dextrose       CBC:   Recent Labs     01/02/24  0707 01/03/24  0615 01/04/24 0227   WBC 17.8* 17.7* 14.7*   HGB 10.0* 9.7* 10.0*   PLT See Reflexed IPF Result 211 188     BMP:    Recent Labs     01/02/24  0707 01/03/24  0615 01/04/24 0227    140 140   K 4.6 3.9 4.6    104 102   CO2 23 23 27   BUN 68* 52* 43*   CREATININE 1.3* 1.2 1.2   GLUCOSE 102* 146* 94     Hepatic:   Recent Labs     01/02/24  0707 01/03/24  0615 01/04/24 0227   * 198* 151*   * 358* 264*   BILITOT 1.8* 1.5* 1.0   ALKPHOS 106 96 82     Troponin: No results for input(s): \"TROPONINI\" in the last 72 hours.  BNP: No results for input(s): \"BNP\" in the last 72 hours.  Lipids: No results for input(s): \"CHOL\", \"HDL\" in  function  Cardiogenic shock  Short episode of complete heart block during cardiac cath     Recommendation:  CT consult for emergent CABG  Intra-aortic balloon pump was inserted via her left femoral approach  Transvenous temporary pacemaker inserted via left femoral vein  Supportive care on vent and vasopressors  Transfer emergently to [facility] for CABG    Assessment:   Anterolateral STEMI s/p [facility] with high-grade left main and ostial LAD disease s/p emergent CABG x3 12/26/23 [LIMA to the first diagonal, saphenous vein graft to the distal LAD, saphenous vein graft to the OM1.]  V-fib cardiac arrest requiring defibrillation x 1 before cardiac cath.  PEA cardiac arrest after cardiac cath requiring CPR for less than a minute with achievement of ROSC.  Improved LVEF to 65% on echo 1/2/24  Flash pulmonary edema  Cardiogenic shock - IABP placed 12/26/23 and removed on 12/30/2023  Complete heart block during cardiac cath s/p TPM 12/26/23  Respiratory failure secondary to cardiac arrest, intubated and on mechanical ventilation  DM  Shock liver 12/28, improving  Elevated INR  Hypernatermia, resolved,   Anemia  IABP was removed 12/30/23. Extubated on 12/30/23    Plan:   Stable this AM. Off isolation as per protocol.   On Aspirin 81 mg daily, Plavix 75 mg daily, spironolactone 25 mg daily and Coreg 6.25mg p.o. twice daily.  Not on Amiodarone and Lipitor due to elevated liver enzymes  will monitor LFTS daily and start statins tomorrow if LFTS return to baseline. Hold hepatotoxic drugs  Cardiac rehab  Pulmonology following, appreciate recommendations  Nephrology following for SANDHYA, appreciate recommendations      Gina Mosher MD   Cardiovascular Fellow,   [place], [place]

## 2024-01-05 ENCOUNTER — APPOINTMENT (OUTPATIENT)
Dept: GENERAL RADIOLOGY | Age: 64
DRG: 165 | End: 2024-01-05
Attending: INTERNAL MEDICINE
Payer: MEDICAID

## 2024-01-05 LAB
ALBUMIN SERPL-MCNC: 3.1 G/DL (ref 3.5–5.2)
ALBUMIN/GLOB SERPL: 1.7 {RATIO} (ref 1–2.5)
ALLEN TEST: ABNORMAL
ALP SERPL-CCNC: 63 U/L (ref 40–129)
ALT SERPL-CCNC: 137 U/L (ref 5–41)
ANION GAP SERPL CALCULATED.3IONS-SCNC: 13 MMOL/L (ref 9–17)
ANION GAP SERPL CALCULATED.3IONS-SCNC: 6 MMOL/L (ref 9–17)
AST SERPL-CCNC: 71 U/L
BASOPHILS # BLD: <0.03 K/UL (ref 0–0.2)
BASOPHILS NFR BLD: 0 % (ref 0–2)
BILIRUB DIRECT SERPL-MCNC: 0.4 MG/DL
BILIRUB INDIRECT SERPL-MCNC: 0.6 MG/DL (ref 0–1)
BILIRUB SERPL-MCNC: 1 MG/DL (ref 0.3–1.2)
BUN BLD-MCNC: 33 MG/DL (ref 8–26)
BUN SERPL-MCNC: 35 MG/DL (ref 8–23)
BUN SERPL-MCNC: 35 MG/DL (ref 8–23)
CA-I BLD-SCNC: 1.15 MMOL/L (ref 1.13–1.33)
CA-I BLD-SCNC: 1.17 MMOL/L (ref 1.15–1.33)
CALCIUM SERPL-MCNC: 8 MG/DL (ref 8.6–10.4)
CALCIUM SERPL-MCNC: 8.6 MG/DL (ref 8.6–10.4)
CHLORIDE BLD-SCNC: 104 MMOL/L (ref 98–107)
CHLORIDE SERPL-SCNC: 102 MMOL/L (ref 98–107)
CHLORIDE SERPL-SCNC: 103 MMOL/L (ref 98–107)
CK SERPL-CCNC: 1317 U/L (ref 39–308)
CO2 BLD CALC-SCNC: 26 MMOL/L (ref 22–30)
CO2 SERPL-SCNC: 23 MMOL/L (ref 20–31)
CO2 SERPL-SCNC: 27 MMOL/L (ref 20–31)
CREAT SERPL-MCNC: 1 MG/DL (ref 0.7–1.2)
CREAT SERPL-MCNC: 1.1 MG/DL (ref 0.7–1.2)
EGFR, POC: >60 ML/MIN/1.73M2
EOSINOPHIL # BLD: 0.53 K/UL (ref 0–0.44)
EOSINOPHILS RELATIVE PERCENT: 4 % (ref 1–4)
ERYTHROCYTE [DISTWIDTH] IN BLOOD BY AUTOMATED COUNT: 14.6 % (ref 11.8–14.4)
ERYTHROCYTE [DISTWIDTH] IN BLOOD BY AUTOMATED COUNT: 15.4 % (ref 11.8–14.4)
FIO2: 100
FIO2: 40
FIO2: 50
GFR SERPL CREATININE-BSD FRML MDRD: >60 ML/MIN/1.73M2
GFR SERPL CREATININE-BSD FRML MDRD: >60 ML/MIN/1.73M2
GLUCOSE BLD-MCNC: 103 MG/DL (ref 74–100)
GLUCOSE BLD-MCNC: 123 MG/DL (ref 75–110)
GLUCOSE BLD-MCNC: 94 MG/DL (ref 74–100)
GLUCOSE BLD-MCNC: 96 MG/DL (ref 74–100)
GLUCOSE SERPL-MCNC: 109 MG/DL (ref 70–99)
GLUCOSE SERPL-MCNC: 110 MG/DL (ref 70–99)
HCT VFR BLD AUTO: 23.9 % (ref 40.7–50.3)
HCT VFR BLD AUTO: 27 % (ref 41–53)
HCT VFR BLD AUTO: 28.9 % (ref 40.7–50.3)
HGB BLD-MCNC: 7.5 G/DL (ref 13–17)
HGB BLD-MCNC: 9.5 G/DL (ref 13–17)
IMM GRANULOCYTES # BLD AUTO: 0.15 K/UL (ref 0–0.3)
IMM GRANULOCYTES NFR BLD: 1 %
LYMPHOCYTES NFR BLD: 2.16 K/UL (ref 1.1–3.7)
LYMPHOCYTES RELATIVE PERCENT: 16 % (ref 24–43)
MAGNESIUM SERPL-MCNC: 1.9 MG/DL (ref 1.6–2.6)
MAGNESIUM SERPL-MCNC: 2.4 MG/DL (ref 1.6–2.6)
MCH RBC QN AUTO: 28.7 PG (ref 25.2–33.5)
MCH RBC QN AUTO: 29.9 PG (ref 25.2–33.5)
MCHC RBC AUTO-ENTMCNC: 31.4 G/DL (ref 28.4–34.8)
MCHC RBC AUTO-ENTMCNC: 32.9 G/DL (ref 28.4–34.8)
MCV RBC AUTO: 90.9 FL (ref 82.6–102.9)
MCV RBC AUTO: 91.6 FL (ref 82.6–102.9)
MODE: ABNORMAL
MONOCYTES NFR BLD: 1.29 K/UL (ref 0.1–1.2)
MONOCYTES NFR BLD: 9 % (ref 3–12)
NEUTROPHILS NFR BLD: 70 % (ref 36–65)
NEUTS SEG NFR BLD: 9.76 K/UL (ref 1.5–8.1)
NRBC BLD-RTO: 0 PER 100 WBC
NRBC BLD-RTO: 0 PER 100 WBC
O2 DELIVERY DEVICE: ABNORMAL
PATIENT TEMP: 36.3
PHOSPHATE SERPL-MCNC: 3.8 MG/DL (ref 2.5–4.5)
PHOSPHATE SERPL-MCNC: 3.9 MG/DL (ref 2.5–4.5)
PLATELET # BLD AUTO: 64 K/UL (ref 138–453)
PLATELET # BLD AUTO: ABNORMAL K/UL (ref 138–453)
PLATELET # BLD AUTO: ABNORMAL K/UL (ref 138–453)
PLATELET, FLUORESCENCE: ABNORMAL K/UL (ref 138–453)
PLATELET, FLUORESCENCE: ABNORMAL K/UL (ref 138–453)
POC ANION GAP: 12 MMOL/L (ref 7–16)
POC CREATININE: 1.2 MG/DL (ref 0.51–1.19)
POC HCO3: 26.2 MMOL/L (ref 21–28)
POC HCO3: 26.9 MMOL/L (ref 21–28)
POC HCO3: 27.5 MMOL/L (ref 21–28)
POC HEMOGLOBIN (CALC): 9.1 G/DL (ref 13.5–17.5)
POC LACTIC ACID: 0.7 MMOL/L (ref 0.56–1.39)
POC LACTIC ACID: 0.9 MMOL/L (ref 0.56–1.39)
POC O2 SATURATION: 100 % (ref 94–98)
POC O2 SATURATION: 97.4 % (ref 94–98)
POC O2 SATURATION: 99.1 % (ref 94–98)
POC PCO2: 38.5 MM HG (ref 35–48)
POC PCO2: 38.7 MM HG (ref 35–48)
POC PCO2: 39.7 MM HG (ref 35–48)
POC PH: 7.43 (ref 7.35–7.45)
POC PH: 7.45 (ref 7.35–7.45)
POC PH: 7.46 (ref 7.35–7.45)
POC PO2: 131.8 MM HG (ref 83–108)
POC PO2: 363.3 MM HG (ref 83–108)
POC PO2: 90.8 MM HG (ref 83–108)
POSITIVE BASE EXCESS, ART: 1.7 MMOL/L (ref 0–3)
POSITIVE BASE EXCESS, ART: 2.8 MMOL/L (ref 0–3)
POSITIVE BASE EXCESS, ART: 3.4 MMOL/L (ref 0–3)
POTASSIUM BLD-SCNC: 3.8 MMOL/L (ref 3.5–4.5)
POTASSIUM SERPL-SCNC: 3.8 MMOL/L (ref 3.7–5.3)
POTASSIUM SERPL-SCNC: 3.9 MMOL/L (ref 3.7–5.3)
PROT SERPL-MCNC: 4.9 G/DL (ref 6.4–8.3)
RBC # BLD AUTO: 2.61 M/UL (ref 4.21–5.77)
RBC # BLD AUTO: 3.18 M/UL (ref 4.21–5.77)
RBC # BLD: ABNORMAL 10*6/UL
SAMPLE SITE: ABNORMAL
SODIUM BLD-SCNC: 140 MMOL/L (ref 138–146)
SODIUM SERPL-SCNC: 136 MMOL/L (ref 135–144)
SODIUM SERPL-SCNC: 138 MMOL/L (ref 135–144)
WBC OTHER # BLD: 13.9 K/UL (ref 3.5–11.3)
WBC OTHER # BLD: 16.2 K/UL (ref 3.5–11.3)

## 2024-01-05 PROCEDURE — 83735 ASSAY OF MAGNESIUM: CPT

## 2024-01-05 PROCEDURE — 94003 VENT MGMT INPAT SUBQ DAY: CPT

## 2024-01-05 PROCEDURE — 85027 COMPLETE CBC AUTOMATED: CPT

## 2024-01-05 PROCEDURE — 37799 UNLISTED PX VASCULAR SURGERY: CPT

## 2024-01-05 PROCEDURE — 86901 BLOOD TYPING SEROLOGIC RH(D): CPT

## 2024-01-05 PROCEDURE — 2580000003 HC RX 258: Performed by: SURGERY

## 2024-01-05 PROCEDURE — 85014 HEMATOCRIT: CPT

## 2024-01-05 PROCEDURE — 71045 X-RAY EXAM CHEST 1 VIEW: CPT

## 2024-01-05 PROCEDURE — 84100 ASSAY OF PHOSPHORUS: CPT

## 2024-01-05 PROCEDURE — 6360000002 HC RX W HCPCS: Performed by: SURGERY

## 2024-01-05 PROCEDURE — 2580000003 HC RX 258: Performed by: STUDENT IN AN ORGANIZED HEALTH CARE EDUCATION/TRAINING PROGRAM

## 2024-01-05 PROCEDURE — 85055 RETICULATED PLATELET ASSAY: CPT

## 2024-01-05 PROCEDURE — 94761 N-INVAS EAR/PLS OXIMETRY MLT: CPT

## 2024-01-05 PROCEDURE — 36430 TRANSFUSION BLD/BLD COMPNT: CPT

## 2024-01-05 PROCEDURE — 36415 COLL VENOUS BLD VENIPUNCTURE: CPT

## 2024-01-05 PROCEDURE — 80048 BASIC METABOLIC PNL TOTAL CA: CPT

## 2024-01-05 PROCEDURE — 99233 SBSQ HOSP IP/OBS HIGH 50: CPT | Performed by: INTERNAL MEDICINE

## 2024-01-05 PROCEDURE — 97606 NEG PRS WND THER DME>50 SQCM: CPT

## 2024-01-05 PROCEDURE — 2500000003 HC RX 250 WO HCPCS: Performed by: SURGERY

## 2024-01-05 PROCEDURE — 86850 RBC ANTIBODY SCREEN: CPT

## 2024-01-05 PROCEDURE — 86920 COMPATIBILITY TEST SPIN: CPT

## 2024-01-05 PROCEDURE — 82330 ASSAY OF CALCIUM: CPT

## 2024-01-05 PROCEDURE — 6370000000 HC RX 637 (ALT 250 FOR IP): Performed by: SURGERY

## 2024-01-05 PROCEDURE — 85025 COMPLETE CBC W/AUTO DIFF WBC: CPT

## 2024-01-05 PROCEDURE — 82803 BLOOD GASES ANY COMBINATION: CPT

## 2024-01-05 PROCEDURE — 6360000002 HC RX W HCPCS: Performed by: STUDENT IN AN ORGANIZED HEALTH CARE EDUCATION/TRAINING PROGRAM

## 2024-01-05 PROCEDURE — 82550 ASSAY OF CK (CPK): CPT

## 2024-01-05 PROCEDURE — 84520 ASSAY OF UREA NITROGEN: CPT

## 2024-01-05 PROCEDURE — 2700000000 HC OXYGEN THERAPY PER DAY

## 2024-01-05 PROCEDURE — 30233N1 TRANSFUSION OF NONAUTOLOGOUS RED BLOOD CELLS INTO PERIPHERAL VEIN, PERCUTANEOUS APPROACH: ICD-10-PCS | Performed by: SURGERY

## 2024-01-05 PROCEDURE — 86900 BLOOD TYPING SEROLOGIC ABO: CPT

## 2024-01-05 PROCEDURE — A4216 STERILE WATER/SALINE, 10 ML: HCPCS | Performed by: STUDENT IN AN ORGANIZED HEALTH CARE EDUCATION/TRAINING PROGRAM

## 2024-01-05 PROCEDURE — 82565 ASSAY OF CREATININE: CPT

## 2024-01-05 PROCEDURE — 85049 AUTOMATED PLATELET COUNT: CPT

## 2024-01-05 PROCEDURE — 2500000003 HC RX 250 WO HCPCS: Performed by: STUDENT IN AN ORGANIZED HEALTH CARE EDUCATION/TRAINING PROGRAM

## 2024-01-05 PROCEDURE — A4216 STERILE WATER/SALINE, 10 ML: HCPCS | Performed by: SURGERY

## 2024-01-05 PROCEDURE — 80076 HEPATIC FUNCTION PANEL: CPT

## 2024-01-05 PROCEDURE — 6370000000 HC RX 637 (ALT 250 FOR IP)

## 2024-01-05 PROCEDURE — 80051 ELECTROLYTE PANEL: CPT

## 2024-01-05 PROCEDURE — 74018 RADEX ABDOMEN 1 VIEW: CPT

## 2024-01-05 PROCEDURE — 83605 ASSAY OF LACTIC ACID: CPT

## 2024-01-05 PROCEDURE — 82947 ASSAY GLUCOSE BLOOD QUANT: CPT

## 2024-01-05 PROCEDURE — P9016 RBC LEUKOCYTES REDUCED: HCPCS

## 2024-01-05 PROCEDURE — 6370000000 HC RX 637 (ALT 250 FOR IP): Performed by: STUDENT IN AN ORGANIZED HEALTH CARE EDUCATION/TRAINING PROGRAM

## 2024-01-05 PROCEDURE — 2000000000 HC ICU R&B

## 2024-01-05 RX ORDER — MAGNESIUM SULFATE IN WATER 40 MG/ML
2000 INJECTION, SOLUTION INTRAVENOUS ONCE
Status: COMPLETED | OUTPATIENT
Start: 2024-01-05 | End: 2024-01-05

## 2024-01-05 RX ORDER — ASPIRIN 81 MG/1
81 TABLET, CHEWABLE ORAL DAILY
Status: DISCONTINUED | OUTPATIENT
Start: 2024-01-05 | End: 2024-01-05

## 2024-01-05 RX ORDER — INSULIN LISPRO 100 [IU]/ML
0-16 INJECTION, SOLUTION INTRAVENOUS; SUBCUTANEOUS EVERY 4 HOURS
Status: DISCONTINUED | OUTPATIENT
Start: 2024-01-05 | End: 2024-01-08

## 2024-01-05 RX ORDER — PROPOFOL 10 MG/ML
5-50 INJECTION, EMULSION INTRAVENOUS CONTINUOUS
Status: DISCONTINUED | OUTPATIENT
Start: 2024-01-05 | End: 2024-01-08

## 2024-01-05 RX ORDER — SODIUM CHLORIDE 9 MG/ML
INJECTION, SOLUTION INTRAVENOUS PRN
Status: DISCONTINUED | OUTPATIENT
Start: 2024-01-05 | End: 2024-01-11 | Stop reason: HOSPADM

## 2024-01-05 RX ORDER — HYDRALAZINE HYDROCHLORIDE 20 MG/ML
10 INJECTION INTRAMUSCULAR; INTRAVENOUS EVERY 4 HOURS PRN
Status: DISCONTINUED | OUTPATIENT
Start: 2024-01-05 | End: 2024-01-11 | Stop reason: HOSPADM

## 2024-01-05 RX ORDER — NOREPINEPHRINE BITARTRATE 0.06 MG/ML
1-100 INJECTION, SOLUTION INTRAVENOUS CONTINUOUS
Status: DISCONTINUED | OUTPATIENT
Start: 2024-01-05 | End: 2024-01-08

## 2024-01-05 RX ORDER — LABETALOL HYDROCHLORIDE 5 MG/ML
10 INJECTION, SOLUTION INTRAVENOUS EVERY 4 HOURS PRN
Status: DISCONTINUED | OUTPATIENT
Start: 2024-01-05 | End: 2024-01-11 | Stop reason: HOSPADM

## 2024-01-05 RX ORDER — PROPOFOL 10 MG/ML
INJECTION, EMULSION INTRAVENOUS CONTINUOUS PRN
Status: DISCONTINUED | OUTPATIENT
Start: 2024-01-04 | End: 2024-01-05 | Stop reason: SDUPTHER

## 2024-01-05 RX ORDER — CHLORHEXIDINE GLUCONATE ORAL RINSE 1.2 MG/ML
15 SOLUTION DENTAL 2 TIMES DAILY
Status: DISCONTINUED | OUTPATIENT
Start: 2024-01-05 | End: 2024-01-06

## 2024-01-05 RX ORDER — ASPIRIN 81 MG/1
81 TABLET, CHEWABLE ORAL DAILY
Status: DISCONTINUED | OUTPATIENT
Start: 2024-01-05 | End: 2024-01-11 | Stop reason: HOSPADM

## 2024-01-05 RX ORDER — SODIUM CHLORIDE, SODIUM LACTATE, POTASSIUM CHLORIDE, CALCIUM CHLORIDE 600; 310; 30; 20 MG/100ML; MG/100ML; MG/100ML; MG/100ML
INJECTION, SOLUTION INTRAVENOUS CONTINUOUS
Status: DISCONTINUED | OUTPATIENT
Start: 2024-01-05 | End: 2024-01-06

## 2024-01-05 RX ORDER — ASPIRIN 81 MG/1
81 TABLET ORAL DAILY
Status: DISCONTINUED | OUTPATIENT
Start: 2024-01-05 | End: 2024-01-05

## 2024-01-05 RX ADMIN — SODIUM CHLORIDE, POTASSIUM CHLORIDE, SODIUM LACTATE AND CALCIUM CHLORIDE: 600; 310; 30; 20 INJECTION, SOLUTION INTRAVENOUS at 00:59

## 2024-01-05 RX ADMIN — Medication 2000 MG: at 12:36

## 2024-01-05 RX ADMIN — SODIUM CHLORIDE, PRESERVATIVE FREE 10 ML: 5 INJECTION INTRAVENOUS at 09:03

## 2024-01-05 RX ADMIN — SODIUM CHLORIDE, PRESERVATIVE FREE 20 MG: 5 INJECTION INTRAVENOUS at 19:42

## 2024-01-05 RX ADMIN — PROPOFOL 20 MCG/KG/MIN: 10 INJECTION, EMULSION INTRAVENOUS at 00:54

## 2024-01-05 RX ADMIN — SODIUM CHLORIDE, POTASSIUM CHLORIDE, SODIUM LACTATE AND CALCIUM CHLORIDE: 600; 310; 30; 20 INJECTION, SOLUTION INTRAVENOUS at 09:55

## 2024-01-05 RX ADMIN — SODIUM CHLORIDE, POTASSIUM CHLORIDE, SODIUM LACTATE AND CALCIUM CHLORIDE: 600; 310; 30; 20 INJECTION, SOLUTION INTRAVENOUS at 18:16

## 2024-01-05 RX ADMIN — SODIUM CHLORIDE, PRESERVATIVE FREE 20 MG: 5 INJECTION INTRAVENOUS at 03:10

## 2024-01-05 RX ADMIN — Medication 50 MCG/HR: at 00:48

## 2024-01-05 RX ADMIN — POTASSIUM BICARBONATE 20 MEQ: 782 TABLET, EFFERVESCENT ORAL at 09:49

## 2024-01-05 RX ADMIN — DEXMEDETOMIDINE HYDROCHLORIDE 0.2 MCG/KG/HR: 400 INJECTION, SOLUTION INTRAVENOUS at 19:26

## 2024-01-05 RX ADMIN — MAGNESIUM SULFATE HEPTAHYDRATE 2000 MG: 40 INJECTION, SOLUTION INTRAVENOUS at 04:30

## 2024-01-05 RX ADMIN — CHLORHEXIDINE GLUCONATE 15 ML: 1.2 RINSE ORAL at 03:13

## 2024-01-05 RX ADMIN — Medication 5 MCG/MIN: at 02:39

## 2024-01-05 RX ADMIN — DEXMEDETOMIDINE HYDROCHLORIDE 0.2 MCG/KG/HR: 400 INJECTION, SOLUTION INTRAVENOUS at 09:01

## 2024-01-05 RX ADMIN — SODIUM CHLORIDE, PRESERVATIVE FREE 10 ML: 5 INJECTION INTRAVENOUS at 19:38

## 2024-01-05 RX ADMIN — SODIUM CHLORIDE, PRESERVATIVE FREE 20 MG: 5 INJECTION INTRAVENOUS at 09:02

## 2024-01-05 ASSESSMENT — PULMONARY FUNCTION TESTS
PIF_VALUE: 17
PIF_VALUE: 22
PIF_VALUE: 23

## 2024-01-05 NOTE — PROGRESS NOTES
Physical Therapy        Physical Therapy Cancel Note      DATE: 2024    NAME: Lee Mejia  MRN: [id number]   : 1960      Patient not seen this date for Physical Therapy due to:    Patient is not appropriate for PT treatment at this time d/t  complicated medical condition per RN.  Procedure: FEMORAL THROMBECTOMY WITH ANGIOGRAM, POSSIBLE FEMORAL FEMORAL BYPASS, POSSIBLE FASCIOTOMY (Left: Thigh)     Notified supervising PT of possible reevaluation needed.      Electronically signed by SARAH ESCOBEDO PTA on 2024 at 9:42 AM

## 2024-01-05 NOTE — PLAN OF CARE
Problem: Respiratory - Adult  Goal: Achieves optimal ventilation and oxygenation  1/5/2024 0901 by Kristin Arteaga, AVEL  Outcome: Progressing

## 2024-01-05 NOTE — PROGRESS NOTES
During hourly rounds, dressing on LLE was found saturated through the gauze roll dressing. RN reinforced dressing with one layer of gauze per Dr. Min. Bleeding appears to be coming from the left lateral incision. Vascular resident, Dr. Haines was notified at 0630. No response.

## 2024-01-05 NOTE — PROGRESS NOTES
Division of Vascular Surgery             Progress Note      Name: Lee Mejia  MRN: [id number]         Overnight Events:      OR for thrombectomy and 4 compartment fasciotomy of LLE      Subjective:     Patient intubated and sedated.    Physical Exam:     Vitals:  /64   Pulse 67   Temp 97.7 °F (36.5 °C)   Resp 18   Ht 1.626 m (5' 4.02\")   Wt 63 kg (138 lb 14.2 oz)   SpO2 100%   BMI 23.83 kg/m²       General appearance - intubated  Mental status - sedated, following commands  Head - normocephalic and atraumatic  Neck - supple, no carotid bruits, thyroid not palpable, no JVD  Chest - clear to auscultation, normal effort  Heart - normal rate, regular rhythm, no murmurs  Abdomen - soft, non-tender, non-distended, bowel sounds present all four quadrants, no masses  Neurological - normal speech, no focal findings or movement disorder noted, cranial nerves II through XII grossly intact  Extremities - peripheral pulses palpable, no pedal edema or calf pain with palpation  Skin - no gross lesions, rashes, or induration noted  Foot Exam - warm, well-perfused  Vascular Exam -Doppler signals to DP and PT bilaterally, cap refill <3s      Imaging:       Vascular duplex lower extremity venous left    Result Date: 1/4/2024    No evidence of deep vein thrombosis in the left lower extremity.     Vascular duplex lower extremity arteries left    Result Date: 1/4/2024  Left: The common femoral and external iliac arteries are occluded.  The distal common femoral is still patent via retrograde flow through the profunda to serve the SFA.  The SFA and popliteal are patent with no evidence of significant stenosis.  Clinical correlation is needed.         Assessment:     62 yo male presenting with ischemic LLE with sensory and motor loss. POD 1 s/p thrombectomy and 4-compartment fasciotomy of LLE      Plan:     Planning for extubation today  Will reevaluate wounds and plan for return to OR in about one week for further  debridement as needed.  Wound ostomy consulted for wound vac placement in fasciotomy incisions.  TICU management while intubated.  Vascular surgery will continue to follow along.  Plan discussed with Dr. Anderson         [facility] [place]  O: [phone]                 Erivedge Counseling- I discussed with the patient the risks of Erivedge including but not limited to nausea, vomiting, diarrhea, constipation, weight loss, changes in the sense of taste, decreased appetite, muscle spasms, and hair loss.  The patient verbalized understanding of the proper use and possible adverse effects of Erivedge.  All of the patient's questions and concerns were addressed.

## 2024-01-05 NOTE — PROGRESS NOTES
Dressing to left lower extremity changed with Dr. Solorzano, dry dressing applied with plans for a wound vac to be applied.

## 2024-01-05 NOTE — PROGRESS NOTES
Informed attending of arterial duplex results regarding clot at this time. Patient symptoms of swelling pain, tingling and numbness of left foot charted. Pulses were doppled and were present but weaker in left leg vs right.

## 2024-01-05 NOTE — ANESTHESIA PRE PROCEDURE
Department of Anesthesiology  Preprocedure Note       Name:  Lee Mejia   Age:  63 y.o.  :  1960                                          MRN:  [id number]         Date:  2024      Surgeon: Surgeon(s):  Brent Anderson MD    Procedure: Procedure(s):  FEMORAL THROMBECTOMY WITH ANGIOGRAM, POSSIBLE FEMORAL FEMORAL BYPASS, POSSIBLE FASCIOTOMY    Medications prior to admission:   Prior to Admission medications    Medication Sig Start Date End Date Taking? Authorizing Provider   albuterol-ipratropium (COMBIVENT)  MCG/ACT inhaler Inhale 2 puffs into the lungs every 6 hours as needed for Wheezing    Provider, MD Candie   nicotine (NICODERM CQ) 21 MG/24HR Place 1 patch onto the skin every 24 hours. 2/19/15   Lorrie Serrato MD   aspirin EC 81 MG EC tablet Take 1 tablet by mouth daily. 14   Lorrie Serrato MD   albuterol (VENTOLIN HFA) 108 (90 BASE) MCG/ACT inhaler Inhale 2 puffs into the lungs every 6 hours as needed for Wheezing. 14   Lorrie Serrato MD       Current medications:    Current Facility-Administered Medications   Medication Dose Route Frequency Provider Last Rate Last Admin   • cefTRIAXone (ROCEPHIN) 2000 mg in sterile water 20 mL IV syringe  2,000 mg IntraVENous Q24H Milli Lim MD   2,000 mg at 24 1238   • torsemide (DEMADEX) tablet 40 mg  40 mg Oral Daily Catherine Gunderson MD   40 mg at 24 0826   • carvedilol (COREG) tablet 6.25 mg  6.25 mg Oral BID  Beverly Adame MD   6.25 mg at 24 1704   • aluminum & magnesium hydroxide-simethicone (MAALOX) 200-200-20 MG/5ML suspension 30 mL  30 mL Oral Q6H PRN Kameron Cerda APRN - NP   30 mL at 24 1525   • naloxegol (MOVANTIK) tablet 12.5 mg  12.5 mg Oral QAM AC Kameron Cerda APRN - NP   12.5 mg at 24 0827   • ALPRAZolam (XANAX) tablet 0.25 mg  0.25 mg Oral 4x Daily PRN Kameron Cerda APRN - NP   0.25 mg at 24 224   • oxyCODONE (ROXICODONE) immediate release

## 2024-01-05 NOTE — PROGRESS NOTES
ICU PROGRESS NOTE        PATIENT NAME: Lee Mejia  MEDICAL RECORD NO. [id number]  DATE: 1/5/2024    HD: # 10    ASSESSMENT    Patient Active Problem List   Diagnosis    Perirectal abscess    Diabetes mellitus (HCC)    COPD (chronic obstructive pulmonary disease) (HCC)    Dependence on nicotine from cigarettes    Mixed hyperlipidemia    STEMI (ST elevation myocardial infarction) (HCC)    Left main coronary artery disease    Bandemia    COVID    Cardiogenic shock (HCC)    Encounter for pre-operative cardiovascular clearance    S/P CABG x 3    ATN (acute tubular necrosis) (HCC)    Hypernatremia    Metabolic acidemia    NSTEMI (non-ST elevation myocardial infarction) ([facility])    Debility    Atelectasis    Femoral thrombosis ([facility])       ACUTE DIAGNOSES/PLAN  Neuro:   Propofol, fent gtt.   Precedex unheld for SBT this AM propofol off   GCS 10T (4, T, 6)    CV   -S/p CABG 12/27, extubated 12/30, found to have Left femoral and external iliac arterial occlusion on duplex.  S/p emergent thrombectomy and 4 compartment fasciotomy of LLE 1/4.   -Oozing from dressing this AM   -Pressor: Levo   -MAPs , HR 62-86   -Hydralazine PRN   -Transfused 1u pRBC this AM post transfusion hgb 9.5    Pulm   -PRVC  18/480/8/40% CPAP this AM   -ABG 7.427/131.8/39.7/26.2   -Hx COPD   -Coreg 6.25mg BID    GI/Nutrition   -NPO, NGT   -Senna, glycolax   -LFTs   -Pepcid BID    Renal/lytes   -UO 0.4 cc/kg/hr   -Na/K 136/3.8   -Mag/Phos 2.4/3.9   -BUN/Cr 35/1.0     Heme   -Aspirin 81mg held this AM for low platelets   -Plavix held   -Hgb 9.5 (7.5)   -Plate 64    7.   Endocrine   -   -HDSS not required     Musculoskeletal   -PT/OT    Skin   -4 compartment fasciotomy on LLE, wrapped in kerlex this AM, will take down, ligate if needed and place wound vac with splint over top to prevent plantar flexion contractor     Micro   -Afebrile   -WBC 16.2 (13.9)   -Rocephin for aspiration pneumonia (started 1/3)    Family/dispo   Extubation today   Dispo  not have output data documented.         LAB:  CBC:   Recent Labs     01/04/24 0227 01/04/24 2256 01/05/24  0033 01/05/24  0605   WBC 14.7*  --  13.9* 16.2*   HGB 10.0* 7.8* 7.5* 9.5*   HCT 33.5* 24.3* 23.9* 28.9*   MCV 98.5  --  91.6 90.9     --  See Reflexed IPF Result See Reflexed IPF Result     BMP:   Recent Labs     01/04/24 0227 01/04/24 2256 01/05/24  0033 01/05/24  0516 01/05/24  0605    138 138  --  136   K 4.6 3.6 3.9  --  3.8     --  102  --  103   CO2 27  --  23  --  27   BUN 43*  --  35*  --  35*   CREATININE 1.2  --  1.1 1.2* 1.0   GLUCOSE 94  --  110*  --  109*         RADIOLOGY:  XR CHEST PORTABLE    Result Date: 1/5/2024  EXAMINATION: ONE X-RAY VIEW OF THE CHEST; ONE SUPINE X-RAY VIEW(S) OF THE ABDOMEN 1/5/2024 5:56 am COMPARISON: 01/05/2024. HISTORY: ORDERING SYSTEM PROVIDED HISTORY:  Post op, while intubated TECHNOLOGIST PROVIDED HISTORY: Post op, while intubated Reason for Exam:  Port supine FINDINGS: The cardiac silhouette and mediastinal contours are stable.  Median sternotomy wires are noted.  Postsurgical changes are noted along the left mediastinal border.  Vascular calcifications are noted along the aortic arch. There is pulmonary vascular congestion.  Small left pleural effusion with left basilar lung infiltrate.  There may be a small left basilar pneumothorax, improved.  There is still a left apical pneumothorax. The right apical pneumothorax is no longer identified. Endotracheal tube tip terminates 6.0 cm above the pippa. The orogastric tube tip is located near the antrum and the side-port is noted in the gastric body. There is a normal bowel gas pattern without evidence of obstruction. The visualized osseous structures are unremarkable.     1. Endotracheal tube tip terminates 6.0 cm above the pippa. 2. Small left pleural effusion with left basilar lung infiltrate. 3. Stable small left apical pneumothorax and smaller left basilar pneumothorax. 4. Previous right

## 2024-01-05 NOTE — PROGRESS NOTES
Patient passed bedside swallow by RN, patient did not have signs or symptoms or aspiration, Dr. Mosher (cardiology fellow) informed via perfect serve messaging system.

## 2024-01-05 NOTE — PROGRESS NOTES
[facility]   Progress Note                   Date:   1/5/2024  Patient name: Lee Mejia  Date of admission:  12/26/2023  1:07 PM  MRN:   [id number]  YOB: 1960  PCP: Dee Morrison DO    Reason for Admission: STEMI    Subjective:       By bedside.  Patient was noted to have pain in lower extremity yesterday underwent Doppler arterial was noted to have occluded common femoral artery, patient was taken to the OR by vascular surgeon yesterday.  He is intubated this a.m. on Levophed and fentanyl    Medications:   Scheduled Meds:   chlorhexidine  15 mL Mouth/Throat BID    famotidine (PEPCID) injection  20 mg IntraVENous BID    aspirin  81 mg Oral Daily    insulin lispro  0-16 Units SubCUTAneous Q4H    iodixanol        cefTRIAXone (ROCEPHIN) IV  2,000 mg IntraVENous Q24H    [Held by provider] torsemide  40 mg Oral Daily    carvedilol  6.25 mg Oral BID WC    [Held by provider] naloxegol  12.5 mg Oral QAM AC    [Held by provider] spironolactone  25 mg Oral Daily    [Held by provider] pantoprazole  40 mg IntraVENous Daily    sodium chloride flush  5-40 mL IntraVENous 2 times per day    [Held by provider] clopidogrel  75 mg Oral Daily    polyethylene glycol  17 g Oral Daily    sennosides-docusate sodium  1 tablet Oral BID    [Held by provider] atorvastatin  20 mg Oral Nightly     Continuous Infusions:   propofol 25 mcg/kg/min (01/05/24 0717)    fentaNYL 50 mcg/mL 50 mcg/hr (01/05/24 0717)    norepinephrine 6 mcg/min (01/05/24 0717)    lactated ringers IV soln 110 mL/hr at 01/05/24 0717    sodium chloride      sodium chloride 10 mL/hr at 12/29/23 1122    [Held by provider] vasopressin 20 Units in sodium chloride 0.9 % 100 mL infusion Stopped (12/28/23 8647)    [Held by provider] dexmedeTOMIDine Stopped (12/28/23 0312)    sodium chloride Stopped (12/29/23 0947)    EPINEPHrine Stopped (12/29/23 0657)    [Held by provider] insulin Stopped (12/27/23 1245)    dextrose       CBC:   Recent Labs      Significant findings were communicated to CTS.     After separation from CPB the following findings were obtaained:   LV fx appears to be much improved,  LVEF visually estimated at 45%   LV anterior segment contractility is improved.  No aortic dissection.  No significant valvular abnormality.  These findings were shared with CTS.    Cardiac Angiography:   11/26/23  Findings:     Left main: 80% distal left main extending into ostial LAD     LAD: 90% ostial LAD with TESSIE-3 flow     LCX: Dominant with luminal irregularities of 20 to 30%     RCA: Luminal irregularities of 20 to 30%     The LV gram was performed in the WITT 30 position.   LVEF: 35%. LV Wall Motion: Anteroapical akinesis     Conclusions:  High-grade left main and ostial LAD disease.  Dominant left circumflex system  Moderately reduced LV function  Cardiogenic shock  Short episode of complete heart block during cardiac cath     Recommendation:  CT consult for emergent CABG  Intra-aortic balloon pump was inserted via her left femoral approach  Transvenous temporary pacemaker inserted via left femoral vein  Supportive care on vent and vasopressors  Transfer emergently to [facility] for CABG    Assessment:   Anterolateral STEMI s/p LHC with high-grade left main and ostial LAD disease s/p emergent CABG x3 12/26/23 [LIMA to the first diagonal, saphenous vein graft to the distal LAD, saphenous vein graft to the OM1.]  V-fib cardiac arrest requiring defibrillation x 1 before cardiac cath.  PEA cardiac arrest after cardiac cath requiring CPR for less than a minute with achievement of ROSC.  Improved LVEF to 65% on echo 1/2/24  Flash pulmonary edema  Cardiogenic shock - IABP placed 12/26/23 and removed on 12/30/2023  Complete heart block during cardiac cath s/p TPM 12/26/23  Respiratory failure secondary to cardiac arrest, intubated and on mechanical ventilation  DM  Shock liver 12/28, improving  Elevated INR  Hypernatermia, resolved,   Anemia  IABP

## 2024-01-05 NOTE — ANESTHESIA PROCEDURE NOTES
Central Venous Line:    A central venous line was placed using ultrasound guidance, in the OR for the following indication(s): central venous access and CVP monitoring.1/4/2024 9:50 PM1/4/2024 9:58 PM    Sterility preparation included the following: hand hygiene performed prior to procedure, maximum sterile barriers used and sterile technique used to drape from head to toe.    The patient was placed in Trendelenburg position.The left internal jugular vein was prepped.  A 9 Fr (size), introducer double lumen was placed.    During the procedure, the following specific steps were taken: target vein identified, needle advanced into vein and blood aspirated and guidewire advanced into vein.        Outcomes: uncomplicated  Anesthesia type: general..No  Staffing  Performed: Anesthesiologist   Anesthesiologist: Shelby Bowman MD  Performed by: Shelby Bowman MD  Authorized by: Shelby Bowman MD

## 2024-01-05 NOTE — PROGRESS NOTES
Order obtained for extubation.  SpO2 of 98 on 40% FiO2.   Patient extubated and placed on 6 liters/min via nasal cannula.   Post extubation SpO2 is 94% with HR 80  bpm and RR 18 breaths/min.    Patient had moderate cough that was productive of clear  and yellow sputum.  Extubation Well tolerated by patient..       JUDY VELAZQUEZ RCP   11:03 AM

## 2024-01-05 NOTE — PROGRESS NOTES
PULMONARY & CRITICAL CARE MEDICINE PROGRESS NOTE     Patient:  Lee Mejia  MRN: [id number]  Admit date: 12/26/2023  Primary Care Physician: Dee Morrison DO  Consulting Physician: Db Sanchez MD  CODE Status: Full Code  LOS: 10     SUBJECTIVE     CHIEF COMPLAINT/REASON FOR INITIAL CONSULT:  STEMI  BRIEF HOSPITAL COURSE:   The patient is a 63 y.o. male with chronic smoking history and COPD, presented to [facility] with severe chest pain.  He went into V-fib arrest while in the ER at [facility] requiring defibrillation.  He converted into sinus rhythm.  EKG was obtained which was consistent with anterolateral STEMI and the patient was emergently taken to Cath Lab.  He underwent cardiac cath which showed high-grade left main and ostial LAD disease with a dominant left circumflex system.  He was in cardiogenic shock, intra-aortic balloon pump was placed.  He also developed complete heart block during cardiac cath, temporary pacemaker was inserted.  He was admitted in medical ICU at the same [facility] facility where he coded again, had brief CPR after which ROSC was achieved.  He was subsequently transferred to [facility] for CT surgery evaluation for emergent CABG.  Nasal swab also came back positive for COVID.  CT surgery planning on bypass today.  Pulmonology consulted for recommendations prior to surgery, COVID infection and for vent management.  History obtained from the family member/girlfriend.  She denies any sick contact in the family.  But he does go out and meet several people.  She does not think that the patient had cough, fever or breathing difficulty.  Patient is heavy smoker, has been smoking 1-.5 pack per day for last several years.  He uses inhaler at home.  Denies any other significant past medical history.    1/5/2024   Subjective    Post 3 vessel cabg 12/26/23   Patient had femoral thrombectomy on the left and fasciotomy of the left lower leg on       Vascular duplex vein mapping lower bilateral   Final Result      Vascular duplex upper extremity arteries bilateral   Final Result      Vascular duplex carotid bilateral   Final Result      XR CHEST PORTABLE   Final Result   Stable interspace crash that stable interstitial pulmonary edema, right   greater than left.      IABP marker now projecting along the superior margin of the aortic knob.   Stable position of endotracheal tube and lower extremity transvenous pacer.         XR CHEST PORTABLE    (Results Pending)        Echocardiogram:   No results found for this or any previous visit.       ASSESSMENT AND PLAN     Assessment:    // Acute hypoxic respiratory failure-extubated  //Left lower lobe atelectasis  // post cabg x3 emergent on 12/26/23   // v fib arrest   // cardiogenic shock resolved   // IABP removed   //shock liver improving   // coagulopathy improving   // thrombocytopenia   // anterolateral STEMI  //chb - post TPP - currently not requiring pacing .  // covid -19 infection on remdesivir   //copd   // kaycee   //Anemia, better  //Leukocytosis, slightly worsened  Hepatic transaminase elevation, improving  Bilateral pneumothoraxes  Thrombocytopenia    Plan:  Incentive spirometry and deep breathing to be encouraged.,  Patient is developing b/l basal atelectasis   Chest pain control  PT AND OT to evaluate and treat patient.  Keep sats >92 %  Family was updated  Chest and abdominal x-rays from today, 1/5/2024 showed small left pleural effusion with left basilar lung infiltrate and stable small left apical pneumothorax and small left basilar pneumothorax and no pneumothorax on the right    Electronically signed by Pola Blum MD on 1/5/2024 at 8:26 AM   Please note that this chart was generated using voice recognition Dragon dictation software.  Although every effort was made to ensure the accuracy of this automated transcription, some errors in transcription may have occurred.

## 2024-01-05 NOTE — OP NOTE
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  Left common femoral and external iliac thrombectomy via left groin incision.  2.  4 compartment fasciotomy.    Surgeon(s):  Brent Anderson MD    Assistant:   Yandy Min    Anesthesia: General    Estimated Blood Loss (mL): less than 100     Complications: None    Specimens:   ID Type Source Tests Collected by Time Destination   A : LEFT ILLIAC THROMBIS Tissue Tissue SURGICAL PATHOLOGY Brent Anderson MD 1/4/2024 3502        Implants:  None      Drains:   Urinary Catheter 01/04/24 Stevenson-Temperature (Active)       [REMOVED] Chest Tube Right 1 (Removed)   Chest Tube Airleak No 01/04/24 0800   Status Gravity 01/04/24 0800   Suction -20 cm H2O 01/04/24 0800   Y Connector Used Yes 01/04/24 0800   Drainage Description Serosanguinous 01/04/24 0800   Dressing Status Clean, dry & intact 01/04/24 0800   Chest Tube Dressing Dry 01/04/24 0800   Site Assessment Clean, dry & intact;Painful;Tender 01/04/24 0800   Surrounding Skin Reddened 01/04/24 0800   Output (ml) 50 ml 01/04/24 0850       [REMOVED] Chest Tube Left 2 (Removed)   Chest Tube Airleak No 01/04/24 0800   Status Continuous Suction 01/04/24 0800   Suction -20 cm H2O 01/04/24 0800   Y Connector Used No 01/04/24 0800   Drainage Description Serosanguinous 01/04/24 0800   Dressing Status Clean, dry & intact 01/04/24 0800   Chest Tube Dressing Dry 01/04/24 0800   Site Assessment Clean, dry & intact;Painful;Tender 01/04/24 0800   Surrounding Skin Reddened 01/04/24 0800   Output (ml) 30 ml 01/04/24 0850       [REMOVED] Chest Tube Left 3 (Removed)   Chest Tube Airleak No 01/04/24 0800   Status Continuous Suction 01/04/24 0800   Suction -20 cm

## 2024-01-05 NOTE — PROGRESS NOTES
Occupational Therapy    [facility]  Occupational Therapy Not Seen Note    DATE: 2024    NAME: Lee Mejia  MRN: [id number]   : 1960      Patient not seen this date for Occupational Therapy due to:    Pt. Not appropriate for tx, d/t complicated medical condition per RN. Sx yesterday- Procedure: FEMORAL THROMBECTOMY WITH ANGIOGRAM, POSSIBLE FEMORAL FEMORAL BYPASS, POSSIBLE FASCIOTOMY (Left: Thigh)     Electronically signed by MAGGIE Luciano on 2024 at 9:23 AM

## 2024-01-05 NOTE — PROGRESS NOTES
[facility] Nurse  Consult Note       NAME:  Lee Mejia  MEDICAL RECORD NUMBER:  [id number]  AGE: 63 y.o.   GENDER: male  : 1960  TODAY'S DATE:  2024    Subjective:     Reason for WOCN Evaluation and Assessment: \"Left lower leg fasciotomy wound, would like wound vac placed.  Please time with Dr. Solorzano to be at bedside for possible suture ligation of wound\"      Lee Mejia is a 63 y.o. male referred by:   [x] Physician  [] Nursing  [] Other:     Wound Identification:  Wound Type:  surgical  Contributing Factors: edema, arterial insufficiency, and decreased tissue oxygenation        Dr Solorzano assessed the wound prior to NPWT application. Orders reviewed.     Objective:      /73   Pulse 89   Temp 99.7 °F (37.6 °C)   Resp 18   Ht 1.626 m (5' 4.02\")   Wt 63 kg (138 lb 14.2 oz)   SpO2 97%   BMI 23.83 kg/m²   Emerson Risk Score: Emerson Scale Score: 14    LABS    CBC:   Lab Results   Component Value Date/Time    WBC 16.2 2024 06:05 AM    RBC 3.18 2024 06:05 AM    HGB 9.5 2024 06:05 AM    HCT 28.9 2024 06:05 AM     CMP:  Albumin:    Lab Results   Component Value Date/Time    LABALBU 3.1 2024 06:05 AM     PT/INR:    Lab Results   Component Value Date/Time    PROTIME 18.1 2023 04:10 AM    INR 1.5 2023 04:10 AM     HgBA1c:    Lab Results   Component Value Date/Time    LABA1C 5.8 2023 04:42 PM     PTT: No components found for: \"LABPTT\"      Assessment:   Left leg wound bed currently red. Trickle of blood from edge at 5 o'clock controlled with pressure for few minutes. Patient denied intolerable pain during care.   Left heel is dusky. Elevated off of the bed with a pillow beneath the calf.     Measurements:       24 1530   Wound 24 Leg Left;Lower   Date First Assessed: 24   Primary Wound Type: Surgical Type  Location: Leg  Wound Location Orientation: Left;Lower   Wound Image    Wound Etiology Surgical   Dressing  cleanser.     [x] Use single layer moisture wicking underpad.     [x] Use comfort glide system and wedges to reposition patient.     [x] Keep the head of the bed below 30 degrees unless contraindicated.     [x] Pressure reducing chair cushion while up to chair. Reposition every hour while in chair and limit chair time to 2 hour intervals.     [x] Encourage good nutritional intake and fluids. Consult dietician if needed.    Specialty Bed Required : Yes: Isolibrium surface in use   [x] Low Air Loss   [x] Pressure Redistribution  [] Fluid Immersion  [] Bariatric  [] Total Pressure Relief  [] Other:     Discharge Plan:  TBD    Patient/Caregiver Teaching:  Reviewed with patient we will change the dressing again on Monday. Reviewed indications and care explained as provided.   Level of patient/caregiver understanding:    [] Indicates understanding       [x] Needs reinforcement  [] Unsuccessful      [x] Verbal Understanding  [] Demonstrated understanding       [] No evidence of learning  [] Refused teaching         [] N/A    Contact the Wound Ostomy RN on-call during working hours Monday-Friday [id number] via [employer] by searching \"wound\" under \"groups\" and selecting the on-call clinician. Sending messages via individual names will not reach a clinician.        Electronically signed by Katherine Rogers RN, CWON on 1/5/2024 at 5:06 PM

## 2024-01-05 NOTE — ANESTHESIA PROCEDURE NOTES
Arterial Line:    An arterial line was placed using surface landmarks, in the OR for the following indication(s): continuous blood pressure monitoring and blood sampling needed.    A 20 gauge (size), 1 and 3/4 inch (length), Arrow (type) catheter was placed, Seldinger technique used, into the left radial artery, secured by Tegaderm.  Anesthesia type: General    Events:  patient tolerated procedure well with no complications.1/4/2024 9:34 PM1/4/2024 9:36 PM  Anesthesiologist: Shelby Bowman MD  Performed: Anesthesiologist

## 2024-01-05 NOTE — ANESTHESIA POSTPROCEDURE EVALUATION
Department of Anesthesiology  Postprocedure Note    Patient: Lee Mejia  MRN: [id number]  YOB: 1960  Date of evaluation: 1/5/2024    Procedure Summary       Date: 01/04/24 Room / Location: [place] [address] / [facility]    Anesthesia Start: 2125 Anesthesia Stop:     Procedure: FEMORAL THROMBECTOMY WITH ANGIOGRAM, POSSIBLE FEMORAL FEMORAL BYPASS, POSSIBLE FASCIOTOMY (Left: Thigh) Diagnosis:       Femoral thrombosis (HCC)      (Femoral thrombosis (HCC) [I74.3])    Surgeons: Brent Anderson MD Responsible Provider: Shelby Bowman MD    Anesthesia Type: general ASA Status: 4 - Emergent            Anesthesia Type: No value filed.    Rhina Phase I: Rhina Score: 4    Rhina Phase II:      Anesthesia Post Evaluation    Patient location during evaluation: ICU  Patient participation: complete - patient cannot participate  Level of consciousness: sedated and ventilated  Airway patency: patent  Cardiovascular status: blood pressure returned to baseline  Respiratory status: intubated and ventilator  Hydration status: euvolemic  Comments: No known anesthesia related complications    No notable events documented.

## 2024-01-05 NOTE — CONSULTS
General Surgery  Consult    PATIENT NAME: Lee Mejia  AGE: 63 y.o.  MEDICAL RECORD NO. [id number]  DATE: 1/5/2024  SURGEON: Bert  PRIMARY CARE PHYSICIAN: Dee Morrison DO    Patient evaluated at the request of  Dr. Anderson  Reason for evaluation: ICU management while intubated postop    Patient information was obtained from chart review, surgical team.      IMPRESSION:     Patient Active Problem List   Diagnosis    Perirectal abscess    Diabetes mellitus (HCC)    COPD (chronic obstructive pulmonary disease) ([facility])    Dependence on nicotine from cigarettes    Mixed hyperlipidemia    STEMI (ST elevation myocardial infarction) ([facility])    Left main coronary artery disease    Bandemia    COVID    Cardiogenic shock ([facility])    Encounter for pre-operative cardiovascular clearance    S/P CABG x 3    ATN (acute tubular necrosis) ([facility])    Hypernatremia    Metabolic acidemia    NSTEMI (non-ST elevation myocardial infarction) ([facility])    Debility    Atelectasis    Femoral thrombosis ([facility])           PLAN:   ICU management postop while intubated      HISTORY:   History of Chief Complaint:    Lee Mejia is a 63 y.o. male who was admitted on 12/26 after sustaining V-fib cardiac arrest with ROSC, underwent cardiac catheterization with temporary pacemaker and IA BP placement.  He underwent an emergent CABG x3 on 12/26.  He was extubated on 12/30.  Today, he was found to have a left femoral and external iliac arterial occlusion on duplex study after complaining of leg pain.  He underwent emergent revascularization with mechanical thrombectomy and 4 compartment fasciotomy of the left lower extremity.  He remained intubated on pressor support postop.  Surgical critical care was consulted for ICU and ventilator management postop.         Past Medical History   has a past medical history of ATN (acute tubular necrosis) (HCC), COPD (chronic obstructive pulmonary disease) (HCC), COPD (chronic obstructive pulmonary disease) (HCC),  REPORTED 03/22/2021 03:10 AM     BMP:    Lab Results   Component Value Date/Time     01/05/2024 12:33 AM     01/04/2024 10:56 PM    K 3.9 01/05/2024 12:33 AM    K 3.6 01/04/2024 10:56 PM     01/05/2024 12:33 AM    CO2 23 01/05/2024 12:33 AM    BUN 35 01/05/2024 12:33 AM    LABALBU 3.3 01/04/2024 02:27 AM    CREATININE 1.1 01/05/2024 12:33 AM    CALCIUM 8.6 01/05/2024 12:33 AM    GFRAA >60 03/23/2021 06:00 AM    LABGLOM >60 01/05/2024 12:33 AM    GLUCOSE 110 01/05/2024 12:33 AM     Urine Culture:  No components found for: \"CURINE\"  Blood Culture:  No components found for: \"CBLOOD\", \"CFUNGUSBL\"    RADIOLOGY:   Vascular duplex lower extremity venous left    Result Date: 1/4/2024    No evidence of deep vein thrombosis in the left lower extremity.     Vascular duplex lower extremity arteries left    Result Date: 1/4/2024  Left: The common femoral and external iliac arteries are occluded.  The distal common femoral is still patent via retrograde flow through the profunda to serve the SFA.  The SFA and popliteal are patent with no evidence of significant stenosis.  Clinical correlation is needed.     XR CHEST PORTABLE    Result Date: 1/4/2024  EXAMINATION: ONE XRAY VIEW OF THE CHEST 1/4/2024 4:32 am COMPARISON: AP chest from 01/03/2024 HISTORY: ORDERING SYSTEM PROVIDED HISTORY: Post op open heart surgery TECHNOLOGIST PROVIDED HISTORY: Post op open heart surgery Reason for Exam: post open heart   upright port FINDINGS: Midline sternotomy wires and clips; mediastinal drain and left chest tubes. Overlying ECG monitor leads.  Cardiomediastinal shadow stable. Slightly improved vascular congestion pattern and slightly smaller biapical pneumothoraces.  Similar hazy bibasilar opacities, greater at the left base. Bones stable.     1. Slightly improved vascular congestion pattern and slightly smaller biapical pneumothoraces. 2. Similar bibasilar opacities, greater at the left base.     XR CHEST PORTABLE    Result

## 2024-01-05 NOTE — CARE COORDINATION
TRANSITIONAL CARE PLANNING/ DISCHARGE ONGOING EVALUATION    Hospital Day: 10    Reason for Admission: STEMI (ST elevation myocardial infarction) (HCC) [I21.3]     Treatment Plan of Care: Disposition planning    Tests/Procedures still needed: Possible wound Vac LLE    Barriers to Discharge: POD#1 Thrombectomy and fasciotomy.    Readmission Risk              Risk of Unplanned Readmission:  25            Patient goals/Treatment Preferences/Transitional Plan:     Referrals Made: [facility], [facility], [facility], and [facility]    Follow Up needed: PT/OT notes

## 2024-01-05 NOTE — PLAN OF CARE
Absence of new skin breakdown  Description: 1.  Monitor for areas of redness and/or skin breakdown  2.  Assess vascular access sites hourly  3.  Every 4-6 hours minimum:  Change oxygen saturation probe site  4.  Every 4-6 hours:  If on nasal continuous positive airway pressure, respiratory therapy assess nares and determine need for appliance change or resting period.  1/5/2024 0505 by Sylvia Vuong, RN  Outcome: Progressing  1/5/2024 0505 by Sylvia Vuong RN  Outcome: Progressing     Problem: ABCDS Injury Assessment  Goal: Absence of physical injury  1/5/2024 0505 by Sylvia Vuong, RN  Outcome: Progressing  1/5/2024 0505 by Sylvia Vuong, RN  Outcome: Progressing

## 2024-01-06 ENCOUNTER — APPOINTMENT (OUTPATIENT)
Dept: GENERAL RADIOLOGY | Age: 64
DRG: 165 | End: 2024-01-06
Attending: INTERNAL MEDICINE
Payer: MEDICAID

## 2024-01-06 PROBLEM — J18.9 PNEUMONIA OF BOTH LOWER LOBES DUE TO INFECTIOUS ORGANISM: Status: ACTIVE | Noted: 2024-01-06

## 2024-01-06 PROBLEM — Z72.0 TOBACCO ABUSE: Status: ACTIVE | Noted: 2024-01-06

## 2024-01-06 LAB
ABO/RH: NORMAL
ANION GAP SERPL CALCULATED.3IONS-SCNC: 10 MMOL/L (ref 9–17)
ANTIBODY SCREEN: NEGATIVE
ARM BAND NUMBER: NORMAL
BASOPHILS # BLD: 0.06 K/UL (ref 0–0.2)
BASOPHILS NFR BLD: 0 % (ref 0–2)
BLOOD BANK BLOOD PRODUCT EXPIRATION DATE: NORMAL
BLOOD BANK DISPENSE STATUS: NORMAL
BLOOD BANK ISBT PRODUCT BLOOD TYPE: 7300
BLOOD BANK PRODUCT CODE: NORMAL
BLOOD BANK SAMPLE EXPIRATION: NORMAL
BLOOD BANK UNIT TYPE AND RH: NORMAL
BPU ID: NORMAL
BUN SERPL-MCNC: 22 MG/DL (ref 8–23)
CA-I BLD-SCNC: 1.19 MMOL/L (ref 1.13–1.33)
CALCIUM SERPL-MCNC: 8.3 MG/DL (ref 8.6–10.4)
CHLORIDE SERPL-SCNC: 102 MMOL/L (ref 98–107)
CO2 SERPL-SCNC: 24 MMOL/L (ref 20–31)
COMPONENT: NORMAL
CREAT SERPL-MCNC: 1.1 MG/DL (ref 0.7–1.2)
CROSSMATCH RESULT: NORMAL
EOSINOPHIL # BLD: 0.31 K/UL (ref 0–0.44)
EOSINOPHILS RELATIVE PERCENT: 2 % (ref 1–4)
ERYTHROCYTE [DISTWIDTH] IN BLOOD BY AUTOMATED COUNT: 15.3 % (ref 11.8–14.4)
GFR SERPL CREATININE-BSD FRML MDRD: >60 ML/MIN/1.73M2
GLUCOSE BLD-MCNC: 111 MG/DL (ref 75–110)
GLUCOSE BLD-MCNC: 111 MG/DL (ref 75–110)
GLUCOSE SERPL-MCNC: 126 MG/DL (ref 70–99)
HCT VFR BLD AUTO: 33.5 % (ref 40.7–50.3)
HGB BLD-MCNC: 11 G/DL (ref 13–17)
IMM GRANULOCYTES # BLD AUTO: 0.09 K/UL (ref 0–0.3)
IMM GRANULOCYTES NFR BLD: 1 %
LYMPHOCYTES NFR BLD: 1.67 K/UL (ref 1.1–3.7)
LYMPHOCYTES RELATIVE PERCENT: 9 % (ref 24–43)
MAGNESIUM SERPL-MCNC: 2 MG/DL (ref 1.6–2.6)
MCH RBC QN AUTO: 29.2 PG (ref 25.2–33.5)
MCHC RBC AUTO-ENTMCNC: 32.8 G/DL (ref 28.4–34.8)
MCV RBC AUTO: 88.9 FL (ref 82.6–102.9)
MONOCYTES NFR BLD: 0.91 K/UL (ref 0.1–1.2)
MONOCYTES NFR BLD: 5 % (ref 3–12)
NEUTROPHILS NFR BLD: 85 % (ref 36–65)
NEUTS SEG NFR BLD: 16.64 K/UL (ref 1.5–8.1)
NRBC BLD-RTO: 0 PER 100 WBC
PHOSPHATE SERPL-MCNC: 2.5 MG/DL (ref 2.5–4.5)
PLATELET # BLD AUTO: 225 K/UL (ref 138–453)
PMV BLD AUTO: 12.2 FL (ref 8.1–13.5)
POTASSIUM SERPL-SCNC: 4.5 MMOL/L (ref 3.7–5.3)
RBC # BLD AUTO: 3.77 M/UL (ref 4.21–5.77)
RBC # BLD: ABNORMAL 10*6/UL
SODIUM SERPL-SCNC: 136 MMOL/L (ref 135–144)
TRANSFUSION STATUS: NORMAL
UNIT DIVISION: 0
UNIT ISSUE DATE/TIME: NORMAL
WBC OTHER # BLD: 19.7 K/UL (ref 3.5–11.3)

## 2024-01-06 PROCEDURE — 83735 ASSAY OF MAGNESIUM: CPT

## 2024-01-06 PROCEDURE — 2580000003 HC RX 258: Performed by: SURGERY

## 2024-01-06 PROCEDURE — 36415 COLL VENOUS BLD VENIPUNCTURE: CPT

## 2024-01-06 PROCEDURE — 2000000000 HC ICU R&B

## 2024-01-06 PROCEDURE — 6370000000 HC RX 637 (ALT 250 FOR IP): Performed by: SURGERY

## 2024-01-06 PROCEDURE — 97110 THERAPEUTIC EXERCISES: CPT

## 2024-01-06 PROCEDURE — 85025 COMPLETE CBC W/AUTO DIFF WBC: CPT

## 2024-01-06 PROCEDURE — 6360000002 HC RX W HCPCS: Performed by: SURGERY

## 2024-01-06 PROCEDURE — 84100 ASSAY OF PHOSPHORUS: CPT

## 2024-01-06 PROCEDURE — A4216 STERILE WATER/SALINE, 10 ML: HCPCS | Performed by: STUDENT IN AN ORGANIZED HEALTH CARE EDUCATION/TRAINING PROGRAM

## 2024-01-06 PROCEDURE — 97530 THERAPEUTIC ACTIVITIES: CPT

## 2024-01-06 PROCEDURE — 2580000003 HC RX 258: Performed by: STUDENT IN AN ORGANIZED HEALTH CARE EDUCATION/TRAINING PROGRAM

## 2024-01-06 PROCEDURE — 99233 SBSQ HOSP IP/OBS HIGH 50: CPT | Performed by: INTERNAL MEDICINE

## 2024-01-06 PROCEDURE — 6370000000 HC RX 637 (ALT 250 FOR IP): Performed by: STUDENT IN AN ORGANIZED HEALTH CARE EDUCATION/TRAINING PROGRAM

## 2024-01-06 PROCEDURE — 97164 PT RE-EVAL EST PLAN CARE: CPT

## 2024-01-06 PROCEDURE — 82947 ASSAY GLUCOSE BLOOD QUANT: CPT

## 2024-01-06 PROCEDURE — 99291 CRITICAL CARE FIRST HOUR: CPT | Performed by: INTERNAL MEDICINE

## 2024-01-06 PROCEDURE — 82330 ASSAY OF CALCIUM: CPT

## 2024-01-06 PROCEDURE — 2500000003 HC RX 250 WO HCPCS: Performed by: STUDENT IN AN ORGANIZED HEALTH CARE EDUCATION/TRAINING PROGRAM

## 2024-01-06 PROCEDURE — 80048 BASIC METABOLIC PNL TOTAL CA: CPT

## 2024-01-06 PROCEDURE — 6370000000 HC RX 637 (ALT 250 FOR IP)

## 2024-01-06 RX ORDER — ZOLPIDEM TARTRATE 5 MG/1
5 TABLET ORAL NIGHTLY PRN
Status: DISCONTINUED | OUTPATIENT
Start: 2024-01-06 | End: 2024-01-11 | Stop reason: HOSPADM

## 2024-01-06 RX ORDER — ACETAMINOPHEN 325 MG/1
650 TABLET ORAL EVERY 4 HOURS PRN
Status: DISCONTINUED | OUTPATIENT
Start: 2024-01-06 | End: 2024-01-08

## 2024-01-06 RX ORDER — TRAMADOL HYDROCHLORIDE 50 MG/1
50 TABLET ORAL EVERY 6 HOURS PRN
Status: DISCONTINUED | OUTPATIENT
Start: 2024-01-06 | End: 2024-01-11 | Stop reason: HOSPADM

## 2024-01-06 RX ADMIN — SODIUM CHLORIDE, PRESERVATIVE FREE 10 ML: 5 INJECTION INTRAVENOUS at 20:45

## 2024-01-06 RX ADMIN — SODIUM CHLORIDE, PRESERVATIVE FREE 20 MG: 5 INJECTION INTRAVENOUS at 20:03

## 2024-01-06 RX ADMIN — ACETAMINOPHEN 650 MG: 325 TABLET ORAL at 20:02

## 2024-01-06 RX ADMIN — CARVEDILOL 6.25 MG: 3.12 TABLET, FILM COATED ORAL at 16:53

## 2024-01-06 RX ADMIN — SODIUM CHLORIDE, POTASSIUM CHLORIDE, SODIUM LACTATE AND CALCIUM CHLORIDE: 600; 310; 30; 20 INJECTION, SOLUTION INTRAVENOUS at 13:00

## 2024-01-06 RX ADMIN — DIPHENHYDRAMINE HYDROCHLORIDE 25 MG: 25 TABLET ORAL at 22:50

## 2024-01-06 RX ADMIN — CARVEDILOL 6.25 MG: 3.12 TABLET, FILM COATED ORAL at 09:10

## 2024-01-06 RX ADMIN — SODIUM CHLORIDE, PRESERVATIVE FREE 20 MG: 5 INJECTION INTRAVENOUS at 09:10

## 2024-01-06 RX ADMIN — ACETAMINOPHEN 650 MG: 325 TABLET ORAL at 09:17

## 2024-01-06 RX ADMIN — Medication 2000 MG: at 12:10

## 2024-01-06 RX ADMIN — SODIUM CHLORIDE, POTASSIUM CHLORIDE, SODIUM LACTATE AND CALCIUM CHLORIDE: 600; 310; 30; 20 INJECTION, SOLUTION INTRAVENOUS at 03:13

## 2024-01-06 RX ADMIN — ZOLPIDEM TARTRATE 5 MG: 5 TABLET ORAL at 20:02

## 2024-01-06 RX ADMIN — ASPIRIN 81 MG 81 MG: 81 TABLET ORAL at 09:09

## 2024-01-06 ASSESSMENT — ENCOUNTER SYMPTOMS
ABDOMINAL DISTENTION: 0
EYE PAIN: 0
SHORTNESS OF BREATH: 1
APNEA: 0
EYE DISCHARGE: 0
EYE REDNESS: 0
COLOR CHANGE: 0

## 2024-01-06 ASSESSMENT — PAIN DESCRIPTION - PAIN TYPE: TYPE: SURGICAL PAIN

## 2024-01-06 ASSESSMENT — PAIN - FUNCTIONAL ASSESSMENT
PAIN_FUNCTIONAL_ASSESSMENT: ACTIVITIES ARE NOT PREVENTED
PAIN_FUNCTIONAL_ASSESSMENT: ACTIVITIES ARE NOT PREVENTED

## 2024-01-06 ASSESSMENT — PAIN DESCRIPTION - ONSET: ONSET: ON-GOING

## 2024-01-06 ASSESSMENT — PAIN SCALES - GENERAL
PAINLEVEL_OUTOF10: 7
PAINLEVEL_OUTOF10: 7

## 2024-01-06 ASSESSMENT — PAIN DESCRIPTION - LOCATION
LOCATION: LEG
LOCATION: LEG

## 2024-01-06 ASSESSMENT — PAIN DESCRIPTION - ORIENTATION
ORIENTATION: LEFT
ORIENTATION: LEFT

## 2024-01-06 ASSESSMENT — PAIN DESCRIPTION - FREQUENCY: FREQUENCY: CONTINUOUS

## 2024-01-06 ASSESSMENT — PAIN DESCRIPTION - DESCRIPTORS: DESCRIPTORS: ACHING

## 2024-01-06 NOTE — PROGRESS NOTES
Division of Vascular Surgery             Progress Note      Name: Lee Mejia  MRN: [id number]         Overnight Events:      OR for thrombectomy and 4 compartment fasciotomy of LLE 1/4/24      Subjective:     Patient extubated yesterday. Pt seen and examined.  Alert and oriented today. Comfortable up in chair. No complaints at time of exam.  Wound vac and splint on LLE.    Physical Exam:     Vitals:  /73   Pulse 91   Temp 100 °F (37.8 °C)   Resp 18   Ht 1.626 m (5' 4.02\")   Wt 61 kg (134 lb 7.7 oz)   SpO2 95%   BMI 23.07 kg/m²       General appearance - Well appearing, no acute distress   Mental status - A&Ox3  Head - normocephalic and atraumatic  Neck - supple, no JVD  Chest -no respiratory distress, no accessory muscle use  Heart - normal rate, regular rhythm  Abdomen - soft, non-tender, non-distended  Neurological - normal speech, no focal findings or movement disorder noted  Extremities - UE peripheral pulses palpable, no pedal edema or calf pain with palpation  Skin - wound vac over anteriolateral would, sealed with minimal output  Foot Exam - warm, well-perfused  Vascular Exam -Doppler signals to DP and PT bilaterally, cap refill <3s      Imaging:       Vascular duplex lower extremity venous left    Result Date: 1/4/2024    No evidence of deep vein thrombosis in the left lower extremity.     Vascular duplex lower extremity arteries left    Result Date: 1/4/2024  Left: The common femoral and external iliac arteries are occluded.  The distal common femoral is still patent via retrograde flow through the profunda to serve the SFA.  The SFA and popliteal are patent with no evidence of significant stenosis.  Clinical correlation is needed.         Assessment:     64 yo male presenting with ischemic LLE with sensory and motor loss. POD 2 s/p thrombectomy and 4-compartment fasciotomy of LLE      Plan:     Wound ostomy consulted for wound vac placement in fasciotomy incision.  Medical management per

## 2024-01-06 NOTE — PROGRESS NOTES
[facility]   Progress Note                   Date:   1/6/2024  Patient name: Lee Mejia  Date of admission:  12/26/2023  1:07 PM  MRN:   [id number]  YOB: 1960  PCP: Dee Morrison DO    Reason for Admission: STEMI    Subjective:       The patient was seen and evaluated at bedside, was extubated and remains hemodynamically stable.   Labs and imaging reviewed. WBC 19.     Medications:   Scheduled Meds:   chlorhexidine  15 mL Mouth/Throat BID    famotidine (PEPCID) injection  20 mg IntraVENous BID    insulin lispro  0-16 Units SubCUTAneous Q4H    aspirin  81 mg Oral Daily    cefTRIAXone (ROCEPHIN) IV  2,000 mg IntraVENous Q24H    [Held by provider] torsemide  40 mg Oral Daily    carvedilol  6.25 mg Oral BID WC    [Held by provider] naloxegol  12.5 mg Oral QAM AC    [Held by provider] spironolactone  25 mg Oral Daily    [Held by provider] pantoprazole  40 mg IntraVENous Daily    sodium chloride flush  5-40 mL IntraVENous 2 times per day    [Held by provider] clopidogrel  75 mg Oral Daily    polyethylene glycol  17 g Oral Daily    sennosides-docusate sodium  1 tablet Oral BID    [Held by provider] atorvastatin  20 mg Oral Nightly     Continuous Infusions:   propofol Stopped (01/05/24 0846)    fentaNYL 50 mcg/mL Stopped (01/05/24 0846)    norepinephrine Stopped (01/05/24 0856)    lactated ringers IV soln 115 mL/hr at 01/06/24 0314    sodium chloride      sodium chloride 10 mL/hr at 12/29/23 1122    [Held by provider] vasopressin 20 Units in sodium chloride 0.9 % 100 mL infusion Stopped (12/28/23 1837)    dexmedeTOMIDine 0.4 mcg/kg/hr (01/06/24 0313)    sodium chloride Stopped (12/29/23 0947)    EPINEPHrine Stopped (12/29/23 0657)    [Held by provider] insulin Stopped (12/27/23 1245)    dextrose       CBC:   Recent Labs     01/04/24  0227 01/04/24  2256 01/05/24  0033 01/05/24  0605 01/05/24  0900   WBC 14.7*  --  13.9* 16.2*  --    HGB 10.0* 7.8* 7.5* 9.5*  --      --  See  and 4 compartment fasciotomy    Plan:     Continue Aspirin 81 mg daily, Plavix 75 mg daily, spironolactone 25 mg daily and Coreg 6.25mg p.o. twice daily.  Resume lipitor and amiodarone, LFTs have down trended.   Cardiac rehab after discharge.   Continue PT/OT and cardiac rehab. Incentive spirometry.   Pulmonology and vascular surgery following, appreciate recommendations.   Nephrology following for SANDHYA, appreciate recommendations.   Okay to transfer to step down tomorrow if remains stable.     Emmie Moffett MD  Fellow, Cardiovascular Diseases    [facility]      Attending Physician Statement  I have discussed the care of Lee Mejia, including pertinent history and exam findings,  with the cardiology fellow/resident. I have seen and examined the patient and the key elements of all parts of the encounter have been performed by me. I have completed at least one if not all key elements of the E/M (history, physical exam, and MDM). I agree with the assessment, plan and orders as documented by the resident    Sabina Sanchez MD, FACC, [facility]

## 2024-01-06 NOTE — PLAN OF CARE
Problem: Chronic Conditions and Co-morbidities  Goal: Patient's chronic conditions and co-morbidity symptoms are monitored and maintained or improved  Outcome: Progressing  Flowsheets (Taken 1/6/2024 0800)  Care Plan - Patient's Chronic Conditions and Co-Morbidity Symptoms are Monitored and Maintained or Improved: Monitor and assess patient's chronic conditions and comorbid symptoms for stability, deterioration, or improvement     Problem: Discharge Planning  Goal: Discharge to home or other facility with appropriate resources  Outcome: Progressing  Flowsheets (Taken 1/6/2024 0800)  Discharge to home or other facility with appropriate resources: Identify barriers to discharge with patient and caregiver     Problem: Respiratory - Adult  Goal: Achieves optimal ventilation and oxygenation  Outcome: Progressing  Flowsheets (Taken 1/6/2024 0800)  Achieves optimal ventilation and oxygenation:   Assess for changes in respiratory status   Assess for changes in mentation and behavior     Problem: Safety - Adult  Goal: Free from fall injury  Outcome: Progressing     Problem: Nutrition Deficit:  Goal: Optimize nutritional status  Outcome: Progressing     Problem: Pain  Goal: Verbalizes/displays adequate comfort level or baseline comfort level  Outcome: Progressing     Problem: Skin/Tissue Integrity  Goal: Absence of new skin breakdown  Description: 1.  Monitor for areas of redness and/or skin breakdown  2.  Assess vascular access sites hourly  3.  Every 4-6 hours minimum:  Change oxygen saturation probe site  4.  Every 4-6 hours:  If on nasal continuous positive airway pressure, respiratory therapy assess nares and determine need for appliance change or resting period.  Outcome: Progressing     Problem: ABCDS Injury Assessment  Goal: Absence of physical injury  Outcome: Progressing

## 2024-01-06 NOTE — PROGRESS NOTES
Pt agitated and not comfortable in bed, continues to slide around in the bed. Precedex restarted. Okay to get to chair for comfort per vascular.

## 2024-01-06 NOTE — PLAN OF CARE
Progressing     Problem: Skin/Tissue Integrity  Goal: Absence of new skin breakdown  Description: 1.  Monitor for areas of redness and/or skin breakdown  2.  Assess vascular access sites hourly  3.  Every 4-6 hours minimum:  Change oxygen saturation probe site  4.  Every 4-6 hours:  If on nasal continuous positive airway pressure, respiratory therapy assess nares and determine need for appliance change or resting period.  Outcome: Progressing     Problem: ABCDS Injury Assessment  Goal: Absence of physical injury  Outcome: Progressing  Flowsheets (Taken 1/5/2024 2146)  Absence of Physical Injury: Implement safety measures based on patient assessment     Problem: Safety - Medical Restraint  Goal: Remains free of injury from restraints (Restraint for Interference with Medical Device)  Description: INTERVENTIONS:  1. Determine that other, less restrictive measures have been tried or would not be effective before applying the restraint  2. Evaluate the patient's condition at the time of restraint application  3. Inform patient/family regarding the reason for restraint  4. Q2H: Monitor safety, psychosocial status, comfort, nutrition and hydration  Outcome: Completed

## 2024-01-06 NOTE — PROGRESS NOTES
Physical Therapy  Facility/Department: [facility] CAR 1- SICU  Physical Therapy Reassessment    Name: Lee Mejia  : 1960  MRN: [id number]  Date of Service: 2024    Pt underwent emergent CABGx3 23; post operatively developed thrombus LLE, and 24 underwent  Left common femoral and external iliac thrombectomy via left groin incision and 4 compartment fasciotomy. Pt also had wound vac placed LLE.     Discharge Recommendations:  Patient would benefit from continued therapy after discharge   PT Equipment Recommendations  Equipment Needed: No      Patient Diagnosis(es): The primary encounter diagnosis was Left main coronary artery disease. Diagnoses of S/P CABG x 3, NSTEMI (non-ST elevation myocardial infarction) (HCC), COVID, Left leg swelling, and Femoral thrombosis (HCC) were also pertinent to this visit.  Past Medical History:  has a past medical history of ATN (acute tubular necrosis) (HCC), COPD (chronic obstructive pulmonary disease) (HCC), COPD (chronic obstructive pulmonary disease) (HCC), Diabetes mellitus (HCC), Diabetes mellitus (HCC), Left main coronary artery disease, and Mixed hyperlipidemia.  Past Surgical History:  has a past surgical history that includes Tooth Extraction (14); Colonoscopy (2015); Cardiac procedure (N/A, 2023); Cardiac procedure (N/A, 2023); Cardiac procedure (N/A, 2023); Cardiac procedure (N/A, 2023); Coronary artery bypass graft (N/A, 2023); Cardiac procedure (N/A, 2023); Cardiac procedure (N/A, 2023); Cardiac procedure (N/A, 2023); and Femoral-femoral Bypass Graft (Left, 2024).    Assessment   Pt up in chair, initially reluctant to participate with PT d/t increased LLE pain but easily persuaded.  Sit to stand min A+1, took 4 steps forward and 4 steps back using rwalker, high step gait LLE d/t foot drop L.  Able to achieve foot flat in standing, reduced wt bearing LLE d/t increased pain.  Pt denied wanting

## 2024-01-07 ENCOUNTER — APPOINTMENT (OUTPATIENT)
Dept: GENERAL RADIOLOGY | Age: 64
DRG: 165 | End: 2024-01-07
Attending: INTERNAL MEDICINE
Payer: MEDICAID

## 2024-01-07 LAB
ANION GAP SERPL CALCULATED.3IONS-SCNC: 12 MMOL/L (ref 9–17)
BASOPHILS # BLD: 0.05 K/UL (ref 0–0.2)
BASOPHILS NFR BLD: 0 % (ref 0–2)
BUN SERPL-MCNC: 21 MG/DL (ref 8–23)
CA-I BLD-SCNC: 1.2 MMOL/L (ref 1.13–1.33)
CALCIUM SERPL-MCNC: 8.3 MG/DL (ref 8.6–10.4)
CHLORIDE SERPL-SCNC: 105 MMOL/L (ref 98–107)
CO2 SERPL-SCNC: 23 MMOL/L (ref 20–31)
CREAT SERPL-MCNC: 1 MG/DL (ref 0.7–1.2)
EOSINOPHIL # BLD: 0.21 K/UL (ref 0–0.44)
EOSINOPHILS RELATIVE PERCENT: 1 % (ref 1–4)
ERYTHROCYTE [DISTWIDTH] IN BLOOD BY AUTOMATED COUNT: 15.2 % (ref 11.8–14.4)
GFR SERPL CREATININE-BSD FRML MDRD: >60 ML/MIN/1.73M2
GLUCOSE BLD-MCNC: 79 MG/DL (ref 75–110)
GLUCOSE SERPL-MCNC: 95 MG/DL (ref 70–99)
HCT VFR BLD AUTO: 31.9 % (ref 40.7–50.3)
HGB BLD-MCNC: 10.4 G/DL (ref 13–17)
IMM GRANULOCYTES # BLD AUTO: 0.09 K/UL (ref 0–0.3)
IMM GRANULOCYTES NFR BLD: 1 %
LYMPHOCYTES NFR BLD: 1.78 K/UL (ref 1.1–3.7)
LYMPHOCYTES RELATIVE PERCENT: 10 % (ref 24–43)
MAGNESIUM SERPL-MCNC: 1.9 MG/DL (ref 1.6–2.6)
MCH RBC QN AUTO: 29.1 PG (ref 25.2–33.5)
MCHC RBC AUTO-ENTMCNC: 32.6 G/DL (ref 28.4–34.8)
MCV RBC AUTO: 89.4 FL (ref 82.6–102.9)
MONOCYTES NFR BLD: 1.19 K/UL (ref 0.1–1.2)
MONOCYTES NFR BLD: 6 % (ref 3–12)
NEUTROPHILS NFR BLD: 82 % (ref 36–65)
NEUTS SEG NFR BLD: 15.3 K/UL (ref 1.5–8.1)
NRBC BLD-RTO: 0 PER 100 WBC
PHOSPHATE SERPL-MCNC: 2.8 MG/DL (ref 2.5–4.5)
PLATELET # BLD AUTO: 221 K/UL (ref 138–453)
PMV BLD AUTO: 12.2 FL (ref 8.1–13.5)
POTASSIUM SERPL-SCNC: 4.6 MMOL/L (ref 3.7–5.3)
RBC # BLD AUTO: 3.57 M/UL (ref 4.21–5.77)
RBC # BLD: ABNORMAL 10*6/UL
SODIUM SERPL-SCNC: 140 MMOL/L (ref 135–144)
WBC OTHER # BLD: 18.6 K/UL (ref 3.5–11.3)

## 2024-01-07 PROCEDURE — 6370000000 HC RX 637 (ALT 250 FOR IP)

## 2024-01-07 PROCEDURE — 6370000000 HC RX 637 (ALT 250 FOR IP): Performed by: SURGERY

## 2024-01-07 PROCEDURE — A4216 STERILE WATER/SALINE, 10 ML: HCPCS | Performed by: STUDENT IN AN ORGANIZED HEALTH CARE EDUCATION/TRAINING PROGRAM

## 2024-01-07 PROCEDURE — 6360000002 HC RX W HCPCS: Performed by: SURGERY

## 2024-01-07 PROCEDURE — 99233 SBSQ HOSP IP/OBS HIGH 50: CPT | Performed by: INTERNAL MEDICINE

## 2024-01-07 PROCEDURE — 6370000000 HC RX 637 (ALT 250 FOR IP): Performed by: STUDENT IN AN ORGANIZED HEALTH CARE EDUCATION/TRAINING PROGRAM

## 2024-01-07 PROCEDURE — 82947 ASSAY GLUCOSE BLOOD QUANT: CPT

## 2024-01-07 PROCEDURE — 2580000003 HC RX 258: Performed by: SURGERY

## 2024-01-07 PROCEDURE — 2060000000 HC ICU INTERMEDIATE R&B

## 2024-01-07 PROCEDURE — 99232 SBSQ HOSP IP/OBS MODERATE 35: CPT | Performed by: INTERNAL MEDICINE

## 2024-01-07 PROCEDURE — 82330 ASSAY OF CALCIUM: CPT

## 2024-01-07 PROCEDURE — 85025 COMPLETE CBC W/AUTO DIFF WBC: CPT

## 2024-01-07 PROCEDURE — 80048 BASIC METABOLIC PNL TOTAL CA: CPT

## 2024-01-07 PROCEDURE — 84100 ASSAY OF PHOSPHORUS: CPT

## 2024-01-07 PROCEDURE — 2580000003 HC RX 258: Performed by: STUDENT IN AN ORGANIZED HEALTH CARE EDUCATION/TRAINING PROGRAM

## 2024-01-07 PROCEDURE — 36415 COLL VENOUS BLD VENIPUNCTURE: CPT

## 2024-01-07 PROCEDURE — 2500000003 HC RX 250 WO HCPCS: Performed by: STUDENT IN AN ORGANIZED HEALTH CARE EDUCATION/TRAINING PROGRAM

## 2024-01-07 PROCEDURE — 83735 ASSAY OF MAGNESIUM: CPT

## 2024-01-07 RX ADMIN — SODIUM CHLORIDE, PRESERVATIVE FREE 20 MG: 5 INJECTION INTRAVENOUS at 20:29

## 2024-01-07 RX ADMIN — DIPHENHYDRAMINE HYDROCHLORIDE 25 MG: 25 TABLET ORAL at 20:29

## 2024-01-07 RX ADMIN — CARVEDILOL 6.25 MG: 3.12 TABLET, FILM COATED ORAL at 09:06

## 2024-01-07 RX ADMIN — Medication 2000 MG: at 12:02

## 2024-01-07 RX ADMIN — SODIUM CHLORIDE, PRESERVATIVE FREE 10 ML: 5 INJECTION INTRAVENOUS at 20:29

## 2024-01-07 RX ADMIN — ATORVASTATIN CALCIUM 20 MG: 20 TABLET, FILM COATED ORAL at 20:29

## 2024-01-07 RX ADMIN — ASPIRIN 81 MG 81 MG: 81 TABLET ORAL at 09:06

## 2024-01-07 RX ADMIN — SODIUM CHLORIDE, PRESERVATIVE FREE 10 ML: 5 INJECTION INTRAVENOUS at 09:13

## 2024-01-07 RX ADMIN — SODIUM CHLORIDE, PRESERVATIVE FREE 20 MG: 5 INJECTION INTRAVENOUS at 09:06

## 2024-01-07 RX ADMIN — ACETAMINOPHEN 650 MG: 325 TABLET ORAL at 12:11

## 2024-01-07 RX ADMIN — CARVEDILOL 6.25 MG: 3.12 TABLET, FILM COATED ORAL at 17:22

## 2024-01-07 ASSESSMENT — ENCOUNTER SYMPTOMS
COLOR CHANGE: 0
EYE DISCHARGE: 0
EYE REDNESS: 0
SHORTNESS OF BREATH: 1
EYE PAIN: 0
APNEA: 0
ABDOMINAL DISTENTION: 0

## 2024-01-07 NOTE — PROGRESS NOTES
Physical Therapy        Physical Therapy Cancel Note      DATE: 2024    NAME: Lee Mejia  MRN: [id number]   : 1960      Patient not seen this date for Physical Therapy due to:    Patient Declined: Pt reported LLE is in to much pain from last session and swollen. Will continue to pursue as able.       Electronically signed by Jimbo Awad PTA on 2024 at 10:47 AM

## 2024-01-07 NOTE — PROGRESS NOTES
exerts himself and has COPD  Taken to card cath and had a complete HB and pacer placed  He is intubated w resp failure and P edema  on CXR  WBC elevated and no clear infection  Tested + covid and ID called for clearance for surgery  He is on pressors for card failure and cardiogenic shock  According to the girl friend she had worked  long hours last 4 days and can't tell about cough or other symptoms -      Pt now is on the vent - no cellulitis -  UA ng  BC pend    12/27  No fever - post CABG emergent  12/26 - on the vent   Abd soft no  tender  Aortic balloon +    Pressors off  WBC better  Lactic acid still elevated    12/28  Post op styill on the vent - shock liver w elevated Liver enz -   Abd soft  -no rash - WBc better 11  Still vented FIo2  30 w aortic balloon in place - skin no rash and chest wound dry    12/29  On the vent - stable and no fever - VSS - no npressors - sp small  Abd soft non tender  W normal  CXR left atelectasis -  Disc w fam on bedside    12/30  Extubated and unable to talk much - still sluggish though awake alert -no fever - all cx neg  No fever    1/2/24  No fever - WBC increasing again 17  CXR 1/1/24 small effusions and infilt  Ox 3 and poor expiratiory effort  Abd soft  non tender    1/3/24  Alert tired and on 40 high flow - poor inspiratory effort  Tired and CXR w small apical pneumothorax and a]same Left basilar consolidation  wBc 17  Liver enz still slightly up    1/4/24  No fever - alert and still very poor inspiratory effort  Abd soft   On hi flow  Ceftriaxone started 1/3 and WBC  improved to 14  CXR bibasilar opacities    Interval changes  1/7/2024   Patient Vitals for the past 8 hrs:   BP Temp Temp src Pulse SpO2   01/07/24 0600 (!) 149/86 99 °F (37.2 °C) Bladder 89 --   01/07/24 0500 (!) 152/83 98.8 °F (37.1 °C) -- 85 99 %   01/07/24 0400 133/80 98.8 °F (37.1 °C) -- 86 100 %   01/07/24 0300 (!) 147/64 98.8 °F (37.1 °C) -- 86 96 %   01/07/24 0200 (!) 154/85 99 °F (37.2 °C) -- 87 95  CATH LAB    CARDIAC PROCEDURE N/A 12/30/2023    Insert temporary pacemaker performed by Taj Daly MD at [facility] CARDIAC CATH LAB    CARDIAC PROCEDURE N/A 12/30/2023    Left heart cath performed by Taj Daly MD at [facility] CARDIAC CATH LAB    COLONOSCOPY  01/05/2015    CORONARY ARTERY BYPASS GRAFT N/A 12/26/2023    EMERGENCY CABG, CORONARY ARTERY BYPASS X3, ON PUMP, ANGELLA PER ANESTHESIA performed by David Bañuelos MD at [facility] CVOR    FEMORAL-FEMORAL BYPASS GRAFT Left 1/4/2024    FEMORAL THROMBECTOMY WITH ANGIOGRAM, POSSIBLE FEMORAL FEMORAL BYPASS, POSSIBLE FASCIOTOMY performed by Brent Anderson MD at [facility] CVOR    TOOTH EXTRACTION  11/06/14       Medications:      famotidine (PEPCID) injection  20 mg IntraVENous BID    insulin lispro  0-16 Units SubCUTAneous Q4H    aspirin  81 mg Oral Daily    cefTRIAXone (ROCEPHIN) IV  2,000 mg IntraVENous Q24H    [Held by provider] torsemide  40 mg Oral Daily    carvedilol  6.25 mg Oral BID WC    [Held by provider] naloxegol  12.5 mg Oral QAM AC    [Held by provider] spironolactone  25 mg Oral Daily    [Held by provider] pantoprazole  40 mg IntraVENous Daily    sodium chloride flush  5-40 mL IntraVENous 2 times per day    [Held by provider] clopidogrel  75 mg Oral Daily    polyethylene glycol  17 g Oral Daily    sennosides-docusate sodium  1 tablet Oral BID    [Held by provider] atorvastatin  20 mg Oral Nightly       Social History:     Social History     Socioeconomic History    Marital status: Single     Spouse name: Not on file    Number of children: Not on file    Years of education: Not on file    Highest education level: Not on file   Occupational History    Not on file   Tobacco Use    Smoking status: Every Day     Current packs/day: 0.50     Average packs/day: 0.5 packs/day for 20.0 years (10.0 ttl pk-yrs)     Types: Cigarettes    Smokeless tobacco: Never   Substance and Sexual Activity    Alcohol use: No    Drug use: No    Sexual activity: Not on file   Other

## 2024-01-07 NOTE — PROGRESS NOTES
like substitutions which may escape proof reading.  It such instances, actual meaningcan be extrapolated by contextual diversion.    Micha Hartmann MD  Office: [phone]  Perfect serve / office [phone]

## 2024-01-07 NOTE — PLAN OF CARE
Problem: Chronic Conditions and Co-morbidities  Goal: Patient's chronic conditions and co-morbidity symptoms are monitored and maintained or improved  1/6/2024 2259 by Yefri Roberts RN  Outcome: Progressing  1/6/2024 1324 by Joseline Celeste RN  Outcome: Progressing  Flowsheets (Taken 1/6/2024 0800)  Care Plan - Patient's Chronic Conditions and Co-Morbidity Symptoms are Monitored and Maintained or Improved: Monitor and assess patient's chronic conditions and comorbid symptoms for stability, deterioration, or improvement     Problem: Discharge Planning  Goal: Discharge to home or other facility with appropriate resources  1/6/2024 2259 by Yefri Roberts RN  Outcome: Progressing  1/6/2024 1324 by Joseline Celeste RN  Outcome: Progressing  Flowsheets (Taken 1/6/2024 0800)  Discharge to home or other facility with appropriate resources: Identify barriers to discharge with patient and caregiver     Problem: Respiratory - Adult  Goal: Achieves optimal ventilation and oxygenation  1/6/2024 2259 by Yefri Roberts RN  Outcome: Progressing  1/6/2024 1324 by Joseline Celeste RN  Outcome: Progressing  Flowsheets (Taken 1/6/2024 0800)  Achieves optimal ventilation and oxygenation:   Assess for changes in respiratory status   Assess for changes in mentation and behavior     Problem: Safety - Adult  Goal: Free from fall injury  1/6/2024 2259 by Yefri Roberts RN  Outcome: Progressing  1/6/2024 1324 by Joseline Celeste RN  Outcome: Progressing     Problem: Nutrition Deficit:  Goal: Optimize nutritional status  1/6/2024 2259 by Yefri Roberts RN  Outcome: Progressing  1/6/2024 1324 by Joseline Celeste RN  Outcome: Progressing     Problem: Pain  Goal: Verbalizes/displays adequate comfort level or baseline comfort level  1/6/2024 2259 by Yefri Roberts RN  Outcome: Progressing  Flowsheets (Taken 1/6/2024 1600 by Wheaton, Joseline, RN)  Verbalizes/displays adequate comfort level or baseline comfort

## 2024-01-07 NOTE — PLAN OF CARE
Problem: Chronic Conditions and Co-morbidities  Goal: Patient's chronic conditions and co-morbidity symptoms are monitored and maintained or improved  1/7/2024 0842 by Joseline Celeste RN  Outcome: Progressing  1/6/2024 2259 by Yefri Roberts RN  Outcome: Progressing     Problem: Discharge Planning  Goal: Discharge to home or other facility with appropriate resources  1/7/2024 0842 by Joseline Celeste RN  Outcome: Progressing  1/6/2024 2259 by Yefri Roberts RN  Outcome: Progressing     Problem: Respiratory - Adult  Goal: Achieves optimal ventilation and oxygenation  1/7/2024 0842 by Joseline Celeste RN  Outcome: Progressing  1/6/2024 2259 by Yefri Roberts RN  Outcome: Progressing     Problem: Safety - Adult  Goal: Free from fall injury  1/7/2024 0842 by Joseline Celeste RN  Outcome: Progressing  1/6/2024 2259 by Yefri Roberts RN  Outcome: Progressing     Problem: Nutrition Deficit:  Goal: Optimize nutritional status  1/7/2024 0842 by Joseline Celeste RN  Outcome: Progressing  1/6/2024 2259 by Yefri Roberts RN  Outcome: Progressing     Problem: Pain  Goal: Verbalizes/displays adequate comfort level or baseline comfort level  1/7/2024 0842 by Joseline Celeste RN  Outcome: Progressing  Flowsheets (Taken 1/7/2024 0800)  Verbalizes/displays adequate comfort level or baseline comfort level:   Encourage patient to monitor pain and request assistance   Assess pain using appropriate pain scale  1/6/2024 2259 by Yefri Roberts RN  Outcome: Progressing  Flowsheets (Taken 1/6/2024 1600 by Roula, Joseline, RN)  Verbalizes/displays adequate comfort level or baseline comfort level:   Encourage patient to monitor pain and request assistance   Assess pain using appropriate pain scale     Problem: Skin/Tissue Integrity  Goal: Absence of new skin breakdown  Description: 1.  Monitor for areas of redness and/or skin breakdown  2.  Assess vascular access sites hourly  3.  Every 4-6 hours  minimum:  Change oxygen saturation probe site  4.  Every 4-6 hours:  If on nasal continuous positive airway pressure, respiratory therapy assess nares and determine need for appliance change or resting period.  1/7/2024 0842 by Joseline Celeste RN  Outcome: Progressing  1/6/2024 2259 by Yefri Roberts RN  Outcome: Progressing     Problem: ABCDS Injury Assessment  Goal: Absence of physical injury  1/7/2024 0842 by Joseline Celeste RN  Outcome: Progressing  1/6/2024 2259 by Yefri Roberts RN  Outcome: Progressing

## 2024-01-07 NOTE — PROGRESS NOTES
PULMONARY & CRITICAL CARE MEDICINE PROGRESS NOTE     Patient:  Lee Mejia  MRN: [id number]  Admit date: 12/26/2023  Primary Care Physician: Dee Morrison DO  Consulting Physician: Db Sanchez MD  CODE Status: Full Code  LOS: 11     SUBJECTIVE     CHIEF COMPLAINT/REASON FOR INITIAL CONSULT:  STEMI  BRIEF HOSPITAL COURSE:   The patient is a 63 y.o. male with chronic smoking history and COPD, presented to [facility] with severe chest pain.  He went into V-fib arrest while in the ER at [facility] requiring defibrillation.  He converted into sinus rhythm.  EKG was obtained which was consistent with anterolateral STEMI and the patient was emergently taken to Cath Lab.  He underwent cardiac cath which showed high-grade left main and ostial LAD disease with a dominant left circumflex system.  He was in cardiogenic shock, intra-aortic balloon pump was placed.  He also developed complete heart block during cardiac cath, temporary pacemaker was inserted.  He was admitted in medical ICU at the same [facility] facility where he coded again, had brief CPR after which ROSC was achieved.  He was subsequently transferred to [facility] for CT surgery evaluation for emergent CABG.  Nasal swab also came back positive for COVID.  CT surgery planning on bypass today.  Pulmonology consulted for recommendations prior to surgery, COVID infection and for vent management.  History obtained from the family member/girlfriend.  She denies any sick contact in the family.  But he does go out and meet several people.  She does not think that the patient had cough, fever or breathing difficulty.  Patient is heavy smoker, has been smoking 1-.5 pack per day for last several years.  He uses inhaler at home.  Denies any other significant past medical history.    1/6/2024   Subjective    Post 3 vessel cabg 12/26/23   Patient had femoral thrombectomy on the left and fasciotomy of the left lower leg on 1/4/2024  On  projecting along the superior margin of the aortic knob.   Stable position of endotracheal tube and lower extremity transvenous pacer.         XR CHEST PORTABLE    (Results Pending)        Echocardiogram:   No results found for this or any previous visit.       ASSESSMENT AND PLAN     Assessment:    // Acute hypoxic respiratory failure-extubated  // post left thrombectomy and 4 compartment fasciotomy   //Left lower lobe atelectasis  // post cabg x3 emergent on 12/26/23   // v fib arrest   // cardiogenic shock resolved   // IABP removed   //shock liver improving   // coagulopathy improving   // anterolateral STEMI  //chb - post TPP - currently not requiring pacing .  // covid -19 infection post  remdesivir   //copd   //Anemia, better  //Leukocytosis,     Plan:  IS and deep breathing   Pt ot   Wound care        Total critical care time caring for this patient with life threatening, unstable organ failure, including direct patient contact, management of life support systems, review of data including imaging and labs, discussions with other team members and physicians at least 30   Min so far today, excluding procedures.        Electronically signed by FELIPA PURDY MD on 1/6/2024 at 7:05 PM   Please note that this chart was generated using voice recognition Dragon dictation software.  Although every effort was made to ensure the accuracy of this automated transcription, some errors in transcription may have occurred.

## 2024-01-07 NOTE — PROGRESS NOTES
[facility]   Progress Note                   Date:   1/7/2024  Patient name: Lee Mejia  Date of admission:  12/26/2023  1:07 PM  MRN:   [id number]  YOB: 1960  PCP: Dee Morrison DO    Reason for Admission: STEMI    Subjective:       The patient was seen and evaluated at bedside, remains asymptomatic and hemodynamically stable.   Labs and imaging reviewed.   Vascular surgery okay with resuming antiplatelet.    Medications:   Scheduled Meds:   famotidine (PEPCID) injection  20 mg IntraVENous BID    insulin lispro  0-16 Units SubCUTAneous Q4H    aspirin  81 mg Oral Daily    cefTRIAXone (ROCEPHIN) IV  2,000 mg IntraVENous Q24H    [Held by provider] torsemide  40 mg Oral Daily    carvedilol  6.25 mg Oral BID WC    [Held by provider] naloxegol  12.5 mg Oral QAM AC    [Held by provider] spironolactone  25 mg Oral Daily    [Held by provider] pantoprazole  40 mg IntraVENous Daily    sodium chloride flush  5-40 mL IntraVENous 2 times per day    clopidogrel  75 mg Oral Daily    polyethylene glycol  17 g Oral Daily    sennosides-docusate sodium  1 tablet Oral BID    atorvastatin  20 mg Oral Nightly     Continuous Infusions:   propofol Stopped (01/05/24 0846)    fentaNYL 50 mcg/mL Stopped (01/05/24 0846)    norepinephrine Stopped (01/05/24 0856)    sodium chloride      sodium chloride 10 mL/hr at 12/29/23 1122    [Held by provider] vasopressin 20 Units in sodium chloride 0.9 % 100 mL infusion Stopped (12/28/23 1837)    dexmedeTOMIDine 0.4 mcg/kg/hr (01/06/24 0313)    sodium chloride Stopped (12/29/23 0947)    EPINEPHrine Stopped (12/29/23 0657)    [Held by provider] insulin Stopped (12/27/23 1245)    dextrose       CBC:   Recent Labs     01/05/24  0605 01/05/24  0900 01/06/24  1002 01/07/24  0444   WBC 16.2*  --  19.7* 18.6*   HGB 9.5*  --  11.0* 10.4*   PLT See Reflexed IPF Result 64* 225 221       BMP:    Recent Labs     01/05/24  0605 01/06/24  1002 01/07/24  0444    136 140   K

## 2024-01-08 ENCOUNTER — APPOINTMENT (OUTPATIENT)
Dept: ULTRASOUND IMAGING | Age: 64
DRG: 165 | End: 2024-01-08
Attending: INTERNAL MEDICINE
Payer: MEDICAID

## 2024-01-08 ENCOUNTER — APPOINTMENT (OUTPATIENT)
Dept: GENERAL RADIOLOGY | Age: 64
DRG: 165 | End: 2024-01-08
Attending: INTERNAL MEDICINE
Payer: MEDICAID

## 2024-01-08 LAB
ALBUMIN SERPL-MCNC: 3.5 G/DL (ref 3.5–5.2)
ALBUMIN/GLOB SERPL: 1.1 {RATIO} (ref 1–2.5)
ALP SERPL-CCNC: 125 U/L (ref 40–129)
ALT SERPL-CCNC: 111 U/L (ref 5–41)
ANION GAP SERPL CALCULATED.3IONS-SCNC: 11 MMOL/L (ref 9–17)
AST SERPL-CCNC: 76 U/L
BASOPHILS # BLD: 0 K/UL (ref 0–0.2)
BASOPHILS NFR BLD: 0 % (ref 0–2)
BILIRUB DIRECT SERPL-MCNC: 0.3 MG/DL
BILIRUB INDIRECT SERPL-MCNC: 0.6 MG/DL (ref 0–1)
BILIRUB SERPL-MCNC: 0.9 MG/DL (ref 0.3–1.2)
BUN SERPL-MCNC: 18 MG/DL (ref 8–23)
CA-I BLD-SCNC: 1.15 MMOL/L (ref 1.13–1.33)
CALCIUM SERPL-MCNC: 8.3 MG/DL (ref 8.6–10.4)
CHLORIDE SERPL-SCNC: 105 MMOL/L (ref 98–107)
CK SERPL-CCNC: 350 U/L (ref 39–308)
CK SERPL-CCNC: 511 U/L (ref 39–308)
CO2 SERPL-SCNC: 18 MMOL/L (ref 20–31)
CREAT SERPL-MCNC: 1.1 MG/DL (ref 0.7–1.2)
EOSINOPHIL # BLD: 0 K/UL (ref 0–0.4)
EOSINOPHILS RELATIVE PERCENT: 0 % (ref 1–4)
ERYTHROCYTE [DISTWIDTH] IN BLOOD BY AUTOMATED COUNT: 15.7 % (ref 11.8–14.4)
GFR SERPL CREATININE-BSD FRML MDRD: >60 ML/MIN/1.73M2
GLUCOSE BLD-MCNC: 114 MG/DL (ref 75–110)
GLUCOSE BLD-MCNC: 95 MG/DL (ref 75–110)
GLUCOSE BLD-MCNC: 97 MG/DL (ref 75–110)
GLUCOSE FLD-MCNC: 113 MG/DL
GLUCOSE SERPL-MCNC: 114 MG/DL (ref 70–99)
HCT VFR BLD AUTO: 38.7 % (ref 40.7–50.3)
HGB BLD-MCNC: 11.7 G/DL (ref 13–17)
IMM GRANULOCYTES # BLD AUTO: 0 K/UL (ref 0–0.3)
IMM GRANULOCYTES NFR BLD: 0 %
LDH FLD L TO P-CCNC: 598 U/L
LYMPHOCYTES NFR BLD: 1.89 K/UL (ref 1–4.8)
LYMPHOCYTES RELATIVE PERCENT: 7 % (ref 24–44)
MAGNESIUM SERPL-MCNC: 2.3 MG/DL (ref 1.6–2.6)
MCH RBC QN AUTO: 28.8 PG (ref 25.2–33.5)
MCHC RBC AUTO-ENTMCNC: 30.2 G/DL (ref 28.4–34.8)
MCV RBC AUTO: 95.3 FL (ref 82.6–102.9)
MONOCYTES NFR BLD: 0.81 K/UL (ref 0.1–0.8)
MONOCYTES NFR BLD: 3 % (ref 1–7)
MORPHOLOGY: ABNORMAL
MYOGLOBIN SERPL-MCNC: 120 NG/ML (ref 28–72)
MYOGLOBIN SERPL-MCNC: 164 NG/ML (ref 28–72)
NEUTROPHILS NFR BLD: 90 % (ref 36–66)
NEUTS SEG NFR BLD: 24.3 K/UL (ref 1.8–7.7)
NRBC BLD-RTO: 0 PER 100 WBC
PHOSPHATE SERPL-MCNC: 2.6 MG/DL (ref 2.5–4.5)
PLATELET # BLD AUTO: 291 K/UL (ref 138–453)
PMV BLD AUTO: 11.3 FL (ref 8.1–13.5)
POTASSIUM SERPL-SCNC: 4.5 MMOL/L (ref 3.7–5.3)
PROT FLD-MCNC: 2.7 G/DL
PROT SERPL-MCNC: 6.8 G/DL (ref 6.4–8.3)
RBC # BLD AUTO: 4.06 M/UL (ref 4.21–5.77)
SODIUM SERPL-SCNC: 134 MMOL/L (ref 135–144)
SPECIMEN TYPE: NORMAL
WBC OTHER # BLD: 27 K/UL (ref 3.5–11.3)

## 2024-01-08 PROCEDURE — 2700000000 HC OXYGEN THERAPY PER DAY

## 2024-01-08 PROCEDURE — 85025 COMPLETE CBC W/AUTO DIFF WBC: CPT

## 2024-01-08 PROCEDURE — 99233 SBSQ HOSP IP/OBS HIGH 50: CPT | Performed by: INTERNAL MEDICINE

## 2024-01-08 PROCEDURE — 99232 SBSQ HOSP IP/OBS MODERATE 35: CPT | Performed by: PHYSICAL MEDICINE & REHABILITATION

## 2024-01-08 PROCEDURE — 80076 HEPATIC FUNCTION PANEL: CPT

## 2024-01-08 PROCEDURE — 6370000000 HC RX 637 (ALT 250 FOR IP): Performed by: SURGERY

## 2024-01-08 PROCEDURE — 82945 GLUCOSE OTHER FLUID: CPT

## 2024-01-08 PROCEDURE — 83735 ASSAY OF MAGNESIUM: CPT

## 2024-01-08 PROCEDURE — 82947 ASSAY GLUCOSE BLOOD QUANT: CPT

## 2024-01-08 PROCEDURE — 2060000000 HC ICU INTERMEDIATE R&B

## 2024-01-08 PROCEDURE — 83615 LACTATE (LD) (LDH) ENZYME: CPT

## 2024-01-08 PROCEDURE — 6370000000 HC RX 637 (ALT 250 FOR IP): Performed by: STUDENT IN AN ORGANIZED HEALTH CARE EDUCATION/TRAINING PROGRAM

## 2024-01-08 PROCEDURE — 89051 BODY FLUID CELL COUNT: CPT

## 2024-01-08 PROCEDURE — 94761 N-INVAS EAR/PLS OXIMETRY MLT: CPT

## 2024-01-08 PROCEDURE — 6370000000 HC RX 637 (ALT 250 FOR IP)

## 2024-01-08 PROCEDURE — 97530 THERAPEUTIC ACTIVITIES: CPT

## 2024-01-08 PROCEDURE — 80048 BASIC METABOLIC PNL TOTAL CA: CPT

## 2024-01-08 PROCEDURE — 71045 X-RAY EXAM CHEST 1 VIEW: CPT

## 2024-01-08 PROCEDURE — 2709999900 US THORACENTESIS

## 2024-01-08 PROCEDURE — 87205 SMEAR GRAM STAIN: CPT

## 2024-01-08 PROCEDURE — 0W9B3ZZ DRAINAGE OF LEFT PLEURAL CAVITY, PERCUTANEOUS APPROACH: ICD-10-PCS | Performed by: RADIOLOGY

## 2024-01-08 PROCEDURE — 97535 SELF CARE MNGMENT TRAINING: CPT

## 2024-01-08 PROCEDURE — 2500000003 HC RX 250 WO HCPCS: Performed by: STUDENT IN AN ORGANIZED HEALTH CARE EDUCATION/TRAINING PROGRAM

## 2024-01-08 PROCEDURE — 82330 ASSAY OF CALCIUM: CPT

## 2024-01-08 PROCEDURE — 97606 NEG PRS WND THER DME>50 SQCM: CPT

## 2024-01-08 PROCEDURE — 88112 CYTOPATH CELL ENHANCE TECH: CPT

## 2024-01-08 PROCEDURE — 87075 CULTR BACTERIA EXCEPT BLOOD: CPT

## 2024-01-08 PROCEDURE — 6360000002 HC RX W HCPCS: Performed by: SURGERY

## 2024-01-08 PROCEDURE — 83986 ASSAY PH BODY FLUID NOS: CPT

## 2024-01-08 PROCEDURE — 87070 CULTURE OTHR SPECIMN AEROBIC: CPT

## 2024-01-08 PROCEDURE — 36415 COLL VENOUS BLD VENIPUNCTURE: CPT

## 2024-01-08 PROCEDURE — 82550 ASSAY OF CK (CPK): CPT

## 2024-01-08 PROCEDURE — 83874 ASSAY OF MYOGLOBIN: CPT

## 2024-01-08 PROCEDURE — 2580000003 HC RX 258: Performed by: STUDENT IN AN ORGANIZED HEALTH CARE EDUCATION/TRAINING PROGRAM

## 2024-01-08 PROCEDURE — 84157 ASSAY OF PROTEIN OTHER: CPT

## 2024-01-08 PROCEDURE — 84100 ASSAY OF PHOSPHORUS: CPT

## 2024-01-08 PROCEDURE — A4216 STERILE WATER/SALINE, 10 ML: HCPCS | Performed by: STUDENT IN AN ORGANIZED HEALTH CARE EDUCATION/TRAINING PROGRAM

## 2024-01-08 PROCEDURE — 88305 TISSUE EXAM BY PATHOLOGIST: CPT

## 2024-01-08 RX ORDER — ATORVASTATIN CALCIUM 80 MG/1
80 TABLET, FILM COATED ORAL NIGHTLY
Status: DISCONTINUED | OUTPATIENT
Start: 2024-01-08 | End: 2024-01-11 | Stop reason: HOSPADM

## 2024-01-08 RX ORDER — CARVEDILOL 12.5 MG/1
12.5 TABLET ORAL 2 TIMES DAILY WITH MEALS
Status: DISCONTINUED | OUTPATIENT
Start: 2024-01-08 | End: 2024-01-11 | Stop reason: HOSPADM

## 2024-01-08 RX ORDER — METOPROLOL TARTRATE 1 MG/ML
5 INJECTION, SOLUTION INTRAVENOUS EVERY 6 HOURS PRN
Status: DISCONTINUED | OUTPATIENT
Start: 2024-01-08 | End: 2024-01-11 | Stop reason: HOSPADM

## 2024-01-08 RX ORDER — ACETAMINOPHEN 325 MG/1
650 TABLET ORAL EVERY 4 HOURS PRN
Status: DISCONTINUED | OUTPATIENT
Start: 2024-01-08 | End: 2024-01-11 | Stop reason: HOSPADM

## 2024-01-08 RX ORDER — CHOLECALCIFEROL (VITAMIN D3) 125 MCG
5 CAPSULE ORAL NIGHTLY PRN
Status: DISCONTINUED | OUTPATIENT
Start: 2024-01-08 | End: 2024-01-11 | Stop reason: HOSPADM

## 2024-01-08 RX ORDER — IBUPROFEN 400 MG/1
800 TABLET ORAL EVERY 6 HOURS PRN
Status: DISCONTINUED | OUTPATIENT
Start: 2024-01-08 | End: 2024-01-08

## 2024-01-08 RX ADMIN — TRAMADOL HYDROCHLORIDE 50 MG: 50 TABLET, COATED ORAL at 10:31

## 2024-01-08 RX ADMIN — SODIUM CHLORIDE, PRESERVATIVE FREE 20 MG: 5 INJECTION INTRAVENOUS at 20:52

## 2024-01-08 RX ADMIN — ACETAMINOPHEN 650 MG: 325 TABLET ORAL at 08:16

## 2024-01-08 RX ADMIN — Medication 2000 MG: at 12:08

## 2024-01-08 RX ADMIN — ASPIRIN 81 MG 81 MG: 81 TABLET ORAL at 08:17

## 2024-01-08 RX ADMIN — SODIUM CHLORIDE, PRESERVATIVE FREE 20 MG: 5 INJECTION INTRAVENOUS at 08:17

## 2024-01-08 RX ADMIN — ATORVASTATIN CALCIUM 80 MG: 80 TABLET, FILM COATED ORAL at 20:52

## 2024-01-08 RX ADMIN — ACETAMINOPHEN 650 MG: 325 TABLET ORAL at 20:52

## 2024-01-08 RX ADMIN — CARVEDILOL 12.5 MG: 12.5 TABLET, FILM COATED ORAL at 16:53

## 2024-01-08 RX ADMIN — Medication 5 MG: at 20:52

## 2024-01-08 RX ADMIN — CLOPIDOGREL BISULFATE 75 MG: 75 TABLET ORAL at 08:17

## 2024-01-08 RX ADMIN — CARVEDILOL 6.25 MG: 3.12 TABLET, FILM COATED ORAL at 08:17

## 2024-01-08 RX ADMIN — ACETAMINOPHEN 650 MG: 325 TABLET ORAL at 00:03

## 2024-01-08 RX ADMIN — TRAMADOL HYDROCHLORIDE 50 MG: 50 TABLET, COATED ORAL at 16:53

## 2024-01-08 RX ADMIN — DIPHENHYDRAMINE HYDROCHLORIDE 25 MG: 25 TABLET ORAL at 20:52

## 2024-01-08 ASSESSMENT — PAIN SCALES - GENERAL
PAINLEVEL_OUTOF10: 4
PAINLEVEL_OUTOF10: 5
PAINLEVEL_OUTOF10: 5
PAINLEVEL_OUTOF10: 6
PAINLEVEL_OUTOF10: 3
PAINLEVEL_OUTOF10: 6

## 2024-01-08 ASSESSMENT — PAIN DESCRIPTION - DESCRIPTORS
DESCRIPTORS: ACHING;DISCOMFORT;DULL
DESCRIPTORS: ACHING;DISCOMFORT
DESCRIPTORS: ACHING;DISCOMFORT;DULL
DESCRIPTORS: ACHING
DESCRIPTORS: ACHING;DISCOMFORT

## 2024-01-08 ASSESSMENT — PAIN DESCRIPTION - LOCATION
LOCATION: LEG

## 2024-01-08 ASSESSMENT — PAIN DESCRIPTION - ORIENTATION
ORIENTATION: LEFT

## 2024-01-08 NOTE — PLAN OF CARE
adequate comfort level or baseline comfort level:   Encourage patient to monitor pain and request assistance   Assess pain using appropriate pain scale  Taken 1/7/2024 1200 by Joseline Celeste RN  Verbalizes/displays adequate comfort level or baseline comfort level:   Encourage patient to monitor pain and request assistance   Assess pain using appropriate pain scale  1/7/2024 0842 by Joseline Celeste RN  Outcome: Progressing  Flowsheets (Taken 1/7/2024 0800)  Verbalizes/displays adequate comfort level or baseline comfort level:   Encourage patient to monitor pain and request assistance   Assess pain using appropriate pain scale     Problem: Skin/Tissue Integrity  Goal: Absence of new skin breakdown  Description: 1.  Monitor for areas of redness and/or skin breakdown  2.  Assess vascular access sites hourly  3.  Every 4-6 hours minimum:  Change oxygen saturation probe site  4.  Every 4-6 hours:  If on nasal continuous positive airway pressure, respiratory therapy assess nares and determine need for appliance change or resting period.  1/7/2024 0842 by Joseline Celeste RN  Outcome: Progressing     Problem: ABCDS Injury Assessment  Goal: Absence of physical injury  1/7/2024 0842 by Joseline Celeste RN  Outcome: Progressing

## 2024-01-08 NOTE — PROGRESS NOTES
PULMONARY & CRITICAL CARE MEDICINE PROGRESS NOTE     Patient:  Lee Mejia  MRN: [id number]  Admit date: 12/26/2023  Primary Care Physician: Dee Morrison DO  Consulting Physician: Db Sanchez MD  CODE Status: Full Code  LOS: 13     SUBJECTIVE     CHIEF COMPLAINT/REASON FOR INITIAL CONSULT:  STEMI  BRIEF HOSPITAL COURSE:   The patient is a 63 y.o. male with chronic smoking history and COPD, presented to [facility] with severe chest pain.  He went into V-fib arrest while in the ER at [facility] requiring defibrillation.  He converted into sinus rhythm.  EKG was obtained which was consistent with anterolateral STEMI and the patient was emergently taken to Cath Lab.  He underwent cardiac cath which showed high-grade left main and ostial LAD disease with a dominant left circumflex system.  He was in cardiogenic shock, intra-aortic balloon pump was placed.  He also developed complete heart block during cardiac cath, temporary pacemaker was inserted.  He was admitted in medical ICU at the same [facility] facility where he coded again, had brief CPR after which ROSC was achieved.  He was subsequently transferred to [facility] for CT surgery evaluation for emergent CABG.  Nasal swab also came back positive for COVID.  CT surgery planning on bypass today.  Pulmonology consulted for recommendations prior to surgery, COVID infection and for vent management.  History obtained from the family member/girlfriend.  She denies any sick contact in the family.  But he does go out and meet several people.  She does not think that the patient had cough, fever or breathing difficulty.  Patient is heavy smoker, has been smoking 1-.5 pack per day for last several years.  He uses inhaler at home.  Denies any other significant past medical history.    1/8/2024   Subjective    Post 3 vessel cabg 12/26/23   Patient had femoral thrombectomy on the left and fasciotomy of the left lower leg on 1/4/2024 ,  2,000 mg IntraVENous Q24H    [Held by provider] torsemide  40 mg Oral Daily    [Held by provider] naloxegol  12.5 mg Oral QAM AC    [Held by provider] spironolactone  25 mg Oral Daily    [Held by provider] pantoprazole  40 mg IntraVENous Daily    sodium chloride flush  5-40 mL IntraVENous 2 times per day    clopidogrel  75 mg Oral Daily    polyethylene glycol  17 g Oral Daily    sennosides-docusate sodium  1 tablet Oral BID     Continuous Infusions:   sodium chloride      sodium chloride 10 mL/hr at 12/29/23 1122    sodium chloride Stopped (12/29/23 0947)    EPINEPHrine Stopped (12/29/23 0657)    dextrose         INPUT/OUTPUT:  In: 825.1 [P.O.:600; I.V.:225.1]  Out: 2172 [Urine:1972; Drains:200]  Date 01/08/24 0000 - 01/08/24 2359   Shift 8244-6404 7780-4563 0337-0118 24 Hour Total   INTAKE   Shift Total(mL/kg)       OUTPUT   Urine(mL/kg/hr) 700(1.4)   700   Drains(mL/kg) 200(3.3)   200(3.3)   Shift Total(mL/kg) 900(14.8)   900(14.8)   Weight (kg) 61 61 61 61        LABS:  ABGs:   No results for input(s): \"POCPH\", \"POCPCO2\", \"POCPO2\", \"POCHCO3\", \"YLPG6LWL\" in the last 72 hours.    CBC:   Recent Labs     01/06/24  1002 01/07/24  0444 01/08/24  0658   WBC 19.7* 18.6* 27.0*   HGB 11.0* 10.4* 11.7*   HCT 33.5* 31.9* 38.7*   MCV 88.9 89.4 95.3    221 291   LYMPHOPCT 9* 10* 7*   RBC 3.77* 3.57* 4.06*   MCH 29.2 29.1 28.8   MCHC 32.8 32.6 30.2   RDW 15.3* 15.2* 15.7*     CRP:   No results for input(s): \"CRP\" in the last 72 hours.  LDH:   No results for input(s): \"LDH\" in the last 72 hours.  BMP:   Recent Labs     01/06/24  1002 01/07/24  0444 01/08/24  0658    140 134*   K 4.5 4.6 4.5    105 105   CO2 24 23 18*   BUN 22 21 18   CREATININE 1.1 1.0 1.1   GLUCOSE 126* 95 114*   MG 2.0 1.9 2.3   PHOS 2.5 2.8 2.6     Liver Function Test:   Recent Labs     01/08/24  0658   PROT PENDING   LABALBU PENDING   ALT PENDING   AST PENDING   ALKPHOS PENDING   BILITOT PENDING     Coagulation Profile:   No results for

## 2024-01-08 NOTE — PROGRESS NOTES
Comprehensive Nutrition Assessment    Type and Reason for Visit:  Reassess    Nutrition Recommendations/Plan:   Continue current diet, Regular  Will send Diabetic ONS BID  Monitor/encourage PO intake     Malnutrition Assessment:  Malnutrition Status:  At risk for malnutrition (Comment) (01/08/24 1146)    Context:  Acute Illness     Findings of the 6 clinical characteristics of malnutrition:  Energy Intake:  75% or less of estimated energy requirements for 7 or more days (predicted)  Weight Loss:  Unable to assess (Weight fluctuations noted.)     Body Fat Loss:  Unable to assess     Muscle Mass Loss:  Unable to assess    Fluid Accumulation:  Mild Extremities, Generalized   Strength:  Not Performed    Nutrition Assessment:    Discussed with RN, pt tolerating diet, but refuses to eat unless his wife feeds him, despite being capable to feed self. RN notes pt drinking well, previously was accepting of ONS, will reorder. PO intake variable per chart 1-50% of meals. LBM 1/8. +1 BUE/RLE, +2 LLE edema noted. Wt fluctuation noted, fluid?, will monitor.    Nutrition Related Findings:    labs/meds reviewed Wound Type: Multiple, Surgical Incision, Wound Vac       Current Nutrition Intake & Therapies:    Average Meal Intake: 1-25%, 26-50%  Average Supplements Intake: None Ordered  ADULT DIET; Regular; No Pork  ADULT ORAL NUTRITION SUPPLEMENT; Breakfast, Dinner; Diabetic Oral Supplement    Anthropometric Measures:  Height: 162.6 cm (5' 4.02\")  Ideal Body Weight (IBW): 130 lbs (59 kg)    Admission Body Weight: 53.1 kg (117 lb 1 oz) (estimated)  Current Body Weight: 61 kg (134 lb 7.7 oz) (1/6/24), 103.4 % IBW. Weight Source: Bed Scale  Current BMI (kg/m2): 23.1        Weight Adjustment For: No Adjustment                 BMI Categories: Normal Weight (BMI 18.5-24.9)    Estimated Daily Nutrient Needs:  Energy Requirements Based On: Kcal/kg  Weight Used for Energy Requirements: Current  Energy (kcal/day): 9908-0070  kcals/day  Weight Used for Protein Requirements: Ideal  Protein (g/day):   Method Used for Fluid Requirements: Other (Comment)  Fluid (ml/day): per MD    Nutrition Diagnosis:   Inadequate oral intake related to  (recent extubation; current condition; appetite) as evidenced by intake 0-25%, intake 26-50% (variable PO intakes; need for ONS)    Nutrition Interventions:   Food and/or Nutrient Delivery: Continue Current Diet, Start Oral Nutrition Supplement  Nutrition Education/Counseling: No recommendation at this time  Coordination of Nutrition Care: Continue to monitor while inpatient  Plan of Care discussed with: RN    Goals:  Previous Goal Met: No Progress toward Goal(s)  Goals: Meet at least 75% of estimated needs, prior to discharge       Nutrition Monitoring and Evaluation:   Behavioral-Environmental Outcomes: None Identified  Food/Nutrient Intake Outcomes: Food and Nutrient Intake, Supplement Intake  Physical Signs/Symptoms Outcomes: Biochemical Data, GI Status, Constipation, Fluid Status or Edema, Hemodynamic Status, Weight, Skin, Nutrition Focused Physical Findings    Discharge Planning:    Too soon to determine     Shaunna Dee, MS, RD, LD  Contact:   Floor Mobile: [phone]  Desk Phone: [phone]  RD Weekend Mobile: [phone]

## 2024-01-08 NOTE — PROGRESS NOTES
[facility]   Progress Note                   Date:   1/8/2024  Patient name: Lee Mejia  Date of admission:  12/26/2023  1:07 PM  MRN:   [id number]  YOB: 1960  PCP: Dee Morrison DO    Reason for Admission: STEMI    Subjective:       Seen and examined at bedside.  Patient febrile this a.m., temperature 39.3.  Hypertensive overnight.  Patient complaining of shortness of breath on minimal exertion    Medications:   Scheduled Meds:   famotidine (PEPCID) injection  20 mg IntraVENous BID    insulin lispro  0-16 Units SubCUTAneous Q4H    aspirin  81 mg Oral Daily    cefTRIAXone (ROCEPHIN) IV  2,000 mg IntraVENous Q24H    [Held by provider] torsemide  40 mg Oral Daily    carvedilol  6.25 mg Oral BID WC    [Held by provider] naloxegol  12.5 mg Oral QAM AC    [Held by provider] spironolactone  25 mg Oral Daily    [Held by provider] pantoprazole  40 mg IntraVENous Daily    sodium chloride flush  5-40 mL IntraVENous 2 times per day    clopidogrel  75 mg Oral Daily    polyethylene glycol  17 g Oral Daily    sennosides-docusate sodium  1 tablet Oral BID    atorvastatin  20 mg Oral Nightly     Continuous Infusions:   propofol Stopped (01/05/24 0846)    fentaNYL 50 mcg/mL Stopped (01/05/24 0846)    norepinephrine Stopped (01/05/24 0856)    sodium chloride      sodium chloride 10 mL/hr at 12/29/23 1122    [Held by provider] vasopressin 20 Units in sodium chloride 0.9 % 100 mL infusion Stopped (12/28/23 1837)    dexmedeTOMIDine 0.4 mcg/kg/hr (01/06/24 0313)    sodium chloride Stopped (12/29/23 0947)    EPINEPHrine Stopped (12/29/23 0657)    [Held by provider] insulin Stopped (12/27/23 1245)    dextrose       CBC:   Recent Labs     01/06/24  1002 01/07/24 0444 01/08/24 0658   WBC 19.7* 18.6* 27.0*   HGB 11.0* 10.4* 11.7*    221 291       BMP:    Recent Labs     01/06/24  1002 01/07/24 0444 01/08/24 0658    140 134*   K 4.5 4.6 4.5    105 105   CO2 24 23 18*   BUN 22 21 18  LAD with TESSIE-3 flow     LCX: Dominant with luminal irregularities of 20 to 30%     RCA: Luminal irregularities of 20 to 30%     The LV gram was performed in the WITT 30 position.   LVEF: 35%. LV Wall Motion: Anteroapical akinesis     Conclusions:  High-grade left main and ostial LAD disease.  Dominant left circumflex system  Moderately reduced LV function  Cardiogenic shock  Short episode of complete heart block during cardiac cath     Recommendation:  CT consult for emergent CABG  Intra-aortic balloon pump was inserted via her left femoral approach  Transvenous temporary pacemaker inserted via left femoral vein  Supportive care on vent and vasopressors  Transfer emergently to [facility] for CABG    Assessment:   Anterolateral STEMI s/p LHC with high-grade left main and ostial LAD disease s/p emergent CABG x3 12/26/23 [LIMA to the first diagonal, saphenous vein graft to the distal LAD, saphenous vein graft to the OM1.]  V-fib cardiac arrest requiring defibrillation x 1 before cardiac cath.  PEA cardiac arrest after cardiac cath requiring CPR for less than a minute with achievement of ROSC.  Improved LVEF to 65% on echo 1/2/24  Flash pulmonary edema  Cardiogenic shock - IABP placed 12/26/23 and removed on 12/30/2023  Complete heart block during cardiac cath s/p TPM 12/26/23  Respiratory failure secondary to cardiac arrest  DM  Shock liver 12/28, improving  Elevated INR  Hypernatermia, resolved,   Anemia  IABP was removed 12/30/23. Extubated on 12/30/23  Acute thrombosis of left external iliac and common femoral artery status post thrombectomy via left groin and 4 compartment fasciotomy    Plan:     Continue Aspirin 81 mg daily, Plavix 75 mg daily, increase Coreg 12.5p.o. twice daily.  Lopressor 5mg IV for HR >100  Resume lipitor and amiodarone, LFTs have down trended.   Cardiac rehab after discharge.   Continue PT/OT and cardiac rehab. Incentive spirometry.   Pulmonology and vascular surgery

## 2024-01-08 NOTE — PROGRESS NOTES
Physical Therapy        Physical Therapy Cancel Note      DATE: 2024    NAME: Lee Mejia  MRN: [id number]   : 1960      Patient not seen this date for Physical Therapy due to: SOB    Patient Declined: Pt refused to do therapy this date.       Electronically signed by Roberto Loaiza PTA on 2024 at 2:06 PM

## 2024-01-08 NOTE — BRIEF OP NOTE
Brief Postoperative Note for Thoracentesis    Lee Caceresqmoemma  YOB: 1960  [id number]    Pre-operative Diagnosis: left Pleural effusion      Post-operative Diagnosis: Same    Procedure: Ultrasound guided Thoracentesis     Anesthesia: 1% Lidocaine     Surgeons/Assistants: Harvey Hernandez PA-C    Complications: none    EBL: Minimal    Specimens: were obtained    Ultrasound guided left thoracentesis performed. 550 ml serosang fluid obtained. Dressing applied.      Electronically signed by ARLENE De Santiago on 1/8/2024 at 4:41 PM

## 2024-01-08 NOTE — PROGRESS NOTES
Occupational Therapy  Facility/Department: [facility] CAR 1- SICU  Occupational Daily Treatment Note    Name: Lee Mejia  : 1960  MRN: [id number]  Date of Service: 2024    Discharge Recommendations:  Patient would benefit from continued therapy after discharge  Patient Diagnosis(es): The primary encounter diagnosis was Left main coronary artery disease. Diagnoses of S/P CABG x 3, NSTEMI (non-ST elevation myocardial infarction) (HCC), COVID, Left leg swelling, and Femoral thrombosis (HCC) were also pertinent to this visit.  Past Medical History:  has a past medical history of ATN (acute tubular necrosis) (HCC), COPD (chronic obstructive pulmonary disease) (HCC), COPD (chronic obstructive pulmonary disease) (HCC), Diabetes mellitus (HCC), Diabetes mellitus (HCC), Left main coronary artery disease, and Mixed hyperlipidemia.  Past Surgical History:  has a past surgical history that includes Tooth Extraction (14); Colonoscopy (2015); Cardiac procedure (N/A, 2023); Cardiac procedure (N/A, 2023); Cardiac procedure (N/A, 2023); Cardiac procedure (N/A, 2023); Coronary artery bypass graft (N/A, 2023); Cardiac procedure (N/A, 2023); Cardiac procedure (N/A, 2023); Cardiac procedure (N/A, 2023); and Femoral-femoral Bypass Graft (Left, 2024).    Treatment Diagnosis: CABG X3  Assessment   Performance deficits / Impairments: Decreased functional mobility ;Decreased endurance;Decreased ADL status;Decreased coordination;Decreased ROM;Decreased balance;Decreased strength;Decreased safe awareness;Decreased cognition;Decreased fine motor control  Treatment Diagnosis: CABG X3  Prognosis: Good  Activity Tolerance  Activity Tolerance: Patient limited by fatigue;Patient limited by pain;Treatment limited secondary to decreased cognition        Plan   Occupational Therapy Plan  Times Per Week: 4-6/wk (CABG)  Current Treatment Recommendations: Strengthening, ROM, Balance  listening provided to pt with fair return. Pt educated on sternal precautions/heart hugger with fair return. Heart hugger donned at start of session. Pt completed simple grooming seated in chair, pt declining further ADL care this day d/t fatigue/pain. Pt continues to display decreased  strength B hands requiring use of red foam built up handle on toothbrusah. Pt completed sit/stand transfer using RW for static standing at chair. Pt returned to seated in chair. Pt educated on use of incentive spirometer/acapella, pt verbalized/demo understanding. Pt retired to seated in chair at end of session with all needs met.     Transfers  Sit to stand: Contact guard assistance  Stand to sit: Contact guard assistance  Transfer Comments: Verbal cues for hand placement with fair return. Therapist held heart hugger for transfers.     Cognition  Overall Cognitive Status: Exceptions  Arousal/Alertness: Appropriate responses to stimuli  Following Commands: Follows multistep commands with increased time  Attention Span: Attends with cues to redirect  Memory: Decreased recall of precautions  Safety Judgement: Decreased awareness of need for assistance;Decreased awareness of need for safety  Problem Solving: Assistance required to identify errors made;Assistance required to correct errors made  Insights: Decreased awareness of deficits  Initiation: Requires cues for some  Sequencing: Requires cues for some  Orientation  Overall Orientation Status: Within Functional Limits     Education Given To: Patient  Education Provided Comments: OT POC, transfer/walker safety, importance of participation in therapy, pursed lip breathing, sternal precautions, heart hugger, incentive spirometer/acapella with fair return.  Barriers to Learning: Cognition  AM-PAC - ADL  AM-PAC Daily Activity - Inpatient   How much help is needed for putting on and taking off regular lower body clothing?: A Lot  How much help is needed for bathing (which includes

## 2024-01-08 NOTE — PROGRESS NOTES
Physical Medicine & Rehabilitation  Progress Note    1/8/2024 1:07 PM     CC: Ambulatory and ADL dysfunction due to debility second coronary disease with STEMI and cardiac arrest status post emergent CABG in 12/26, COVID-19 detected 12/26    Patient left and right pleural effusions plan for thoracentesis continue antibiotics per ID recommend DC Stevenson    Care following left leg wound    Anterolateral ST MI emergent CABG x 3, V-fib cardiac arrest PEA cardiac arrest with cardiac cath flash pulmonary edema acute thrombosis of left external iliac and common femoral artery status post thrombectomy-continue aspirin and Plavix nephrology following for SANDHYA    Vascular 1/6-Ischemic left lower extremity sensorimotor loss status post thrombectomy and 4 compartment fasciotomy-wound VAC placement, pending takeback to the OR for wound debridement in about a week    Subjective:   Doing well otherwise no complaints wife present states patient's had multiple medical setbacks    ROS:  Denies fevers, chills, sweats.  No chest pain, palpitations, lightheadedness.  Denies coughing, wheezing or shortness of breath.  Denies abdominal pain, nausea, diarrhea or constipation.  No new areas of joint pain.  Denies new areas of numbness or weakness.  Denies new anxiety or depression issues.  No new skin problems.    Rehabilitation:   PT:  Restrictions/Precautions: Surgical Protocols, Up as Tolerated, Fall Risk, Cardiac  Sternal Precautions: CABG X3 12/26/23  Other position/activity restrictions: ambulate, up in chair, 12/30 extubated. Stevenson catheter. O2 NC 3 Lm. Wound vac LLE.  Required Braces or Orthoses  Other: Heart Hugger Brace   Transfers  Sit to Stand: Minimal Assistance  Stand to Sit: Minimal Assistance  Stand Pivot Transfers: Minimal Assistance  Comment: Pt performed STS transfer from [facility] with RW and 2nd trial from EOC. Pt required cues to heart hugger during transfers..  Ambulation  Surface: Level tile  Device: Rolling Walker  Other

## 2024-01-08 NOTE — PROGRESS NOTES
[facility] Nurse  Follow Up      NAME:  Lee Mejia  MEDICAL RECORD NUMBER:  [id number]  AGE: 63 y.o.   GENDER: male  : 1960  TODAY'S DATE:  2024    Subjective:     Reason for WOCN Evaluation and Assessment: \"Left lower leg fasciotomy wound, would like wound vac placed.  Please time with Dr. Solorzano to be at bedside for possible suture ligation of wound\"      Lee Mejia is a 63 y.o. male referred by:   [x] Physician  [] Nursing  [] Other:      Wound Identification:  Wound Type:  surgical  Contributing Factors: edema, arterial insufficiency, and decreased tissue oxygenation            Objective:      /78   Pulse 84   Temp (!) 101.7 °F (38.7 °C)   Resp 22   Ht 1.626 m (5' 4.02\")   Wt 61 kg (134 lb 7.7 oz)   SpO2 97%   BMI 23.07 kg/m²   Emerson Risk Score: Emerson Scale Score: 15    LABS    CBC:   Lab Results   Component Value Date/Time    WBC 27.0 2024 06:58 AM    RBC 4.06 2024 06:58 AM    HGB 11.7 2024 06:58 AM    HCT 38.7 2024 06:58 AM     CMP:  Albumin:    Lab Results   Component Value Date/Time    LABALBU PENDING 2024 06:58 AM     PT/INR:    Lab Results   Component Value Date/Time    PROTIME 18.1 2023 04:10 AM    INR 1.5 2023 04:10 AM     HgBA1c:    Lab Results   Component Value Date/Time    LABA1C 5.8 2023 04:42 PM     PTT: No components found for: \"LABPTT\"      Assessment:       Measurements:       24 1205   Wound 24 Leg Left;Lower   Date First Assessed: 24   Primary Wound Type: Surgical Type  Location: Leg  Wound Location Orientation: Left;Lower   Wound Image    Wound Etiology Surgical   Dressing Status New dressing applied   Wound Cleansed Cleansed with saline   Dressing/Treatment Negative pressure wound therapy   Dressing Change Due 01/10/24   Wound Assessment Subcutaneous;Exposed structure muscle;Dusky   Drainage Amount Moderate (25-50%)   Drainage Description Serosanguinous   Odor None  and as needed after incontinent episodes.     [x] Perform routine incontinence care with use of foam cleanser.     [x] Use single layer moisture wicking underpad.     [x] Use comfort glide system and wedges to reposition patient.     [x] Keep the head of the bed below 30 degrees unless contraindicated.     [x] Pressure reducing chair cushion while up to chair. Reposition every hour while in chair and limit chair time to 2 hour intervals.     [x] Encourage good nutritional intake and fluids. Consult dietician if needed.     Specialty Bed Required : Yes: Isolibrium surface in use   [x] Low Air Loss   [x] Pressure Redistribution  [] Fluid Immersion  [] Bariatric  [] Total Pressure Relief  [] Other:      Discharge Plan:  TBD     Patient/Caregiver Teaching:  Reviewed with patient we will change the dressing again on Wednesday. Reviewed indications and care explained as provided.   Level of patient/caregiver understanding:    [] Indicates understanding                [x] Needs reinforcement  [] Unsuccessful                                  [x] Verbal Understanding  [] Demonstrated understanding        [] No evidence of learning  [] Refused teaching                           [] N/A     Contact the Wound Ostomy RN on-call during working hours Monday-Friday [phone] via [employer] by searching \"wound\" under \"groups\" and selecting the on-call clinician. Sending messages via individual names will not reach a clinician.        Electronically signed by Katherine Rogers RN, CWON on 1/8/2024 at 12:15 PM

## 2024-01-08 NOTE — PROGRESS NOTES
Thoracentesis: via left chest    Milka Main RDMS      Patient to US room 8, identified, allergies confirmed. Sitting up, monitors on. Stable.             Time out complete. Prep to left back  per Harvey Lancaster    .   550 ML of serosanguinous fluid drawn off. yes orders for sample at this time. 2x2 with Tegaderm to left back puncture. No problems noted.       Patient tolerated well. Report called to rajwinder RADFORD. Post CXR ordered and completed. Patient stable, off monitor, and awaiting transport in ir holding.

## 2024-01-08 NOTE — PROGRESS NOTES
Patient has a fever and patient was educated that he should not use multiple blankets to cover up at this time until his fever breaks patient states \"I am cold and shivering I need blankets\" and refused to take blankets off. Will continue to educate.    Electronically signed by Jolene Brown RN on 1/8/2024 at 5:14 AM

## 2024-01-08 NOTE — PROGRESS NOTES
Physician Progress Note      PATIENT:               GILES THORNTON  CSN #:                  [id number]  :                       1960  ADMIT DATE:       2023 1:07 PM  DISCH DATE:  RESPONDING  PROVIDER #:        Arthur Delos Reyes MD          QUERY TEXT:    Pt admitted with NSTEMI s/p cardiac arrest  and underwent IABP placement prior   to admission at OSH and removed on  , . On  c/o leg pain and was   found to have acute thrombosis of left extremal iliac and common femoral   artery with sensory symptoms. Underwent surgery on  of thrombectomy and   fasciotomy? If possible, please document in progress notes and discharge   summary the relationship if any between the acute thrombosis and the IABP .    The medical record reflects the following:  Risk Factors: age, IABP, cabg, with vein harvest  Clinical Indicators: admitted with NSTEMI s/p cardiac arrest  and underwent   IABP placement prior to admission at OSH and removed on   On  c/o leg   pain and was found to have acute thrombosis of left extremal iliac and common   femoral artery with sensory symptoms. Underwent surgery on  of thrombectomy   and fasciotomy  Treatment: surgery of thrombectomy and fasciotomy, IABP removal, Duplex scan,   vascular consult    Thank You Diogo RADFORD BSN CCDS  Email [email or web address]  Cell [phone]  office hours M-F 6am to 2:30p  Options provided:  -- Acute thrombosis of iliac and common femoral artery is due to the IABP  -- Acute thrombosis of iliac and common femoral artery is not due to the IABP,   but is due to other incidental risk factor, Please specify other incidental   risk factor.  -- Other - I will add my own diagnosis  -- Disagree - Not applicable / Not valid  -- Disagree - Clinically unable to determine / Unknown  -- Refer to Clinical Documentation Reviewer    PROVIDER RESPONSE TEXT:    Patient has acute thrombosis of iliac and common femoral artery due to the   IABP.    Query created

## 2024-01-08 NOTE — PLAN OF CARE
Problem: Chronic Conditions and Co-morbidities  Goal: Patient's chronic conditions and co-morbidity symptoms are monitored and maintained or improved  Outcome: Progressing     Problem: Discharge Planning  Goal: Discharge to home or other facility with appropriate resources  Outcome: Progressing     Problem: Respiratory - Adult  Goal: Achieves optimal ventilation and oxygenation  Outcome: Progressing     Problem: Safety - Adult  Goal: Free from fall injury  Outcome: Progressing     Problem: Nutrition Deficit:  Goal: Optimize nutritional status  1/8/2024 1334 by Schoen, Tracey, RN  Outcome: Progressing  1/8/2024 1151 by Shaunna Dee, MS, RD, LD  Outcome: Progressing  Flowsheets (Taken 1/8/2024 1143)  Nutrient intake appropriate for improving, restoring, or maintaining nutritional needs:   Assess nutritional status and recommend course of action   Monitor oral intake, labs, and treatment plans   Recommend appropriate diets, oral nutritional supplements, and vitamin/mineral supplements     Problem: Pain  Goal: Verbalizes/displays adequate comfort level or baseline comfort level  Outcome: Progressing  Flowsheets  Taken 1/8/2024 0400 by Jolene Brown RN  Verbalizes/displays adequate comfort level or baseline comfort level: Assess pain using appropriate pain scale  Taken 1/8/2024 0000 by Jolene Brown RN  Verbalizes/displays adequate comfort level or baseline comfort level: Assess pain using appropriate pain scale     Problem: ABCDS Injury Assessment  Goal: Absence of physical injury  Outcome: Progressing     Problem: Skin/Tissue Integrity  Goal: Absence of new skin breakdown  Description: 1.  Monitor for areas of redness and/or skin breakdown  2.  Assess vascular access sites hourly  3.  Every 4-6 hours minimum:  Change oxygen saturation probe site  4.  Every 4-6 hours:  If on nasal continuous positive airway pressure, respiratory therapy assess nares and determine need for appliance change or

## 2024-01-08 NOTE — PROGRESS NOTES
Patient moved heart hugger at shift change and when writer got him a new one he refused to allow writer to apply. Patient educated on the sternum precautions he was supposed to be taking due to his CABG that was during this hospital admission. Patient educated on the importance of the heart hugger and patient still refused to use the heart hugger.    Patient also refused a chlorhexidine bath this AM and educated the importance of bathing due to surgical site infections, patient did allow writer to change surgical site dressings and cleanse them at this time but does not wish to be washed up by writer.     Electronically signed by Jolene Brown RN on 1/8/2024 at 4:46 AM

## 2024-01-08 NOTE — PROGRESS NOTES
PULMONARY & CRITICAL CARE MEDICINE PROGRESS NOTE     Patient:  Lee Mejia  MRN: [id number]  Admit date: 12/26/2023  Primary Care Physician: Dee Morrison DO  Consulting Physician: Db Sanchez MD  CODE Status: Full Code  LOS: 12     SUBJECTIVE     CHIEF COMPLAINT/REASON FOR INITIAL CONSULT:  STEMI  BRIEF HOSPITAL COURSE:   The patient is a 63 y.o. male with chronic smoking history and COPD, presented to [facility] with severe chest pain.  He went into V-fib arrest while in the ER at [facility] requiring defibrillation.  He converted into sinus rhythm.  EKG was obtained which was consistent with anterolateral STEMI and the patient was emergently taken to Cath Lab.  He underwent cardiac cath which showed high-grade left main and ostial LAD disease with a dominant left circumflex system.  He was in cardiogenic shock, intra-aortic balloon pump was placed.  He also developed complete heart block during cardiac cath, temporary pacemaker was inserted.  He was admitted in medical ICU at the same [facility] facility where he coded again, had brief CPR after which ROSC was achieved.  He was subsequently transferred to [facility] for CT surgery evaluation for emergent CABG.  Nasal swab also came back positive for COVID.  CT surgery planning on bypass today.  Pulmonology consulted for recommendations prior to surgery, COVID infection and for vent management.  History obtained from the family member/girlfriend.  She denies any sick contact in the family.  But he does go out and meet several people.  She does not think that the patient had cough, fever or breathing difficulty.  Patient is heavy smoker, has been smoking 1-.5 pack per day for last several years.  He uses inhaler at home.  Denies any other significant past medical history.    1/7/2024   Subjective    Post 3 vessel cabg 12/26/23   Patient had femoral thrombectomy on the left and fasciotomy of the left lower leg on  Trace bilateral perinephric fluid and small volume pelvic ascites.         XR CHEST PORTABLE   Final Result   Cardiomegaly with mild pulmonary edema and small bilateral pleural effusions.      Right perihilar atelectasis.      No pneumothorax.      No change in life support.         XR CHEST PORTABLE   Final Result   1. Interval extubation.   2. Mild left basilar atelectasis.         XR CHEST PORTABLE   Final Result   1. No significant interval change.   2. Tubes and lines as above.         XR CHEST PORTABLE   Final Result   Support devices in adequate position.  Left basilar atelectasis.  No large   effusion or pneumothorax.         XR CHEST PORTABLE   Final Result   1. Persistent hazy opacity over the right hemithorax.  Differential includes   asymmetric pulmonary edema and layering pleural effusion.   2. Bandlike opacity in left base, likely discoid atelectasis.         XR CHEST (SINGLE VIEW FRONTAL)   Final Result   1. Interstitial pulmonary edema.   2. Small left pleural effusion.  Platelike atelectasis in the left lung base.   3. Supporting lines and tubes as described.         XR CHEST PORTABLE   Final Result   No significant interval change.         XR CHEST PORTABLE   Final Result   Satisfactory postop appearance with suggestion of fluid overload versus mild   failure.         XR CHEST PORTABLE   Final Result   The intra-aortic balloon pump tip is at the level of the superior margin of   the aortic knob.         Vascular duplex vein mapping lower bilateral   Final Result      Vascular duplex upper extremity arteries bilateral   Final Result      Vascular duplex carotid bilateral   Final Result      XR CHEST PORTABLE   Final Result   Stable interspace crash that stable interstitial pulmonary edema, right   greater than left.      IABP marker now projecting along the superior margin of the aortic knob.   Stable position of endotracheal tube and lower extremity transvenous pacer.         XR CHEST PORTABLE

## 2024-01-09 ENCOUNTER — APPOINTMENT (OUTPATIENT)
Dept: GENERAL RADIOLOGY | Age: 64
DRG: 165 | End: 2024-01-09
Attending: INTERNAL MEDICINE
Payer: MEDICAID

## 2024-01-09 PROBLEM — M79.89 LEFT LEG SWELLING: Status: ACTIVE | Noted: 2024-01-09

## 2024-01-09 PROBLEM — R50.9 FEVER: Status: ACTIVE | Noted: 2024-01-09

## 2024-01-09 LAB
ANION GAP SERPL CALCULATED.3IONS-SCNC: 14 MMOL/L (ref 9–17)
BODY FLD TYPE: NORMAL
BUN SERPL-MCNC: 20 MG/DL (ref 8–23)
CALCIUM SERPL-MCNC: 7.6 MG/DL (ref 8.6–10.4)
CASE NUMBER:: NORMAL
CHLORIDE SERPL-SCNC: 104 MMOL/L (ref 98–107)
CK SERPL-CCNC: 240 U/L (ref 39–308)
CK SERPL-CCNC: 255 U/L (ref 39–308)
CO2 SERPL-SCNC: 13 MMOL/L (ref 20–31)
CREAT SERPL-MCNC: 0.9 MG/DL (ref 0.7–1.2)
D DIMER PPP FEU-MCNC: 4.43 UG/ML FEU (ref 0–0.57)
ERYTHROCYTE [DISTWIDTH] IN BLOOD BY AUTOMATED COUNT: 15.7 % (ref 11.8–14.4)
FIO2: 3
GFR SERPL CREATININE-BSD FRML MDRD: >60 ML/MIN/1.73M2
GLUCOSE SERPL-MCNC: 100 MG/DL (ref 70–99)
HCT VFR BLD AUTO: 32.9 % (ref 40.7–50.3)
HGB BLD-MCNC: 9 G/DL (ref 13–17)
LYMPHOCYTES NFR FLD: 13 %
MAGNESIUM SERPL-MCNC: 2.7 MG/DL (ref 1.6–2.6)
MCH RBC QN AUTO: 28.8 PG (ref 25.2–33.5)
MCHC RBC AUTO-ENTMCNC: 27.4 G/DL (ref 28.4–34.8)
MCV RBC AUTO: 105.1 FL (ref 82.6–102.9)
MYOGLOBIN SERPL-MCNC: 87 NG/ML (ref 28–72)
MYOGLOBIN SERPL-MCNC: 90 NG/ML (ref 28–72)
NEGATIVE BASE EXCESS, ART: 1.5 MMOL/L (ref 0–2)
NEUTROPHILS NFR FLD: 51 %
NRBC BLD-RTO: 0 PER 100 WBC
O2 DELIVERY DEVICE: ABNORMAL
PH FLUID: 8
PLATELET # BLD AUTO: 249 K/UL (ref 138–453)
PMV BLD AUTO: 11.3 FL (ref 8.1–13.5)
POC HCO3: 20.7 MMOL/L (ref 21–28)
POC LACTIC ACID: 0.8 MMOL/L (ref 0.56–1.39)
POC O2 SATURATION: 92.1 % (ref 94–98)
POC PCO2: 25.8 MM HG (ref 35–48)
POC PH: 7.51 (ref 7.35–7.45)
POC PO2: 55.9 MM HG (ref 83–108)
POTASSIUM SERPL-SCNC: 4.1 MMOL/L (ref 3.7–5.3)
RBC # BLD AUTO: 3.13 M/UL (ref 4.21–5.77)
RBC # FLD: NORMAL CELLS/UL
SODIUM SERPL-SCNC: 131 MMOL/L (ref 135–144)
SPECIMEN DESCRIPTION: NORMAL
SPECIMEN TYPE: NORMAL
UNIDENT CELLS NFR FLD: NORMAL %
WBC # FLD: 498 CELLS/UL
WBC OTHER # BLD: 22.2 K/UL (ref 3.5–11.3)

## 2024-01-09 PROCEDURE — 2580000003 HC RX 258: Performed by: SURGERY

## 2024-01-09 PROCEDURE — 2700000000 HC OXYGEN THERAPY PER DAY

## 2024-01-09 PROCEDURE — 84157 ASSAY OF PROTEIN OTHER: CPT

## 2024-01-09 PROCEDURE — 6370000000 HC RX 637 (ALT 250 FOR IP): Performed by: INTERNAL MEDICINE

## 2024-01-09 PROCEDURE — 6370000000 HC RX 637 (ALT 250 FOR IP): Performed by: STUDENT IN AN ORGANIZED HEALTH CARE EDUCATION/TRAINING PROGRAM

## 2024-01-09 PROCEDURE — 87075 CULTR BACTERIA EXCEPT BLOOD: CPT

## 2024-01-09 PROCEDURE — 97530 THERAPEUTIC ACTIVITIES: CPT

## 2024-01-09 PROCEDURE — 83605 ASSAY OF LACTIC ACID: CPT

## 2024-01-09 PROCEDURE — 94761 N-INVAS EAR/PLS OXIMETRY MLT: CPT

## 2024-01-09 PROCEDURE — 6370000000 HC RX 637 (ALT 250 FOR IP)

## 2024-01-09 PROCEDURE — 83874 ASSAY OF MYOGLOBIN: CPT

## 2024-01-09 PROCEDURE — 87205 SMEAR GRAM STAIN: CPT

## 2024-01-09 PROCEDURE — 85379 FIBRIN DEGRADATION QUANT: CPT

## 2024-01-09 PROCEDURE — 36600 WITHDRAWAL OF ARTERIAL BLOOD: CPT

## 2024-01-09 PROCEDURE — 97110 THERAPEUTIC EXERCISES: CPT

## 2024-01-09 PROCEDURE — 6370000000 HC RX 637 (ALT 250 FOR IP): Performed by: SURGERY

## 2024-01-09 PROCEDURE — 6360000002 HC RX W HCPCS: Performed by: SURGERY

## 2024-01-09 PROCEDURE — 82550 ASSAY OF CK (CPK): CPT

## 2024-01-09 PROCEDURE — 99233 SBSQ HOSP IP/OBS HIGH 50: CPT | Performed by: INTERNAL MEDICINE

## 2024-01-09 PROCEDURE — 82945 GLUCOSE OTHER FLUID: CPT

## 2024-01-09 PROCEDURE — 89051 BODY FLUID CELL COUNT: CPT

## 2024-01-09 PROCEDURE — 2500000003 HC RX 250 WO HCPCS: Performed by: STUDENT IN AN ORGANIZED HEALTH CARE EDUCATION/TRAINING PROGRAM

## 2024-01-09 PROCEDURE — A4216 STERILE WATER/SALINE, 10 ML: HCPCS | Performed by: STUDENT IN AN ORGANIZED HEALTH CARE EDUCATION/TRAINING PROGRAM

## 2024-01-09 PROCEDURE — 82803 BLOOD GASES ANY COMBINATION: CPT

## 2024-01-09 PROCEDURE — 36415 COLL VENOUS BLD VENIPUNCTURE: CPT

## 2024-01-09 PROCEDURE — 83986 ASSAY PH BODY FLUID NOS: CPT

## 2024-01-09 PROCEDURE — 2580000003 HC RX 258: Performed by: STUDENT IN AN ORGANIZED HEALTH CARE EDUCATION/TRAINING PROGRAM

## 2024-01-09 PROCEDURE — 2060000000 HC ICU INTERMEDIATE R&B

## 2024-01-09 PROCEDURE — 83615 LACTATE (LD) (LDH) ENZYME: CPT

## 2024-01-09 PROCEDURE — 83735 ASSAY OF MAGNESIUM: CPT

## 2024-01-09 PROCEDURE — 87070 CULTURE OTHR SPECIMN AEROBIC: CPT

## 2024-01-09 PROCEDURE — 85027 COMPLETE CBC AUTOMATED: CPT

## 2024-01-09 PROCEDURE — 80048 BASIC METABOLIC PNL TOTAL CA: CPT

## 2024-01-09 RX ORDER — VANCOMYCIN HYDROCHLORIDE 125 MG/1
125 CAPSULE ORAL 4 TIMES DAILY
Status: DISCONTINUED | OUTPATIENT
Start: 2024-01-09 | End: 2024-01-11 | Stop reason: HOSPADM

## 2024-01-09 RX ORDER — LACTOBACILLUS RHAMNOSUS GG 10B CELL
1 CAPSULE ORAL 2 TIMES DAILY WITH MEALS
Status: DISCONTINUED | OUTPATIENT
Start: 2024-01-09 | End: 2024-01-11 | Stop reason: HOSPADM

## 2024-01-09 RX ADMIN — ACETAMINOPHEN 650 MG: 325 TABLET ORAL at 16:18

## 2024-01-09 RX ADMIN — Medication 1 CAPSULE: at 19:07

## 2024-01-09 RX ADMIN — DIPHENHYDRAMINE HYDROCHLORIDE 25 MG: 25 TABLET ORAL at 20:41

## 2024-01-09 RX ADMIN — SODIUM CHLORIDE, PRESERVATIVE FREE 10 ML: 5 INJECTION INTRAVENOUS at 20:48

## 2024-01-09 RX ADMIN — SODIUM CHLORIDE, PRESERVATIVE FREE 10 ML: 5 INJECTION INTRAVENOUS at 08:24

## 2024-01-09 RX ADMIN — Medication 5 MG: at 20:40

## 2024-01-09 RX ADMIN — ATORVASTATIN CALCIUM 80 MG: 80 TABLET, FILM COATED ORAL at 20:41

## 2024-01-09 RX ADMIN — CARVEDILOL 12.5 MG: 12.5 TABLET, FILM COATED ORAL at 16:17

## 2024-01-09 RX ADMIN — VANCOMYCIN HYDROCHLORIDE 125 MG: 125 CAPSULE ORAL at 20:42

## 2024-01-09 RX ADMIN — SODIUM CHLORIDE, PRESERVATIVE FREE 20 MG: 5 INJECTION INTRAVENOUS at 08:23

## 2024-01-09 RX ADMIN — TRAMADOL HYDROCHLORIDE 50 MG: 50 TABLET, COATED ORAL at 21:52

## 2024-01-09 RX ADMIN — Medication 2000 MG: at 16:17

## 2024-01-09 RX ADMIN — CARVEDILOL 12.5 MG: 12.5 TABLET, FILM COATED ORAL at 08:23

## 2024-01-09 RX ADMIN — ASPIRIN 81 MG 81 MG: 81 TABLET ORAL at 08:23

## 2024-01-09 RX ADMIN — TRAMADOL HYDROCHLORIDE 50 MG: 50 TABLET, COATED ORAL at 08:23

## 2024-01-09 RX ADMIN — CLOPIDOGREL BISULFATE 75 MG: 75 TABLET ORAL at 08:23

## 2024-01-09 ASSESSMENT — ENCOUNTER SYMPTOMS
EYE REDNESS: 0
COLOR CHANGE: 0
APNEA: 0
EYE PAIN: 0
EYE DISCHARGE: 0
SHORTNESS OF BREATH: 1
ABDOMINAL DISTENTION: 0
DIARRHEA: 1

## 2024-01-09 ASSESSMENT — PAIN DESCRIPTION - ORIENTATION
ORIENTATION: LEFT
ORIENTATION: LEFT

## 2024-01-09 ASSESSMENT — PAIN SCALES - GENERAL
PAINLEVEL_OUTOF10: 7
PAINLEVEL_OUTOF10: 2
PAINLEVEL_OUTOF10: 3
PAINLEVEL_OUTOF10: 8
PAINLEVEL_OUTOF10: 0
PAINLEVEL_OUTOF10: 4
PAINLEVEL_OUTOF10: 7

## 2024-01-09 ASSESSMENT — PAIN DESCRIPTION - DESCRIPTORS
DESCRIPTORS: DISCOMFORT
DESCRIPTORS: ACHING;DISCOMFORT;DULL
DESCRIPTORS: ACHING;DISCOMFORT

## 2024-01-09 ASSESSMENT — PAIN DESCRIPTION - LOCATION
LOCATION: BACK
LOCATION: CHEST
LOCATION: BACK;LEG;CHEST

## 2024-01-09 NOTE — PROGRESS NOTES
[facility]   Progress Note                   Date:   1/9/2024  Patient name: Lee Mejia  Date of admission:  12/26/2023  1:07 PM  MRN:   [id number]  YOB: 1960  PCP: Dee Morrison DO    Reason for Admission: STEMI    Subjective:       Seen and examined at bedside.  Patient febrile this a.m., temperature 39.3.  Hypertensive overnight.  Patient complaining of shortness of breath on minimal exertion.s/p thoracentesis    Medications:   Scheduled Meds:   carvedilol  12.5 mg Oral BID WC    atorvastatin  80 mg Oral Nightly    famotidine (PEPCID) injection  20 mg IntraVENous BID    aspirin  81 mg Oral Daily    cefTRIAXone (ROCEPHIN) IV  2,000 mg IntraVENous Q24H    [Held by provider] torsemide  40 mg Oral Daily    [Held by provider] naloxegol  12.5 mg Oral QAM AC    [Held by provider] spironolactone  25 mg Oral Daily    [Held by provider] pantoprazole  40 mg IntraVENous Daily    sodium chloride flush  5-40 mL IntraVENous 2 times per day    clopidogrel  75 mg Oral Daily    polyethylene glycol  17 g Oral Daily    sennosides-docusate sodium  1 tablet Oral BID     Continuous Infusions:   sodium chloride      sodium chloride 10 mL/hr at 12/29/23 1122    sodium chloride Stopped (12/29/23 0947)    EPINEPHrine Stopped (12/29/23 0657)    dextrose       CBC:   Recent Labs     01/07/24  0444 01/08/24  0658 01/09/24  0327   WBC 18.6* 27.0* 22.2*   HGB 10.4* 11.7* 9.0*    291 249       BMP:    Recent Labs     01/07/24  0444 01/08/24  0658 01/09/24  0327    134* 131*   K 4.6 4.5 4.1    105 104   CO2 23 18* 13*   BUN 21 18 20   CREATININE 1.0 1.1 0.9   GLUCOSE 95 114* 100*       Hepatic:   Recent Labs     01/08/24  0658   AST 76*   *   BILITOT 0.9   ALKPHOS 125       Troponin: No results for input(s): \"TROPONINI\" in the last 72 hours.  BNP: No results for input(s): \"BNP\" in the last 72 hours.  Lipids: No results for input(s): \"CHOL\", \"HDL\" in the last 72 hours.    Invalid

## 2024-01-09 NOTE — PROGRESS NOTES
[facility] -   Infectious diseases evaluation  admission date 12/26/2023    reason for consultation:   Covid  - surg clearance    Impression :   Current:  Anterolateral NSTEMI  Card cath complicated w complete AV block and placed a temporary pace maker  High grade left main ostial disease 12/26  LCX 20-30 - RCA 20 - 30LVEF 35  Card arrest due to the AV block -  CPR less than a min to achieve ROSC  CABG 12/26 emergent bypass x  3   Cardiogenic shock  Intra aortic balloon placed  Left leg ischemia and needed declotting w fasciotomy  Site still demarcating  Bandemia 31 - better  Shock liver 12/28  Flash Pulm edema - sp white red, no clear bacterial pneumonia   Resp failure from the card arrest   intubated  Covid test +  COPD  DM    Other:    Discussion / summary of stay / plan of care     Recommendations     COVID + but not clear if causing disease  12/26 remdesevir after the CABG - stopped 12/28 due to shock liver and elevated Liver enzymes  Emergent CABG 12/26 - 3 vessels  Bandemia is reactive to the MI and arrest - improved  L thoracentesis 550 ml serosanguinous  Disc w RN and pulm       1/3/24 - WBC 17 -LLL same consolidation -resuming ceftriaxone  1/8 left thoracentesis and minimal infil left behind  1/8 fever 104 and WBC  27  1/9 fever better - WBC 22 - left thoracentesis seems serous and cx pend - diarrhea , \add culturelle  and add po vanco and switch to ceftaroline pend fasciotomy site demarcating per vasc, plan for I/D pend date    Infection Control Recommendations   Warren Precautions  Contact Isolation  plus till 1/4/24  Antimicrobial Stewardship Recommendations   Simplification of therapy  Targeted therapy    History of Present Illness:   Initial history:  Lee Mejia is a 63 y.o.-year-old male admitted w severe chest pain and NSTEMI  Over exerts himself and has COPD  Taken to card cath and had a complete HB and pacer placed  He is intubated w resp

## 2024-01-09 NOTE — PROGRESS NOTES
Division of Vascular Surgery             Progress Note      Name: Lee Mejia  MRN: [id number]         Subjective:     1/4/24: LLE thrombectomy and 4 compartment fasciotomy    Pt seen and examined at bedside. No longer febrile, white count downtrending from 27 to 22. Pt is on Rocephin. Went for thoracentesis yesterday with IR. Requiring 3 L nasal cannula but denying chest pain or shortness of breath at this time. Alert and oriented today. Reports feeling slightly uncomfortable up in chair. Wound vac and splint on LLE, timing for debridement TBD. CK/Paul downtrending    Physical Exam:     Vitals:  /63   Pulse 80   Temp 99 °F (37.2 °C)   Resp 20   Ht 1.626 m (5' 4.02\")   Wt 61 kg (134 lb 7.7 oz)   SpO2 96%   BMI 23.07 kg/m²       General appearance - Well appearing, no acute distress   Mental status - A&Ox3  Head - normocephalic and atraumatic  Neck - supple, no JVD  Chest -no respiratory distress, no accessory muscle use  Heart - normal rate, regular rhythm  Abdomen - soft, non-tender, non-distended  Neurological - normal speech, no focal findings or movement disorder noted  Extremities - UE peripheral pulses palpable, no pedal edema or calf pain with palpation  Skin - wound vac over anteriolateral would, sealed with minimal output  Foot Exam - warm, well-perfused  Vascular Exam -Doppler signals to DP and PT bilaterally, cap refill <3s      Imaging:       Vascular duplex lower extremity venous left    Result Date: 1/4/2024    No evidence of deep vein thrombosis in the left lower extremity.     Vascular duplex lower extremity arteries left    Result Date: 1/4/2024  Left: The common femoral and external iliac arteries are occluded.  The distal common femoral is still patent via retrograde flow through the profunda to serve the SFA.  The SFA and popliteal are patent with no evidence of significant stenosis.  Clinical correlation is needed.         Assessment:     64 yo male presenting with ischemic LLE

## 2024-01-09 NOTE — PLAN OF CARE
Problem: Chronic Conditions and Co-morbidities  Goal: Patient's chronic conditions and co-morbidity symptoms are monitored and maintained or improved  1/9/2024 1316 by Schoen, Tracey, RN  Outcome: Progressing  1/9/2024 0602 by Shayy Hill RN  Outcome: Progressing     Problem: Discharge Planning  Goal: Discharge to home or other facility with appropriate resources  1/9/2024 1316 by Schoen, Tracey, RN  Outcome: Progressing  1/9/2024 0602 by Shayy Hill RN  Outcome: Progressing     Problem: Respiratory - Adult  Goal: Achieves optimal ventilation and oxygenation  1/9/2024 1316 by Schoen, Tracey, RN  Outcome: Progressing  1/9/2024 0602 by Shayy Hill RN  Outcome: Progressing     Problem: Safety - Adult  Goal: Free from fall injury  1/9/2024 1316 by Schoen, Tracey, RN  Outcome: Progressing  1/9/2024 0602 by Shayy Hill RN  Outcome: Progressing     Problem: Nutrition Deficit:  Goal: Optimize nutritional status  1/9/2024 1316 by Schoen, Tracey, RN  Outcome: Progressing  1/9/2024 0602 by Shayy Hill RN  Outcome: Progressing     Problem: Pain  Goal: Verbalizes/displays adequate comfort level or baseline comfort level  1/9/2024 1316 by Schoen, Tracey, RN  Outcome: Progressing  1/9/2024 0602 by Shayy Hill RN  Outcome: Progressing     Problem: Skin/Tissue Integrity  Goal: Absence of new skin breakdown  Description: 1.  Monitor for areas of redness and/or skin breakdown  2.  Assess vascular access sites hourly  3.  Every 4-6 hours minimum:  Change oxygen saturation probe site  4.  Every 4-6 hours:  If on nasal continuous positive airway pressure, respiratory therapy assess nares and determine need for appliance change or resting period.  1/9/2024 1316 by Schoen, Tracey, RN  Outcome: Progressing  1/9/2024 0602 by Shayy Hill RN  Outcome: Progressing     Problem: ABCDS Injury Assessment  Goal: Absence of physical injury  1/9/2024 1316 by Schoen, Tracey, RN  Outcome: Progressing  1/9/2024 0602  by Shayy Hill, RN  Outcome: Progressing

## 2024-01-09 NOTE — PLAN OF CARE
Problem: Discharge Planning  Goal: Discharge to home or other facility with appropriate resources  1/9/2024 1610 by Elizabeth Khalil, RN  Outcome: Progressing     Problem: Safety - Adult  Goal: Free from fall injury  1/9/2024 1610 by Elizabeth Khalil, RN  Outcome: Progressing

## 2024-01-09 NOTE — PLAN OF CARE
Problem: Chronic Conditions and Co-morbidities  Goal: Patient's chronic conditions and co-morbidity symptoms are monitored and maintained or improved  Outcome: Progressing     Problem: Discharge Planning  Goal: Discharge to home or other facility with appropriate resources  Outcome: Progressing     Problem: Respiratory - Adult  Goal: Achieves optimal ventilation and oxygenation  Outcome: Progressing     Problem: Safety - Adult  Goal: Free from fall injury  Outcome: Progressing     Problem: Nutrition Deficit:  Goal: Optimize nutritional status  Outcome: Progressing     Problem: Pain  Goal: Verbalizes/displays adequate comfort level or baseline comfort level  Outcome: Progressing     Problem: Skin/Tissue Integrity  Goal: Absence of new skin breakdown  Description: 1.  Monitor for areas of redness and/or skin breakdown  2.  Assess vascular access sites hourly  3.  Every 4-6 hours minimum:  Change oxygen saturation probe site  4.  Every 4-6 hours:  If on nasal continuous positive airway pressure, respiratory therapy assess nares and determine need for appliance change or resting period.  Outcome: Progressing     Problem: ABCDS Injury Assessment  Goal: Absence of physical injury  Outcome: Progressing

## 2024-01-09 NOTE — PROGRESS NOTES
dizziness with standing     Plan   Physical Therapy Plan  General Plan: 6-7 times per week  Current Treatment Recommendations: Strengthening, Functional mobility training, Gait training, Transfer training, Positioning, Patient/Caregiver education & training, Equipment evaluation, education, & procurement, Safety education & training, Therapeutic activities, Home exercise program, ROM, Balance training, Endurance training, Stair training, Pain management  Safety Devices  Type of Devices: Call light within reach, Gait belt, Nurse notified, Patient at risk for falls, All fall risk precautions in place, Left in chair  Restraints  Restraints Initially in Place: No     Restrictions  Restrictions/Precautions  Restrictions/Precautions: Surgical Protocols, Up as Tolerated, Fall Risk, Cardiac, General Precautions  Required Braces or Orthoses?: Yes  Required Braces or Orthoses  Other: Heart Hugger Brace  Position Activity Restriction  Sternal Precautions: No Pushing, No Pulling, 5# Lifting Restrictions  Sternal Precautions: emergent CABG X3 12/26/23  Other position/activity restrictions: ambulate, up in chair, 12/30 extubated. Stevenson catheter. O2 NC 3 Lm. Wound vac LLE. 1/4 LLE thrombectiomy and 4 compartment fasciotomy, 1/8 thoracentesis     Subjective   General  Response To Previous Treatment: Patient with no complaints from previous session.  Family / Caregiver Present: Yes  Follows Commands: Within Functional Limits  General Comment  Comments: RN and pt agreeable to PT. Pt alert in chair upon arrival.  Subjective  Subjective: Pt reports 8/10 pain L foot, back chest. Pt reports numbness/ tingling L foot.       Cognition   Orientation  Overall Orientation Status: Within Functional Limits  Cognition  Overall Cognitive Status: WFL     Objective       AROM RLE (degrees)  RLE AROM: WFL  AROM LLE (degrees)  LLE AROM : WFL  LLE General AROM: limited end range DF  AROM RUE (degrees)  RUE AROM : WFL  AROM LUE (degrees)  LUE AROM :  WFL              Bed mobility  Bed Mobility Comments: Pt in chair upon arrival and exit  Transfers  Sit to Stand: Minimal Assistance  Stand to Sit: Minimal Assistance  Ambulation  Comments: unable to attempt d/t dizziness upon standing, few small steps taken reto to approximate chair prior to return to seated, Elida ROSAS.     Balance  Posture: Good  Sitting - Static: Good;-  Sitting - Dynamic: Fair;+  Standing - Static: Fair  Standing - Dynamic: Fair;-  Comments: RW used while assessings standing balance.  Exercise Treatment: seated 15x: rajesh MORALES. increased time for mobility, frequent rest breaks. SBP upon arrival 83mmHg, retaken and found to be 89 mmHg. later taken after sitting edge of chair and performing exercises found to be 88 mmHg. pt stood as described and c/o dizziness with was increased resulting in seating after 30seconds. SBP found to be low 90s mmHg, resulted in seated position. Pt reports resolution of symptoms within 1-2minutes. EDU sheet assisted L PF exercises, pt performed 3x to demonstrate.        [facility] - Mobility    AM-PAC Basic Mobility - Inpatient   How much help is needed turning from your back to your side while in a flat bed without using bedrails?: A Little  How much help is needed moving from lying on your back to sitting on the side of a flat bed without using bedrails?: A Lot  How much help is needed moving to and from a bed to a chair?: A Lot  How much help is needed standing up from a chair using your arms?: A Little  How much help is needed walking in hospital room?: A Lot  How much help is needed climbing 3-5 steps with a railing?: Total  AM-PAC Inpatient Mobility Raw Score : 13  AM-PAC Inpatient T-Scale Score : 36.74  Mobility Inpatient CMS 0-100% Score: 64.91  Mobility Inpatient CMS G-Code Modifier : CL         Goals  Short Term Goals  Time Frame for Short Term Goals: 14 visits  Short Term Goal 1: Supine to/from sit with SBA.  Short Term Goal 2: Sit to/from stand with Mod A,

## 2024-01-09 NOTE — PROGRESS NOTES
PULMONARY & CRITICAL CARE MEDICINE PROGRESS NOTE     Patient:  Lee Mejia  MRN: [id number]  Admit date: 12/26/2023  Primary Care Physician: Dee Morrison DO  Consulting Physician: Db Sanchez MD  CODE Status: Full Code  LOS: 14     SUBJECTIVE     CHIEF COMPLAINT/REASON FOR INITIAL CONSULT:  STEMI  BRIEF HOSPITAL COURSE:   The patient is a 63 y.o. male with chronic smoking history and COPD, presented to [facility] with severe chest pain.  He went into V-fib arrest while in the ER at [facility] requiring defibrillation.  He converted into sinus rhythm.  EKG was obtained which was consistent with anterolateral STEMI and the patient was emergently taken to Cath Lab.  He underwent cardiac cath which showed high-grade left main and ostial LAD disease with a dominant left circumflex system.  He was in cardiogenic shock, intra-aortic balloon pump was placed.  He also developed complete heart block during cardiac cath, temporary pacemaker was inserted.  He was admitted in medical ICU at the same [facility] facility where he coded again, had brief CPR after which ROSC was achieved.  He was subsequently transferred to [facility] for CT surgery evaluation for emergent CABG.  Nasal swab also came back positive for COVID.  CT surgery planning on bypass today.  Pulmonology consulted for recommendations prior to surgery, COVID infection and for vent management.  History obtained from the family member/girlfriend.  She denies any sick contact in the family.  But he does go out and meet several people.  She does not think that the patient had cough, fever or breathing difficulty.  Patient is heavy smoker, has been smoking 1-.5 pack per day for last several years.  He uses inhaler at home.  Denies any other significant past medical history.    1/9/2024   Subjective    Post 3 vessel cabg 12/26/23   Patient had femoral thrombectomy on the left and fasciotomy of the left lower leg on 1/4/2024 ,  Acid:  No results for input(s): \"LACTA\" in the last 72 hours.  Cardiac Enzymes:  Recent Labs     01/08/24  0658 01/08/24  2200   CKTOTAL 511* 350*     BNP/ProBNP:   No results for input(s): \"BNP\", \"PROBNP\" in the last 72 hours.    Triglycerides:  No results for input(s): \"TRIG\" in the last 72 hours.       Microbiology:  Urine Culture:  No components found for: \"CURINE\"  Blood Culture:  No components found for: \"CBLOOD\", \"CFUNGUSBL\"  Sputum Culture:  No components found for: \"CSPUTUM\"  Recent Labs     01/08/24  1705   SPECDESC .PLEURAL FLUID   CULTURE NO GROWTH 8 HOURS       Recent Labs     01/08/24  1705   SPECDESC .PLEURAL FLUID   CULTURE NO GROWTH 8 HOURS          Pathology:    Radiology Reports:  US THORACENTESIS Which side should the procedure be performed? Left   Final Result   Successful ultrasound guided left thoracentesis.         XR CHEST PORTABLE   Final Result   No evidence of pneumothorax status post left thoracentesis.         XR CHEST PORTABLE   Final Result   Worsening bilateral left greater than right pleural effusions with bibasilar   opacities that may represent consolidation from pneumonia versus atelectasis.      Worsening interstitial prominence noted throughout both lungs that may be   related to interstitial pulmonary edema.         XR CHEST PORTABLE   Final Result   1. Endotracheal tube tip terminates 6.0 cm above the pippa.   2. Small left pleural effusion with left basilar lung infiltrate.   3. Stable small left apical pneumothorax and smaller left basilar   pneumothorax.   4. Previous right apical pneumothorax is no longer identified.   5. The orogastric tube tip terminates near the gastric antrum.         XR ABDOMEN FOR NG/OG/NE TUBE PLACEMENT   Final Result   1. Endotracheal tube tip terminates 6.0 cm above the pippa.   2. Small left pleural effusion with left basilar lung infiltrate.   3. Stable small left apical pneumothorax and smaller left basilar   pneumothorax.   4. Previous

## 2024-01-10 ENCOUNTER — APPOINTMENT (OUTPATIENT)
Dept: ULTRASOUND IMAGING | Age: 64
DRG: 165 | End: 2024-01-10
Attending: INTERNAL MEDICINE
Payer: MEDICAID

## 2024-01-10 LAB
ANION GAP SERPL CALCULATED.3IONS-SCNC: 13 MMOL/L (ref 9–17)
BUN SERPL-MCNC: 24 MG/DL (ref 8–23)
CALCIUM SERPL-MCNC: 8 MG/DL (ref 8.6–10.4)
CHLORIDE SERPL-SCNC: 104 MMOL/L (ref 98–107)
CO2 SERPL-SCNC: 20 MMOL/L (ref 20–31)
CREAT SERPL-MCNC: 1 MG/DL (ref 0.7–1.2)
ERYTHROCYTE [DISTWIDTH] IN BLOOD BY AUTOMATED COUNT: 15.3 % (ref 11.8–14.4)
GFR SERPL CREATININE-BSD FRML MDRD: >60 ML/MIN/1.73M2
GLUCOSE FLD-MCNC: 92 MG/DL
GLUCOSE SERPL-MCNC: 97 MG/DL (ref 70–99)
HCT VFR BLD AUTO: 32.3 % (ref 40.7–50.3)
HGB BLD-MCNC: 9.5 G/DL (ref 13–17)
LDH FLD L TO P-CCNC: 381 U/L
MAGNESIUM SERPL-MCNC: 2.3 MG/DL (ref 1.6–2.6)
MCH RBC QN AUTO: 28.8 PG (ref 25.2–33.5)
MCHC RBC AUTO-ENTMCNC: 29.4 G/DL (ref 28.4–34.8)
MCV RBC AUTO: 97.9 FL (ref 82.6–102.9)
NRBC BLD-RTO: 0 PER 100 WBC
PH FLUID: 8
PLATELET # BLD AUTO: 276 K/UL (ref 138–453)
PMV BLD AUTO: 11.8 FL (ref 8.1–13.5)
POTASSIUM SERPL-SCNC: 4.4 MMOL/L (ref 3.7–5.3)
PROT FLD-MCNC: 2.1 G/DL
RBC # BLD AUTO: 3.3 M/UL (ref 4.21–5.77)
SODIUM SERPL-SCNC: 137 MMOL/L (ref 135–144)
SPECIMEN TYPE: NORMAL
SURGICAL PATHOLOGY REPORT: NORMAL
SURGICAL PATHOLOGY REPORT: NORMAL
WBC OTHER # BLD: 13.5 K/UL (ref 3.5–11.3)

## 2024-01-10 PROCEDURE — 80048 BASIC METABOLIC PNL TOTAL CA: CPT

## 2024-01-10 PROCEDURE — 6360000002 HC RX W HCPCS: Performed by: INTERNAL MEDICINE

## 2024-01-10 PROCEDURE — 83735 ASSAY OF MAGNESIUM: CPT

## 2024-01-10 PROCEDURE — 85027 COMPLETE CBC AUTOMATED: CPT

## 2024-01-10 PROCEDURE — 6370000000 HC RX 637 (ALT 250 FOR IP): Performed by: STUDENT IN AN ORGANIZED HEALTH CARE EDUCATION/TRAINING PROGRAM

## 2024-01-10 PROCEDURE — 36415 COLL VENOUS BLD VENIPUNCTURE: CPT

## 2024-01-10 PROCEDURE — 2500000003 HC RX 250 WO HCPCS: Performed by: STUDENT IN AN ORGANIZED HEALTH CARE EDUCATION/TRAINING PROGRAM

## 2024-01-10 PROCEDURE — 51798 US URINE CAPACITY MEASURE: CPT

## 2024-01-10 PROCEDURE — 99233 SBSQ HOSP IP/OBS HIGH 50: CPT | Performed by: INTERNAL MEDICINE

## 2024-01-10 PROCEDURE — 6360000002 HC RX W HCPCS: Performed by: STUDENT IN AN ORGANIZED HEALTH CARE EDUCATION/TRAINING PROGRAM

## 2024-01-10 PROCEDURE — 2709999900 US THORACENTESIS

## 2024-01-10 PROCEDURE — 2580000003 HC RX 258: Performed by: SURGERY

## 2024-01-10 PROCEDURE — 97530 THERAPEUTIC ACTIVITIES: CPT

## 2024-01-10 PROCEDURE — 2580000003 HC RX 258: Performed by: STUDENT IN AN ORGANIZED HEALTH CARE EDUCATION/TRAINING PROGRAM

## 2024-01-10 PROCEDURE — 97110 THERAPEUTIC EXERCISES: CPT

## 2024-01-10 PROCEDURE — 97605 NEG PRS WND THER DME<=50SQCM: CPT

## 2024-01-10 PROCEDURE — A4216 STERILE WATER/SALINE, 10 ML: HCPCS | Performed by: STUDENT IN AN ORGANIZED HEALTH CARE EDUCATION/TRAINING PROGRAM

## 2024-01-10 PROCEDURE — 2060000000 HC ICU INTERMEDIATE R&B

## 2024-01-10 PROCEDURE — 0W993ZZ DRAINAGE OF RIGHT PLEURAL CAVITY, PERCUTANEOUS APPROACH: ICD-10-PCS | Performed by: RADIOLOGY

## 2024-01-10 PROCEDURE — 2580000003 HC RX 258: Performed by: INTERNAL MEDICINE

## 2024-01-10 PROCEDURE — 6370000000 HC RX 637 (ALT 250 FOR IP): Performed by: INTERNAL MEDICINE

## 2024-01-10 RX ORDER — CLOPIDOGREL BISULFATE 75 MG/1
75 TABLET ORAL DAILY
Qty: 30 TABLET | Refills: 3 | Status: SHIPPED | OUTPATIENT
Start: 2024-01-11

## 2024-01-10 RX ORDER — MORPHINE SULFATE 2 MG/ML
2 INJECTION, SOLUTION INTRAMUSCULAR; INTRAVENOUS EVERY 4 HOURS PRN
Status: DISCONTINUED | OUTPATIENT
Start: 2024-01-10 | End: 2024-01-11 | Stop reason: HOSPADM

## 2024-01-10 RX ORDER — CARVEDILOL 12.5 MG/1
12.5 TABLET ORAL 2 TIMES DAILY WITH MEALS
Qty: 60 TABLET | Refills: 3 | Status: SHIPPED | OUTPATIENT
Start: 2024-01-10

## 2024-01-10 RX ORDER — ATORVASTATIN CALCIUM 80 MG/1
80 TABLET, FILM COATED ORAL NIGHTLY
Qty: 30 TABLET | Refills: 3 | Status: SHIPPED | OUTPATIENT
Start: 2024-01-10

## 2024-01-10 RX ADMIN — ATORVASTATIN CALCIUM 80 MG: 80 TABLET, FILM COATED ORAL at 20:24

## 2024-01-10 RX ADMIN — SODIUM CHLORIDE, PRESERVATIVE FREE 10 ML: 5 INJECTION INTRAVENOUS at 09:32

## 2024-01-10 RX ADMIN — CEFTAROLINE FOSAMIL 600 MG: 600 POWDER, FOR SOLUTION INTRAVENOUS at 17:41

## 2024-01-10 RX ADMIN — Medication 1 CAPSULE: at 09:32

## 2024-01-10 RX ADMIN — VANCOMYCIN HYDROCHLORIDE 125 MG: 125 CAPSULE ORAL at 13:56

## 2024-01-10 RX ADMIN — SODIUM CHLORIDE, PRESERVATIVE FREE 20 MG: 5 INJECTION INTRAVENOUS at 20:24

## 2024-01-10 RX ADMIN — TRAMADOL HYDROCHLORIDE 50 MG: 50 TABLET, COATED ORAL at 04:00

## 2024-01-10 RX ADMIN — SODIUM CHLORIDE, PRESERVATIVE FREE 20 MG: 5 INJECTION INTRAVENOUS at 09:32

## 2024-01-10 RX ADMIN — VANCOMYCIN HYDROCHLORIDE 125 MG: 125 CAPSULE ORAL at 20:24

## 2024-01-10 RX ADMIN — VANCOMYCIN HYDROCHLORIDE 125 MG: 125 CAPSULE ORAL at 09:32

## 2024-01-10 RX ADMIN — MORPHINE SULFATE 2 MG: 2 INJECTION, SOLUTION INTRAMUSCULAR; INTRAVENOUS at 20:25

## 2024-01-10 RX ADMIN — SODIUM CHLORIDE, PRESERVATIVE FREE 10 ML: 5 INJECTION INTRAVENOUS at 20:45

## 2024-01-10 RX ADMIN — Medication 1 CAPSULE: at 17:17

## 2024-01-10 RX ADMIN — CARVEDILOL 12.5 MG: 12.5 TABLET, FILM COATED ORAL at 09:33

## 2024-01-10 RX ADMIN — ACETAMINOPHEN 650 MG: 325 TABLET ORAL at 12:16

## 2024-01-10 RX ADMIN — VANCOMYCIN HYDROCHLORIDE 125 MG: 125 CAPSULE ORAL at 17:17

## 2024-01-10 ASSESSMENT — PAIN DESCRIPTION - ORIENTATION
ORIENTATION: MID
ORIENTATION: LEFT

## 2024-01-10 ASSESSMENT — ENCOUNTER SYMPTOMS
DIARRHEA: 1
SHORTNESS OF BREATH: 0
EYE DISCHARGE: 0
PHOTOPHOBIA: 0
EYE REDNESS: 0
ABDOMINAL DISTENTION: 0
APNEA: 0
EYE PAIN: 0
COLOR CHANGE: 0

## 2024-01-10 ASSESSMENT — PAIN DESCRIPTION - DESCRIPTORS
DESCRIPTORS: ACHING;DISCOMFORT
DESCRIPTORS: ACHING

## 2024-01-10 ASSESSMENT — PAIN SCALES - GENERAL
PAINLEVEL_OUTOF10: 7
PAINLEVEL_OUTOF10: 10
PAINLEVEL_OUTOF10: 8

## 2024-01-10 ASSESSMENT — PAIN DESCRIPTION - LOCATION
LOCATION: CHEST
LOCATION: LEG
LOCATION: LEG

## 2024-01-10 NOTE — PLAN OF CARE
Off floor during rounds for  Ultrasound guided Thoracentesis.       Electronically signed by BIANCA Hardy CNP on 1/10/2024 at 1:40 PM

## 2024-01-10 NOTE — PROGRESS NOTES
Physical Therapy  Facility/Department: [facility] CAR 2- STEPDOWN  Physical Therapy treatment    Name: Lee Mejia  : 1960  MRN: [id number]  Date of Service: 1/10/2024    Discharge Recommendations:  Further therapy recommended at discharge.The patient should be able to tolerate at least 3 hours of therapy per day over 5 days or 15 hours over 7 days.   This patient may benefit from a Physical Medicine and Rehab consult.      PT Equipment Recommendations  Other: Pt requires RW to safetly attempt ambulation with assistance.      Patient Diagnosis(es): The primary encounter diagnosis was Left main coronary artery disease. Diagnoses of S/P CABG x 3, NSTEMI (non-ST elevation myocardial infarction) (HCC), COVID, Left leg swelling, and Femoral thrombosis (HCC) were also pertinent to this visit.  Past Medical History:  has a past medical history of ATN (acute tubular necrosis) ([facility]), COPD (chronic obstructive pulmonary disease) (HCC), COPD (chronic obstructive pulmonary disease) (HCC), Diabetes mellitus (HCC), Diabetes mellitus (HCC), Left main coronary artery disease, and Mixed hyperlipidemia.  Past Surgical History:  has a past surgical history that includes Tooth Extraction (14); Colonoscopy (2015); Cardiac procedure (N/A, 2023); Cardiac procedure (N/A, 2023); Cardiac procedure (N/A, 2023); Cardiac procedure (N/A, 2023); Coronary artery bypass graft (N/A, 2023); Cardiac procedure (N/A, 2023); Cardiac procedure (N/A, 2023); Cardiac procedure (N/A, 2023); and Femoral-femoral Bypass Graft (Left, 2024).    Assessment   Body Structures, Functions, Activity Limitations Requiring Skilled Therapeutic Intervention: Decreased functional mobility ;Decreased strength;Decreased balance;Increased pain  Assessment: Pt CGA transfers Elida amb 50' RW w/c follow. Pt would benefit from continued acute PT to address deficits.  Therapy Prognosis: Good  Decision Making: Medium

## 2024-01-10 NOTE — DISCHARGE SUMMARY
[facility]  Discharge Note                 Name:  Lee Mejia  YOB: 1960  Social Security Number:  [id number]  Medical Record Number:  [id number]    Date of Admission:  12/26/2023  Date of Discharge:  1/10/2024    Admitting physician: Db Sanchez MD    Discharge Attending: BIANCA Hardy CNP, CNP  Primary Care Physician: Dee Morrison DO  Consultants: Cardiology  Discharge to Home in stable condition    HOSPITAL ADMISSION PROBLEM LIST:  Patient Active Problem List   Diagnosis    Perirectal abscess    Diabetes mellitus (HCC)    COPD (chronic obstructive pulmonary disease) ([facility])    Dependence on nicotine from cigarettes    Mixed hyperlipidemia    STEMI (ST elevation myocardial infarction) ([facility])    Left main coronary artery disease    Bandemia    COVID    Cardiogenic shock ([facility])    Encounter for pre-operative cardiovascular clearance    S/P CABG x 3    ATN (acute tubular necrosis) ([facility])    Hypernatremia    Metabolic acidemia    NSTEMI (non-ST elevation myocardial infarction) ([facility])    Debility    Atelectasis    Femoral thrombosis ([facility])    Pneumonia of both lower lobes due to infectious organism    Tobacco abuse    Left leg swelling    Fever         Procedures:cardiac catheterization, echocardiogram    HOSPITAL COURSE :           The patient was admitted for: STEMI  Hospital Procedures if any: Cardiac cath  Medications changes recommendation: see medications below  Follow Up Plan: as schedueld       Discharge exam:   Vitals:    01/10/24 1130   BP: 106/70   Pulse: 83   Resp: 16   Temp: 97.8 °F (36.6 °C)   SpO2:      Neuro: normal  Chest: Clear to ausculation. No wheezing.  Cardiac: Regular rate. s1 and s2 auscultated. No murmur noted.  Abdomen/groin: soft, non-tender, without masses or organomegaly  Lower extremity edema: none    Discharge Medications:     Medication List      ASK your doctor about these medications     albuterol sulfate  (90 Base) MCG/ACT  Consultants      [phone]

## 2024-01-10 NOTE — PROGRESS NOTES
PULMONARY & CRITICAL CARE MEDICINE PROGRESS NOTE     Patient:  Lee Mejia  MRN: [id number]  Admit date: 12/26/2023  Primary Care Physician: Dee Morrison DO  Consulting Physician: Db Sanchez MD  CODE Status: Full Code  LOS: 15     SUBJECTIVE     CHIEF COMPLAINT/REASON FOR INITIAL CONSULT:  STEMI  BRIEF HOSPITAL COURSE:   The patient is a 63 y.o. male with chronic smoking history and COPD, presented to [facility] with severe chest pain.  He went into V-fib arrest while in the ER at [facility] requiring defibrillation.  He converted into sinus rhythm.  EKG was obtained which was consistent with anterolateral STEMI and the patient was emergently taken to Cath Lab.  He underwent cardiac cath which showed high-grade left main and ostial LAD disease with a dominant left circumflex system.  He was in cardiogenic shock, intra-aortic balloon pump was placed.  He also developed complete heart block during cardiac cath, temporary pacemaker was inserted.  He was admitted in medical ICU at the same [facility] facility where he coded again, had brief CPR after which ROSC was achieved.  He was subsequently transferred to [facility] for CT surgery evaluation for emergent CABG.  Nasal swab also came back positive for COVID.  CT surgery planning on bypass today.  Pulmonology consulted for recommendations prior to surgery, COVID infection and for vent management.  History obtained from the family member/girlfriend.  She denies any sick contact in the family.  But he does go out and meet several people.  She does not think that the patient had cough, fever or breathing difficulty.  Patient is heavy smoker, has been smoking 1-.5 pack per day for last several years.  He uses inhaler at home.  Denies any other significant past medical history.    1/10/2024   Subjective    Post 3 vessel cabg 12/26/23   Patient had femoral thrombectomy on the left and fasciotomy of the left lower leg on 1/4/2024    Feels better after right thoracentesis   Cough but no sputum   Up in chair     REVIEW OF SYSTEMS:  Review of Systems -   Constitutional ROS: fever   ENT ROS: negative  Allergy and Immunology ROS: negative  Respiratory ROS: negative  Cardiovascular ROS: negative  Gastrointestinal ROS: diarrhea   Musculoskeletal ROS: negative      OBJECTIVE           SUPPORT DEVICES: [] Ventilator [] BIPAP  [x] Nasal Cannula [] Room Air          PaO2/FiO2 RATIO:  Recent Labs     24  0856   POCPO2 55.9*        FiO2 : 40 %     VITAL SIGNS:   LAST:  /70   Pulse 83   Temp 97.8 °F (36.6 °C) (Oral)   Resp 16   Ht 1.626 m (5' 4.02\")   Wt 61 kg (134 lb 7.7 oz)   SpO2 97%   BMI 23.07 kg/m²   8-24 HR RANGE:  TEMP Temp  Av.1 °F (36.7 °C)  Min: 97.8 °F (36.6 °C)  Max: 98.6 °F (37 °C)   BP Systolic (24hrs), Av , Min:94 , Max:116      Diastolic (24hrs), Av, Min:62, Max:76     PULSE Pulse  Av.1  Min: 68  Max: 83   RR Resp  Av.3  Min: 16  Max: 22   O2 SAT SpO2  Av %  Min: 97 %  Max: 97 %   OXYGEN DELIVERY O2 Flow Rate (L/min)  Av.5 L/min  Min: 2 L/min  Max: 3 L/min        SYSTEMIC EXAMINATION:   Head and neck atraumatic, normocephalic    Lymph nodes-no cervical, supraclavicular lymphadenopathy    Neck-no JVP elevation    Lungs - Ventilating all lobes with decreased breath sounds in the bases.  Dull bases post     CVS- S1, S2 regular.  No S3 no S4, no murmurs    Abdomen-nontender, nondistended.  Bowel sounds are present.  No organomegaly    Lower extremity-no edema.  Patient left lower extremity wound vac     Upper extremity-no edema    Neurological-no overt motor deficit   DATA REVIEW     Medications:  Scheduled Meds:   lactobacillus  1 capsule Oral BID WC    ceftaroline fosamil (TEFLARO) IVPB  600 mg IntraVENous Q12H    vancomycin  125 mg Oral 4x Daily    carvedilol  12.5 mg Oral BID WC    atorvastatin  80 mg Oral Nightly    famotidine (PEPCID) injection  20 mg IntraVENous BID    aspirin  81 mg

## 2024-01-10 NOTE — CARE COORDINATION
Spoke to Sharifa from [place]. They are able to accept patient. They have bed availability today. PS sent to Dr Solorzano with vascular asking if they are ok with discharge and following wound progress at LTAC    1252 spoke to Renetta from [facility]. They are not able to accept d/t cost of care. Spoke to Tona from [facility]. They do not accept patient's insurance    1330 PS sent to Vicki SRINIVASAN with cardiology asking if patient could discharge to LTAC    1530 Dr Anderson ok with discharge and follow up next week for I&D. PS sent to Vicki NP to update. Spoke to Sharifa from [place]. They do not have a bed available today but do expects discharges tomorrow

## 2024-01-10 NOTE — PROGRESS NOTES
Division of Vascular Surgery             Progress Note      Name: Lee Mejia  MRN: [id number]         Overnight Events:       Subjective:     1/4/24: LLE thrombectomy and 4 compartment fasciotomy    Pt seen and examined at bedside.  Afebrile overnight. Requiring 3 L nasal cannula but denying chest pain or shortness of breath at this time. WBC downtrending 13.5 today from 22.2 from 27. Alert and oriented today. Comfortable up in chair. No complaints at time of exam.  Wound vac lost seal overnight had to be re-enforced and splint on LLE on and off q2hrs, timing for debridement TBD. CK/Paul downtrended.  Wound vac change today.     Physical Exam:     Vitals:  /67   Pulse 71   Temp (P) 98 °F (36.7 °C) (Oral)   Resp 24   Ht 1.626 m (5' 4.02\")   Wt 61 kg (134 lb 7.7 oz)   SpO2 96%   BMI 23.07 kg/m²       General appearance - Well appearing, no acute distress   Mental status - A&Ox3  Head - normocephalic and atraumatic  Neck - supple, no JVD  Chest -no respiratory distress, no accessory muscle use  Heart - normal rate, regular rhythm  Abdomen - soft, non-tender, non-distended  Neurological - normal speech, no focal findings or movement disorder noted  Extremities - UE peripheral pulses palpable, mild pedal edema  Skin - wound vac over anteriolateral would, sealed with minimal output  Foot Exam - warm, well-perfused  Vascular Exam -Doppler signals PT bilaterally, palpable DP pulses, cap refill <3s      Imaging:       Vascular duplex lower extremity venous left    Result Date: 1/4/2024    No evidence of deep vein thrombosis in the left lower extremity.     Vascular duplex lower extremity arteries left    Result Date: 1/4/2024  Left: The common femoral and external iliac arteries are occluded.  The distal common femoral is still patent via retrograde flow through the profunda to serve the SFA.  The SFA and popliteal are patent with no evidence of significant stenosis.  Clinical correlation is needed.

## 2024-01-10 NOTE — PROGRESS NOTES
Subjective:      Reason for WOCN Evaluation and Assessment: \"Left lower leg fasciotomy wound, would like wound vac placed.          01/10/24 1051   Wound 01/05/24 Leg Left;Lower;Lateral fasciotomy   Date First Assessed: 01/05/24   Primary Wound Type: Surgical Type  Location: Leg  Wound Location Orientation: Left;Lower;Lateral  Wound Description (Comments): fasciotomy   Wound Image    Wound Etiology Surgical   Dressing Status New dressing applied   Wound Cleansed Cleansed with saline   Dressing/Treatment Negative pressure wound therapy   Dressing Change Due 01/12/24   Wound Assessment Exposed structure muscle;Granulation tissue   Drainage Description Serosanguinous;Serous   Odor None   Karlie-wound Assessment Edematous   Wound Thickness Description not for Pressure Injury Full thickness   Negative Pressure Wound Therapy Leg Left;Lower   Placement Date: 01/05/24   Present on Admission/Arrival: No  Location: Leg  Wound Location Orientation: Left;Lower   Wound Type Surgical   Unit Type 3M VAC Ulta   Dressing Type Black Foam   Number of pieces used 1   Number of pieces removed 1   Cycle Continuous;On   Target Pressure (mmHg) 100   Canister changed? Yes   Dressing Status New dressing applied   Dressing Changed Changed/New   Drainage Description Serous;Serosanguinous   Dressing Change Due 01/12/24   Wound Assessment Exposed structure muscle;Granulation tissue   Karlie-wound Assessment Edematous   Odor None   Incision 01/05/24 Leg Left;Lower;Medial   Date First Assessed: 01/05/24   Location: Leg  Incision Location Orientation: Left;Lower;Medial  Incision Description (Comments): edges well approximated  Incision Outcome: Well approximated   Wound Image    Dressing Status New dressing applied   Dressing Change Due 01/12/24   Incision Cleansed Cleansed with saline   Dressing/Treatment Silicone border;Foam   Closure Sutures   Margins Approximated   Drainage Amount None (dry)   Karlie-incision Assessment Edematous     NPWT dressing  changed to the left lower leg fasciotomy incision using black granufoam.  Silicone bordered foam applied to the medial closed incision.    The foot is edematous held in  position of dorsal plantar flexion; the periwound tissue is edematous;  a light ACE was applied to the left lower leg.  ABD used to pad track pad.  Foot Drop AFO applied; seated in recliner with legs elevated.

## 2024-01-10 NOTE — BRIEF OP NOTE
Brief Postoperative Note for Thoracentesis    Lee SHETTY Yeqmour  YOB: 1960  [id number]    Pre-operative Diagnosis: right Pleural effusion      Post-operative Diagnosis: Same    Procedure: Ultrasound guided Thoracentesis     Anesthesia: 1% Lidocaine     Surgeons/Assistants: Harvey Hernandez PA-C    Complications: none    EBL: Minimal    Specimens: were obtained    Ultrasound guided right thoracentesis performed. 600 ml turbid rita fluid obtained. Dressing applied.      Electronically signed by ARLENE De Santiago on 1/10/2024 at 9:05 AM

## 2024-01-10 NOTE — DISCHARGE INSTR - COC
01/10/24 1051   Odor None 01/10/24 1051   Karlie-wound Assessment Edematous 01/10/24 1051   Margins Other (Comment) 01/09/24 0400   Wound Thickness Description not for Pressure Injury Full thickness 01/10/24 1051   Number of days: 5       Incision 12/26/23 Sternum Lower (Active)   Dressing Status Other (Comment) 01/09/24 1600   Dressing Change Due 01/07/24 01/08/24 1000   Incision Cleansed Not Cleansed 01/10/24 0000   Dressing/Treatment Open to air 01/10/24 0000   Closure Surgical glue 01/10/24 0000   Margins Approximated 01/10/24 0000   Incision Assessment Dry 01/10/24 0000   Drainage Amount None (dry) 01/10/24 0000   Odor None 01/10/24 0000   Karlie-incision Assessment Intact 01/10/24 0000   Number of days: 14       Incision 12/26/23 Femoral Distal;Left (Active)   Dressing Status Other (Comment) 01/09/24 1600   Dressing Change Due 01/09/24 01/08/24 1000   Incision Cleansed Not Cleansed 01/10/24 0000   Dressing/Treatment Open to air 01/10/24 0000   Closure Open to air 01/09/24 1600   Margins Approximated 01/10/24 0000   Incision Assessment Dry 01/10/24 0000   Drainage Amount None (dry) 01/10/24 0000   Odor None 01/09/24 0800   Karlie-incision Assessment Intact 01/09/24 0400   Number of days: 14       Incision 01/04/24 Pelvis Left (Active)   Dressing Status Clean;Dry;Intact 01/10/24 0000   Dressing Change Due 01/10/24 01/09/24 0400   Incision Cleansed Not Cleansed 01/10/24 0000   Dressing/Treatment ABD pad 01/10/24 0000   Closure Sutures 01/10/24 0000   Margins Approximated 01/10/24 0000   Incision Assessment Dry;Erythema 01/10/24 0000   Drainage Amount None (dry) 01/10/24 0000   Odor None 01/10/24 0000   Karlie-incision Assessment Intact;Edematous 01/10/24 0000   Number of days: 6       Incision 01/05/24 Leg Left;Lower;Medial (Active)   Wound Image   01/10/24 1051   Dressing Status New dressing applied 01/10/24 1051   Dressing Change Due 01/12/24 01/10/24 1051   Incision Cleansed Cleansed with saline 01/10/24 4196    Dressing/Treatment Silicone border;Foam 01/10/24 1051   Closure Sutures 01/10/24 1051   Margins Approximated 01/10/24 1051   Incision Assessment Dry;Erythema 01/10/24 0000   Drainage Amount None (dry) 01/10/24 1051   Drainage Description Serosanguinous 01/08/24 1205   Odor None 01/10/24 0000   Karlie-incision Assessment Edematous 01/10/24 1051   Number of days: 5        Elimination:  Continence:   Bowel: Yes  Bladder: Yes  Urinary Catheter: None   Colostomy/Ileostomy/Ileal Conduit: No       Date of Last BM: 01/11    Intake/Output Summary (Last 24 hours) at 1/10/2024 1528  Last data filed at 1/10/2024 0903  Gross per 24 hour   Intake --   Output 600 ml   Net -600 ml     I/O last 3 completed shifts:  In: -   Out: 1075 [Urine:1075]    Safety Concerns:     At Risk for Falls    Impairments/Disabilities:      None    Nutrition Therapy:  Current Nutrition Therapy:   - Oral Diet:  General    Routes of Feeding: Oral  Liquids: No Restrictions  Daily Fluid Restriction: no  Last Modified Barium Swallow with Video (Video Swallowing Test): not done    Treatments at the Time of Hospital Discharge:   Respiratory Treatments: 2L NC  Oxygen Therapy:  is not on home oxygen therapy.  Ventilator:    - No ventilator support    Rehab Therapies: Physical Therapy and Occupational Therapy  Weight Bearing Status/Restrictions: No weight bearing restrictions  Other Medical Equipment (for information only, NOT a DME order):  walker  Other Treatments: na    Patient's personal belongings (please select all that are sent with patient):  None    RN SIGNATURE:  Electronically signed by Princess Ball RN on 1/11/24 at 12:06 PM EST    CASE MANAGEMENT/SOCIAL WORK SECTION    Inpatient Status Date: ***    Readmission Risk Assessment Score:  Readmission Risk              Risk of Unplanned Readmission:  26           Discharging to Facility/ Agency   Name: [facility]  Address: [address] [place] [address]  Phone: [phone]    Case

## 2024-01-10 NOTE — FLOWSHEET NOTE
Thoracentesis: via right  chest    JR ARLENE Alonzo  MS  RN  MF RDMS    Patient to US room 8, identified, allergies confirmed. Sitting up, monitors on. Stable.     BP  110/76  HR  74  SATS  96    Time out complete. Prep to right  back per JR JACOBS.   600 mls  of rita colored fluid drawn off. No orders for sample at this.time. 2x2 with Tegaderm to right back puncture. No problems noted.     Patient tolerated well. Report called to RN.  Patient stable, off monitor, and awaiting transport in IR holding area.

## 2024-01-11 VITALS
BODY MASS INDEX: 22.96 KG/M2 | OXYGEN SATURATION: 98 % | RESPIRATION RATE: 17 BRPM | WEIGHT: 134.48 LBS | SYSTOLIC BLOOD PRESSURE: 110 MMHG | DIASTOLIC BLOOD PRESSURE: 61 MMHG | HEART RATE: 76 BPM | HEIGHT: 64 IN | TEMPERATURE: 98.2 F

## 2024-01-11 LAB
ANION GAP SERPL CALCULATED.3IONS-SCNC: 13 MMOL/L (ref 9–17)
BODY FLD TYPE: NORMAL
BUN SERPL-MCNC: 19 MG/DL (ref 8–23)
CALCIUM SERPL-MCNC: 7.6 MG/DL (ref 8.6–10.4)
CHLORIDE SERPL-SCNC: 103 MMOL/L (ref 98–107)
CO2 SERPL-SCNC: 18 MMOL/L (ref 20–31)
CREAT SERPL-MCNC: 0.9 MG/DL (ref 0.7–1.2)
ERYTHROCYTE [DISTWIDTH] IN BLOOD BY AUTOMATED COUNT: 15.1 % (ref 11.8–14.4)
GFR SERPL CREATININE-BSD FRML MDRD: >60 ML/MIN/1.73M2
GLUCOSE SERPL-MCNC: 120 MG/DL (ref 70–99)
HCT VFR BLD AUTO: 30.8 % (ref 40.7–50.3)
HGB BLD-MCNC: 10 G/DL (ref 13–17)
LYMPHOCYTES NFR FLD: 21 %
MAGNESIUM SERPL-MCNC: 1.9 MG/DL (ref 1.6–2.6)
MCH RBC QN AUTO: 29 PG (ref 25.2–33.5)
MCHC RBC AUTO-ENTMCNC: 32.5 G/DL (ref 28.4–34.8)
MCV RBC AUTO: 89.3 FL (ref 82.6–102.9)
NEUTROPHILS NFR FLD: 7 %
NRBC BLD-RTO: 0 PER 100 WBC
PLATELET # BLD AUTO: 268 K/UL (ref 138–453)
PMV BLD AUTO: 11.9 FL (ref 8.1–13.5)
POTASSIUM SERPL-SCNC: 3.9 MMOL/L (ref 3.7–5.3)
RBC # BLD AUTO: 3.45 M/UL (ref 4.21–5.77)
RBC # FLD: NORMAL CELLS/UL
SODIUM SERPL-SCNC: 134 MMOL/L (ref 135–144)
UNIDENT CELLS NFR FLD: NORMAL %
WBC # FLD: 445 CELLS/UL
WBC OTHER # BLD: 11.9 K/UL (ref 3.5–11.3)

## 2024-01-11 PROCEDURE — 05HY33Z INSERTION OF INFUSION DEVICE INTO UPPER VEIN, PERCUTANEOUS APPROACH: ICD-10-PCS | Performed by: INTERNAL MEDICINE

## 2024-01-11 PROCEDURE — 80048 BASIC METABOLIC PNL TOTAL CA: CPT

## 2024-01-11 PROCEDURE — 2580000003 HC RX 258: Performed by: STUDENT IN AN ORGANIZED HEALTH CARE EDUCATION/TRAINING PROGRAM

## 2024-01-11 PROCEDURE — 6360000002 HC RX W HCPCS: Performed by: INTERNAL MEDICINE

## 2024-01-11 PROCEDURE — 6370000000 HC RX 637 (ALT 250 FOR IP): Performed by: INTERNAL MEDICINE

## 2024-01-11 PROCEDURE — 2580000003 HC RX 258: Performed by: INTERNAL MEDICINE

## 2024-01-11 PROCEDURE — C1751 CATH, INF, PER/CENT/MIDLINE: HCPCS

## 2024-01-11 PROCEDURE — 2500000003 HC RX 250 WO HCPCS: Performed by: STUDENT IN AN ORGANIZED HEALTH CARE EDUCATION/TRAINING PROGRAM

## 2024-01-11 PROCEDURE — 76937 US GUIDE VASCULAR ACCESS: CPT

## 2024-01-11 PROCEDURE — 2700000000 HC OXYGEN THERAPY PER DAY

## 2024-01-11 PROCEDURE — A4216 STERILE WATER/SALINE, 10 ML: HCPCS | Performed by: STUDENT IN AN ORGANIZED HEALTH CARE EDUCATION/TRAINING PROGRAM

## 2024-01-11 PROCEDURE — 2580000003 HC RX 258: Performed by: SURGERY

## 2024-01-11 PROCEDURE — 6370000000 HC RX 637 (ALT 250 FOR IP): Performed by: STUDENT IN AN ORGANIZED HEALTH CARE EDUCATION/TRAINING PROGRAM

## 2024-01-11 PROCEDURE — 85027 COMPLETE CBC AUTOMATED: CPT

## 2024-01-11 PROCEDURE — 83735 ASSAY OF MAGNESIUM: CPT

## 2024-01-11 PROCEDURE — 94761 N-INVAS EAR/PLS OXIMETRY MLT: CPT

## 2024-01-11 PROCEDURE — 6370000000 HC RX 637 (ALT 250 FOR IP)

## 2024-01-11 PROCEDURE — 97530 THERAPEUTIC ACTIVITIES: CPT

## 2024-01-11 PROCEDURE — 97110 THERAPEUTIC EXERCISES: CPT

## 2024-01-11 PROCEDURE — 36415 COLL VENOUS BLD VENIPUNCTURE: CPT

## 2024-01-11 PROCEDURE — 6370000000 HC RX 637 (ALT 250 FOR IP): Performed by: SURGERY

## 2024-01-11 PROCEDURE — 99239 HOSP IP/OBS DSCHRG MGMT >30: CPT

## 2024-01-11 RX ORDER — LACTOBACILLUS RHAMNOSUS GG 10B CELL
1 CAPSULE ORAL 2 TIMES DAILY WITH MEALS
Qty: 20 CAPSULE | Refills: 0 | Status: SHIPPED | OUTPATIENT
Start: 2024-01-11 | End: 2024-01-21

## 2024-01-11 RX ADMIN — VANCOMYCIN HYDROCHLORIDE 125 MG: 125 CAPSULE ORAL at 08:16

## 2024-01-11 RX ADMIN — SODIUM CHLORIDE, PRESERVATIVE FREE 10 ML: 5 INJECTION INTRAVENOUS at 08:15

## 2024-01-11 RX ADMIN — Medication 1 CAPSULE: at 08:15

## 2024-01-11 RX ADMIN — SODIUM CHLORIDE, PRESERVATIVE FREE 20 MG: 5 INJECTION INTRAVENOUS at 08:15

## 2024-01-11 RX ADMIN — CARVEDILOL 12.5 MG: 12.5 TABLET, FILM COATED ORAL at 08:14

## 2024-01-11 RX ADMIN — VANCOMYCIN HYDROCHLORIDE 125 MG: 125 CAPSULE ORAL at 12:22

## 2024-01-11 RX ADMIN — CLOPIDOGREL BISULFATE 75 MG: 75 TABLET ORAL at 08:14

## 2024-01-11 RX ADMIN — ACETAMINOPHEN 650 MG: 325 TABLET ORAL at 09:34

## 2024-01-11 RX ADMIN — ASPIRIN 81 MG 81 MG: 81 TABLET ORAL at 08:14

## 2024-01-11 RX ADMIN — CEFTAROLINE FOSAMIL 600 MG: 600 POWDER, FOR SOLUTION INTRAVENOUS at 05:04

## 2024-01-11 ASSESSMENT — PAIN DESCRIPTION - ORIENTATION: ORIENTATION: LEFT

## 2024-01-11 ASSESSMENT — PAIN DESCRIPTION - DESCRIPTORS: DESCRIPTORS: ACHING

## 2024-01-11 ASSESSMENT — PAIN DESCRIPTION - LOCATION: LOCATION: LEG

## 2024-01-11 ASSESSMENT — PAIN SCALES - GENERAL: PAINLEVEL_OUTOF10: 9

## 2024-01-11 NOTE — DISCHARGE SUMMARY
[facility]  Discharge Note                 Name:  Lee Mejia  YOB: 1960  Social Security Number:  [id number]  Medical Record Number:  [id number]    Date of Admission:  12/26/2023  Date of Discharge:  1/11/2024    Admitting physician: Db Sanchez MD    Discharge Attending: BIANCA Hardy CNP, CNP  Primary Care Physician: Dee Morrison DO  Consultants: Cardiology  Discharge to Home in stable condition    HOSPITAL ADMISSION PROBLEM LIST:  Patient Active Problem List   Diagnosis    Perirectal abscess    Diabetes mellitus (HCC)    COPD (chronic obstructive pulmonary disease) ([facility])    Dependence on nicotine from cigarettes    Mixed hyperlipidemia    STEMI (ST elevation myocardial infarction) ([facility])    Left main coronary artery disease    Bandemia    COVID    Cardiogenic shock ([facility])    Encounter for pre-operative cardiovascular clearance    S/P CABG x 3    ATN (acute tubular necrosis) ([facility])    Hypernatremia    Metabolic acidemia    NSTEMI (non-ST elevation myocardial infarction) ([facility])    Debility    Atelectasis    Femoral thrombosis ([facility])    Pneumonia of both lower lobes due to infectious organism    Tobacco abuse    Left leg swelling    Fever         Procedures:cardiac catheterization, echocardiogram    HOSPITAL COURSE :           The patient was admitted for: STEMI  Hospital Procedures if any: Cardiac cath  Medications changes recommendation: see medications below  Follow Up Plan: as schedueld       Discharge exam:   Vitals:    01/11/24 0715   BP: 119/70   Pulse: 81   Resp: 12   Temp: 97.9 °F (36.6 °C)   SpO2:      Neuro: normal  Chest: Clear to ausculation. No wheezing.  Cardiac: Regular rate. s1 and s2 auscultated. No murmur noted.  Abdomen/groin: soft, non-tender, without masses or organomegaly  Lower extremity edema: none    Discharge Medications:     Medication List      START taking these medications     atorvastatin 80 MG tablet; Commonly known as: LIPITOR; Take  1 tablet by   mouth nightly   carvedilol 12.5 MG tablet; Commonly known as: COREG; Take 1 tablet by   mouth 2 times daily (with meals)   ceftaroline fosamil 600 MG Solr; Commonly known as: TEFLARO; Infuse 600   mg intravenously every 12 hours for 7 days   clopidogrel 75 MG tablet; Commonly known as: PLAVIX; Take 1 tablet by   mouth daily   lactobacillus capsule; Take 1 capsule by mouth 2 times daily (with   meals) for 10 days     CONTINUE taking these medications     albuterol sulfate  (90 Base) MCG/ACT inhaler; Commonly known as:   Ventolin HFA; Inhale 2 puffs into the lungs every 6 hours as needed for   Wheezing.   aspirin EC 81 MG EC tablet; Take 1 tablet by mouth daily.   Combivent  MCG/ACT inhaler; Generic drug: albuterol-ipratropium   nicotine 21 MG/24HR; Commonly known as: NICODERM CQ; Place 1 patch onto   the skin every 24 hours.    ECHO     Left Ventricle: Normal left ventricular systolic function. EF by 2D Simpsons Biplane is 66%.    Right Ventricle: Reduced systolic function.    Aortic Valve: Mild sclerosis of the aortic valve cusp.    Mitral Valve: Mildly thickened leaflet.    Image quality is adequate.    Cardiac cath   Findings:     Left main: 80% distal left main extending into ostial LAD     LAD: 90% ostial LAD with TESSIE-3 flow     LCX: Dominant with luminal irregularities of 20 to 30%     RCA: Luminal irregularities of 20 to 30%     The LV gram was performed in the WITT 30 position.   LVEF: 35%. LV Wall Motion: Anteroapical akinesis     Conclusions:  High-grade left main and ostial LAD disease.  Dominant left circumflex system  Moderately reduced LV function  Cardiogenic shock  Short episode of complete heart block during cardiac cath     Recommendation:  CT consult for emergent CABG  Intra-aortic balloon pump was inserted via her left femoral approach  Transvenous temporary pacemaker inserted via left femoral vein  Supportive care on vent and vasopressors  Transfer emergently to [facility]

## 2024-01-11 NOTE — PROGRESS NOTES
[facility] -   Infectious diseases evaluation  admission date 12/26/2023    reason for consultation:   Covid  - surg clearance    Impression :   Current:  Anterolateral NSTEMI  Card cath complicated w complete AV block and placed a temporary pace maker  High grade left main ostial disease 12/26  LCX 20-30 - RCA 20 - 30LVEF 35  Card arrest due to the AV block -  CPR less than a min to achieve ROSC  CABG 12/26 emergent bypass x  3   Cardiogenic shock  Intra aortic balloon placed  Left leg ischemia and needed declotting w fasciotomy  Site still demarcating  Bandemia 31 - better  Shock liver 12/28  Flash Pulm edema - sp white red, no clear bacterial pneumonia   Resp failure from the card arrest   intubated  Covid test +  COPD  DM    Other:    Discussion / summary of stay / plan of care     Recommendations     COVID + but not clear if causing disease  12/26 remdesevir after the CABG - stopped 12/28 due to shock liver and elevated Liver enzymes  Emergent CABG 12/26 - 3 vessels  Bandemia is reactive to the MI and arrest - improved  L thoracentesis 550 ml serosanguinous  Disc w RN and pulm       1/3/24 - WBC 17 -LLL same consolidation -resuming ceftriaxone  1/8 left thoracentesis and minimal infil left behind  1/8 fever 104 and WBC  27  1/9 fever better - WBC 22 - left thoracentesis seems serous and cx pend - diarrhea , \add culturelle  and add po vanco and switch to ceftaroline   fasciotomy site demarcating per vasc, plan for I/D 1/12    Infection Control Recommendations   [place] Precautions  Contact Isolation  plus till 1/4/24  Antimicrobial Stewardship Recommendations   Simplification of therapy  Targeted therapy    History of Present Illness:   Initial history:  Lee Mejia is a 63 y.o.-year-old male admitted w severe chest pain and NSTEMI  Over exerts himself and has COPD  Taken to card cath and had a complete HB and pacer placed  He is intubated w resp failure and  Taj Owens MD at [facility] CARDIAC CATH LAB    CARDIAC PROCEDURE N/A 12/30/2023    Insert temporary pacemaker performed by Taj Daly MD at [facility] CARDIAC CATH LAB    CARDIAC PROCEDURE N/A 12/30/2023    Left heart cath performed by Taj Daly MD at [facility] CARDIAC CATH LAB    COLONOSCOPY  01/05/2015    CORONARY ARTERY BYPASS GRAFT N/A 12/26/2023    EMERGENCY CABG, CORONARY ARTERY BYPASS X3, ON PUMP, ANGELLA PER ANESTHESIA performed by David Bañuelos MD at [facility] CVOR    FEMORAL-FEMORAL BYPASS GRAFT Left 1/4/2024    FEMORAL THROMBECTOMY WITH ANGIOGRAM, POSSIBLE FEMORAL FEMORAL BYPASS, POSSIBLE FASCIOTOMY performed by Brent Anderson MD at [facility] CVOR    TOOTH EXTRACTION  11/06/14       Medications:      lactobacillus  1 capsule Oral BID WC    ceftaroline fosamil (TEFLARO) IVPB  600 mg IntraVENous Q12H    vancomycin  125 mg Oral 4x Daily    carvedilol  12.5 mg Oral BID WC    atorvastatin  80 mg Oral Nightly    famotidine (PEPCID) injection  20 mg IntraVENous BID    aspirin  81 mg Oral Daily    [Held by provider] torsemide  40 mg Oral Daily    [Held by provider] naloxegol  12.5 mg Oral QAM AC    [Held by provider] spironolactone  25 mg Oral Daily    [Held by provider] pantoprazole  40 mg IntraVENous Daily    sodium chloride flush  5-40 mL IntraVENous 2 times per day    clopidogrel  75 mg Oral Daily    polyethylene glycol  17 g Oral Daily    sennosides-docusate sodium  1 tablet Oral BID       Social History:     Social History     Socioeconomic History    Marital status: Single     Spouse name: Not on file    Number of children: Not on file    Years of education: Not on file    Highest education level: Not on file   Occupational History    Not on file   Tobacco Use    Smoking status: Every Day     Current packs/day: 0.50     Average packs/day: 0.5 packs/day for 20.0 years (10.0 ttl pk-yrs)     Types: Cigarettes    Smokeless tobacco: Never   Substance and Sexual Activity    Alcohol use: No    Drug use: No    Sexual

## 2024-01-11 NOTE — PROCEDURES
Midline insertion note: PICC order - Midline ordered in comments.     Prescribed therapy: teflaro 7 days  Product type: BARD MST PowerMidline  Insertion site: Left  Vein measures 0.28 cm with an area CVR of 14.9%.(Preffered area based CVR would be less than 20%).  History/Labs/Allergies Reviewed  Placed By: Guero Monroe RN IV Team  Assistant: Staff -RN  Time out Performed using Two Identifiers  Lot #: [id number]  Expiration date: 04/30/2024  Catheter size: 20 gauge  Total length: 10 cm  External catheter length: 0 cm  Number of attempts: 1  Estimated blood loss: 1 ml  Placement verified by: positive blood return & flushes easily  Special equipment used: ultrasound & micro-introducer technique   Catheter securement: Adhesive statlock securement device   Catheter securement adhesive (SecureportIV) utilized at insertion site  Dressing applied: Tegaderm CHG  Lidocaine administered intradermally conc.1%: approx 1 mL    Midline education:     [ x ] Post care line insertion was discussed with patient/Family or POA prior to procedure.  Risks, benefits, alternatives, and reason for procedure were discussed and teaching was reinforced. An educational handout on post insertion line care and maintenance was left at bedside with patient or in chart. Patient (Family or POA) acknowledged understanding of information relayed.         vessel picture

## 2024-01-11 NOTE — PROGRESS NOTES
Physical Therapy  Facility/Department: [facility] CAR 2- STEPDOWN  Daily Treatment Note    Name: Lee Mejia  : 1960  MRN: [id number]  Date of Service: 2024    Discharge Recommendations:  Patient would benefit from continued therapy after discharge          Patient Diagnosis(es): The primary encounter diagnosis was Left main coronary artery disease. Diagnoses of S/P CABG x 3, NSTEMI (non-ST elevation myocardial infarction) (HCC), COVID, Left leg swelling, and Femoral thrombosis (HCC) were also pertinent to this visit.  Past Medical History:  has a past medical history of ATN (acute tubular necrosis) (HCC), COPD (chronic obstructive pulmonary disease) (HCC), COPD (chronic obstructive pulmonary disease) (HCC), Diabetes mellitus (HCC), Diabetes mellitus (HCC), Left main coronary artery disease, and Mixed hyperlipidemia.  Past Surgical History:  has a past surgical history that includes Tooth Extraction (14); Colonoscopy (2015); Cardiac procedure (N/A, 2023); Cardiac procedure (N/A, 2023); Cardiac procedure (N/A, 2023); Cardiac procedure (N/A, 2023); Coronary artery bypass graft (N/A, 2023); Cardiac procedure (N/A, 2023); Cardiac procedure (N/A, 2023); Cardiac procedure (N/A, 2023); and Femoral-femoral Bypass Graft (Left, 2024).    Assessment   Body Structures, Functions, Activity Limitations Requiring Skilled Therapeutic Intervention: Decreased functional mobility ;Decreased strength;Decreased balance;Increased pain  Assessment: Pt able to perform sit<>stand transfers with CGA. Pt ambulated 50 ft with RW and CGA. Pt would benefit from therapy following d/c to address deficits in gait mechanics, strength, and tolerance to activity.  Therapy Prognosis: Good  Activity Tolerance  Activity Tolerance: Patient limited by fatigue;Patient limited by endurance;Patient limited by pain     Plan   Physical Therapy Plan  General Plan: 6-7 times per week  Current

## 2024-01-11 NOTE — PLAN OF CARE
Problem: Chronic Conditions and Co-morbidities  Goal: Patient's chronic conditions and co-morbidity symptoms are monitored and maintained or improved  1/11/2024 1210 by Princess Ball RN  Outcome: Completed  1/11/2024 0510 by Magdalene Mccracken RN  Outcome: Progressing     Problem: Discharge Planning  Goal: Discharge to home or other facility with appropriate resources  1/11/2024 1210 by Princess Ball RN  Outcome: Completed  1/11/2024 0510 by Magdalene Mccracken RN  Outcome: Progressing     Problem: Respiratory - Adult  Goal: Achieves optimal ventilation and oxygenation  1/11/2024 1210 by Princess Ball RN  Outcome: Completed  1/11/2024 0510 by Magdalene Mccracken RN  Outcome: Progressing     Problem: Safety - Adult  Goal: Free from fall injury  1/11/2024 1210 by Princess Ball RN  Outcome: Completed  1/11/2024 0510 by Magdalene Mccracken RN  Outcome: Progressing     Problem: Nutrition Deficit:  Goal: Optimize nutritional status  1/11/2024 1210 by Princess Ball RN  Outcome: Completed  1/11/2024 0510 by Magdalene Mccracken RN  Outcome: Progressing     Problem: Pain  Goal: Verbalizes/displays adequate comfort level or baseline comfort level  1/11/2024 1210 by Princess Ball RN  Outcome: Completed  1/11/2024 0510 by Magdalene Mccracken RN  Outcome: Progressing     Problem: Skin/Tissue Integrity  Goal: Absence of new skin breakdown  Description: 1.  Monitor for areas of redness and/or skin breakdown  2.  Assess vascular access sites hourly  3.  Every 4-6 hours minimum:  Change oxygen saturation probe site  4.  Every 4-6 hours:  If on nasal continuous positive airway pressure, respiratory therapy assess nares and determine need for appliance change or resting period.  1/11/2024 1210 by Princess Ball RN  Outcome: Completed  1/11/2024 0510 by Magdalene Mccracken RN  Outcome: Progressing     Problem: ABCDS Injury Assessment  Goal: Absence of physical injury  1/11/2024 1210 by Princess Ball RN  Outcome:

## 2024-01-11 NOTE — CARE COORDINATION
Received call from Alisson from [facility]. They are able to accept today. PS sent to Dr Lim asking if patient is able to discharge and requested med rec for antibiotics. Transportation request faxed to [facility] for 2 pm. Patient updated    1100 patient scheduled for transportation at 2 pm via [facility]    1130 Alisson from [facility] updated    Discharge Report    [facility]  Clinical Case Management Department  Written by: Rika Turner RN    Patient Name: Lee Mejia  Attending Provider: Db Sanchez MD  Admit Date: 2023  1:07 PM  MRN: [id number]  Account: [id number]                     : 1960  Discharge Date: 2024      Disposition: LTAC    Rika Turner RN

## 2024-01-11 NOTE — PLAN OF CARE
Problem: Chronic Conditions and Co-morbidities  Goal: Patient's chronic conditions and co-morbidity symptoms are monitored and maintained or improved  1/11/2024 0510 by Magdalene Mccracken RN  Outcome: Progressing  1/10/2024 1611 by Princess Ball RN  Outcome: Progressing     Problem: Discharge Planning  Goal: Discharge to home or other facility with appropriate resources  1/11/2024 0510 by Magdalene Mccracken RN  Outcome: Progressing  1/10/2024 1611 by Princess Ball RN  Outcome: Progressing     Problem: Respiratory - Adult  Goal: Achieves optimal ventilation and oxygenation  1/11/2024 0510 by Magdalene Mccracken RN  Outcome: Progressing  1/10/2024 1611 by Princess Ball RN  Outcome: Progressing     Problem: Safety - Adult  Goal: Free from fall injury  1/11/2024 0510 by Magdalene Mccracken RN  Outcome: Progressing  1/10/2024 1611 by Princess Ball RN  Outcome: Progressing     Problem: Nutrition Deficit:  Goal: Optimize nutritional status  1/11/2024 0510 by Magdalene Mccracken RN  Outcome: Progressing  1/10/2024 1611 by Princess Ball RN  Outcome: Progressing     Problem: Pain  Goal: Verbalizes/displays adequate comfort level or baseline comfort level  1/11/2024 0510 by Magdalene Mccracken RN  Outcome: Progressing  1/10/2024 1611 by Princess Ball RN  Outcome: Progressing     Problem: Skin/Tissue Integrity  Goal: Absence of new skin breakdown  Description: 1.  Monitor for areas of redness and/or skin breakdown  2.  Assess vascular access sites hourly  3.  Every 4-6 hours minimum:  Change oxygen saturation probe site  4.  Every 4-6 hours:  If on nasal continuous positive airway pressure, respiratory therapy assess nares and determine need for appliance change or resting period.  1/11/2024 0510 by Magdalene Mccracken RN  Outcome: Progressing  1/10/2024 1611 by Princess Ball RN  Outcome: Progressing     Problem: ABCDS Injury Assessment  Goal: Absence of physical injury  1/11/2024 0510 by Magdalene Mccracken RN  Outcome:  Progressing  1/10/2024 1611 by Princess Ball, RN  Outcome: Progressing

## 2024-01-12 ENCOUNTER — TELEPHONE (OUTPATIENT)
Dept: VASCULAR SURGERY | Age: 64
End: 2024-01-12

## 2024-01-12 NOTE — TELEPHONE ENCOUNTER
Called and left message for patient to call.  Dr. Anderson scheduled surgery for Tuesday 1/16/2024 10:00 am, arrive 8:30 am.

## 2024-01-14 LAB
MICROORGANISM SPEC CULT: NORMAL
MICROORGANISM SPEC CULT: NORMAL
MICROORGANISM/AGENT SPEC: NORMAL
SPECIMEN DESCRIPTION: NORMAL
SPECIMEN DESCRIPTION: NORMAL

## 2024-01-15 ENCOUNTER — TELEPHONE (OUTPATIENT)
Dept: VASCULAR SURGERY | Age: 64
End: 2024-01-15

## 2024-01-15 NOTE — TELEPHONE ENCOUNTER
----- Message from Cassandra Litten, MA sent at 1/15/2024 10:28 AM EST -----  Constanza Wound and vascular specialist at [facility] took a look at the patients wound and states that it looks good, and was not sure if you would like to go through with the surgery? Please advise.    Constanza [phone]

## 2024-01-15 NOTE — TELEPHONE ENCOUNTER
Spoke with Dr. Anderson in regards to this and he states to go ahead and cancel the surgery.    Spoke with Constanza in regards to this to let her know, she stated that she will inform the nurse to cancel the transportation

## 2024-01-17 LAB
EKG ATRIAL RATE: 77 BPM
EKG P AXIS: 78 DEGREES
EKG P-R INTERVAL: 120 MS
EKG Q-T INTERVAL: 366 MS
EKG QRS DURATION: 72 MS
EKG QTC CALCULATION (BAZETT): 414 MS
EKG R AXIS: 86 DEGREES
EKG T AXIS: -118 DEGREES
EKG VENTRICULAR RATE: 77 BPM
MICROORGANISM SPEC CULT: NORMAL
MICROORGANISM/AGENT SPEC: NORMAL
SPECIMEN DESCRIPTION: NORMAL

## 2024-01-25 PROBLEM — Z01.810 ENCOUNTER FOR PRE-OPERATIVE CARDIOVASCULAR CLEARANCE: Status: RESOLVED | Noted: 2023-12-26 | Resolved: 2024-01-25

## 2024-02-24 ENCOUNTER — HOSPITAL ENCOUNTER (INPATIENT)
Age: 64
LOS: 10 days | Discharge: HOME | DRG: 710 | End: 2024-03-05
Attending: EMERGENCY MEDICINE | Admitting: INTERNAL MEDICINE
Payer: MEDICAID

## 2024-02-24 ENCOUNTER — APPOINTMENT (OUTPATIENT)
Dept: GENERAL RADIOLOGY | Age: 64
DRG: 710 | End: 2024-02-24
Payer: MEDICAID

## 2024-02-24 DIAGNOSIS — Z48.89 ENCOUNTER FOR POST SURGICAL WOUND CHECK: Primary | ICD-10-CM

## 2024-02-24 DIAGNOSIS — S81.802A NON-HEALING WOUND OF LOWER EXTREMITY, LEFT, INITIAL ENCOUNTER: ICD-10-CM

## 2024-02-24 LAB
ANION GAP SERPL CALCULATED.3IONS-SCNC: 11 MMOL/L (ref 9–17)
BASOPHILS # BLD: 0.08 K/UL (ref 0–0.2)
BASOPHILS NFR BLD: 1 % (ref 0–2)
BUN SERPL-MCNC: 11 MG/DL (ref 8–23)
CALCIUM SERPL-MCNC: 8.7 MG/DL (ref 8.6–10.4)
CHLORIDE SERPL-SCNC: 100 MMOL/L (ref 98–107)
CO2 SERPL-SCNC: 23 MMOL/L (ref 20–31)
CREAT SERPL-MCNC: 0.6 MG/DL (ref 0.7–1.2)
CRP SERPL HS-MCNC: 52 MG/L (ref 0–5)
EOSINOPHIL # BLD: 0.35 K/UL (ref 0–0.44)
EOSINOPHILS RELATIVE PERCENT: 4 % (ref 1–4)
ERYTHROCYTE [DISTWIDTH] IN BLOOD BY AUTOMATED COUNT: 16.4 % (ref 11.8–14.4)
ERYTHROCYTE [SEDIMENTATION RATE] IN BLOOD BY PHOTOMETRIC METHOD: 50 MM/HR (ref 0–20)
GFR SERPL CREATININE-BSD FRML MDRD: >60 ML/MIN/1.73M2
GLUCOSE SERPL-MCNC: 97 MG/DL (ref 70–99)
HCT VFR BLD AUTO: 29 % (ref 40.7–50.3)
HGB BLD-MCNC: 8.4 G/DL (ref 13–17)
IMM GRANULOCYTES # BLD AUTO: 0.04 K/UL (ref 0–0.3)
IMM GRANULOCYTES NFR BLD: 0 %
LYMPHOCYTES NFR BLD: 2.77 K/UL (ref 1.1–3.7)
LYMPHOCYTES RELATIVE PERCENT: 28 % (ref 24–43)
MCH RBC QN AUTO: 27.3 PG (ref 25.2–33.5)
MCHC RBC AUTO-ENTMCNC: 29 G/DL (ref 28.4–34.8)
MCV RBC AUTO: 94.2 FL (ref 82.6–102.9)
MONOCYTES NFR BLD: 0.8 K/UL (ref 0.1–1.2)
MONOCYTES NFR BLD: 8 % (ref 3–12)
NEUTROPHILS NFR BLD: 59 % (ref 36–65)
NEUTS SEG NFR BLD: 5.94 K/UL (ref 1.5–8.1)
NRBC BLD-RTO: 0 PER 100 WBC
PLATELET # BLD AUTO: 475 K/UL (ref 138–453)
PMV BLD AUTO: 9.5 FL (ref 8.1–13.5)
POTASSIUM SERPL-SCNC: 4.4 MMOL/L (ref 3.7–5.3)
RBC # BLD AUTO: 3.08 M/UL (ref 4.21–5.77)
RBC # BLD: ABNORMAL 10*6/UL
SODIUM SERPL-SCNC: 134 MMOL/L (ref 135–144)
WBC OTHER # BLD: 10 K/UL (ref 3.5–11.3)

## 2024-02-24 PROCEDURE — 85652 RBC SED RATE AUTOMATED: CPT

## 2024-02-24 PROCEDURE — 6360000002 HC RX W HCPCS: Performed by: INTERNAL MEDICINE

## 2024-02-24 PROCEDURE — 2580000003 HC RX 258: Performed by: INTERNAL MEDICINE

## 2024-02-24 PROCEDURE — 99285 EMERGENCY DEPT VISIT HI MDM: CPT

## 2024-02-24 PROCEDURE — 2580000003 HC RX 258

## 2024-02-24 PROCEDURE — 99254 IP/OBS CNSLTJ NEW/EST MOD 60: CPT | Performed by: INTERNAL MEDICINE

## 2024-02-24 PROCEDURE — 6370000000 HC RX 637 (ALT 250 FOR IP)

## 2024-02-24 PROCEDURE — 87205 SMEAR GRAM STAIN: CPT

## 2024-02-24 PROCEDURE — 73590 X-RAY EXAM OF LOWER LEG: CPT

## 2024-02-24 PROCEDURE — 1200000000 HC SEMI PRIVATE

## 2024-02-24 PROCEDURE — 99223 1ST HOSP IP/OBS HIGH 75: CPT | Performed by: INTERNAL MEDICINE

## 2024-02-24 PROCEDURE — 80048 BASIC METABOLIC PNL TOTAL CA: CPT

## 2024-02-24 PROCEDURE — 6370000000 HC RX 637 (ALT 250 FOR IP): Performed by: STUDENT IN AN ORGANIZED HEALTH CARE EDUCATION/TRAINING PROGRAM

## 2024-02-24 PROCEDURE — 85025 COMPLETE CBC W/AUTO DIFF WBC: CPT

## 2024-02-24 PROCEDURE — 94761 N-INVAS EAR/PLS OXIMETRY MLT: CPT

## 2024-02-24 PROCEDURE — 86140 C-REACTIVE PROTEIN: CPT

## 2024-02-24 PROCEDURE — 87070 CULTURE OTHR SPECIMN AEROBIC: CPT

## 2024-02-24 RX ORDER — ASPIRIN 81 MG/1
81 TABLET ORAL DAILY
Status: DISCONTINUED | OUTPATIENT
Start: 2024-02-24 | End: 2024-03-05 | Stop reason: HOSPADM

## 2024-02-24 RX ORDER — CYCLOBENZAPRINE HCL 5 MG
5 TABLET ORAL 3 TIMES DAILY PRN
COMMUNITY

## 2024-02-24 RX ORDER — ACETAMINOPHEN 325 MG/1
650 TABLET ORAL EVERY 6 HOURS PRN
Status: DISCONTINUED | OUTPATIENT
Start: 2024-02-24 | End: 2024-03-02

## 2024-02-24 RX ORDER — NYSTATIN 10B UNIT
1 POWDER (EA) MISCELLANEOUS 3 TIMES DAILY
COMMUNITY

## 2024-02-24 RX ORDER — GABAPENTIN 300 MG/1
300 CAPSULE ORAL 3 TIMES DAILY
Status: DISCONTINUED | OUTPATIENT
Start: 2024-02-24 | End: 2024-03-02

## 2024-02-24 RX ORDER — GABAPENTIN 300 MG/1
1 CAPSULE ORAL 3 TIMES DAILY
COMMUNITY

## 2024-02-24 RX ORDER — ACETAMINOPHEN 325 MG/1
650 TABLET ORAL EVERY 6 HOURS PRN
COMMUNITY

## 2024-02-24 RX ORDER — POTASSIUM CHLORIDE 7.45 MG/ML
10 INJECTION INTRAVENOUS PRN
Status: DISCONTINUED | OUTPATIENT
Start: 2024-02-24 | End: 2024-03-05 | Stop reason: HOSPADM

## 2024-02-24 RX ORDER — SODIUM HYPOCHLORITE 1.25 MG/ML
SOLUTION TOPICAL DAILY
Status: DISCONTINUED | OUTPATIENT
Start: 2024-02-24 | End: 2024-03-05 | Stop reason: HOSPADM

## 2024-02-24 RX ORDER — SODIUM CHLORIDE 9 MG/ML
INJECTION, SOLUTION INTRAVENOUS PRN
Status: DISCONTINUED | OUTPATIENT
Start: 2024-02-24 | End: 2024-03-05 | Stop reason: HOSPADM

## 2024-02-24 RX ORDER — ENOXAPARIN SODIUM 100 MG/ML
30 INJECTION SUBCUTANEOUS DAILY
Status: DISCONTINUED | OUTPATIENT
Start: 2024-02-24 | End: 2024-02-24

## 2024-02-24 RX ORDER — APIXABAN 5 MG/1
1 TABLET, FILM COATED ORAL 2 TIMES DAILY
COMMUNITY

## 2024-02-24 RX ORDER — HYDROXYZINE HYDROCHLORIDE 25 MG/1
25 TABLET, FILM COATED ORAL 2 TIMES DAILY
COMMUNITY

## 2024-02-24 RX ORDER — CEPHALEXIN 500 MG/1
500 CAPSULE ORAL ONCE
Status: DISCONTINUED | OUTPATIENT
Start: 2024-02-24 | End: 2024-02-24

## 2024-02-24 RX ORDER — CARVEDILOL 12.5 MG/1
12.5 TABLET ORAL 2 TIMES DAILY WITH MEALS
Status: DISCONTINUED | OUTPATIENT
Start: 2024-02-24 | End: 2024-03-05 | Stop reason: HOSPADM

## 2024-02-24 RX ORDER — FLUTICASONE FUROATE AND VILANTEROL 100; 25 UG/1; UG/1
1 POWDER RESPIRATORY (INHALATION) DAILY
COMMUNITY

## 2024-02-24 RX ORDER — SODIUM CHLORIDE 0.9 % (FLUSH) 0.9 %
5-40 SYRINGE (ML) INJECTION EVERY 12 HOURS SCHEDULED
Status: DISCONTINUED | OUTPATIENT
Start: 2024-02-24 | End: 2024-03-05 | Stop reason: HOSPADM

## 2024-02-24 RX ORDER — NITROGLYCERIN 0.4 MG/1
0.4 TABLET SUBLINGUAL EVERY 5 MIN PRN
COMMUNITY

## 2024-02-24 RX ORDER — NICOTINE 21 MG/24HR
1 PATCH, TRANSDERMAL 24 HOURS TRANSDERMAL EVERY 24 HOURS
Status: DISCONTINUED | OUTPATIENT
Start: 2024-02-25 | End: 2024-03-05 | Stop reason: HOSPADM

## 2024-02-24 RX ORDER — ONDANSETRON 2 MG/ML
4 INJECTION INTRAMUSCULAR; INTRAVENOUS EVERY 6 HOURS PRN
Status: DISCONTINUED | OUTPATIENT
Start: 2024-02-24 | End: 2024-03-05 | Stop reason: HOSPADM

## 2024-02-24 RX ORDER — POTASSIUM CHLORIDE 20 MEQ/1
40 TABLET, EXTENDED RELEASE ORAL PRN
Status: DISCONTINUED | OUTPATIENT
Start: 2024-02-24 | End: 2024-03-05 | Stop reason: HOSPADM

## 2024-02-24 RX ORDER — OXYCODONE HYDROCHLORIDE 5 MG/1
5 TABLET ORAL ONCE
Status: COMPLETED | OUTPATIENT
Start: 2024-02-24 | End: 2024-02-24

## 2024-02-24 RX ORDER — DOCUSATE SODIUM 100 MG/1
100 CAPSULE, LIQUID FILLED ORAL 3 TIMES DAILY PRN
COMMUNITY

## 2024-02-24 RX ORDER — POLYETHYLENE GLYCOL 3350 17 G/17G
17 POWDER, FOR SOLUTION ORAL DAILY PRN
Status: DISCONTINUED | OUTPATIENT
Start: 2024-02-24 | End: 2024-03-05 | Stop reason: HOSPADM

## 2024-02-24 RX ORDER — SODIUM CHLORIDE 0.9 % (FLUSH) 0.9 %
5-40 SYRINGE (ML) INJECTION PRN
Status: DISCONTINUED | OUTPATIENT
Start: 2024-02-24 | End: 2024-03-05 | Stop reason: HOSPADM

## 2024-02-24 RX ORDER — HYDROCODONE BITARTRATE AND ACETAMINOPHEN 10; 325 MG/1; MG/1
1 TABLET ORAL EVERY 4 HOURS PRN
Status: ON HOLD | COMMUNITY
End: 2024-03-05 | Stop reason: HOSPADM

## 2024-02-24 RX ORDER — ACETAMINOPHEN 650 MG/1
650 SUPPOSITORY RECTAL EVERY 6 HOURS PRN
Status: DISCONTINUED | OUTPATIENT
Start: 2024-02-24 | End: 2024-03-02

## 2024-02-24 RX ORDER — CEPHALEXIN 500 MG/1
500 CAPSULE ORAL EVERY 6 HOURS
Status: ON HOLD | COMMUNITY
Start: 2024-02-17 | End: 2024-02-27 | Stop reason: HOSPADM

## 2024-02-24 RX ORDER — CLOPIDOGREL BISULFATE 75 MG/1
75 TABLET ORAL DAILY
Status: DISCONTINUED | OUTPATIENT
Start: 2024-02-24 | End: 2024-02-25

## 2024-02-24 RX ORDER — ONDANSETRON 4 MG/1
4 TABLET, ORALLY DISINTEGRATING ORAL EVERY 8 HOURS PRN
Status: DISCONTINUED | OUTPATIENT
Start: 2024-02-24 | End: 2024-03-05 | Stop reason: HOSPADM

## 2024-02-24 RX ORDER — MAGNESIUM SULFATE IN WATER 40 MG/ML
2000 INJECTION, SOLUTION INTRAVENOUS PRN
Status: DISCONTINUED | OUTPATIENT
Start: 2024-02-24 | End: 2024-03-05 | Stop reason: HOSPADM

## 2024-02-24 RX ORDER — LANOLIN ALCOHOL/MO/W.PET/CERES
9 CREAM (GRAM) TOPICAL NIGHTLY PRN
COMMUNITY

## 2024-02-24 RX ORDER — POLYETHYLENE GLYCOL 3350 17 G/17G
17 POWDER, FOR SOLUTION ORAL DAILY
COMMUNITY

## 2024-02-24 RX ORDER — SENNOSIDES A AND B 8.6 MG/1
1 TABLET, FILM COATED ORAL DAILY
COMMUNITY

## 2024-02-24 RX ORDER — ATORVASTATIN CALCIUM 80 MG/1
80 TABLET, FILM COATED ORAL NIGHTLY
Status: DISCONTINUED | OUTPATIENT
Start: 2024-02-24 | End: 2024-03-05 | Stop reason: HOSPADM

## 2024-02-24 RX ORDER — LEVALBUTEROL 1.25 MG/.5ML
1 SOLUTION, CONCENTRATE RESPIRATORY (INHALATION) EVERY 4 HOURS PRN
COMMUNITY

## 2024-02-24 RX ORDER — HYDROCODONE BITARTRATE AND ACETAMINOPHEN 5; 325 MG/1; MG/1
1 TABLET ORAL EVERY 4 HOURS PRN
Status: DISCONTINUED | OUTPATIENT
Start: 2024-02-24 | End: 2024-03-05

## 2024-02-24 RX ADMIN — CARVEDILOL 12.5 MG: 12.5 TABLET, FILM COATED ORAL at 18:01

## 2024-02-24 RX ADMIN — OXYCODONE 5 MG: 5 TABLET ORAL at 14:19

## 2024-02-24 RX ADMIN — GABAPENTIN 300 MG: 300 CAPSULE ORAL at 20:18

## 2024-02-24 RX ADMIN — OXYCODONE 5 MG: 5 TABLET ORAL at 14:38

## 2024-02-24 RX ADMIN — ASPIRIN 81 MG: 81 TABLET, COATED ORAL at 18:01

## 2024-02-24 RX ADMIN — CARVEDILOL 12.5 MG: 12.5 TABLET, FILM COATED ORAL at 20:18

## 2024-02-24 RX ADMIN — SODIUM CHLORIDE: 9 INJECTION, SOLUTION INTRAVENOUS at 18:41

## 2024-02-24 RX ADMIN — SODIUM CHLORIDE, PRESERVATIVE FREE 10 ML: 5 INJECTION INTRAVENOUS at 20:21

## 2024-02-24 RX ADMIN — Medication: at 18:02

## 2024-02-24 RX ADMIN — CEFTRIAXONE SODIUM 2000 MG: 2 INJECTION, POWDER, FOR SOLUTION INTRAMUSCULAR; INTRAVENOUS at 18:45

## 2024-02-24 ASSESSMENT — PAIN DESCRIPTION - ORIENTATION: ORIENTATION: LEFT

## 2024-02-24 ASSESSMENT — PAIN DESCRIPTION - LOCATION: LOCATION: LEG

## 2024-02-24 ASSESSMENT — PAIN SCALES - GENERAL: PAINLEVEL_OUTOF10: 8

## 2024-02-24 NOTE — H&P
[facility]     Department of Internal Medicine - Staff Internal Medicine Teaching Service          ADMISSION NOTE/HISTORY AND PHYSICAL EXAMINATION   Date: 2/24/2024  Patient Name: Lee Mejia  Date of admission: 2/24/2024 12:44 PM  YOB: 1960  PCP: Dee Morrison DO  History Obtained From:  patient    CHIEF COMPLAINT     Chief complaint: Nonhealing wound    HISTORY OF PRESENTING ILLNESS     The patient is a pleasant 63 y.o. male with a PMHx significant for     S/p CABG 12/26  S/p fasciotomy 1/12/2024  COPD  Former smoker    presents with a chief complaint of  worsening of wound of lateral left lower extremity.  Patient was admitted in December and underwent a CABG on 12/26.  Post CABG the patient underwent then a femoral-femoral bypass graft,  mechanical thrombectomy 1/4/2024 for concerns of thrombus in left external iliac and common femoral veins.  He developed compartment syndrome and underwent  fasciotomy 1/12/2024.  At that time he was noted to have necrosis of muscle and anterior lateral compartments and he underwent OR for debridement.  Patient was then discharged to rehab with instruction to follow-up.On 2/14/24 his wound repaired well and has appointment scheduled 11-16 was canceled.  His wound VAC was removed on 2/14 and ever since then he has been having home care nurse to his dressing with Adaptic and Hydrofera Blue.Last WBC check was on 2/17, and level was 15. He is finishing a 7-day course of cephalexin today .    Today's nurse is evaluating him at home and she  she was concerned about the appearance of the wound and advised him to visit the ER.  The patient does endorse noticing increased swelling and redness of the left lower extremity which has not has been working.  The patient denies any fever, chills, nausea, vomiting.    Labs reviewed in the ER with CBC suggestive of WBC 10, hemoglobin 8.4, elevated CRP 52 with sed rate 50.  Vascular surgery was

## 2024-02-24 NOTE — ED PROVIDER NOTES
[facility] ED  Emergency Department Encounter  Emergency Medicine Resident     Pt Name:Lee Mejia  MRN: [id number]  Birthdate 1960  Date of evaluation: 2/24/24  PCP:  eDe Morrison DO  Note Started: 12:45 PM EST      CHIEF COMPLAINT       Chief Complaint   Patient presents with    Wound Check     Left leg; home care nurse noticed black areas developing and sent        HISTORY OF PRESENT ILLNESS  (Location/Symptom, Timing/Onset, Context/Setting, Quality, Duration, Modifying Factors, Severity.)      Lee Mejia is a 63 y.o. male who presents with need for a wound check.  Patient states he recently had a complicated medical course, states he had a CABG on 12/26.  Post CABG she developed an arterial occlusion in his left lower extremity and then had to have a lower extremity fasciotomy with vascular surgery.  Patient states he had a wound VAC on the wound until a week ago.  Patient states he currently has been having wound care come to his house and do dressings every other day.  Patient states that yesterday his wound care nurse was concerned with the appearance of the wound as there was a black spot so recommended evaluation in the ER.  Patient states he has pain to the leg however not worse than normal.  Denies any fevers or chills.    PAST MEDICAL / SURGICAL / SOCIAL / FAMILY HISTORY      has a past medical history of ATN (acute tubular necrosis) (HCC), COPD (chronic obstructive pulmonary disease) (HCC), COPD (chronic obstructive pulmonary disease) (HCC), Diabetes mellitus (HCC), Diabetes mellitus (HCC), Left main coronary artery disease, and Mixed hyperlipidemia.       has a past surgical history that includes Tooth Extraction (11/06/14); Colonoscopy (01/05/2015); Cardiac procedure (N/A, 12/26/2023); Cardiac procedure (N/A, 12/26/2023); Cardiac procedure (N/A, 12/26/2023); Cardiac procedure (N/A, 12/26/2023); Coronary artery bypass graft (N/A, 12/26/2023); Cardiac procedure (N/A,

## 2024-02-24 NOTE — ED PROVIDER NOTES
[facility]     Emergency Department     Faculty Note/ Attestation      Pt Name: Lee Mejia                                       MRN: [id number]  Birthdate 1960  Date of evaluation: 2/24/2024    Patients PCP:    Dee Morrison DO    Note Started: 1:10 PM EST      Attestation  I performed a history and physical examination of the patient and discussed management with the resident. I reviewed the resident’s note and agree with the documented findings and plan of care. Any areas of disagreement are noted on the chart. I was personally present for the key portions of any procedures. I have documented in the chart those procedures where I was not present during the key portions. I have reviewed the emergency nurses triage note. I agree with the chief complaint, past medical history, past surgical history, allergies, medications, social and family history as documented unless otherwise noted below.    For Physician Assistant/ Nurse Practitioner cases/documentation I have personally evaluated this patient and have completed at least one if not all key elements of the E/M (history, physical exam, and MDM). Additional findings are as noted.      Initial Screens:             Vitals:    Vitals:    02/24/24 1233   BP: 110/69   Pulse: 71   Resp: 17   Temp: 99.1 °F (37.3 °C)   TempSrc: Oral   SpO2: 99%       CHIEF COMPLAINT       Chief Complaint   Patient presents with    Wound Check     Left leg; home care nurse noticed black areas developing and sent              DIAGNOSTIC RESULTS             RADIOLOGY:   No orders to display         LABS:  Labs Reviewed - No data to display      EMERGENCY DEPARTMENT COURSE:     -------------------------  BP: 110/69, Temp: 99.1 °F (37.3 °C), Pulse: 71, Respirations: 17      Comments    Patient had complications of a CABG resulting in a fasciotomy of the lower leg, had wound VAC on, now has a hydrogel dressing, home nurse was concerned about a black spot in

## 2024-02-25 PROBLEM — Z87.39: Status: ACTIVE | Noted: 2024-02-25

## 2024-02-25 LAB
ANION GAP SERPL CALCULATED.3IONS-SCNC: 9 MMOL/L (ref 9–17)
BASOPHILS # BLD: 0.08 K/UL (ref 0–0.2)
BASOPHILS NFR BLD: 1 % (ref 0–2)
BUN SERPL-MCNC: 11 MG/DL (ref 8–23)
CALCIUM SERPL-MCNC: 9.1 MG/DL (ref 8.6–10.4)
CHLORIDE SERPL-SCNC: 100 MMOL/L (ref 98–107)
CO2 SERPL-SCNC: 24 MMOL/L (ref 20–31)
CREAT SERPL-MCNC: 0.7 MG/DL (ref 0.7–1.2)
EOSINOPHIL # BLD: 0.3 K/UL (ref 0–0.44)
EOSINOPHILS RELATIVE PERCENT: 4 % (ref 1–4)
ERYTHROCYTE [DISTWIDTH] IN BLOOD BY AUTOMATED COUNT: 16.4 % (ref 11.8–14.4)
GFR SERPL CREATININE-BSD FRML MDRD: >60 ML/MIN/1.73M2
GLUCOSE SERPL-MCNC: 120 MG/DL (ref 70–99)
HCT VFR BLD AUTO: 28.8 % (ref 40.7–50.3)
HGB BLD-MCNC: 8.3 G/DL (ref 13–17)
IMM GRANULOCYTES # BLD AUTO: 0.03 K/UL (ref 0–0.3)
IMM GRANULOCYTES NFR BLD: 0 %
LYMPHOCYTES NFR BLD: 2 K/UL (ref 1.1–3.7)
LYMPHOCYTES RELATIVE PERCENT: 27 % (ref 24–43)
MCH RBC QN AUTO: 27.2 PG (ref 25.2–33.5)
MCHC RBC AUTO-ENTMCNC: 28.8 G/DL (ref 28.4–34.8)
MCV RBC AUTO: 94.4 FL (ref 82.6–102.9)
MONOCYTES NFR BLD: 0.59 K/UL (ref 0.1–1.2)
MONOCYTES NFR BLD: 8 % (ref 3–12)
NEUTROPHILS NFR BLD: 60 % (ref 36–65)
NEUTS SEG NFR BLD: 4.47 K/UL (ref 1.5–8.1)
NRBC BLD-RTO: 0 PER 100 WBC
PLATELET # BLD AUTO: 455 K/UL (ref 138–453)
PMV BLD AUTO: 9.7 FL (ref 8.1–13.5)
POTASSIUM SERPL-SCNC: 4.1 MMOL/L (ref 3.7–5.3)
RBC # BLD AUTO: 3.05 M/UL (ref 4.21–5.77)
RBC # BLD: ABNORMAL 10*6/UL
SODIUM SERPL-SCNC: 133 MMOL/L (ref 135–144)
WBC OTHER # BLD: 7.5 K/UL (ref 3.5–11.3)

## 2024-02-25 PROCEDURE — 6360000002 HC RX W HCPCS

## 2024-02-25 PROCEDURE — 2580000003 HC RX 258

## 2024-02-25 PROCEDURE — 99233 SBSQ HOSP IP/OBS HIGH 50: CPT | Performed by: INTERNAL MEDICINE

## 2024-02-25 PROCEDURE — 85025 COMPLETE CBC W/AUTO DIFF WBC: CPT

## 2024-02-25 PROCEDURE — 6370000000 HC RX 637 (ALT 250 FOR IP)

## 2024-02-25 PROCEDURE — 36415 COLL VENOUS BLD VENIPUNCTURE: CPT

## 2024-02-25 PROCEDURE — 6360000002 HC RX W HCPCS: Performed by: INTERNAL MEDICINE

## 2024-02-25 PROCEDURE — 80048 BASIC METABOLIC PNL TOTAL CA: CPT

## 2024-02-25 PROCEDURE — 1200000000 HC SEMI PRIVATE

## 2024-02-25 RX ORDER — LANOLIN ALCOHOL/MO/W.PET/CERES
9 CREAM (GRAM) TOPICAL NIGHTLY PRN
Status: DISCONTINUED | OUTPATIENT
Start: 2024-02-25 | End: 2024-03-05 | Stop reason: HOSPADM

## 2024-02-25 RX ORDER — MORPHINE SULFATE 2 MG/ML
1 INJECTION, SOLUTION INTRAMUSCULAR; INTRAVENOUS EVERY 4 HOURS PRN
Status: DISCONTINUED | OUTPATIENT
Start: 2024-02-25 | End: 2024-03-05 | Stop reason: HOSPADM

## 2024-02-25 RX ORDER — ALBUTEROL SULFATE 90 UG/1
2 AEROSOL, METERED RESPIRATORY (INHALATION) EVERY 6 HOURS PRN
Status: DISCONTINUED | OUTPATIENT
Start: 2024-02-25 | End: 2024-03-05 | Stop reason: HOSPADM

## 2024-02-25 RX ORDER — BUDESONIDE AND FORMOTEROL FUMARATE DIHYDRATE 80; 4.5 UG/1; UG/1
2 AEROSOL RESPIRATORY (INHALATION)
Status: DISCONTINUED | OUTPATIENT
Start: 2024-02-25 | End: 2024-03-05 | Stop reason: HOSPADM

## 2024-02-25 RX ORDER — SENNA AND DOCUSATE SODIUM 50; 8.6 MG/1; MG/1
2 TABLET, FILM COATED ORAL DAILY PRN
Status: DISCONTINUED | OUTPATIENT
Start: 2024-02-25 | End: 2024-03-05 | Stop reason: HOSPADM

## 2024-02-25 RX ORDER — HYDROXYZINE HYDROCHLORIDE 25 MG/1
25 TABLET, FILM COATED ORAL 2 TIMES DAILY
Status: DISCONTINUED | OUTPATIENT
Start: 2024-02-25 | End: 2024-03-05 | Stop reason: HOSPADM

## 2024-02-25 RX ORDER — CYCLOBENZAPRINE HCL 5 MG
5 TABLET ORAL 3 TIMES DAILY PRN
Status: DISCONTINUED | OUTPATIENT
Start: 2024-02-25 | End: 2024-03-05 | Stop reason: HOSPADM

## 2024-02-25 RX ORDER — ENOXAPARIN SODIUM 100 MG/ML
1 INJECTION SUBCUTANEOUS 2 TIMES DAILY
Status: DISCONTINUED | OUTPATIENT
Start: 2024-02-25 | End: 2024-02-26

## 2024-02-25 RX ADMIN — SODIUM CHLORIDE, PRESERVATIVE FREE 10 ML: 5 INJECTION INTRAVENOUS at 08:53

## 2024-02-25 RX ADMIN — Medication 9 MG: at 23:10

## 2024-02-25 RX ADMIN — GABAPENTIN 300 MG: 300 CAPSULE ORAL at 08:52

## 2024-02-25 RX ADMIN — CYCLOBENZAPRINE HYDROCHLORIDE 5 MG: 5 TABLET, FILM COATED ORAL at 23:10

## 2024-02-25 RX ADMIN — HYDROCODONE BITARTRATE AND ACETAMINOPHEN 1 TABLET: 5; 325 TABLET ORAL at 01:27

## 2024-02-25 RX ADMIN — MORPHINE SULFATE 1 MG: 2 INJECTION, SOLUTION INTRAMUSCULAR; INTRAVENOUS at 23:10

## 2024-02-25 RX ADMIN — Medication 2000 MG: at 18:31

## 2024-02-25 RX ADMIN — GABAPENTIN 300 MG: 300 CAPSULE ORAL at 21:29

## 2024-02-25 RX ADMIN — HYDROXYZINE HYDROCHLORIDE 25 MG: 25 TABLET ORAL at 08:52

## 2024-02-25 RX ADMIN — CARVEDILOL 12.5 MG: 12.5 TABLET, FILM COATED ORAL at 17:18

## 2024-02-25 RX ADMIN — ATORVASTATIN CALCIUM 80 MG: 80 TABLET, FILM COATED ORAL at 21:29

## 2024-02-25 RX ADMIN — ENOXAPARIN SODIUM 60 MG: 100 INJECTION SUBCUTANEOUS at 08:55

## 2024-02-25 RX ADMIN — HYDROCODONE BITARTRATE AND ACETAMINOPHEN 1 TABLET: 5; 325 TABLET ORAL at 21:29

## 2024-02-25 RX ADMIN — HYDROCODONE BITARTRATE AND ACETAMINOPHEN 1 TABLET: 5; 325 TABLET ORAL at 17:18

## 2024-02-25 RX ADMIN — SODIUM CHLORIDE, PRESERVATIVE FREE 10 ML: 5 INJECTION INTRAVENOUS at 21:29

## 2024-02-25 RX ADMIN — CYCLOBENZAPRINE HYDROCHLORIDE 5 MG: 5 TABLET, FILM COATED ORAL at 02:11

## 2024-02-25 RX ADMIN — HYDROXYZINE HYDROCHLORIDE 25 MG: 25 TABLET ORAL at 02:11

## 2024-02-25 RX ADMIN — HYDROXYZINE HYDROCHLORIDE 25 MG: 25 TABLET ORAL at 21:29

## 2024-02-25 RX ADMIN — GABAPENTIN 300 MG: 300 CAPSULE ORAL at 12:20

## 2024-02-25 RX ADMIN — CARVEDILOL 12.5 MG: 12.5 TABLET, FILM COATED ORAL at 08:52

## 2024-02-25 RX ADMIN — Medication 9 MG: at 02:11

## 2024-02-25 RX ADMIN — HYDROCODONE BITARTRATE AND ACETAMINOPHEN 1 TABLET: 5; 325 TABLET ORAL at 06:02

## 2024-02-25 RX ADMIN — HYDROCODONE BITARTRATE AND ACETAMINOPHEN 1 TABLET: 5; 325 TABLET ORAL at 12:20

## 2024-02-25 RX ADMIN — CYCLOBENZAPRINE HYDROCHLORIDE 5 MG: 5 TABLET, FILM COATED ORAL at 14:57

## 2024-02-25 RX ADMIN — ASPIRIN 81 MG: 81 TABLET, COATED ORAL at 08:52

## 2024-02-25 ASSESSMENT — PAIN SCALES - GENERAL
PAINLEVEL_OUTOF10: 7
PAINLEVEL_OUTOF10: 9
PAINLEVEL_OUTOF10: 8
PAINLEVEL_OUTOF10: 10

## 2024-02-25 NOTE — PLAN OF CARE
Plan for I/D wound Monday  Cx wound so far neg  On ceftriaxone and wound care    Milli Lim MD. Infectious Diseases

## 2024-02-25 NOTE — CARE COORDINATION
Case Management Assessment  Initial Evaluation    Date/Time of Evaluation: 2/25/2024 10:12 AM  Assessment Completed by: Ara Alfaro RN    If patient is discharged prior to next notation, then this note serves as note for discharge by case management.    Patient Name: Lee Meija                   YOB: 1960  Diagnosis: Non-healing wound of lower extremity, left, initial encounter [S81.802A]  Encounter for post surgical wound check [Z48.89]                   Date / Time: 2/24/2024 12:44 PM    Patient Admission Status: Inpatient   Readmission Risk (Low < 19, Mod (19-27), High > 27): Readmission Risk Score: 14.1    Current PCP: Dee Morrison, DO  PCP verified by CM? (P) Yes (has new pt appt with Dr Junior)    Chart Reviewed: Yes      History Provided by: (P) Patient  Patient Orientation: (P) Alert and Oriented    Patient Cognition: (P) Alert    Hospitalization in the last 30 days (Readmission):  Yes    If yes, Readmission Assessment in  Navigator will be completed.    Advance Directives:      Code Status: Full Code   Patient's Primary Decision Maker is: (P) Legal Next of Kin      Discharge Planning:    Patient lives with: (P) Spouse/Significant Other Type of Home: (P) House  Primary Care Giver: (P) Self  Patient Support Systems include: (P) Spouse/Significant Other   Current Financial resources: (P) Medicaid  Current community resources:    Current services prior to admission: (P) Home Care            Current DME:              Type of Home Care services:  (P) Nursing Services    ADLS  Prior functional level: (P) Independent in ADLs/IADLs  Current functional level:      PT AM-PAC:   /24  OT AM-PAC:   /24    Family can provide assistance at DC:    Would you like Case Management to discuss the discharge plan with any other family members/significant others, and if so, who?    Plans to Return to Present Housing: (P) Yes  Other Identified Issues/Barriers to RETURNING to current housing:

## 2024-02-25 NOTE — ED NOTES
ED to inpatient nurses report      Chief Complaint:  Chief Complaint   Patient presents with    Wound Check     Left leg; home care nurse noticed black areas developing and sent      Present to ED from: HOME    MOA:     LOC: alert and orientated to name, place, date  Mobility: Independent  Oxygen Baseline: RA    Current needs required: RA   Pending ED orders: Wound culture when redressed   Present condition: Alert and oriented x4     Why did the patient come to the ED? Wound check  What is the plan? IV antibiotics   Any procedures or intervention occur? Labs, IV antibiotics   Any safety concerns?? No    Mental Status:  Level of Consciousness: Alert (0)    Psych Assessment:   Psychosocial  Psychosocial (WDL): Within Defined Limits  Vital signs   Vitals:    02/24/24 1233 02/24/24 1419 02/24/24 1804 02/24/24 1815   BP: 110/69  106/69 122/78   Pulse: 71   100   Resp: 17 16     Temp: 99.1 °F (37.3 °C)      TempSrc: Oral      SpO2: 99%   99%        Vitals:  Patient Vitals for the past 24 hrs:   BP Temp Temp src Pulse Resp SpO2   02/24/24 1815 122/78 -- -- 100 -- 99 %   02/24/24 1804 106/69 -- -- -- -- --   02/24/24 1419 -- -- -- -- 16 --   02/24/24 1233 110/69 99.1 °F (37.3 °C) Oral 71 17 99 %      Visit Vitals  /78   Pulse 100   Temp 99.1 °F (37.3 °C) (Oral)   Resp 16   SpO2 99%        LDAs:   Peripheral IV 02/24/24 Proximal;Right;Anterior Forearm (Active)       Ambulatory Status:  Presents to emergency department  because of falls (Syncope, seizure, or loss of consciousness): No, Age > 70: No, Altered Mental Status, Intoxication with alcohol or substance confusion (Disorientation, impaired judgment, poor safety awaremess, or inability to follow instructions): No, Impaired Mobility: Ambulates or transfers with assistive devices or assistance; Unable to ambulate or transer.: No, Nursing Judgement: No    Diagnosis:  DISPOSITION Admitted 02/24/2024 03:51:07 PM   Final diagnoses:   Encounter for post surgical wound 
Message sent to Pharmacy to send medication to ED.  
Pt states had a fasciotomy about a month ago with open heart surgery at the same time  Pt states home care came yesterday to rewrap wound on L lower leg   Pt states that the nurse told him to come to ER d/t appearance of wound  Pt denies numbness/tingling in L lower foot/toes at this time   RR even and unlabored.   NAD noted.   Whiteboard updated.  Will continue with plan of care.    
Pt to floor with ED tech  
components:       Result Value    RBC 3.08 (*)     Hemoglobin 8.4 (*)     Hematocrit 29.0 (*)     RDW 16.4 (*)     Platelets 475 (*)     All other components within normal limits   BASIC METABOLIC PANEL W/ REFLEX TO MG FOR LOW K       Electronically signed by Fernando Lindsey RN on 2/24/2024 at 3:46 PM

## 2024-02-25 NOTE — PLAN OF CARE
Problem: Discharge Planning  Goal: Discharge to home or other facility with appropriate resources  2/25/2024 0341 by Soco Shafer  Outcome: Progressing     Problem: Safety - Adult  Goal: Free from fall injury  Outcome: Progressing     Problem: Pain  Goal: Verbalizes/displays adequate comfort level or baseline comfort level  Outcome: Progressing     Problem: ABCDS Injury Assessment  Goal: Absence of physical injury  Outcome: Progressing     Problem: Skin/Tissue Integrity  Goal: Absence of new skin breakdown  Description: 1.  Monitor for areas of redness and/or skin breakdown  2.  Assess vascular access sites hourly  3.  Every 4-6 hours minimum:  Change oxygen saturation probe site  4.  Every 4-6 hours:  If on nasal continuous positive airway pressure, respiratory therapy assess nares and determine need for appliance change or resting period.  Outcome: Progressing

## 2024-02-26 ENCOUNTER — ANESTHESIA (OUTPATIENT)
Dept: OPERATING ROOM | Age: 64
End: 2024-02-26
Payer: MEDICAID

## 2024-02-26 ENCOUNTER — ANESTHESIA EVENT (OUTPATIENT)
Dept: OPERATING ROOM | Age: 64
End: 2024-02-26
Payer: MEDICAID

## 2024-02-26 PROBLEM — R79.82 CRP ELEVATED: Status: ACTIVE | Noted: 2024-02-26

## 2024-02-26 PROBLEM — R70.0 ESR RAISED: Status: ACTIVE | Noted: 2024-02-26

## 2024-02-26 LAB
ABO + RH BLD: NORMAL
ANION GAP SERPL CALCULATED.3IONS-SCNC: 7 MMOL/L (ref 9–17)
ARM BAND NUMBER: NORMAL
BASOPHILS # BLD: 0.06 K/UL (ref 0–0.2)
BASOPHILS NFR BLD: 1 % (ref 0–2)
BLOOD BANK SAMPLE EXPIRATION: NORMAL
BLOOD GROUP ANTIBODIES SERPL: NEGATIVE
BUN SERPL-MCNC: 12 MG/DL (ref 8–23)
CALCIUM SERPL-MCNC: 8.7 MG/DL (ref 8.6–10.4)
CHLORIDE SERPL-SCNC: 100 MMOL/L (ref 98–107)
CO2 SERPL-SCNC: 24 MMOL/L (ref 20–31)
CREAT SERPL-MCNC: 0.6 MG/DL (ref 0.7–1.2)
EOSINOPHIL # BLD: 0.16 K/UL (ref 0–0.44)
EOSINOPHILS RELATIVE PERCENT: 1 % (ref 1–4)
ERYTHROCYTE [DISTWIDTH] IN BLOOD BY AUTOMATED COUNT: 16.2 % (ref 11.8–14.4)
GFR SERPL CREATININE-BSD FRML MDRD: >60 ML/MIN/1.73M2
GLUCOSE SERPL-MCNC: 108 MG/DL (ref 70–99)
HCT VFR BLD AUTO: 25.7 % (ref 40.7–50.3)
HGB BLD-MCNC: 8.1 G/DL (ref 13–17)
IMM GRANULOCYTES # BLD AUTO: 0.05 K/UL (ref 0–0.3)
IMM GRANULOCYTES NFR BLD: 0 %
LYMPHOCYTES NFR BLD: 2.58 K/UL (ref 1.1–3.7)
LYMPHOCYTES RELATIVE PERCENT: 22 % (ref 24–43)
MCH RBC QN AUTO: 27.5 PG (ref 25.2–33.5)
MCHC RBC AUTO-ENTMCNC: 31.5 G/DL (ref 28.4–34.8)
MCV RBC AUTO: 87.1 FL (ref 82.6–102.9)
MONOCYTES NFR BLD: 0.74 K/UL (ref 0.1–1.2)
MONOCYTES NFR BLD: 6 % (ref 3–12)
NEUTROPHILS NFR BLD: 69 % (ref 36–65)
NEUTS SEG NFR BLD: 8.14 K/UL (ref 1.5–8.1)
NRBC BLD-RTO: 0 PER 100 WBC
PLATELET # BLD AUTO: 455 K/UL (ref 138–453)
PMV BLD AUTO: 9.8 FL (ref 8.1–13.5)
POTASSIUM SERPL-SCNC: 4 MMOL/L (ref 3.7–5.3)
RBC # BLD AUTO: 2.95 M/UL (ref 4.21–5.77)
RBC # BLD: ABNORMAL 10*6/UL
SODIUM SERPL-SCNC: 131 MMOL/L (ref 135–144)
WBC OTHER # BLD: 11.7 K/UL (ref 3.5–11.3)

## 2024-02-26 PROCEDURE — 6370000000 HC RX 637 (ALT 250 FOR IP)

## 2024-02-26 PROCEDURE — 2500000003 HC RX 250 WO HCPCS: Performed by: STUDENT IN AN ORGANIZED HEALTH CARE EDUCATION/TRAINING PROGRAM

## 2024-02-26 PROCEDURE — 99232 SBSQ HOSP IP/OBS MODERATE 35: CPT | Performed by: INTERNAL MEDICINE

## 2024-02-26 PROCEDURE — 2580000003 HC RX 258: Performed by: STUDENT IN AN ORGANIZED HEALTH CARE EDUCATION/TRAINING PROGRAM

## 2024-02-26 PROCEDURE — 6360000002 HC RX W HCPCS

## 2024-02-26 PROCEDURE — 3700000001 HC ADD 15 MINUTES (ANESTHESIA): Performed by: STUDENT IN AN ORGANIZED HEALTH CARE EDUCATION/TRAINING PROGRAM

## 2024-02-26 PROCEDURE — 0KBT0ZZ EXCISION OF LEFT LOWER LEG MUSCLE, OPEN APPROACH: ICD-10-PCS | Performed by: STUDENT IN AN ORGANIZED HEALTH CARE EDUCATION/TRAINING PROGRAM

## 2024-02-26 PROCEDURE — 86900 BLOOD TYPING SEROLOGIC ABO: CPT

## 2024-02-26 PROCEDURE — 7100000011 HC PHASE II RECOVERY - ADDTL 15 MIN: Performed by: STUDENT IN AN ORGANIZED HEALTH CARE EDUCATION/TRAINING PROGRAM

## 2024-02-26 PROCEDURE — 2709999900 HC NON-CHARGEABLE SUPPLY: Performed by: STUDENT IN AN ORGANIZED HEALTH CARE EDUCATION/TRAINING PROGRAM

## 2024-02-26 PROCEDURE — 11046 DBRDMT MUSC&/FSCA EA ADDL: CPT | Performed by: STUDENT IN AN ORGANIZED HEALTH CARE EDUCATION/TRAINING PROGRAM

## 2024-02-26 PROCEDURE — 6360000002 HC RX W HCPCS: Performed by: NURSE ANESTHETIST, CERTIFIED REGISTERED

## 2024-02-26 PROCEDURE — 11043 DBRDMT MUSC&/FSCA 1ST 20/<: CPT | Performed by: STUDENT IN AN ORGANIZED HEALTH CARE EDUCATION/TRAINING PROGRAM

## 2024-02-26 PROCEDURE — 85025 COMPLETE CBC W/AUTO DIFF WBC: CPT

## 2024-02-26 PROCEDURE — 3700000000 HC ANESTHESIA ATTENDED CARE: Performed by: STUDENT IN AN ORGANIZED HEALTH CARE EDUCATION/TRAINING PROGRAM

## 2024-02-26 PROCEDURE — 86850 RBC ANTIBODY SCREEN: CPT

## 2024-02-26 PROCEDURE — 7100000010 HC PHASE II RECOVERY - FIRST 15 MIN: Performed by: STUDENT IN AN ORGANIZED HEALTH CARE EDUCATION/TRAINING PROGRAM

## 2024-02-26 PROCEDURE — 80048 BASIC METABOLIC PNL TOTAL CA: CPT

## 2024-02-26 PROCEDURE — 36415 COLL VENOUS BLD VENIPUNCTURE: CPT

## 2024-02-26 PROCEDURE — 3600000003 HC SURGERY LEVEL 3 BASE: Performed by: STUDENT IN AN ORGANIZED HEALTH CARE EDUCATION/TRAINING PROGRAM

## 2024-02-26 PROCEDURE — 94640 AIRWAY INHALATION TREATMENT: CPT

## 2024-02-26 PROCEDURE — 86901 BLOOD TYPING SEROLOGIC RH(D): CPT

## 2024-02-26 PROCEDURE — 6370000000 HC RX 637 (ALT 250 FOR IP): Performed by: STUDENT IN AN ORGANIZED HEALTH CARE EDUCATION/TRAINING PROGRAM

## 2024-02-26 PROCEDURE — 3600000013 HC SURGERY LEVEL 3 ADDTL 15MIN: Performed by: STUDENT IN AN ORGANIZED HEALTH CARE EDUCATION/TRAINING PROGRAM

## 2024-02-26 PROCEDURE — 1200000000 HC SEMI PRIVATE

## 2024-02-26 PROCEDURE — 2580000003 HC RX 258

## 2024-02-26 RX ORDER — SODIUM CHLORIDE 0.9 % (FLUSH) 0.9 %
5-40 SYRINGE (ML) INJECTION EVERY 12 HOURS SCHEDULED
Status: DISCONTINUED | OUTPATIENT
Start: 2024-02-26 | End: 2024-02-26 | Stop reason: HOSPADM

## 2024-02-26 RX ORDER — VANCOMYCIN HYDROCHLORIDE 1 G/20ML
INJECTION, POWDER, LYOPHILIZED, FOR SOLUTION INTRAVENOUS
Status: DISCONTINUED
Start: 2024-02-26 | End: 2024-02-26 | Stop reason: WASHOUT

## 2024-02-26 RX ORDER — LIDOCAINE HYDROCHLORIDE 20 MG/ML
JELLY TOPICAL
Status: DISPENSED
Start: 2024-02-26 | End: 2024-02-27

## 2024-02-26 RX ORDER — ONDANSETRON 2 MG/ML
INJECTION INTRAMUSCULAR; INTRAVENOUS PRN
Status: DISCONTINUED | OUTPATIENT
Start: 2024-02-26 | End: 2024-02-26 | Stop reason: SDUPTHER

## 2024-02-26 RX ORDER — MIDAZOLAM HYDROCHLORIDE 1 MG/ML
INJECTION INTRAMUSCULAR; INTRAVENOUS PRN
Status: DISCONTINUED | OUTPATIENT
Start: 2024-02-26 | End: 2024-02-26 | Stop reason: SDUPTHER

## 2024-02-26 RX ORDER — MAGNESIUM HYDROXIDE 1200 MG/15ML
LIQUID ORAL CONTINUOUS PRN
Status: COMPLETED | OUTPATIENT
Start: 2024-02-26 | End: 2024-02-26

## 2024-02-26 RX ORDER — LIDOCAINE HYDROCHLORIDE 20 MG/ML
JELLY TOPICAL PRN
Status: DISCONTINUED | OUTPATIENT
Start: 2024-02-26 | End: 2024-02-26 | Stop reason: ALTCHOICE

## 2024-02-26 RX ORDER — SODIUM CHLORIDE 0.9 % (FLUSH) 0.9 %
5-40 SYRINGE (ML) INJECTION PRN
Status: DISCONTINUED | OUTPATIENT
Start: 2024-02-26 | End: 2024-02-26 | Stop reason: HOSPADM

## 2024-02-26 RX ORDER — CEFAZOLIN SODIUM 1 G/3ML
INJECTION, POWDER, FOR SOLUTION INTRAMUSCULAR; INTRAVENOUS PRN
Status: DISCONTINUED | OUTPATIENT
Start: 2024-02-26 | End: 2024-02-26 | Stop reason: SDUPTHER

## 2024-02-26 RX ORDER — FENTANYL CITRATE 50 UG/ML
25 INJECTION, SOLUTION INTRAMUSCULAR; INTRAVENOUS EVERY 5 MIN PRN
Status: DISCONTINUED | OUTPATIENT
Start: 2024-02-26 | End: 2024-02-26 | Stop reason: HOSPADM

## 2024-02-26 RX ORDER — SODIUM CHLORIDE 9 MG/ML
INJECTION, SOLUTION INTRAVENOUS PRN
Status: DISCONTINUED | OUTPATIENT
Start: 2024-02-26 | End: 2024-02-26 | Stop reason: HOSPADM

## 2024-02-26 RX ORDER — DEXAMETHASONE SODIUM PHOSPHATE 10 MG/ML
INJECTION INTRAMUSCULAR; INTRAVENOUS PRN
Status: DISCONTINUED | OUTPATIENT
Start: 2024-02-26 | End: 2024-02-26 | Stop reason: SDUPTHER

## 2024-02-26 RX ORDER — LIDOCAINE HYDROCHLORIDE 10 MG/ML
INJECTION, SOLUTION EPIDURAL; INFILTRATION; INTRACAUDAL; PERINEURAL PRN
Status: DISCONTINUED | OUTPATIENT
Start: 2024-02-26 | End: 2024-02-26 | Stop reason: ALTCHOICE

## 2024-02-26 RX ORDER — ENOXAPARIN SODIUM 100 MG/ML
1 INJECTION SUBCUTANEOUS 2 TIMES DAILY
Status: DISCONTINUED | OUTPATIENT
Start: 2024-02-27 | End: 2024-03-05 | Stop reason: HOSPADM

## 2024-02-26 RX ORDER — WATER 10 ML/10ML
INJECTION INTRAMUSCULAR; INTRAVENOUS; SUBCUTANEOUS
Status: DISCONTINUED
Start: 2024-02-26 | End: 2024-02-26 | Stop reason: WASHOUT

## 2024-02-26 RX ORDER — SODIUM CHLORIDE 9 MG/ML
INJECTION, SOLUTION INTRAVENOUS CONTINUOUS
Status: ACTIVE | OUTPATIENT
Start: 2024-02-26 | End: 2024-02-26

## 2024-02-26 RX ORDER — FENTANYL CITRATE 50 UG/ML
INJECTION, SOLUTION INTRAMUSCULAR; INTRAVENOUS PRN
Status: DISCONTINUED | OUTPATIENT
Start: 2024-02-26 | End: 2024-02-26 | Stop reason: SDUPTHER

## 2024-02-26 RX ORDER — SODIUM CHLORIDE 9 MG/ML
INJECTION, SOLUTION INTRAVENOUS CONTINUOUS
Status: DISCONTINUED | OUTPATIENT
Start: 2024-02-26 | End: 2024-03-01

## 2024-02-26 RX ADMIN — GABAPENTIN 300 MG: 300 CAPSULE ORAL at 09:53

## 2024-02-26 RX ADMIN — MIDAZOLAM 1 MG: 1 INJECTION INTRAMUSCULAR; INTRAVENOUS at 13:38

## 2024-02-26 RX ADMIN — SODIUM CHLORIDE: 9 INJECTION, SOLUTION INTRAVENOUS at 06:36

## 2024-02-26 RX ADMIN — HYDROXYZINE HYDROCHLORIDE 25 MG: 25 TABLET ORAL at 21:40

## 2024-02-26 RX ADMIN — HYDROCODONE BITARTRATE AND ACETAMINOPHEN 1 TABLET: 5; 325 TABLET ORAL at 21:49

## 2024-02-26 RX ADMIN — ONDANSETRON 4 MG: 2 INJECTION INTRAMUSCULAR; INTRAVENOUS at 13:44

## 2024-02-26 RX ADMIN — FENTANYL CITRATE 50 MCG: 50 INJECTION, SOLUTION INTRAMUSCULAR; INTRAVENOUS at 13:31

## 2024-02-26 RX ADMIN — Medication 9 MG: at 21:49

## 2024-02-26 RX ADMIN — DEXAMETHASONE SODIUM PHOSPHATE 10 MG: 10 INJECTION INTRAMUSCULAR; INTRAVENOUS at 13:44

## 2024-02-26 RX ADMIN — DOCUSATE SODIUM 50 MG AND SENNOSIDES 8.6 MG 2 TABLET: 8.6; 5 TABLET, FILM COATED ORAL at 21:49

## 2024-02-26 RX ADMIN — BUDESONIDE AND FORMOTEROL FUMARATE DIHYDRATE 2 PUFF: 80; 4.5 AEROSOL RESPIRATORY (INHALATION) at 08:23

## 2024-02-26 RX ADMIN — CARVEDILOL 12.5 MG: 12.5 TABLET, FILM COATED ORAL at 17:25

## 2024-02-26 RX ADMIN — ATORVASTATIN CALCIUM 80 MG: 80 TABLET, FILM COATED ORAL at 21:39

## 2024-02-26 RX ADMIN — CYCLOBENZAPRINE HYDROCHLORIDE 5 MG: 5 TABLET, FILM COATED ORAL at 21:49

## 2024-02-26 RX ADMIN — ASPIRIN 81 MG: 81 TABLET, COATED ORAL at 09:53

## 2024-02-26 RX ADMIN — HYDROCODONE BITARTRATE AND ACETAMINOPHEN 1 TABLET: 5; 325 TABLET ORAL at 09:52

## 2024-02-26 RX ADMIN — CEFAZOLIN 2 G: 1 INJECTION, POWDER, FOR SOLUTION INTRAMUSCULAR; INTRAVENOUS at 13:48

## 2024-02-26 RX ADMIN — MIDAZOLAM 1 MG: 1 INJECTION INTRAMUSCULAR; INTRAVENOUS at 14:00

## 2024-02-26 RX ADMIN — HYDROCODONE BITARTRATE AND ACETAMINOPHEN 1 TABLET: 5; 325 TABLET ORAL at 17:23

## 2024-02-26 RX ADMIN — CARVEDILOL 12.5 MG: 12.5 TABLET, FILM COATED ORAL at 09:52

## 2024-02-26 RX ADMIN — Medication: at 12:43

## 2024-02-26 RX ADMIN — MORPHINE SULFATE 1 MG: 2 INJECTION, SOLUTION INTRAMUSCULAR; INTRAVENOUS at 13:13

## 2024-02-26 RX ADMIN — HYDROXYZINE HYDROCHLORIDE 25 MG: 25 TABLET ORAL at 09:52

## 2024-02-26 RX ADMIN — MORPHINE SULFATE 1 MG: 2 INJECTION, SOLUTION INTRAMUSCULAR; INTRAVENOUS at 05:30

## 2024-02-26 RX ADMIN — BUDESONIDE AND FORMOTEROL FUMARATE DIHYDRATE 2 PUFF: 80; 4.5 AEROSOL RESPIRATORY (INHALATION) at 19:53

## 2024-02-26 RX ADMIN — FENTANYL CITRATE 50 MCG: 50 INJECTION, SOLUTION INTRAMUSCULAR; INTRAVENOUS at 13:35

## 2024-02-26 RX ADMIN — GABAPENTIN 300 MG: 300 CAPSULE ORAL at 21:40

## 2024-02-26 RX ADMIN — Medication 2000 MG: at 17:30

## 2024-02-26 ASSESSMENT — PAIN DESCRIPTION - ORIENTATION
ORIENTATION: LEFT
ORIENTATION: LEFT

## 2024-02-26 ASSESSMENT — PAIN SCALES - GENERAL
PAINLEVEL_OUTOF10: 7
PAINLEVEL_OUTOF10: 2
PAINLEVEL_OUTOF10: 10
PAINLEVEL_OUTOF10: 7
PAINLEVEL_OUTOF10: 7
PAINLEVEL_OUTOF10: 10
PAINLEVEL_OUTOF10: 9

## 2024-02-26 ASSESSMENT — PAIN DESCRIPTION - LOCATION
LOCATION: LEG

## 2024-02-26 ASSESSMENT — PAIN DESCRIPTION - DESCRIPTORS
DESCRIPTORS: DISCOMFORT
DESCRIPTORS: DISCOMFORT
DESCRIPTORS: ACHING;SPASM

## 2024-02-26 NOTE — PLAN OF CARE
Problem: Discharge Planning  Goal: Discharge to home or other facility with appropriate resources  2/26/2024 1655 by Kaity Gutierrez, RN  Outcome: Progressing  Flowsheets (Taken 2/26/2024 1415 by Nataliia Story RN)  Discharge to home or other facility with appropriate resources: Identify barriers to discharge with patient and caregiver     Problem: Safety - Adult  Goal: Free from fall injury  2/26/2024 1655 by Kaity Gutierrez, RN  Outcome: Progressing

## 2024-02-26 NOTE — ANESTHESIA PRE PROCEDURE
E87.20    NSTEMI (non-ST elevation myocardial infarction) ([facility]) I21.4    Debility R53.81    Atelectasis J98.11    Femoral thrombosis ([facility]) I74.3    Pneumonia of both lower lobes due to infectious organism J18.9    Tobacco abuse Z72.0    Left leg swelling M79.89    Fever R50.9    Non-healing wound of lower extremity, left, initial encounter S81.802A    Hx of necrotising fasciitis Z87.39       Past Medical History:        Diagnosis Date    ATN (acute tubular necrosis) ([facility]) 12/28/2023    COPD (chronic obstructive pulmonary disease) ([facility])     COPD (chronic obstructive pulmonary disease) ([facility])     Diabetes mellitus ([facility])     controlled with diet    Diabetes mellitus ([facility])     Left main coronary artery disease 12/26/2023    Mixed hyperlipidemia 03/09/2020       Past Surgical History:        Procedure Laterality Date    CARDIAC PROCEDURE N/A 12/26/2023    Coronary angiography performed by Radha Escobar MD at [facility] CARDIAC CATH LAB    CARDIAC PROCEDURE N/A 12/26/2023    Percutaneous coronary intervention performed by Radha Escobar MD at [facility] CARDIAC CATH LAB    CARDIAC PROCEDURE N/A 12/26/2023    Intra-aortic balloon pump insertion performed by Radha Escobar MD at [facility] CARDIAC CATH LAB    CARDIAC PROCEDURE N/A 12/26/2023    Insert temporary pacemaker performed by Radha Escobar MD at [facility] CARDIAC CATH LAB    CARDIAC PROCEDURE N/A 12/30/2023    Intra-aortic balloon removal performed by Taj Daly MD at [facility] CARDIAC CATH LAB    CARDIAC PROCEDURE N/A 12/30/2023    Insert temporary pacemaker performed by Taj Daly MD at [facility] CARDIAC CATH LAB    CARDIAC PROCEDURE N/A 12/30/2023    Left heart cath performed by Taj Daly MD at [facility] CARDIAC CATH LAB    COLONOSCOPY  01/05/2015    CORONARY ARTERY BYPASS GRAFT N/A 12/26/2023    EMERGENCY CABG, CORONARY ARTERY BYPASS X3, ON PUMP, ANGELLA PER ANESTHESIA performed by David Bañuelos MD at [facility] CVOR    FEMORAL-FEMORAL BYPASS GRAFT Left 1/4/2024    FEMORAL

## 2024-02-26 NOTE — OP NOTE
Operative Note      Patient: Lee Mejia  YOB: 1960  MRN: [id number]    Date of Procedure: 2/26/2024    Pre-Op Diagnosis Codes:     * Non-healing wound of lower extremity, left, initial encounter [S81.802A]    Post-Op Diagnosis: Same       Procedure: Left leg lateral leg excisional debridement of subcutaneous tissue, muscle and fascia (13 cm x 4.5 cm x 2 cm; 58.5 sq cm)    Surgeon(s):  Jamri Liu MD    Assistant: None    Anesthesia: MAC with local anesthetic    Estimated Blood Loss (mL): 50 mL    Complications: None    Specimens: None    Implants: None      Drains: None    Findings: Left lateral fasciotomy wound has surrounding granulation tissue. Centrally there is area of necrotic muscle and fascia. This was debrided successfully. Appropriate bleeding was encountered. No obvious gross purulence encountered. Palpable DP pulse in the left foot at end of case.    Detailed Description of Procedure:   Mr. Mejia was brought to the operating room and placed in supine position.  His airway and sedation were managed by the anesthesia team.  His left leg was prepped and draped in standard sterile fashion.  A timeout was performed.  I started by sharply excising the necrotic muscle and fascia in the center of the wound that was nonhealing and what appeared to be nonviable tissue with Metzenbaum scissors. Electrocautery was used to check reactivity of muscle, there was no response. Ultimately I removed what was reasonable without tunneling under the surrounding granulation tissue. I also performed sharp excisional debridement of the surrounding granulation tissue with a curette. Final sq cm of sharp debridement was 58.5 cm. There was appropriate bleeding encountered with no obvious purulence. Hemostasis was achieved. The wound was packed with wet to dry dressing. The leg was wrapped from foot to knee with kerlix and ace wrap. Patient tolerated the procedure well.    Electronically signed by

## 2024-02-27 LAB
ANION GAP SERPL CALCULATED.3IONS-SCNC: 10 MMOL/L (ref 9–17)
BASOPHILS # BLD: <0.03 K/UL (ref 0–0.2)
BASOPHILS NFR BLD: 0 % (ref 0–2)
BUN SERPL-MCNC: 20 MG/DL (ref 8–23)
CALCIUM SERPL-MCNC: 8.8 MG/DL (ref 8.6–10.4)
CHLORIDE SERPL-SCNC: 104 MMOL/L (ref 98–107)
CO2 SERPL-SCNC: 22 MMOL/L (ref 20–31)
CREAT SERPL-MCNC: 0.6 MG/DL (ref 0.7–1.2)
EOSINOPHIL # BLD: <0.03 K/UL (ref 0–0.44)
EOSINOPHILS RELATIVE PERCENT: 0 % (ref 1–4)
ERYTHROCYTE [DISTWIDTH] IN BLOOD BY AUTOMATED COUNT: 16.2 % (ref 11.8–14.4)
GFR SERPL CREATININE-BSD FRML MDRD: >60 ML/MIN/1.73M2
GLUCOSE SERPL-MCNC: 160 MG/DL (ref 70–99)
HCT VFR BLD AUTO: 27 % (ref 40.7–50.3)
HGB BLD-MCNC: 7.8 G/DL (ref 13–17)
IMM GRANULOCYTES # BLD AUTO: 0.09 K/UL (ref 0–0.3)
IMM GRANULOCYTES NFR BLD: 1 %
LYMPHOCYTES NFR BLD: 1.35 K/UL (ref 1.1–3.7)
LYMPHOCYTES RELATIVE PERCENT: 9 % (ref 24–43)
MCH RBC QN AUTO: 27.3 PG (ref 25.2–33.5)
MCHC RBC AUTO-ENTMCNC: 28.9 G/DL (ref 28.4–34.8)
MCV RBC AUTO: 94.4 FL (ref 82.6–102.9)
MICROORGANISM SPEC CULT: NO GROWTH
MICROORGANISM/AGENT SPEC: NORMAL
MICROORGANISM/AGENT SPEC: NORMAL
MONOCYTES NFR BLD: 0.3 K/UL (ref 0.1–1.2)
MONOCYTES NFR BLD: 2 % (ref 3–12)
NEUTROPHILS NFR BLD: 88 % (ref 36–65)
NEUTS SEG NFR BLD: 12.79 K/UL (ref 1.5–8.1)
NRBC BLD-RTO: 0 PER 100 WBC
PLATELET # BLD AUTO: 454 K/UL (ref 138–453)
PMV BLD AUTO: 9.6 FL (ref 8.1–13.5)
POTASSIUM SERPL-SCNC: 4.3 MMOL/L (ref 3.7–5.3)
RBC # BLD AUTO: 2.86 M/UL (ref 4.21–5.77)
RBC # BLD: ABNORMAL 10*6/UL
SERVICE CMNT-IMP: NORMAL
SODIUM SERPL-SCNC: 136 MMOL/L (ref 135–144)
SPECIMEN DESCRIPTION: NORMAL
WBC OTHER # BLD: 14.5 K/UL (ref 3.5–11.3)

## 2024-02-27 PROCEDURE — 85025 COMPLETE CBC W/AUTO DIFF WBC: CPT

## 2024-02-27 PROCEDURE — 6370000000 HC RX 637 (ALT 250 FOR IP)

## 2024-02-27 PROCEDURE — 1200000000 HC SEMI PRIVATE

## 2024-02-27 PROCEDURE — 97606 NEG PRS WND THER DME>50 SQCM: CPT

## 2024-02-27 PROCEDURE — 6360000002 HC RX W HCPCS

## 2024-02-27 PROCEDURE — 99232 SBSQ HOSP IP/OBS MODERATE 35: CPT | Performed by: INTERNAL MEDICINE

## 2024-02-27 PROCEDURE — 94640 AIRWAY INHALATION TREATMENT: CPT

## 2024-02-27 PROCEDURE — 2W1RX6Z COMPRESSION OF LEFT LOWER LEG USING PRESSURE DRESSING: ICD-10-PCS | Performed by: INTERNAL MEDICINE

## 2024-02-27 PROCEDURE — 36415 COLL VENOUS BLD VENIPUNCTURE: CPT

## 2024-02-27 PROCEDURE — 80048 BASIC METABOLIC PNL TOTAL CA: CPT

## 2024-02-27 RX ORDER — SODIUM HYPOCHLORITE 1.25 MG/ML
SOLUTION TOPICAL DAILY
Qty: 473 ML | Refills: 2 | Status: SHIPPED | OUTPATIENT
Start: 2024-02-28

## 2024-02-27 RX ORDER — CEPHALEXIN 500 MG/1
500 CAPSULE ORAL 4 TIMES DAILY
Qty: 28 CAPSULE | Refills: 0 | Status: SHIPPED | OUTPATIENT
Start: 2024-02-27 | End: 2024-03-05 | Stop reason: HOSPADM

## 2024-02-27 RX ADMIN — BUDESONIDE AND FORMOTEROL FUMARATE DIHYDRATE 2 PUFF: 80; 4.5 AEROSOL RESPIRATORY (INHALATION) at 06:45

## 2024-02-27 RX ADMIN — CARVEDILOL 12.5 MG: 12.5 TABLET, FILM COATED ORAL at 08:30

## 2024-02-27 RX ADMIN — Medication 2000 MG: at 17:32

## 2024-02-27 RX ADMIN — ASPIRIN 81 MG: 81 TABLET, COATED ORAL at 08:30

## 2024-02-27 RX ADMIN — HYDROCODONE BITARTRATE AND ACETAMINOPHEN 1 TABLET: 5; 325 TABLET ORAL at 22:18

## 2024-02-27 RX ADMIN — ATORVASTATIN CALCIUM 80 MG: 80 TABLET, FILM COATED ORAL at 22:18

## 2024-02-27 RX ADMIN — BUDESONIDE AND FORMOTEROL FUMARATE DIHYDRATE 2 PUFF: 80; 4.5 AEROSOL RESPIRATORY (INHALATION) at 21:20

## 2024-02-27 RX ADMIN — HYDROCODONE BITARTRATE AND ACETAMINOPHEN 1 TABLET: 5; 325 TABLET ORAL at 17:37

## 2024-02-27 RX ADMIN — HYDROXYZINE HYDROCHLORIDE 25 MG: 25 TABLET ORAL at 08:30

## 2024-02-27 RX ADMIN — HYDROCODONE BITARTRATE AND ACETAMINOPHEN 1 TABLET: 5; 325 TABLET ORAL at 12:12

## 2024-02-27 RX ADMIN — GABAPENTIN 300 MG: 300 CAPSULE ORAL at 08:30

## 2024-02-27 RX ADMIN — MORPHINE SULFATE 1 MG: 2 INJECTION, SOLUTION INTRAMUSCULAR; INTRAVENOUS at 10:14

## 2024-02-27 RX ADMIN — POLYETHYLENE GLYCOL 3350 17 G: 17 POWDER, FOR SOLUTION ORAL at 12:18

## 2024-02-27 RX ADMIN — CARVEDILOL 12.5 MG: 12.5 TABLET, FILM COATED ORAL at 17:32

## 2024-02-27 RX ADMIN — GABAPENTIN 300 MG: 300 CAPSULE ORAL at 22:18

## 2024-02-27 RX ADMIN — CYCLOBENZAPRINE HYDROCHLORIDE 5 MG: 5 TABLET, FILM COATED ORAL at 22:18

## 2024-02-27 RX ADMIN — GABAPENTIN 300 MG: 300 CAPSULE ORAL at 13:58

## 2024-02-27 RX ADMIN — HYDROCODONE BITARTRATE AND ACETAMINOPHEN 1 TABLET: 5; 325 TABLET ORAL at 06:33

## 2024-02-27 RX ADMIN — ENOXAPARIN SODIUM 60 MG: 100 INJECTION SUBCUTANEOUS at 12:18

## 2024-02-27 RX ADMIN — Medication 9 MG: at 22:18

## 2024-02-27 RX ADMIN — ENOXAPARIN SODIUM 60 MG: 100 INJECTION SUBCUTANEOUS at 22:18

## 2024-02-27 RX ADMIN — HYDROXYZINE HYDROCHLORIDE 25 MG: 25 TABLET ORAL at 22:18

## 2024-02-27 ASSESSMENT — PAIN SCALES - GENERAL
PAINLEVEL_OUTOF10: 7
PAINLEVEL_OUTOF10: 2
PAINLEVEL_OUTOF10: 3

## 2024-02-27 ASSESSMENT — PAIN DESCRIPTION - ORIENTATION: ORIENTATION: LEFT;LOWER

## 2024-02-27 ASSESSMENT — PAIN DESCRIPTION - DESCRIPTORS: DESCRIPTORS: ACHING

## 2024-02-27 ASSESSMENT — PAIN DESCRIPTION - LOCATION: LOCATION: LEG;FOOT

## 2024-02-27 NOTE — DISCHARGE INSTRUCTIONS
You were hospital due to left lower extremity wound infection and underwent debridement by vascular surgery.  You are also treated with IV antibiotics by infectious disease.    Please take your medications as advised  Please apply DAKINS topically daily  Please stop taking Plavix  Continue taking aspirin daily along with Lipitor 80 nightly  Please continue taking Coreg 12.5 twice daily for  blood pressure control  Continue evaluation with Ventolin, Breo Ellipta, levo albuterol.  Please continue taking Eliquis 5 mg twice daily  Continue with wound Vac care.    Please follow-up with your PCP in 1 week  Please follow-up with vascular surgeon Dr. Liu in 2 weeks  Office f/up in 3 weeks with Dr Hartmann for infection. Please call [phone] for appointment     In case of any worsening symptoms please visit the nearest ER.  The symptoms might include fever, chills, pain, worsening wound, increased discharge, nausea, vomiting, abdominal pain.

## 2024-02-27 NOTE — PLAN OF CARE
Problem: Pain  Goal: Verbalizes/displays adequate comfort level or baseline comfort level  2/27/2024 1814 by Constanza Talamantes, RN  Outcome: Progressing  2/27/2024 0445 by Kristin Trimble, RN  Outcome: Progressing

## 2024-02-27 NOTE — DISCHARGE INSTR - COC
performed by Brent Anderson MD at [facility] CVOR    LEG SURGERY Left 2024    LEFT LOWER DEBRIDEMENT OF WOUND performed by Jamir Liu MD at [facility] CVOR    TOOTH EXTRACTION  14       Immunization History:   Immunization History   Administered Date(s) Administered    COVID-19, PFIZER PURPLE top, DILUTE for use, (age 12 y+), 30mcg/0.3mL 2021, 2021, 2021    Influenza Virus Vaccine 2014    Pneumococcal, PCV-13, PREVNAR 13, (age 6w+), IM, 0.5mL 2014       Active Problems:  Patient Active Problem List   Diagnosis Code    Perirectal abscess K61.1    Diabetes mellitus ([facility]) E11.9    COPD (chronic obstructive pulmonary disease) ([facility]) J44.9    Dependence on nicotine from cigarettes F17.210    Mixed hyperlipidemia E78.2    STEMI (ST elevation myocardial infarction) ([facility]) I21.3    Left main coronary artery disease I25.10    Bandemia D72.825    COVID U07.1    Cardiogenic shock ([facility]) R57.0    S/P CABG x 3 Z95.1    ATN (acute tubular necrosis) ([facility]) N17.0    Hypernatremia E87.0    Metabolic acidemia E87.20    NSTEMI (non-ST elevation myocardial infarction) ([facility]) I21.4    Debility R53.81    Atelectasis J98.11    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    Pneumonia of both lower lobes due to infectious organism J18.9    Tobacco abuse Z72.0    Left leg swelling M79.89    Fever R50.9    Non-healing wound of lower extremity, left, initial encounter S81.802A    Hx of necrotising fasciitis Z87.39    CRP elevated R79.82    ESR raised R70.0    Encounter for post surgical wound check Z48.89    Leg abscess L02.419       Isolation/Infection:   Isolation            No Isolation          Patient Infection Status       Infection Onset Added Last Indicated Last Indicated By Review Planned Expiration Resolved Resolved By    None active    Resolved    COVID-19 23 COVID-19 & Influenza Combo   24 Infection                        Nurse Assessment:  Last Vital Signs: BP

## 2024-02-27 NOTE — CARE COORDINATION
Was informed by Katherine with Wound Care that the patient will be needing a wound vac.  Paperwork placed in the chart and physician     Notified Dr. Micah Miller via perfect serve informing him of the paper work needing completed.     1217 spoke to patient at bedside regarding the wound vac.    Called [facility], spoke to Dawn.  She tells me that the patient is current with them and can return home with their services.     Spoke to Eliza at [facility] she tell me that they are able to accept wound vacs for straight Medicaid.

## 2024-02-27 NOTE — H&P
Obtained script for [facility] wound vac.  Confirmed with Eliza Liasion for [facility] that they accept Medicaid.  Sent paperwork to Eliza at [phone]    Spoke to Dr. RENAN Miller with Vascular.  He states that the patient has to have a wound vac d/t the depth of the wound.     Eliza Ford  Phone [phone]  Fax [phone]    Called Eliza at [facility] to see if she has received the referral Left voicemail

## 2024-02-27 NOTE — ANESTHESIA POSTPROCEDURE EVALUATION
Department of Anesthesiology  Postprocedure Note    Patient: Lee Mejia  MRN: [id number]  YOB: 1960  Date of evaluation: 2/27/2024    Procedure Summary       Date: 02/26/24 Room / Location: [place] [address] / [facility]    Anesthesia Start: 1327 Anesthesia Stop: 1420    Procedure: LEFT LOWER DEBRIDEMENT OF WOUND (Left: Leg Lower) Diagnosis:       Non-healing wound of lower extremity, left, initial encounter      (Non-healing wound of lower extremity, left, initial encounter [S81.802A])    Surgeons: Jamir Liu MD Responsible Provider: Kevin Mccormick MD    Anesthesia Type: MAC ASA Status: 3            Anesthesia Type: No value filed.    Rhina Phase I: Rhina Score: 10    Rhina Phase II:      Anesthesia Post Evaluation    Patient location during evaluation: bedside  Patient participation: complete - patient participated  Level of consciousness: awake  Airway patency: patent  Nausea & Vomiting: no nausea and no vomiting  Cardiovascular status: hemodynamically stable  Respiratory status: acceptable  Hydration status: stable  Comments: /64   Pulse 88   Temp 97.9 °F (36.6 °C) (Oral)   Resp 15   Ht 1.676 m (5' 6\")   Wt 57.8 kg (127 lb 6.8 oz)   SpO2 98%   BMI 20.57 kg/m²           No notable events documented.

## 2024-02-28 ENCOUNTER — APPOINTMENT (OUTPATIENT)
Dept: GENERAL RADIOLOGY | Age: 64
DRG: 710 | End: 2024-02-28
Payer: MEDICAID

## 2024-02-28 LAB
ANION GAP SERPL CALCULATED.3IONS-SCNC: 11 MMOL/L (ref 9–17)
BASOPHILS # BLD: 0.15 K/UL (ref 0–0.2)
BASOPHILS NFR BLD: 1 % (ref 0–2)
BUN SERPL-MCNC: 17 MG/DL (ref 8–23)
CALCIUM SERPL-MCNC: 8.3 MG/DL (ref 8.6–10.4)
CHLORIDE SERPL-SCNC: 104 MMOL/L (ref 98–107)
CO2 SERPL-SCNC: 22 MMOL/L (ref 20–31)
CREAT SERPL-MCNC: 0.7 MG/DL (ref 0.7–1.2)
EOSINOPHIL # BLD: 0.15 K/UL (ref 0–0.4)
EOSINOPHILS RELATIVE PERCENT: 1 % (ref 1–4)
ERYTHROCYTE [DISTWIDTH] IN BLOOD BY AUTOMATED COUNT: 16.4 % (ref 11.8–14.4)
FERRITIN SERPL-MCNC: 96 NG/ML (ref 30–400)
GFR SERPL CREATININE-BSD FRML MDRD: >60 ML/MIN/1.73M2
GLUCOSE SERPL-MCNC: 101 MG/DL (ref 70–99)
HCT VFR BLD AUTO: 27.6 % (ref 40.7–50.3)
HGB BLD-MCNC: 7.8 G/DL (ref 13–17)
IMM GRANULOCYTES # BLD AUTO: 0 K/UL (ref 0–0.3)
IMM GRANULOCYTES NFR BLD: 0 %
IRON SATN MFR SERPL: 12 % (ref 20–55)
IRON SERPL-MCNC: 31 UG/DL (ref 59–158)
LYMPHOCYTES NFR BLD: 2.66 K/UL (ref 1–4.8)
LYMPHOCYTES RELATIVE PERCENT: 18 % (ref 24–44)
MCH RBC QN AUTO: 26.9 PG (ref 25.2–33.5)
MCHC RBC AUTO-ENTMCNC: 28.3 G/DL (ref 28.4–34.8)
MCV RBC AUTO: 95.2 FL (ref 82.6–102.9)
MONOCYTES NFR BLD: 0.74 K/UL (ref 0.1–0.8)
MONOCYTES NFR BLD: 5 % (ref 1–7)
MORPHOLOGY: ABNORMAL
NEUTROPHILS NFR BLD: 75 % (ref 36–66)
NEUTS SEG NFR BLD: 11.1 K/UL (ref 1.8–7.7)
NRBC BLD-RTO: 0 PER 100 WBC
PLATELET # BLD AUTO: 463 K/UL (ref 138–453)
PMV BLD AUTO: 9.7 FL (ref 8.1–13.5)
POTASSIUM SERPL-SCNC: 3.9 MMOL/L (ref 3.7–5.3)
RBC # BLD AUTO: 2.9 M/UL (ref 4.21–5.77)
SODIUM SERPL-SCNC: 137 MMOL/L (ref 135–144)
TIBC SERPL-MCNC: 249 UG/DL (ref 250–450)
UNSATURATED IRON BINDING CAPACITY: 218 UG/DL (ref 112–347)
WBC OTHER # BLD: 14.8 K/UL (ref 3.5–11.3)

## 2024-02-28 PROCEDURE — 74018 RADEX ABDOMEN 1 VIEW: CPT

## 2024-02-28 PROCEDURE — 36415 COLL VENOUS BLD VENIPUNCTURE: CPT

## 2024-02-28 PROCEDURE — 85025 COMPLETE CBC W/AUTO DIFF WBC: CPT

## 2024-02-28 PROCEDURE — 6360000002 HC RX W HCPCS

## 2024-02-28 PROCEDURE — 83540 ASSAY OF IRON: CPT

## 2024-02-28 PROCEDURE — 6370000000 HC RX 637 (ALT 250 FOR IP)

## 2024-02-28 PROCEDURE — 94761 N-INVAS EAR/PLS OXIMETRY MLT: CPT

## 2024-02-28 PROCEDURE — 94640 AIRWAY INHALATION TREATMENT: CPT

## 2024-02-28 PROCEDURE — 82728 ASSAY OF FERRITIN: CPT

## 2024-02-28 PROCEDURE — 1200000000 HC SEMI PRIVATE

## 2024-02-28 PROCEDURE — 83550 IRON BINDING TEST: CPT

## 2024-02-28 PROCEDURE — 80048 BASIC METABOLIC PNL TOTAL CA: CPT

## 2024-02-28 PROCEDURE — 99232 SBSQ HOSP IP/OBS MODERATE 35: CPT | Performed by: INTERNAL MEDICINE

## 2024-02-28 RX ORDER — BISACODYL 10 MG
10 SUPPOSITORY, RECTAL RECTAL DAILY PRN
Status: DISCONTINUED | OUTPATIENT
Start: 2024-02-28 | End: 2024-03-05 | Stop reason: HOSPADM

## 2024-02-28 RX ORDER — KETOROLAC TROMETHAMINE 15 MG/ML
15 INJECTION, SOLUTION INTRAMUSCULAR; INTRAVENOUS EVERY 6 HOURS PRN
Status: DISPENSED | OUTPATIENT
Start: 2024-02-28 | End: 2024-03-04

## 2024-02-28 RX ORDER — BENZOCAINE/MENTHOL 6 MG-10 MG
LOZENGE MUCOUS MEMBRANE 2 TIMES DAILY
Status: DISCONTINUED | OUTPATIENT
Start: 2024-02-28 | End: 2024-03-05 | Stop reason: HOSPADM

## 2024-02-28 RX ADMIN — MAGNESIUM HYDROXIDE 30 ML: 400 SUSPENSION ORAL at 20:42

## 2024-02-28 RX ADMIN — POLYETHYLENE GLYCOL 3350 17 G: 17 POWDER, FOR SOLUTION ORAL at 11:46

## 2024-02-28 RX ADMIN — ATORVASTATIN CALCIUM 80 MG: 80 TABLET, FILM COATED ORAL at 20:42

## 2024-02-28 RX ADMIN — BUDESONIDE AND FORMOTEROL FUMARATE DIHYDRATE 2 PUFF: 80; 4.5 AEROSOL RESPIRATORY (INHALATION) at 22:05

## 2024-02-28 RX ADMIN — GABAPENTIN 300 MG: 300 CAPSULE ORAL at 20:41

## 2024-02-28 RX ADMIN — ENOXAPARIN SODIUM 60 MG: 100 INJECTION SUBCUTANEOUS at 09:23

## 2024-02-28 RX ADMIN — BUDESONIDE AND FORMOTEROL FUMARATE DIHYDRATE 2 PUFF: 80; 4.5 AEROSOL RESPIRATORY (INHALATION) at 09:39

## 2024-02-28 RX ADMIN — ASPIRIN 81 MG: 81 TABLET, COATED ORAL at 09:23

## 2024-02-28 RX ADMIN — DOCUSATE SODIUM LIQUID 100 MG: 100 LIQUID ORAL at 15:53

## 2024-02-28 RX ADMIN — CARVEDILOL 12.5 MG: 12.5 TABLET, FILM COATED ORAL at 09:23

## 2024-02-28 RX ADMIN — DOCUSATE SODIUM 50 MG AND SENNOSIDES 8.6 MG 2 TABLET: 8.6; 5 TABLET, FILM COATED ORAL at 11:46

## 2024-02-28 RX ADMIN — HYDROCODONE BITARTRATE AND ACETAMINOPHEN 1 TABLET: 5; 325 TABLET ORAL at 06:52

## 2024-02-28 RX ADMIN — ENOXAPARIN SODIUM 60 MG: 100 INJECTION SUBCUTANEOUS at 20:42

## 2024-02-28 RX ADMIN — GABAPENTIN 300 MG: 300 CAPSULE ORAL at 14:42

## 2024-02-28 RX ADMIN — KETOROLAC TROMETHAMINE 15 MG: 15 INJECTION, SOLUTION INTRAMUSCULAR; INTRAVENOUS at 21:35

## 2024-02-28 RX ADMIN — HYDROXYZINE HYDROCHLORIDE 25 MG: 25 TABLET ORAL at 20:42

## 2024-02-28 RX ADMIN — HYDROXYZINE HYDROCHLORIDE 25 MG: 25 TABLET ORAL at 09:25

## 2024-02-28 RX ADMIN — GABAPENTIN 300 MG: 300 CAPSULE ORAL at 09:25

## 2024-02-28 RX ADMIN — CARVEDILOL 12.5 MG: 12.5 TABLET, FILM COATED ORAL at 17:34

## 2024-02-28 RX ADMIN — Medication 2000 MG: at 18:22

## 2024-02-28 ASSESSMENT — PAIN DESCRIPTION - ORIENTATION
ORIENTATION: LEFT

## 2024-02-28 ASSESSMENT — PAIN SCALES - GENERAL
PAINLEVEL_OUTOF10: 7
PAINLEVEL_OUTOF10: 6
PAINLEVEL_OUTOF10: 10
PAINLEVEL_OUTOF10: 9
PAINLEVEL_OUTOF10: 7

## 2024-02-28 ASSESSMENT — PAIN DESCRIPTION - LOCATION
LOCATION: LEG

## 2024-02-28 ASSESSMENT — PAIN DESCRIPTION - DESCRIPTORS
DESCRIPTORS: THROBBING;SPASM
DESCRIPTORS: ACHING
DESCRIPTORS: THROBBING;SPASM

## 2024-02-28 NOTE — CARE COORDINATION
Transitional Planning  Called Eliza with wound Vac.  Wound vac still pending Insurance  She will call once approved.  Called [facility] spoke with Dawn pt is current with them and she is aware the pt will be discharged with a wound vac.    Called [facility] spoke with Sylvia informed of pt's discharge today with wound vac  Faxed phan home care and face sheet to [facility] [phone]

## 2024-02-28 NOTE — PLAN OF CARE
Problem: Pain  Goal: Verbalizes/displays adequate comfort level or baseline comfort level  2/28/2024 0443 by Kristin Trimble, RN  Outcome: Progressing

## 2024-02-28 NOTE — PLAN OF CARE
Problem: Discharge Planning  Goal: Discharge to home or other facility with appropriate resources  Outcome: Progressing     Problem: Safety - Adult  Goal: Free from fall injury  2/28/2024 0814 by Karla Tavares RN  Outcome: Progressing  2/28/2024 0443 by Kristin Trimble RN  Outcome: Progressing     Problem: Pain  Goal: Verbalizes/displays adequate comfort level or baseline comfort level  2/28/2024 0814 by Karla Tavares RN  Outcome: Progressing  Note: Patient c/o foot pain that is relieved with PRN Norco. Will continue to assess and medicate per MAR  2/28/2024 0443 by Kristin Trimble, RN  Outcome: Progressing

## 2024-02-29 ENCOUNTER — APPOINTMENT (OUTPATIENT)
Dept: GENERAL RADIOLOGY | Age: 64
DRG: 710 | End: 2024-02-29
Payer: MEDICAID

## 2024-02-29 PROBLEM — A41.9 SEPSIS (HCC): Status: ACTIVE | Noted: 2024-02-29

## 2024-02-29 LAB
ALBUMIN SERPL-MCNC: 3 G/DL (ref 3.5–5.2)
ALBUMIN/GLOB SERPL: 0.8 {RATIO} (ref 1–2.5)
ALP SERPL-CCNC: 95 U/L (ref 40–129)
ALT SERPL-CCNC: 16 U/L (ref 5–41)
ANION GAP SERPL CALCULATED.3IONS-SCNC: 10 MMOL/L (ref 9–17)
ANION GAP SERPL CALCULATED.3IONS-SCNC: 8 MMOL/L (ref 9–17)
AST SERPL-CCNC: 15 U/L
BACTERIA URNS QL MICRO: NORMAL
BASOPHILS # BLD: 0 K/UL (ref 0–0.2)
BASOPHILS NFR BLD: 0 % (ref 0–2)
BILIRUB SERPL-MCNC: <0.1 MG/DL (ref 0.3–1.2)
BILIRUB UR QL STRIP: NEGATIVE
BUN SERPL-MCNC: 15 MG/DL (ref 8–23)
BUN SERPL-MCNC: 16 MG/DL (ref 8–23)
CALCIUM SERPL-MCNC: 8.3 MG/DL (ref 8.6–10.4)
CALCIUM SERPL-MCNC: 8.4 MG/DL (ref 8.6–10.4)
CASTS #/AREA URNS LPF: NORMAL /LPF (ref 0–8)
CHLORIDE SERPL-SCNC: 101 MMOL/L (ref 98–107)
CHLORIDE SERPL-SCNC: 104 MMOL/L (ref 98–107)
CLARITY UR: CLEAR
CO2 SERPL-SCNC: 21 MMOL/L (ref 20–31)
CO2 SERPL-SCNC: 23 MMOL/L (ref 20–31)
COLOR UR: YELLOW
CREAT SERPL-MCNC: 0.6 MG/DL (ref 0.7–1.2)
CREAT SERPL-MCNC: 0.7 MG/DL (ref 0.7–1.2)
EOSINOPHIL # BLD: 0 K/UL (ref 0–0.4)
EOSINOPHILS RELATIVE PERCENT: 0 % (ref 1–4)
EPI CELLS #/AREA URNS HPF: NORMAL /HPF (ref 0–5)
ERYTHROCYTE [DISTWIDTH] IN BLOOD BY AUTOMATED COUNT: 16.8 % (ref 11.8–14.4)
GFR SERPL CREATININE-BSD FRML MDRD: >60 ML/MIN/1.73M2
GFR SERPL CREATININE-BSD FRML MDRD: >60 ML/MIN/1.73M2
GLUCOSE SERPL-MCNC: 113 MG/DL (ref 70–99)
GLUCOSE SERPL-MCNC: 97 MG/DL (ref 70–99)
GLUCOSE UR STRIP-MCNC: NEGATIVE MG/DL
HCT VFR BLD AUTO: 28.8 % (ref 40.7–50.3)
HGB BLD-MCNC: 8.5 G/DL (ref 13–17)
HGB UR QL STRIP.AUTO: NEGATIVE
IMM GRANULOCYTES # BLD AUTO: 0 K/UL (ref 0–0.3)
IMM GRANULOCYTES NFR BLD: 0 %
KETONES UR STRIP-MCNC: NEGATIVE MG/DL
LACTIC ACID, WHOLE BLOOD: 1.2 MMOL/L (ref 0.7–2.1)
LACTIC ACID, WHOLE BLOOD: 2.3 MMOL/L (ref 0.7–2.1)
LEUKOCYTE ESTERASE UR QL STRIP: NEGATIVE
LYMPHOCYTES NFR BLD: 2.67 K/UL (ref 1–4.8)
LYMPHOCYTES RELATIVE PERCENT: 11 % (ref 24–44)
MCH RBC QN AUTO: 27.3 PG (ref 25.2–33.5)
MCHC RBC AUTO-ENTMCNC: 29.5 G/DL (ref 28.4–34.8)
MCV RBC AUTO: 92.6 FL (ref 82.6–102.9)
MONOCYTES NFR BLD: 0.97 K/UL (ref 0.1–0.8)
MONOCYTES NFR BLD: 4 % (ref 1–7)
MORPHOLOGY: ABNORMAL
NEUTROPHILS NFR BLD: 85 % (ref 36–66)
NEUTS SEG NFR BLD: 20.66 K/UL (ref 1.8–7.7)
NITRITE UR QL STRIP: NEGATIVE
NRBC BLD-RTO: 0 PER 100 WBC
PH UR STRIP: 6.5 [PH] (ref 5–8)
PLATELET # BLD AUTO: 513 K/UL (ref 138–453)
PMV BLD AUTO: 9.3 FL (ref 8.1–13.5)
POTASSIUM SERPL-SCNC: 3.8 MMOL/L (ref 3.7–5.3)
POTASSIUM SERPL-SCNC: 4.2 MMOL/L (ref 3.7–5.3)
PROT SERPL-MCNC: 6.7 G/DL (ref 6.4–8.3)
PROT UR STRIP-MCNC: NEGATIVE MG/DL
RBC # BLD AUTO: 3.11 M/UL (ref 4.21–5.77)
RBC #/AREA URNS HPF: NORMAL /HPF (ref 0–4)
SODIUM SERPL-SCNC: 132 MMOL/L (ref 135–144)
SODIUM SERPL-SCNC: 135 MMOL/L (ref 135–144)
SP GR UR STRIP: 1.02 (ref 1–1.03)
UROBILINOGEN UR STRIP-ACNC: NORMAL EU/DL (ref 0–1)
WBC #/AREA URNS HPF: NORMAL /HPF (ref 0–5)
WBC OTHER # BLD: 24.3 K/UL (ref 3.5–11.3)

## 2024-02-29 PROCEDURE — 6360000002 HC RX W HCPCS

## 2024-02-29 PROCEDURE — 6370000000 HC RX 637 (ALT 250 FOR IP)

## 2024-02-29 PROCEDURE — 87040 BLOOD CULTURE FOR BACTERIA: CPT

## 2024-02-29 PROCEDURE — 99232 SBSQ HOSP IP/OBS MODERATE 35: CPT | Performed by: INTERNAL MEDICINE

## 2024-02-29 PROCEDURE — 85025 COMPLETE CBC W/AUTO DIFF WBC: CPT

## 2024-02-29 PROCEDURE — 87086 URINE CULTURE/COLONY COUNT: CPT

## 2024-02-29 PROCEDURE — 71045 X-RAY EXAM CHEST 1 VIEW: CPT

## 2024-02-29 PROCEDURE — 80048 BASIC METABOLIC PNL TOTAL CA: CPT

## 2024-02-29 PROCEDURE — 81001 URINALYSIS AUTO W/SCOPE: CPT

## 2024-02-29 PROCEDURE — 99213 OFFICE O/P EST LOW 20 MIN: CPT

## 2024-02-29 PROCEDURE — 36415 COLL VENOUS BLD VENIPUNCTURE: CPT

## 2024-02-29 PROCEDURE — 82308 ASSAY OF CALCITONIN: CPT

## 2024-02-29 PROCEDURE — 94640 AIRWAY INHALATION TREATMENT: CPT

## 2024-02-29 PROCEDURE — 83605 ASSAY OF LACTIC ACID: CPT

## 2024-02-29 PROCEDURE — 2580000003 HC RX 258

## 2024-02-29 PROCEDURE — 80053 COMPREHEN METABOLIC PANEL: CPT

## 2024-02-29 PROCEDURE — 1200000000 HC SEMI PRIVATE

## 2024-02-29 RX ORDER — 0.9 % SODIUM CHLORIDE 0.9 %
250 INTRAVENOUS SOLUTION INTRAVENOUS ONCE
Status: COMPLETED | OUTPATIENT
Start: 2024-02-29 | End: 2024-02-29

## 2024-02-29 RX ORDER — SODIUM CHLORIDE, SODIUM LACTATE, POTASSIUM CHLORIDE, AND CALCIUM CHLORIDE .6; .31; .03; .02 G/100ML; G/100ML; G/100ML; G/100ML
500 INJECTION, SOLUTION INTRAVENOUS ONCE
Status: DISCONTINUED | OUTPATIENT
Start: 2024-02-29 | End: 2024-02-29

## 2024-02-29 RX ORDER — SODIUM CHLORIDE 9 MG/ML
INJECTION, SOLUTION INTRAVENOUS CONTINUOUS
Status: DISCONTINUED | OUTPATIENT
Start: 2024-02-29 | End: 2024-03-01

## 2024-02-29 RX ADMIN — SODIUM CHLORIDE: 9 INJECTION, SOLUTION INTRAVENOUS at 15:08

## 2024-02-29 RX ADMIN — Medication: at 09:15

## 2024-02-29 RX ADMIN — BUDESONIDE AND FORMOTEROL FUMARATE DIHYDRATE 2 PUFF: 80; 4.5 AEROSOL RESPIRATORY (INHALATION) at 21:24

## 2024-02-29 RX ADMIN — GABAPENTIN 300 MG: 300 CAPSULE ORAL at 15:02

## 2024-02-29 RX ADMIN — KETOROLAC TROMETHAMINE 15 MG: 15 INJECTION, SOLUTION INTRAMUSCULAR; INTRAVENOUS at 03:34

## 2024-02-29 RX ADMIN — HYDROCORTISONE: 10 CREAM TOPICAL at 20:44

## 2024-02-29 RX ADMIN — Medication 2000 MG: at 18:12

## 2024-02-29 RX ADMIN — ENOXAPARIN SODIUM 60 MG: 100 INJECTION SUBCUTANEOUS at 08:49

## 2024-02-29 RX ADMIN — BUDESONIDE AND FORMOTEROL FUMARATE DIHYDRATE 2 PUFF: 80; 4.5 AEROSOL RESPIRATORY (INHALATION) at 08:37

## 2024-02-29 RX ADMIN — KETOROLAC TROMETHAMINE 15 MG: 15 INJECTION, SOLUTION INTRAMUSCULAR; INTRAVENOUS at 09:02

## 2024-02-29 RX ADMIN — BISACODYL 10 MG: 10 SUPPOSITORY RECTAL at 03:36

## 2024-02-29 RX ADMIN — ASPIRIN 81 MG: 81 TABLET, COATED ORAL at 08:49

## 2024-02-29 RX ADMIN — ACETAMINOPHEN 650 MG: 325 TABLET ORAL at 08:49

## 2024-02-29 RX ADMIN — CARVEDILOL 12.5 MG: 12.5 TABLET, FILM COATED ORAL at 17:06

## 2024-02-29 RX ADMIN — HYDROXYZINE HYDROCHLORIDE 25 MG: 25 TABLET ORAL at 20:43

## 2024-02-29 RX ADMIN — CARVEDILOL 12.5 MG: 12.5 TABLET, FILM COATED ORAL at 08:49

## 2024-02-29 RX ADMIN — GABAPENTIN 300 MG: 300 CAPSULE ORAL at 20:43

## 2024-02-29 RX ADMIN — ACETAMINOPHEN 650 MG: 325 TABLET ORAL at 17:06

## 2024-02-29 RX ADMIN — HYDROCORTISONE: 10 CREAM TOPICAL at 08:49

## 2024-02-29 RX ADMIN — SODIUM CHLORIDE 250 ML: 9 INJECTION, SOLUTION INTRAVENOUS at 04:54

## 2024-02-29 RX ADMIN — ATORVASTATIN CALCIUM 80 MG: 80 TABLET, FILM COATED ORAL at 20:43

## 2024-02-29 RX ADMIN — HYDROCORTISONE: 10 CREAM TOPICAL at 03:35

## 2024-02-29 RX ADMIN — HYDROXYZINE HYDROCHLORIDE 25 MG: 25 TABLET ORAL at 08:50

## 2024-02-29 RX ADMIN — ACETAMINOPHEN 650 MG: 325 TABLET ORAL at 00:04

## 2024-02-29 RX ADMIN — ENOXAPARIN SODIUM 60 MG: 100 INJECTION SUBCUTANEOUS at 20:43

## 2024-02-29 RX ADMIN — GABAPENTIN 300 MG: 300 CAPSULE ORAL at 08:50

## 2024-02-29 RX ADMIN — KETOROLAC TROMETHAMINE 15 MG: 15 INJECTION, SOLUTION INTRAMUSCULAR; INTRAVENOUS at 15:40

## 2024-02-29 ASSESSMENT — PAIN SCALES - GENERAL
PAINLEVEL_OUTOF10: 10
PAINLEVEL_OUTOF10: 3
PAINLEVEL_OUTOF10: 9
PAINLEVEL_OUTOF10: 3
PAINLEVEL_OUTOF10: 7
PAINLEVEL_OUTOF10: 3

## 2024-02-29 ASSESSMENT — PAIN DESCRIPTION - ORIENTATION: ORIENTATION: LEFT

## 2024-02-29 ASSESSMENT — PAIN DESCRIPTION - LOCATION: LOCATION: FOOT

## 2024-02-29 NOTE — PLAN OF CARE
Called wife @ [phone]. Updated her about patient's current status. All questions answered. Sepsis work up in progress. Continue same management.

## 2024-02-29 NOTE — PLAN OF CARE
Patient had concerns for constipation.His last movement was on admission.  His KUB shows large stool burden in colon.  He did receive glycol ax, senna, colace and milk of magnesia and still  has not been able to have a bowel movement   He has been refusing suppositories or enema.  Now is febrile with T max 102.2. complaints of some abdominal pain. On exam no signs of peritonitis.  Sepsis workup done.

## 2024-03-01 LAB
ANION GAP SERPL CALCULATED.3IONS-SCNC: 8 MMOL/L (ref 9–17)
BASOPHILS # BLD: 0.05 K/UL (ref 0–0.2)
BASOPHILS NFR BLD: 0 % (ref 0–2)
BUN SERPL-MCNC: 13 MG/DL (ref 8–23)
CALCIT SERPL-MCNC: <2 PG/ML (ref 0–7.5)
CALCIUM SERPL-MCNC: 7.8 MG/DL (ref 8.6–10.4)
CHLORIDE SERPL-SCNC: 104 MMOL/L (ref 98–107)
CO2 SERPL-SCNC: 20 MMOL/L (ref 20–31)
CREAT SERPL-MCNC: 0.5 MG/DL (ref 0.7–1.2)
CRP SERPL HS-MCNC: 116.7 MG/L (ref 0–5)
EOSINOPHIL # BLD: 0.44 K/UL (ref 0–0.44)
EOSINOPHILS RELATIVE PERCENT: 3 % (ref 1–4)
ERYTHROCYTE [DISTWIDTH] IN BLOOD BY AUTOMATED COUNT: 16.4 % (ref 11.8–14.4)
GFR SERPL CREATININE-BSD FRML MDRD: >60 ML/MIN/1.73M2
GLUCOSE SERPL-MCNC: 92 MG/DL (ref 70–99)
HCT VFR BLD AUTO: 26.7 % (ref 40.7–50.3)
HGB BLD-MCNC: 7.8 G/DL (ref 13–17)
IMM GRANULOCYTES # BLD AUTO: 0.07 K/UL (ref 0–0.3)
IMM GRANULOCYTES NFR BLD: 1 %
LACTIC ACID, WHOLE BLOOD: 1 MMOL/L (ref 0.7–2.1)
LYMPHOCYTES NFR BLD: 1.97 K/UL (ref 1.1–3.7)
LYMPHOCYTES RELATIVE PERCENT: 15 % (ref 24–43)
MCH RBC QN AUTO: 26.6 PG (ref 25.2–33.5)
MCHC RBC AUTO-ENTMCNC: 29.2 G/DL (ref 28.4–34.8)
MCV RBC AUTO: 91.1 FL (ref 82.6–102.9)
MICROORGANISM SPEC CULT: NO GROWTH
MONOCYTES NFR BLD: 0.49 K/UL (ref 0.1–1.2)
MONOCYTES NFR BLD: 4 % (ref 3–12)
NEUTROPHILS NFR BLD: 77 % (ref 36–65)
NEUTS SEG NFR BLD: 10.17 K/UL (ref 1.5–8.1)
NRBC BLD-RTO: 0 PER 100 WBC
PLATELET # BLD AUTO: 462 K/UL (ref 138–453)
PMV BLD AUTO: 9.9 FL (ref 8.1–13.5)
POTASSIUM SERPL-SCNC: 3.9 MMOL/L (ref 3.7–5.3)
PROCALCITONIN SERPL-MCNC: 0.12 NG/ML
RBC # BLD AUTO: 2.93 M/UL (ref 4.21–5.77)
RBC # BLD: ABNORMAL 10*6/UL
SODIUM SERPL-SCNC: 132 MMOL/L (ref 135–144)
SPECIMEN DESCRIPTION: NORMAL
WBC OTHER # BLD: 13.2 K/UL (ref 3.5–11.3)

## 2024-03-01 PROCEDURE — 6360000002 HC RX W HCPCS

## 2024-03-01 PROCEDURE — 84145 PROCALCITONIN (PCT): CPT

## 2024-03-01 PROCEDURE — 94640 AIRWAY INHALATION TREATMENT: CPT

## 2024-03-01 PROCEDURE — 6370000000 HC RX 637 (ALT 250 FOR IP)

## 2024-03-01 PROCEDURE — 85025 COMPLETE CBC W/AUTO DIFF WBC: CPT

## 2024-03-01 PROCEDURE — 83605 ASSAY OF LACTIC ACID: CPT

## 2024-03-01 PROCEDURE — 2580000003 HC RX 258

## 2024-03-01 PROCEDURE — 36415 COLL VENOUS BLD VENIPUNCTURE: CPT

## 2024-03-01 PROCEDURE — 99232 SBSQ HOSP IP/OBS MODERATE 35: CPT | Performed by: INTERNAL MEDICINE

## 2024-03-01 PROCEDURE — 80048 BASIC METABOLIC PNL TOTAL CA: CPT

## 2024-03-01 PROCEDURE — 1200000000 HC SEMI PRIVATE

## 2024-03-01 PROCEDURE — 87506 IADNA-DNA/RNA PROBE TQ 6-11: CPT

## 2024-03-01 PROCEDURE — 86140 C-REACTIVE PROTEIN: CPT

## 2024-03-01 RX ORDER — OXYCODONE HYDROCHLORIDE 5 MG/1
5 TABLET ORAL EVERY 6 HOURS PRN
Status: DISCONTINUED | OUTPATIENT
Start: 2024-03-01 | End: 2024-03-05 | Stop reason: HOSPADM

## 2024-03-01 RX ADMIN — ATORVASTATIN CALCIUM 80 MG: 80 TABLET, FILM COATED ORAL at 21:11

## 2024-03-01 RX ADMIN — BUDESONIDE AND FORMOTEROL FUMARATE DIHYDRATE 2 PUFF: 80; 4.5 AEROSOL RESPIRATORY (INHALATION) at 20:25

## 2024-03-01 RX ADMIN — BUDESONIDE AND FORMOTEROL FUMARATE DIHYDRATE 2 PUFF: 80; 4.5 AEROSOL RESPIRATORY (INHALATION) at 09:21

## 2024-03-01 RX ADMIN — ACETAMINOPHEN 650 MG: 325 TABLET ORAL at 21:12

## 2024-03-01 RX ADMIN — CYCLOBENZAPRINE HYDROCHLORIDE 5 MG: 5 TABLET, FILM COATED ORAL at 21:10

## 2024-03-01 RX ADMIN — OXYCODONE 5 MG: 5 TABLET ORAL at 16:15

## 2024-03-01 RX ADMIN — SODIUM CHLORIDE, PRESERVATIVE FREE 10 ML: 5 INJECTION INTRAVENOUS at 21:11

## 2024-03-01 RX ADMIN — GABAPENTIN 300 MG: 300 CAPSULE ORAL at 08:51

## 2024-03-01 RX ADMIN — CYCLOBENZAPRINE HYDROCHLORIDE 5 MG: 5 TABLET, FILM COATED ORAL at 14:16

## 2024-03-01 RX ADMIN — CARVEDILOL 12.5 MG: 12.5 TABLET, FILM COATED ORAL at 08:51

## 2024-03-01 RX ADMIN — HYDROCORTISONE: 10 CREAM TOPICAL at 08:53

## 2024-03-01 RX ADMIN — CARVEDILOL 12.5 MG: 12.5 TABLET, FILM COATED ORAL at 16:15

## 2024-03-01 RX ADMIN — GABAPENTIN 300 MG: 300 CAPSULE ORAL at 21:11

## 2024-03-01 RX ADMIN — KETOROLAC TROMETHAMINE 15 MG: 15 INJECTION, SOLUTION INTRAMUSCULAR; INTRAVENOUS at 00:29

## 2024-03-01 RX ADMIN — OXYCODONE 5 MG: 5 TABLET ORAL at 23:23

## 2024-03-01 RX ADMIN — ASPIRIN 81 MG: 81 TABLET, COATED ORAL at 08:51

## 2024-03-01 RX ADMIN — DOCUSATE SODIUM LIQUID 100 MG: 100 LIQUID ORAL at 08:51

## 2024-03-01 RX ADMIN — Medication 2000 MG: at 16:16

## 2024-03-01 RX ADMIN — ACETAMINOPHEN 650 MG: 325 TABLET ORAL at 00:29

## 2024-03-01 RX ADMIN — GABAPENTIN 300 MG: 300 CAPSULE ORAL at 14:16

## 2024-03-01 RX ADMIN — Medication: at 08:56

## 2024-03-01 RX ADMIN — ENOXAPARIN SODIUM 60 MG: 100 INJECTION SUBCUTANEOUS at 08:51

## 2024-03-01 RX ADMIN — KETOROLAC TROMETHAMINE 15 MG: 15 INJECTION, SOLUTION INTRAMUSCULAR; INTRAVENOUS at 06:37

## 2024-03-01 RX ADMIN — HYDROXYZINE HYDROCHLORIDE 25 MG: 25 TABLET ORAL at 21:11

## 2024-03-01 RX ADMIN — ENOXAPARIN SODIUM 60 MG: 100 INJECTION SUBCUTANEOUS at 21:12

## 2024-03-01 RX ADMIN — HYDROCORTISONE: 10 CREAM TOPICAL at 21:07

## 2024-03-01 RX ADMIN — HYDROXYZINE HYDROCHLORIDE 25 MG: 25 TABLET ORAL at 08:51

## 2024-03-01 RX ADMIN — ACETAMINOPHEN 650 MG: 325 TABLET ORAL at 06:37

## 2024-03-01 ASSESSMENT — PAIN SCALES - GENERAL
PAINLEVEL_OUTOF10: 9
PAINLEVEL_OUTOF10: 9
PAINLEVEL_OUTOF10: 6
PAINLEVEL_OUTOF10: 6
PAINLEVEL_OUTOF10: 7
PAINLEVEL_OUTOF10: 3
PAINLEVEL_OUTOF10: 7
PAINLEVEL_OUTOF10: 9

## 2024-03-01 ASSESSMENT — PAIN DESCRIPTION - ORIENTATION
ORIENTATION: LEFT
ORIENTATION: LEFT

## 2024-03-01 ASSESSMENT — PAIN SCALES - WONG BAKER
WONGBAKER_NUMERICALRESPONSE: 0
WONGBAKER_NUMERICALRESPONSE: 0

## 2024-03-01 ASSESSMENT — PAIN DESCRIPTION - DESCRIPTORS
DESCRIPTORS: DISCOMFORT;SHOOTING
DESCRIPTORS: SHOOTING

## 2024-03-01 ASSESSMENT — PAIN DESCRIPTION - LOCATION
LOCATION: LEG
LOCATION: LEG

## 2024-03-01 ASSESSMENT — PAIN - FUNCTIONAL ASSESSMENT
PAIN_FUNCTIONAL_ASSESSMENT: PREVENTS OR INTERFERES SOME ACTIVE ACTIVITIES AND ADLS
PAIN_FUNCTIONAL_ASSESSMENT: PREVENTS OR INTERFERES SOME ACTIVE ACTIVITIES AND ADLS

## 2024-03-01 NOTE — PLAN OF CARE
Problem: Discharge Planning  Goal: Discharge to home or other facility with appropriate resources  Outcome: Progressing     Problem: Pain  Goal: Verbalizes/displays adequate comfort level or baseline comfort level  3/1/2024 0449 by Amie Newman RN  Outcome: Progressing  2/29/2024 1633 by Constanza Talamantes, RN  Outcome: Progressing     Problem: ABCDS Injury Assessment  Goal: Absence of physical injury  Outcome: Progressing     Problem: Chronic Conditions and Co-morbidities  Goal: Patient's chronic conditions and co-morbidity symptoms are monitored and maintained or improved  Outcome: Progressing

## 2024-03-02 ENCOUNTER — APPOINTMENT (OUTPATIENT)
Dept: CT IMAGING | Age: 64
DRG: 710 | End: 2024-03-02
Payer: MEDICAID

## 2024-03-02 PROBLEM — Z48.89 ENCOUNTER FOR POST SURGICAL WOUND CHECK: Status: ACTIVE | Noted: 2024-03-02

## 2024-03-02 LAB
ANION GAP SERPL CALCULATED.3IONS-SCNC: 9 MMOL/L (ref 9–17)
BASOPHILS # BLD: 0.04 K/UL (ref 0–0.2)
BASOPHILS NFR BLD: 0 % (ref 0–2)
BUN SERPL-MCNC: 10 MG/DL (ref 8–23)
CALCIUM SERPL-MCNC: 8 MG/DL (ref 8.6–10.4)
CAMPYLOBACTER DNA SPEC NAA+PROBE: NORMAL
CHLORIDE SERPL-SCNC: 102 MMOL/L (ref 98–107)
CO2 SERPL-SCNC: 20 MMOL/L (ref 20–31)
CREAT SERPL-MCNC: 0.5 MG/DL (ref 0.7–1.2)
EOSINOPHIL # BLD: 0.39 K/UL (ref 0–0.44)
EOSINOPHILS RELATIVE PERCENT: 3 % (ref 1–4)
ERYTHROCYTE [DISTWIDTH] IN BLOOD BY AUTOMATED COUNT: 16 % (ref 11.8–14.4)
ETEC ELTA+ESTB GENES STL QL NAA+PROBE: NORMAL
GFR SERPL CREATININE-BSD FRML MDRD: >60 ML/MIN/1.73M2
GLUCOSE SERPL-MCNC: 90 MG/DL (ref 70–99)
HCT VFR BLD AUTO: 28.5 % (ref 40.7–50.3)
HGB BLD-MCNC: 8.4 G/DL (ref 13–17)
IMM GRANULOCYTES # BLD AUTO: 0.05 K/UL (ref 0–0.3)
IMM GRANULOCYTES NFR BLD: 0 %
LYMPHOCYTES NFR BLD: 2.21 K/UL (ref 1.1–3.7)
LYMPHOCYTES RELATIVE PERCENT: 16 % (ref 24–43)
MCH RBC QN AUTO: 26.6 PG (ref 25.2–33.5)
MCHC RBC AUTO-ENTMCNC: 29.5 G/DL (ref 28.4–34.8)
MCV RBC AUTO: 90.2 FL (ref 82.6–102.9)
MONOCYTES NFR BLD: 0.68 K/UL (ref 0.1–1.2)
MONOCYTES NFR BLD: 5 % (ref 3–12)
NEUTROPHILS NFR BLD: 76 % (ref 36–65)
NEUTS SEG NFR BLD: 10.13 K/UL (ref 1.5–8.1)
NRBC BLD-RTO: 0 PER 100 WBC
P SHIGELLOIDES DNA STL QL NAA+PROBE: NORMAL
PLATELET # BLD AUTO: 460 K/UL (ref 138–453)
PMV BLD AUTO: 9.7 FL (ref 8.1–13.5)
POTASSIUM SERPL-SCNC: 4 MMOL/L (ref 3.7–5.3)
RBC # BLD AUTO: 3.16 M/UL (ref 4.21–5.77)
RBC # BLD: ABNORMAL 10*6/UL
SALMONELLA DNA SPEC QL NAA+PROBE: NORMAL
SHIGA TOXIN STX GENE SPEC NAA+PROBE: NORMAL
SHIGELLA DNA SPEC QL NAA+PROBE: NORMAL
SODIUM SERPL-SCNC: 131 MMOL/L (ref 135–144)
SPECIMEN DESCRIPTION: NORMAL
V CHOL+PARA RFBL+TRKH+TNAA STL QL NAA+PR: NORMAL
WBC OTHER # BLD: 13.5 K/UL (ref 3.5–11.3)
Y ENTERO RECN STL QL NAA+PROBE: NORMAL

## 2024-03-02 PROCEDURE — 6370000000 HC RX 637 (ALT 250 FOR IP)

## 2024-03-02 PROCEDURE — 1200000000 HC SEMI PRIVATE

## 2024-03-02 PROCEDURE — 2580000003 HC RX 258

## 2024-03-02 PROCEDURE — 99232 SBSQ HOSP IP/OBS MODERATE 35: CPT | Performed by: INTERNAL MEDICINE

## 2024-03-02 PROCEDURE — 94760 N-INVAS EAR/PLS OXIMETRY 1: CPT

## 2024-03-02 PROCEDURE — 6360000002 HC RX W HCPCS

## 2024-03-02 PROCEDURE — 6370000000 HC RX 637 (ALT 250 FOR IP): Performed by: STUDENT IN AN ORGANIZED HEALTH CARE EDUCATION/TRAINING PROGRAM

## 2024-03-02 PROCEDURE — 80048 BASIC METABOLIC PNL TOTAL CA: CPT

## 2024-03-02 PROCEDURE — 94640 AIRWAY INHALATION TREATMENT: CPT

## 2024-03-02 PROCEDURE — 85025 COMPLETE CBC W/AUTO DIFF WBC: CPT

## 2024-03-02 PROCEDURE — 6360000002 HC RX W HCPCS: Performed by: INTERNAL MEDICINE

## 2024-03-02 PROCEDURE — 73706 CT ANGIO LWR EXTR W/O&W/DYE: CPT

## 2024-03-02 PROCEDURE — 36415 COLL VENOUS BLD VENIPUNCTURE: CPT

## 2024-03-02 PROCEDURE — 6360000004 HC RX CONTRAST MEDICATION: Performed by: STUDENT IN AN ORGANIZED HEALTH CARE EDUCATION/TRAINING PROGRAM

## 2024-03-02 RX ORDER — CEPHALEXIN 500 MG/1
500 CAPSULE ORAL 4 TIMES DAILY
Status: DISCONTINUED | OUTPATIENT
Start: 2024-03-02 | End: 2024-03-02

## 2024-03-02 RX ORDER — ACETAMINOPHEN 500 MG
1000 TABLET ORAL EVERY 8 HOURS SCHEDULED
Status: DISCONTINUED | OUTPATIENT
Start: 2024-03-02 | End: 2024-03-05 | Stop reason: HOSPADM

## 2024-03-02 RX ORDER — GABAPENTIN 400 MG/1
400 CAPSULE ORAL 3 TIMES DAILY
Status: DISCONTINUED | OUTPATIENT
Start: 2024-03-02 | End: 2024-03-05 | Stop reason: HOSPADM

## 2024-03-02 RX ORDER — CEPHALEXIN 250 MG/1
250 CAPSULE ORAL 4 TIMES DAILY
Status: DISCONTINUED | OUTPATIENT
Start: 2024-03-02 | End: 2024-03-02

## 2024-03-02 RX ORDER — SODIUM CHLORIDE 9 MG/ML
INJECTION, SOLUTION INTRAVENOUS CONTINUOUS
Status: DISCONTINUED | OUTPATIENT
Start: 2024-03-03 | End: 2024-03-04

## 2024-03-02 RX ADMIN — BUDESONIDE AND FORMOTEROL FUMARATE DIHYDRATE 2 PUFF: 80; 4.5 AEROSOL RESPIRATORY (INHALATION) at 11:44

## 2024-03-02 RX ADMIN — OXYCODONE 5 MG: 5 TABLET ORAL at 07:11

## 2024-03-02 RX ADMIN — CARVEDILOL 12.5 MG: 12.5 TABLET, FILM COATED ORAL at 09:02

## 2024-03-02 RX ADMIN — ACETAMINOPHEN 1000 MG: 500 TABLET ORAL at 20:56

## 2024-03-02 RX ADMIN — CEPHALEXIN 500 MG: 500 CAPSULE ORAL at 13:58

## 2024-03-02 RX ADMIN — SODIUM CHLORIDE, PRESERVATIVE FREE 10 ML: 5 INJECTION INTRAVENOUS at 09:09

## 2024-03-02 RX ADMIN — CARVEDILOL 12.5 MG: 12.5 TABLET, FILM COATED ORAL at 17:21

## 2024-03-02 RX ADMIN — Medication 2000 MG: at 17:21

## 2024-03-02 RX ADMIN — SODIUM CHLORIDE, PRESERVATIVE FREE 10 ML: 5 INJECTION INTRAVENOUS at 21:01

## 2024-03-02 RX ADMIN — OXYCODONE 5 MG: 5 TABLET ORAL at 13:59

## 2024-03-02 RX ADMIN — ASPIRIN 81 MG: 81 TABLET, COATED ORAL at 09:01

## 2024-03-02 RX ADMIN — ACETAMINOPHEN 1000 MG: 500 TABLET ORAL at 13:58

## 2024-03-02 RX ADMIN — HYDROCORTISONE: 10 CREAM TOPICAL at 20:58

## 2024-03-02 RX ADMIN — GABAPENTIN 400 MG: 400 CAPSULE ORAL at 20:56

## 2024-03-02 RX ADMIN — GABAPENTIN 400 MG: 400 CAPSULE ORAL at 13:58

## 2024-03-02 RX ADMIN — ACETAMINOPHEN 1000 MG: 500 TABLET ORAL at 09:02

## 2024-03-02 RX ADMIN — GABAPENTIN 400 MG: 400 CAPSULE ORAL at 09:02

## 2024-03-02 RX ADMIN — ENOXAPARIN SODIUM 60 MG: 100 INJECTION SUBCUTANEOUS at 20:57

## 2024-03-02 RX ADMIN — HYDROXYZINE HYDROCHLORIDE 25 MG: 25 TABLET ORAL at 21:01

## 2024-03-02 RX ADMIN — BUDESONIDE AND FORMOTEROL FUMARATE DIHYDRATE 2 PUFF: 80; 4.5 AEROSOL RESPIRATORY (INHALATION) at 21:46

## 2024-03-02 RX ADMIN — HYDROCORTISONE: 10 CREAM TOPICAL at 11:40

## 2024-03-02 RX ADMIN — Medication: at 08:00

## 2024-03-02 RX ADMIN — CEPHALEXIN 500 MG: 500 CAPSULE ORAL at 09:08

## 2024-03-02 RX ADMIN — ENOXAPARIN SODIUM 60 MG: 100 INJECTION SUBCUTANEOUS at 11:41

## 2024-03-02 RX ADMIN — IOPAMIDOL 125 ML: 755 INJECTION, SOLUTION INTRAVENOUS at 09:22

## 2024-03-02 RX ADMIN — HYDROXYZINE HYDROCHLORIDE 25 MG: 25 TABLET ORAL at 09:02

## 2024-03-02 RX ADMIN — ATORVASTATIN CALCIUM 80 MG: 80 TABLET, FILM COATED ORAL at 20:56

## 2024-03-02 ASSESSMENT — PAIN DESCRIPTION - LOCATION
LOCATION: LEG

## 2024-03-02 ASSESSMENT — PAIN DESCRIPTION - ORIENTATION
ORIENTATION: LEFT

## 2024-03-02 ASSESSMENT — PAIN SCALES - GENERAL
PAINLEVEL_OUTOF10: 9
PAINLEVEL_OUTOF10: 8
PAINLEVEL_OUTOF10: 4

## 2024-03-02 NOTE — PLAN OF CARE
Problem: Discharge Planning  Goal: Discharge to home or other facility with appropriate resources  3/2/2024 1659 by Sussy Winters RN  Outcome: Progressing  Flowsheets (Taken 3/2/2024 0800)  Discharge to home or other facility with appropriate resources: Identify barriers to discharge with patient and caregiver  3/2/2024 0312 by Cortes Andino RN  Outcome: Progressing  Flowsheets (Taken 3/1/2024 2000)  Discharge to home or other facility with appropriate resources: Identify barriers to discharge with patient and caregiver     Problem: Safety - Adult  Goal: Free from fall injury  3/2/2024 1659 by Sussy Winters RN  Outcome: Progressing  3/2/2024 0312 by Cortes Andino RN  Outcome: Progressing  Flowsheets (Taken 3/1/2024 2004)  Free From Fall Injury: Instruct family/caregiver on patient safety     Problem: Pain  Goal: Verbalizes/displays adequate comfort level or baseline comfort level  3/2/2024 1659 by Sussy Winters RN  Outcome: Progressing  3/2/2024 0312 by Cortes Andino RN  Outcome: Progressing     Problem: ABCDS Injury Assessment  Goal: Absence of physical injury  3/2/2024 1659 by Sussy Winters RN  Outcome: Progressing  Flowsheets (Taken 3/2/2024 1037)  Absence of Physical Injury: Implement safety measures based on patient assessment  3/2/2024 0312 by Cortes Andino RN  Outcome: Progressing  Flowsheets (Taken 3/1/2024 2004)  Absence of Physical Injury: Implement safety measures based on patient assessment     Problem: Skin/Tissue Integrity  Goal: Absence of new skin breakdown  Description: 1.  Monitor for areas of redness and/or skin breakdown  2.  Assess vascular access sites hourly  3.  Every 4-6 hours minimum:  Change oxygen saturation probe site  4.  Every 4-6 hours:  If on nasal continuous positive airway pressure, respiratory therapy assess nares and determine need for appliance change or resting period.  3/2/2024 1659 by Sussy Winters RN  Outcome: Progressing  3/2/2024 0312 by Sina

## 2024-03-02 NOTE — PLAN OF CARE
Problem: Discharge Planning  Goal: Discharge to home or other facility with appropriate resources  Outcome: Progressing  Flowsheets (Taken 3/1/2024 2000)  Discharge to home or other facility with appropriate resources: Identify barriers to discharge with patient and caregiver     Problem: Safety - Adult  Goal: Free from fall injury  Outcome: Progressing  Flowsheets (Taken 3/1/2024 2004)  Free From Fall Injury: Instruct family/caregiver on patient safety     Problem: Pain  Goal: Verbalizes/displays adequate comfort level or baseline comfort level  Outcome: Progressing     Problem: ABCDS Injury Assessment  Goal: Absence of physical injury  Outcome: Progressing  Flowsheets (Taken 3/1/2024 2004)  Absence of Physical Injury: Implement safety measures based on patient assessment     Problem: Skin/Tissue Integrity  Goal: Absence of new skin breakdown  Description: 1.  Monitor for areas of redness and/or skin breakdown  2.  Assess vascular access sites hourly  3.  Every 4-6 hours minimum:  Change oxygen saturation probe site  4.  Every 4-6 hours:  If on nasal continuous positive airway pressure, respiratory therapy assess nares and determine need for appliance change or resting period.  Outcome: Progressing     Problem: Chronic Conditions and Co-morbidities  Goal: Patient's chronic conditions and co-morbidity symptoms are monitored and maintained or improved  Outcome: Progressing  Flowsheets (Taken 3/1/2024 2000)  Care Plan - Patient's Chronic Conditions and Co-Morbidity Symptoms are Monitored and Maintained or Improved: Monitor and assess patient's chronic conditions and comorbid symptoms for stability, deterioration, or improvement     Problem: Respiratory - Adult  Goal: Achieves optimal ventilation and oxygenation  Outcome: Progressing  Flowsheets (Taken 3/1/2024 2000)  Achieves optimal ventilation and oxygenation: Assess for changes in respiratory status     Problem: Nutrition Deficit:  Goal: Optimize nutritional

## 2024-03-03 LAB
ANION GAP SERPL CALCULATED.3IONS-SCNC: 9 MMOL/L (ref 9–17)
BASOPHILS # BLD: 0.06 K/UL (ref 0–0.2)
BASOPHILS NFR BLD: 1 % (ref 0–2)
BUN SERPL-MCNC: 13 MG/DL (ref 8–23)
CALCIUM SERPL-MCNC: 8.1 MG/DL (ref 8.6–10.4)
CHLORIDE SERPL-SCNC: 102 MMOL/L (ref 98–107)
CO2 SERPL-SCNC: 22 MMOL/L (ref 20–31)
CREAT SERPL-MCNC: 0.5 MG/DL (ref 0.7–1.2)
EOSINOPHIL # BLD: 0.4 K/UL (ref 0–0.44)
EOSINOPHILS RELATIVE PERCENT: 5 % (ref 1–4)
ERYTHROCYTE [DISTWIDTH] IN BLOOD BY AUTOMATED COUNT: 16.2 % (ref 11.8–14.4)
GFR SERPL CREATININE-BSD FRML MDRD: >60 ML/MIN/1.73M2
GLUCOSE SERPL-MCNC: 89 MG/DL (ref 70–99)
HCT VFR BLD AUTO: 27.8 % (ref 40.7–50.3)
HGB BLD-MCNC: 8 G/DL (ref 13–17)
IMM GRANULOCYTES # BLD AUTO: 0.04 K/UL (ref 0–0.3)
IMM GRANULOCYTES NFR BLD: 1 %
LYMPHOCYTES NFR BLD: 2.06 K/UL (ref 1.1–3.7)
LYMPHOCYTES RELATIVE PERCENT: 24 % (ref 24–43)
MCH RBC QN AUTO: 26.2 PG (ref 25.2–33.5)
MCHC RBC AUTO-ENTMCNC: 28.8 G/DL (ref 28.4–34.8)
MCV RBC AUTO: 91.1 FL (ref 82.6–102.9)
MONOCYTES NFR BLD: 0.7 K/UL (ref 0.1–1.2)
MONOCYTES NFR BLD: 8 % (ref 3–12)
NEUTROPHILS NFR BLD: 62 % (ref 36–65)
NEUTS SEG NFR BLD: 5.41 K/UL (ref 1.5–8.1)
NRBC BLD-RTO: 0 PER 100 WBC
PLATELET # BLD AUTO: 469 K/UL (ref 138–453)
PMV BLD AUTO: 9.6 FL (ref 8.1–13.5)
POTASSIUM SERPL-SCNC: 3.9 MMOL/L (ref 3.7–5.3)
RBC # BLD AUTO: 3.05 M/UL (ref 4.21–5.77)
RBC # BLD: ABNORMAL 10*6/UL
SODIUM SERPL-SCNC: 133 MMOL/L (ref 135–144)
WBC OTHER # BLD: 8.7 K/UL (ref 3.5–11.3)

## 2024-03-03 PROCEDURE — 2580000003 HC RX 258

## 2024-03-03 PROCEDURE — 99253 IP/OBS CNSLTJ NEW/EST LOW 45: CPT | Performed by: SURGERY

## 2024-03-03 PROCEDURE — 6370000000 HC RX 637 (ALT 250 FOR IP)

## 2024-03-03 PROCEDURE — 85025 COMPLETE CBC W/AUTO DIFF WBC: CPT

## 2024-03-03 PROCEDURE — 36415 COLL VENOUS BLD VENIPUNCTURE: CPT

## 2024-03-03 PROCEDURE — 80048 BASIC METABOLIC PNL TOTAL CA: CPT

## 2024-03-03 PROCEDURE — 94640 AIRWAY INHALATION TREATMENT: CPT

## 2024-03-03 PROCEDURE — 6370000000 HC RX 637 (ALT 250 FOR IP): Performed by: STUDENT IN AN ORGANIZED HEALTH CARE EDUCATION/TRAINING PROGRAM

## 2024-03-03 PROCEDURE — 6360000002 HC RX W HCPCS: Performed by: STUDENT IN AN ORGANIZED HEALTH CARE EDUCATION/TRAINING PROGRAM

## 2024-03-03 PROCEDURE — 6360000002 HC RX W HCPCS: Performed by: INTERNAL MEDICINE

## 2024-03-03 PROCEDURE — 99232 SBSQ HOSP IP/OBS MODERATE 35: CPT | Performed by: INTERNAL MEDICINE

## 2024-03-03 PROCEDURE — 6360000002 HC RX W HCPCS

## 2024-03-03 PROCEDURE — 1200000000 HC SEMI PRIVATE

## 2024-03-03 RX ORDER — METRONIDAZOLE 500 MG/100ML
500 INJECTION, SOLUTION INTRAVENOUS EVERY 8 HOURS
Status: DISCONTINUED | OUTPATIENT
Start: 2024-03-03 | End: 2024-03-04

## 2024-03-03 RX ADMIN — METRONIDAZOLE 500 MG: 500 INJECTION, SOLUTION INTRAVENOUS at 09:47

## 2024-03-03 RX ADMIN — OXYCODONE 5 MG: 5 TABLET ORAL at 14:39

## 2024-03-03 RX ADMIN — SODIUM CHLORIDE: 9 INJECTION, SOLUTION INTRAVENOUS at 00:16

## 2024-03-03 RX ADMIN — CARVEDILOL 12.5 MG: 12.5 TABLET, FILM COATED ORAL at 09:40

## 2024-03-03 RX ADMIN — ACETAMINOPHEN 1000 MG: 500 TABLET ORAL at 22:09

## 2024-03-03 RX ADMIN — HYDROXYZINE HYDROCHLORIDE 25 MG: 25 TABLET ORAL at 22:10

## 2024-03-03 RX ADMIN — GABAPENTIN 400 MG: 400 CAPSULE ORAL at 14:39

## 2024-03-03 RX ADMIN — HYDROXYZINE HYDROCHLORIDE 25 MG: 25 TABLET ORAL at 09:40

## 2024-03-03 RX ADMIN — Medication 2000 MG: at 16:32

## 2024-03-03 RX ADMIN — Medication: at 08:00

## 2024-03-03 RX ADMIN — ENOXAPARIN SODIUM 60 MG: 100 INJECTION SUBCUTANEOUS at 09:40

## 2024-03-03 RX ADMIN — BUDESONIDE AND FORMOTEROL FUMARATE DIHYDRATE 2 PUFF: 80; 4.5 AEROSOL RESPIRATORY (INHALATION) at 20:31

## 2024-03-03 RX ADMIN — ACETAMINOPHEN 1000 MG: 500 TABLET ORAL at 14:39

## 2024-03-03 RX ADMIN — ACETAMINOPHEN 1000 MG: 500 TABLET ORAL at 05:52

## 2024-03-03 RX ADMIN — OXYCODONE 5 MG: 5 TABLET ORAL at 22:13

## 2024-03-03 RX ADMIN — METRONIDAZOLE 500 MG: 500 INJECTION, SOLUTION INTRAVENOUS at 18:53

## 2024-03-03 RX ADMIN — ATORVASTATIN CALCIUM 80 MG: 80 TABLET, FILM COATED ORAL at 22:09

## 2024-03-03 RX ADMIN — HYDROCORTISONE: 10 CREAM TOPICAL at 22:19

## 2024-03-03 RX ADMIN — GABAPENTIN 400 MG: 400 CAPSULE ORAL at 22:10

## 2024-03-03 RX ADMIN — ENOXAPARIN SODIUM 60 MG: 100 INJECTION SUBCUTANEOUS at 22:09

## 2024-03-03 RX ADMIN — GABAPENTIN 400 MG: 400 CAPSULE ORAL at 09:40

## 2024-03-03 RX ADMIN — CARVEDILOL 12.5 MG: 12.5 TABLET, FILM COATED ORAL at 18:52

## 2024-03-03 RX ADMIN — ASPIRIN 81 MG: 81 TABLET, COATED ORAL at 09:40

## 2024-03-03 RX ADMIN — SODIUM CHLORIDE, PRESERVATIVE FREE 10 ML: 5 INJECTION INTRAVENOUS at 09:41

## 2024-03-03 RX ADMIN — Medication 9 MG: at 22:13

## 2024-03-03 ASSESSMENT — PAIN SCALES - WONG BAKER

## 2024-03-03 ASSESSMENT — PAIN SCALES - GENERAL
PAINLEVEL_OUTOF10: 0
PAINLEVEL_OUTOF10: 4
PAINLEVEL_OUTOF10: 0
PAINLEVEL_OUTOF10: 8

## 2024-03-03 ASSESSMENT — PAIN DESCRIPTION - LOCATION
LOCATION: FOOT
LOCATION: LEG
LOCATION: LEG

## 2024-03-03 ASSESSMENT — PAIN - FUNCTIONAL ASSESSMENT: PAIN_FUNCTIONAL_ASSESSMENT: ACTIVITIES ARE NOT PREVENTED

## 2024-03-03 ASSESSMENT — PAIN DESCRIPTION - ORIENTATION
ORIENTATION: LEFT

## 2024-03-03 ASSESSMENT — PAIN DESCRIPTION - DESCRIPTORS: DESCRIPTORS: ACHING

## 2024-03-03 NOTE — PLAN OF CARE
Problem: Discharge Planning  Goal: Discharge to home or other facility with appropriate resources  3/3/2024 0528 by Antonino Potter RN  Outcome: Progressing  3/2/2024 1659 by Sussy Winters RN  Outcome: Progressing  Flowsheets (Taken 3/2/2024 0800)  Discharge to home or other facility with appropriate resources: Identify barriers to discharge with patient and caregiver     Problem: Safety - Adult  Goal: Free from fall injury  3/3/2024 0528 by Antonino Potter RN  Outcome: Progressing  3/2/2024 1659 by Sussy Winters RN  Outcome: Progressing     Problem: Pain  Goal: Verbalizes/displays adequate comfort level or baseline comfort level  3/3/2024 0528 by Antonino Potter RN  Outcome: Progressing  3/2/2024 1659 by Sussy Winters RN  Outcome: Progressing     Problem: ABCDS Injury Assessment  Goal: Absence of physical injury  3/3/2024 0528 by Antonino Potter RN  Outcome: Progressing  3/2/2024 1659 by Sussy Winters RN  Outcome: Progressing  Flowsheets (Taken 3/2/2024 1037)  Absence of Physical Injury: Implement safety measures based on patient assessment     Problem: Skin/Tissue Integrity  Goal: Absence of new skin breakdown  Description: 1.  Monitor for areas of redness and/or skin breakdown  2.  Assess vascular access sites hourly  3.  Every 4-6 hours minimum:  Change oxygen saturation probe site  4.  Every 4-6 hours:  If on nasal continuous positive airway pressure, respiratory therapy assess nares and determine need for appliance change or resting period.  3/3/2024 0528 by Antonino Potter RN  Outcome: Progressing  3/2/2024 1659 by Sussy Winters RN  Outcome: Progressing     Problem: Chronic Conditions and Co-morbidities  Goal: Patient's chronic conditions and co-morbidity symptoms are monitored and maintained or improved  3/3/2024 0528 by Antonino Potter RN  Outcome: Progressing  3/2/2024 1659 by Sussy Winters RN  Outcome: Progressing     Problem: Respiratory - Adult  Goal: Achieves optimal

## 2024-03-03 NOTE — CONSULTS
General Surgery  Consult    PATIENT NAME: Lee Mejia  AGE: 64 y.o.  MEDICAL RECORD NO. [id number]  DATE: 3/3/2024  SURGEON: Yasmani  PRIMARY CARE PHYSICIAN: Dee Morrison DO    Patient evaluated at the request of  Dr. Martinez  Reason for evaluation: Perirectal abscess    IMPRESSION:     Patient Active Problem List   Diagnosis    Perirectal abscess    Diabetes mellitus (HCC)    COPD (chronic obstructive pulmonary disease) ([facility])    Dependence on nicotine from cigarettes    Mixed hyperlipidemia    STEMI (ST elevation myocardial infarction) ([facility])    Left main coronary artery disease    Bandemia    COVID    Cardiogenic shock ([facility])    S/P CABG x 3    ATN (acute tubular necrosis) ([facility])    Hypernatremia    Metabolic acidemia    NSTEMI (non-ST elevation myocardial infarction) ([facility])    Debility    Atelectasis    Femoral thrombosis ([facility])    Pneumonia of both lower lobes due to infectious organism    Tobacco abuse    Left leg swelling    Fever    Non-healing wound of lower extremity, left, initial encounter    Hx of necrotising fasciitis    CRP elevated    ESR raised    Sepsis ([facility])    Encounter for post surgical wound check     64M admitted for nonhealing fasciotomy wounds.  Found to have an incidental rectal intramural abscess on CT of his leg      PLAN:   The abscess that was incidentally found is intramural in nature within the rectal wall.  He is asymptomatic from it.  It is small and amenable to conservative management with antibiotics.  He does not wish a surgical intervention.  He is currently on Rocephin.  We added Flagyl  No surgical intervention indicated from a general surgery perspective.  General surgery to sign off at this time.  Call with any questions.      HISTORY:   History of Chief Complaint:    Lee Mejia is a 64 y.o. male is admitted for management of nonhealing fasciotomy wounds.  As part of his workup he had a CTA of the left lower extremity done which found an incidental 2.7 x 2.4 cm intramural 
tablet by mouth nightly 30 tablet 3    carvedilol (COREG) 12.5 MG tablet Take 1 tablet by mouth 2 times daily (with meals) 60 tablet 3    clopidogrel (PLAVIX) 75 MG tablet Take 1 tablet by mouth daily 30 tablet 3    albuterol-ipratropium (COMBIVENT)  MCG/ACT inhaler Inhale 2 puffs into the lungs every 6 hours as needed for Wheezing      nicotine (NICODERM CQ) 21 MG/24HR Place 1 patch onto the skin every 24 hours. 30 patch 3    aspirin EC 81 MG EC tablet Take 1 tablet by mouth daily. 30 tablet 3    albuterol (VENTOLIN HFA) 108 (90 BASE) MCG/ACT inhaler Inhale 2 puffs into the lungs every 6 hours as needed for Wheezing. 1 Inhaler 3       Social History:     Tobacco:    reports that he has been smoking cigarettes. He has a 10.0 pack-year smoking history. He has never used smokeless tobacco.  Alcohol:      reports no history of alcohol use.  Drug Use:  reports no history of drug use.    Review of Systems:     Review of Systems   Constitutional:  Negative for chills and fever.   HENT:  Negative for congestion and postnasal drip.    Respiratory:  Negative for cough and shortness of breath.    Cardiovascular:  Positive for leg swelling. Negative for chest pain and palpitations.   Gastrointestinal:  Negative for abdominal pain, blood in stool, diarrhea and nausea.   Genitourinary:  Negative for difficulty urinating and dysuria.   Musculoskeletal:  Negative for back pain and myalgias.   Skin:  Positive for wound.   Neurological:  Negative for dizziness, syncope and light-headedness.   Psychiatric/Behavioral:  Negative for agitation and behavioral problems.        Physical Exam:     Vitals:  /69   Pulse 71   Temp 99.1 °F (37.3 °C) (Oral)   Resp 16   SpO2 99%     Physical Exam  Constitutional:       General: He is not in acute distress.     Appearance: Normal appearance. He is not ill-appearing.   HENT:      Head: Normocephalic and atraumatic.      Nose: Nose normal.      Mouth/Throat:      Mouth: Mucous 
FEMORAL BYPASS, POSSIBLE FASCIOTOMY performed by Brent Anderson MD at [facility]    TOOTH EXTRACTION  11/06/14       Medications:      cephALEXin  500 mg Oral Once    sodium hypochlorite   Irrigation Daily       Social History:     Social History     Socioeconomic History    Marital status: Single     Spouse name: Not on file    Number of children: Not on file    Years of education: Not on file    Highest education level: Not on file   Occupational History    Not on file   Tobacco Use    Smoking status: Every Day     Current packs/day: 0.50     Average packs/day: 0.5 packs/day for 20.0 years (10.0 ttl pk-yrs)     Types: Cigarettes    Smokeless tobacco: Never   Substance and Sexual Activity    Alcohol use: No    Drug use: No    Sexual activity: Not on file   Other Topics Concern    Not on file   Social History Narrative    Not on file     Social Determinants of Health     Financial Resource Strain: Not on file   Food Insecurity: Not on file   Transportation Needs: Not on file   Physical Activity: Not on file   Stress: Not on file   Social Connections: Not on file   Intimate Partner Violence: Not on file   Housing Stability: Not on file       Family History:     Family History   Problem Relation Age of Onset    High Blood Pressure Mother     High Blood Pressure Father     Cancer Brother       Medical Decision Making:   I have independently reviewed/ordered the following labs:    CBC with Differential:   Recent Labs     02/24/24  1523   WBC 10.0   HGB 8.4*   HCT 29.0*   *   LYMPHOPCT 28   MONOPCT 8     BMP:  Recent Labs     02/24/24  1523   *   K 4.4      CO2 23   BUN 11   CREATININE 0.6*     Hepatic Function Panel: No results for input(s): \"PROT\", \"LABALBU\", \"BILIDIR\", \"IBILI\", \"BILITOT\", \"ALKPHOS\", \"ALT\", \"AST\" in the last 72 hours.  No results for input(s): \"RPR\" in the last 72 hours.  No results for input(s): \"HIV\" in the last 72 hours.  No results for input(s): \"BC\" in the last 72 
Making-Imaging:   XR CHEST PORTABLE    Result Date: 2/29/2024  EXAMINATION: ONE XRAY VIEW OF THE CHEST 2/29/2024 7:04 am COMPARISON: None. HISTORY: ORDERING SYSTEM PROVIDED HISTORY: rule out pneumonia TECHNOLOGIST PROVIDED HISTORY: rule out pneumonia FINDINGS: Normal cardiomediastinal silhouette.  Status post median sternotomy.  No acute airspace infiltrate.  No pneumothorax or pleural effusion     No acute cardiopulmonary findings     XR ABDOMEN (KUB) (SINGLE AP VIEW)    Result Date: 2/28/2024  EXAMINATION: ONE SUPINE XRAY VIEW(S) OF THE ABDOMEN 2/28/2024 4:14 pm COMPARISON: 01/05/2024 HISTORY: ORDERING SYSTEM PROVIDED HISTORY: constipation TECHNOLOGIST PROVIDED HISTORY: constipation Reason for Exam: constipation port supine at 424pm FINDINGS: Gas in nondilated stomach.  Large stool burden in the colon.  No significant small bowel gas.     Large stool burden in the colon     XR TIBIA FIBULA LEFT (2 VIEWS)    Result Date: 2/24/2024  EXAMINATION: 2 XRAY VIEWS OF THE LEFT TIBIA AND FIBULA 2/24/2024 5:54 pm COMPARISON: None. HISTORY: ORDERING SYSTEM PROVIDED HISTORY: rule out osteomyelitis TECHNOLOGIST PROVIDED HISTORY: rule out osteomyelitis FINDINGS: Large defect within the skin favoring ulcer at the lateral leg.  No periostitis or osseous erosions.  No evidence of acute fracture.  Staples or clips over the medial proximal leg.     Large ulcer or wound over the lateral leg.  No radiographic evidence of osteomyelitis.       Medical Decision [id number]-[id number]:     Results       Procedure Component Value Units Date/Time    Gastrointestinal Panel, Molecular [[id number]] Collected: 03/01/24 1448    Order Status: Sent Specimen: Stool Updated: 03/01/24 1448    Culture, Urine [[id number]] Collected: 02/29/24 0342    Order Status: Completed Specimen: Urine, clean catch Updated: 03/01/24 0810     Specimen Description .CLEAN CATCH URINE     Culture NO GROWTH    Culture, Blood 1 [[id number]] Collected: 02/29/24 0108    Order

## 2024-03-03 NOTE — PLAN OF CARE
Problem: Respiratory - Adult  Goal: Achieves optimal ventilation and oxygenation  3/2/2024 2149 by Nita Donnelly RCP  Flowsheets (Taken 3/2/2024 2149)  Achieves optimal ventilation and oxygenation: Respiratory therapy support as indicated

## 2024-03-04 PROBLEM — L02.419 LEG ABSCESS: Status: ACTIVE | Noted: 2024-03-04

## 2024-03-04 LAB
ANION GAP SERPL CALCULATED.3IONS-SCNC: 7 MMOL/L (ref 9–17)
BASOPHILS # BLD: 0.06 K/UL (ref 0–0.2)
BASOPHILS NFR BLD: 1 % (ref 0–2)
BUN SERPL-MCNC: 13 MG/DL (ref 8–23)
CALCIUM SERPL-MCNC: 8.1 MG/DL (ref 8.6–10.4)
CHLORIDE SERPL-SCNC: 106 MMOL/L (ref 98–107)
CO2 SERPL-SCNC: 21 MMOL/L (ref 20–31)
CREAT SERPL-MCNC: 0.5 MG/DL (ref 0.7–1.2)
EOSINOPHIL # BLD: 0.34 K/UL (ref 0–0.44)
EOSINOPHILS RELATIVE PERCENT: 4 % (ref 1–4)
ERYTHROCYTE [DISTWIDTH] IN BLOOD BY AUTOMATED COUNT: 16.2 % (ref 11.8–14.4)
GFR SERPL CREATININE-BSD FRML MDRD: >60 ML/MIN/1.73M2
GLUCOSE SERPL-MCNC: 107 MG/DL (ref 70–99)
HCT VFR BLD AUTO: 25.6 % (ref 40.7–50.3)
HGB BLD-MCNC: 7.6 G/DL (ref 13–17)
IMM GRANULOCYTES # BLD AUTO: 0.05 K/UL (ref 0–0.3)
IMM GRANULOCYTES NFR BLD: 1 %
LYMPHOCYTES NFR BLD: 2.16 K/UL (ref 1.1–3.7)
LYMPHOCYTES RELATIVE PERCENT: 28 % (ref 24–43)
MCH RBC QN AUTO: 25.9 PG (ref 25.2–33.5)
MCHC RBC AUTO-ENTMCNC: 29.7 G/DL (ref 28.4–34.8)
MCV RBC AUTO: 87.4 FL (ref 82.6–102.9)
MONOCYTES NFR BLD: 0.63 K/UL (ref 0.1–1.2)
MONOCYTES NFR BLD: 8 % (ref 3–12)
NEUTROPHILS NFR BLD: 58 % (ref 36–65)
NEUTS SEG NFR BLD: 4.5 K/UL (ref 1.5–8.1)
NRBC BLD-RTO: 0 PER 100 WBC
PLATELET # BLD AUTO: 483 K/UL (ref 138–453)
PMV BLD AUTO: 9.4 FL (ref 8.1–13.5)
POTASSIUM SERPL-SCNC: 3.8 MMOL/L (ref 3.7–5.3)
RBC # BLD AUTO: 2.93 M/UL (ref 4.21–5.77)
RBC # BLD: ABNORMAL 10*6/UL
SODIUM SERPL-SCNC: 134 MMOL/L (ref 135–144)
WBC OTHER # BLD: 7.7 K/UL (ref 3.5–11.3)

## 2024-03-04 PROCEDURE — 6370000000 HC RX 637 (ALT 250 FOR IP)

## 2024-03-04 PROCEDURE — 6360000002 HC RX W HCPCS

## 2024-03-04 PROCEDURE — 6370000000 HC RX 637 (ALT 250 FOR IP): Performed by: STUDENT IN AN ORGANIZED HEALTH CARE EDUCATION/TRAINING PROGRAM

## 2024-03-04 PROCEDURE — 6360000002 HC RX W HCPCS: Performed by: STUDENT IN AN ORGANIZED HEALTH CARE EDUCATION/TRAINING PROGRAM

## 2024-03-04 PROCEDURE — 97606 NEG PRS WND THER DME>50 SQCM: CPT

## 2024-03-04 PROCEDURE — 2580000003 HC RX 258

## 2024-03-04 PROCEDURE — 6360000002 HC RX W HCPCS: Performed by: INTERNAL MEDICINE

## 2024-03-04 PROCEDURE — 99232 SBSQ HOSP IP/OBS MODERATE 35: CPT | Performed by: INTERNAL MEDICINE

## 2024-03-04 PROCEDURE — 2W1MX6Z COMPRESSION OF LEFT LOWER EXTREMITY USING PRESSURE DRESSING: ICD-10-PCS | Performed by: INTERNAL MEDICINE

## 2024-03-04 PROCEDURE — 85025 COMPLETE CBC W/AUTO DIFF WBC: CPT

## 2024-03-04 PROCEDURE — 6370000000 HC RX 637 (ALT 250 FOR IP): Performed by: INTERNAL MEDICINE

## 2024-03-04 PROCEDURE — 80048 BASIC METABOLIC PNL TOTAL CA: CPT

## 2024-03-04 PROCEDURE — 36415 COLL VENOUS BLD VENIPUNCTURE: CPT

## 2024-03-04 PROCEDURE — 1200000000 HC SEMI PRIVATE

## 2024-03-04 RX ORDER — METRONIDAZOLE 500 MG/1
500 TABLET ORAL EVERY 8 HOURS
Status: DISCONTINUED | OUTPATIENT
Start: 2024-03-04 | End: 2024-03-05

## 2024-03-04 RX ADMIN — SODIUM CHLORIDE: 9 INJECTION, SOLUTION INTRAVENOUS at 02:12

## 2024-03-04 RX ADMIN — GABAPENTIN 400 MG: 400 CAPSULE ORAL at 09:30

## 2024-03-04 RX ADMIN — METRONIDAZOLE 500 MG: 500 TABLET ORAL at 17:25

## 2024-03-04 RX ADMIN — ATORVASTATIN CALCIUM 80 MG: 80 TABLET, FILM COATED ORAL at 21:35

## 2024-03-04 RX ADMIN — ACETAMINOPHEN 1000 MG: 500 TABLET ORAL at 14:49

## 2024-03-04 RX ADMIN — OXYCODONE 5 MG: 5 TABLET ORAL at 14:49

## 2024-03-04 RX ADMIN — CARVEDILOL 12.5 MG: 12.5 TABLET, FILM COATED ORAL at 09:30

## 2024-03-04 RX ADMIN — ACETAMINOPHEN 1000 MG: 500 TABLET ORAL at 21:35

## 2024-03-04 RX ADMIN — ASPIRIN 81 MG: 81 TABLET, COATED ORAL at 09:30

## 2024-03-04 RX ADMIN — ACETAMINOPHEN 1000 MG: 500 TABLET ORAL at 06:27

## 2024-03-04 RX ADMIN — CARVEDILOL 12.5 MG: 12.5 TABLET, FILM COATED ORAL at 17:25

## 2024-03-04 RX ADMIN — SODIUM CHLORIDE, PRESERVATIVE FREE 10 ML: 5 INJECTION INTRAVENOUS at 21:40

## 2024-03-04 RX ADMIN — ENOXAPARIN SODIUM 60 MG: 100 INJECTION SUBCUTANEOUS at 21:35

## 2024-03-04 RX ADMIN — HYDROCORTISONE: 10 CREAM TOPICAL at 21:45

## 2024-03-04 RX ADMIN — GABAPENTIN 400 MG: 400 CAPSULE ORAL at 21:35

## 2024-03-04 RX ADMIN — OXYCODONE 5 MG: 5 TABLET ORAL at 21:38

## 2024-03-04 RX ADMIN — METRONIDAZOLE 500 MG: 500 INJECTION, SOLUTION INTRAVENOUS at 09:41

## 2024-03-04 RX ADMIN — Medication: at 10:00

## 2024-03-04 RX ADMIN — Medication 2000 MG: at 16:07

## 2024-03-04 RX ADMIN — METRONIDAZOLE 500 MG: 500 INJECTION, SOLUTION INTRAVENOUS at 02:09

## 2024-03-04 RX ADMIN — HYDROXYZINE HYDROCHLORIDE 25 MG: 25 TABLET ORAL at 09:30

## 2024-03-04 RX ADMIN — OXYCODONE 5 MG: 5 TABLET ORAL at 07:47

## 2024-03-04 RX ADMIN — Medication 9 MG: at 21:38

## 2024-03-04 RX ADMIN — ENOXAPARIN SODIUM 60 MG: 100 INJECTION SUBCUTANEOUS at 09:30

## 2024-03-04 RX ADMIN — HYDROXYZINE HYDROCHLORIDE 25 MG: 25 TABLET ORAL at 21:35

## 2024-03-04 RX ADMIN — GABAPENTIN 400 MG: 400 CAPSULE ORAL at 14:49

## 2024-03-04 ASSESSMENT — PAIN SCALES - WONG BAKER

## 2024-03-04 ASSESSMENT — PAIN SCALES - GENERAL
PAINLEVEL_OUTOF10: 9
PAINLEVEL_OUTOF10: 10
PAINLEVEL_OUTOF10: 2
PAINLEVEL_OUTOF10: 8

## 2024-03-04 ASSESSMENT — PAIN DESCRIPTION - DESCRIPTORS: DESCRIPTORS: ACHING

## 2024-03-04 ASSESSMENT — PAIN DESCRIPTION - ORIENTATION
ORIENTATION: LEFT;LOWER
ORIENTATION: LEFT

## 2024-03-04 ASSESSMENT — PAIN DESCRIPTION - LOCATION
LOCATION: LEG
LOCATION: LEG

## 2024-03-04 NOTE — CARE COORDINATION
Met with pt and signif other to discuss potential IV infusion needs, no stop date from ID noted and Podiatry plans to initiate wound vac with possible discharge tomorrow, Spouse and pt relate they were told the pt would go home with oral antx, they show me a bag of meds from the 27th filled, notified a referral would just be to follow needs, they did not wish to provide infusion choice as they would rather wait for Dr Mchugh to cofirm

## 2024-03-04 NOTE — PLAN OF CARE
Problem: Discharge Planning  Goal: Discharge to home or other facility with appropriate resources  3/4/2024 1622 by Elizabeth Kline RN  Outcome: Progressing  3/4/2024 0656 by Antonino Potter RN  Outcome: Progressing     Problem: Safety - Adult  Goal: Free from fall injury  3/4/2024 1622 by Elizabeth Kline RN  Outcome: Progressing  3/4/2024 0656 by Antonino Ptoter RN  Outcome: Progressing     Problem: Pain  Goal: Verbalizes/displays adequate comfort level or baseline comfort level  3/4/2024 1622 by Elizabeth Kline RN  Outcome: Progressing  3/4/2024 0656 by Antonino Potter RN  Outcome: Progressing     Problem: ABCDS Injury Assessment  Goal: Absence of physical injury  3/4/2024 1622 by Elizabeth Kline RN  Outcome: Progressing  3/4/2024 0656 by Antonino Potter RN  Outcome: Progressing     Problem: Skin/Tissue Integrity  Goal: Absence of new skin breakdown  Description: 1.  Monitor for areas of redness and/or skin breakdown  2.  Assess vascular access sites hourly  3.  Every 4-6 hours minimum:  Change oxygen saturation probe site  4.  Every 4-6 hours:  If on nasal continuous positive airway pressure, respiratory therapy assess nares and determine need for appliance change or resting period.  3/4/2024 1622 by Elizabeth Kline RN  Outcome: Progressing  3/4/2024 0656 by Antonino Potter RN  Outcome: Progressing     Problem: Chronic Conditions and Co-morbidities  Goal: Patient's chronic conditions and co-morbidity symptoms are monitored and maintained or improved  3/4/2024 1622 by Elizabeth Kline RN  Outcome: Progressing  3/4/2024 0656 by Antonino Potter RN  Outcome: Progressing     Problem: Respiratory - Adult  Goal: Achieves optimal ventilation and oxygenation  3/4/2024 1622 by Elizabeth Kline RN  Outcome: Progressing  3/4/2024 0656 by Antonino Potter RN  Outcome: Progressing     Problem: Nutrition Deficit:  Goal: Optimize nutritional status  3/4/2024

## 2024-03-04 NOTE — PLAN OF CARE
Problem: Discharge Planning  Goal: Discharge to home or other facility with appropriate resources  Outcome: Progressing     Problem: Safety - Adult  Goal: Free from fall injury  Outcome: Progressing     Problem: Pain  Goal: Verbalizes/displays adequate comfort level or baseline comfort level  Outcome: Progressing     Problem: ABCDS Injury Assessment  Goal: Absence of physical injury  Outcome: Progressing     Problem: Skin/Tissue Integrity  Goal: Absence of new skin breakdown  Description: 1.  Monitor for areas of redness and/or skin breakdown  2.  Assess vascular access sites hourly  3.  Every 4-6 hours minimum:  Change oxygen saturation probe site  4.  Every 4-6 hours:  If on nasal continuous positive airway pressure, respiratory therapy assess nares and determine need for appliance change or resting period.  Outcome: Progressing     Problem: Chronic Conditions and Co-morbidities  Goal: Patient's chronic conditions and co-morbidity symptoms are monitored and maintained or improved  Outcome: Progressing     Problem: Respiratory - Adult  Goal: Achieves optimal ventilation and oxygenation  Outcome: Progressing     Problem: Nutrition Deficit:  Goal: Optimize nutritional status  Outcome: Progressing

## 2024-03-05 VITALS
SYSTOLIC BLOOD PRESSURE: 125 MMHG | BODY MASS INDEX: 20.48 KG/M2 | OXYGEN SATURATION: 98 % | TEMPERATURE: 98.5 F | HEART RATE: 83 BPM | WEIGHT: 127.43 LBS | DIASTOLIC BLOOD PRESSURE: 79 MMHG | HEIGHT: 66 IN | RESPIRATION RATE: 16 BRPM

## 2024-03-05 PROBLEM — A41.9 SEPSIS (HCC): Status: RESOLVED | Noted: 2024-02-29 | Resolved: 2024-03-05

## 2024-03-05 LAB
MICROORGANISM SPEC CULT: NORMAL
MICROORGANISM SPEC CULT: NORMAL
SERVICE CMNT-IMP: NORMAL
SERVICE CMNT-IMP: NORMAL
SPECIMEN DESCRIPTION: NORMAL
SPECIMEN DESCRIPTION: NORMAL

## 2024-03-05 PROCEDURE — 6370000000 HC RX 637 (ALT 250 FOR IP)

## 2024-03-05 PROCEDURE — 6370000000 HC RX 637 (ALT 250 FOR IP): Performed by: STUDENT IN AN ORGANIZED HEALTH CARE EDUCATION/TRAINING PROGRAM

## 2024-03-05 PROCEDURE — 6360000002 HC RX W HCPCS

## 2024-03-05 PROCEDURE — 2580000003 HC RX 258

## 2024-03-05 PROCEDURE — 6370000000 HC RX 637 (ALT 250 FOR IP): Performed by: INTERNAL MEDICINE

## 2024-03-05 PROCEDURE — 99232 SBSQ HOSP IP/OBS MODERATE 35: CPT | Performed by: INTERNAL MEDICINE

## 2024-03-05 PROCEDURE — 94640 AIRWAY INHALATION TREATMENT: CPT

## 2024-03-05 PROCEDURE — 94761 N-INVAS EAR/PLS OXIMETRY MLT: CPT

## 2024-03-05 RX ORDER — AMOXICILLIN AND CLAVULANATE POTASSIUM 875; 125 MG/1; MG/1
1 TABLET, FILM COATED ORAL EVERY 12 HOURS SCHEDULED
Qty: 30 TABLET | Refills: 0 | Status: SHIPPED | OUTPATIENT
Start: 2024-03-05 | End: 2024-03-20

## 2024-03-05 RX ORDER — AMOXICILLIN AND CLAVULANATE POTASSIUM 875; 125 MG/1; MG/1
1 TABLET, FILM COATED ORAL EVERY 12 HOURS SCHEDULED
Status: DISCONTINUED | OUTPATIENT
Start: 2024-03-05 | End: 2024-03-05 | Stop reason: HOSPADM

## 2024-03-05 RX ORDER — BENZOCAINE/MENTHOL 6 MG-10 MG
LOZENGE MUCOUS MEMBRANE
Qty: 30 G | Refills: 1 | Status: SHIPPED | OUTPATIENT
Start: 2024-03-05 | End: 2024-03-12

## 2024-03-05 RX ORDER — AMOXICILLIN AND CLAVULANATE POTASSIUM 875; 125 MG/1; MG/1
1 TABLET, FILM COATED ORAL 2 TIMES DAILY
Qty: 28 TABLET | Refills: 0 | OUTPATIENT
Start: 2024-03-05 | End: 2024-03-19

## 2024-03-05 RX ADMIN — METRONIDAZOLE 500 MG: 500 TABLET ORAL at 02:28

## 2024-03-05 RX ADMIN — HYDROXYZINE HYDROCHLORIDE 25 MG: 25 TABLET ORAL at 11:12

## 2024-03-05 RX ADMIN — BUDESONIDE AND FORMOTEROL FUMARATE DIHYDRATE 2 PUFF: 80; 4.5 AEROSOL RESPIRATORY (INHALATION) at 08:27

## 2024-03-05 RX ADMIN — ACETAMINOPHEN 1000 MG: 500 TABLET ORAL at 16:06

## 2024-03-05 RX ADMIN — HYDROCORTISONE: 10 CREAM TOPICAL at 11:18

## 2024-03-05 RX ADMIN — CARVEDILOL 12.5 MG: 12.5 TABLET, FILM COATED ORAL at 11:12

## 2024-03-05 RX ADMIN — SODIUM CHLORIDE, PRESERVATIVE FREE 10 ML: 5 INJECTION INTRAVENOUS at 11:14

## 2024-03-05 RX ADMIN — ACETAMINOPHEN 1000 MG: 500 TABLET ORAL at 06:23

## 2024-03-05 RX ADMIN — GABAPENTIN 400 MG: 400 CAPSULE ORAL at 11:13

## 2024-03-05 RX ADMIN — OXYCODONE 5 MG: 5 TABLET ORAL at 06:23

## 2024-03-05 RX ADMIN — ASPIRIN 81 MG: 81 TABLET, COATED ORAL at 11:13

## 2024-03-05 RX ADMIN — METRONIDAZOLE 500 MG: 500 TABLET ORAL at 11:17

## 2024-03-05 RX ADMIN — ENOXAPARIN SODIUM 60 MG: 100 INJECTION SUBCUTANEOUS at 11:13

## 2024-03-05 RX ADMIN — GABAPENTIN 400 MG: 400 CAPSULE ORAL at 16:07

## 2024-03-05 ASSESSMENT — PAIN SCALES - WONG BAKER

## 2024-03-05 ASSESSMENT — PAIN DESCRIPTION - LOCATION: LOCATION: LEG

## 2024-03-05 ASSESSMENT — PAIN DESCRIPTION - DESCRIPTORS: DESCRIPTORS: ACHING

## 2024-03-05 ASSESSMENT — PAIN DESCRIPTION - ORIENTATION: ORIENTATION: LEFT

## 2024-03-05 ASSESSMENT — PAIN SCALES - GENERAL
PAINLEVEL_OUTOF10: 4
PAINLEVEL_OUTOF10: 7

## 2024-03-05 NOTE — PROGRESS NOTES
[facility] -   Infectious diseases evaluation  admission date 2/24/2024    reason for consultation:   Left leg wound infection    Impression :     Recently in  12/2023:  Acute anterolateral MI- w complete AV block and cardiogenic shock and intra aortic balloon  CABG 12/26/23 urgent  12/2023 Left common fem art thrombosis ad declotting w fasciotomy  Wound care as outpt  Currently:  Left leg fasciotomy  wound infection  Recent cellulitis out pt and better after keflex   DM 2  CRP and ESR elevation      Other:    Discussion / summary of stay / plan of care/ Recommendations:     HENCE:   Keep dakins  Wound cx 2/24 - no growth   Start ceftriaxone 2 g daily and watch response  2/26 OR for debridement, wound with central necrotic fascia and muscle, no obv purulence seen , with surrounding granulation tissue   2/27 - dc planning with wound vac continue ceftriaxone till dc, dc on keflex 7 days ,reconsiled     Infection Control Recommendations   [place] Precautions      Antimicrobial Stewardship Recommendations   Simplification of therapy  Targeted therapy    History of Present Illness:   Initial history:  Lee Mejia is a 63 y.o.-year-old male who was seen in December, who had an acute MI and underwent a CABG  1/4/2024 developed left iliac and common femoral artery  thrombosis and underwent mechanical thrombectomy with 4 compartment fasciotomies of the left lower extremity, at the time he had some necrosis of the muscle of the anterior and lateral compartments    Patient discharged to the rehab and went  to [facility] wound care  It seems on 2/14 his wound looked fine  2/17 leg seen w open wound and redness  - WBC was 15 and wound care started keflex, which improved the redness and the WBC    On 2/24 the nurse who takes care of his dressing was concerned about the wound and he was brought to the ER  The patient is getting admitted, starting Dakin's I&D to the wound and she is 
     Division of Vascular Surgery             Progress Note      Name: Lee Mejia  MRN: [id number]         Overnight Events:      None      Subjective:     Patient seen and examined this morning.  No acute event overnight, patient tolerated procedure well without difficulty.  His left lower extremity is wrapped with Ace wrap and Kerlix.    Physical Exam:     Vitals:  /66   Pulse 83   Temp 98.1 °F (36.7 °C) (Oral)   Resp 16   Ht 1.676 m (5' 6\")   Wt 57.8 kg (127 lb 6.8 oz)   SpO2 97%   BMI 20.57 kg/m²     General appearance - alert, well appearing and in no acute distress  Mental status - oriented to person, place and time with normal affect  Head - normocephalic and atraumatic  Neck - supple, no carotid bruits, thyroid not palpable, no JVD  Chest - clear to auscultation, normal effort  Heart - normal rate, regular rhythm, no murmurs  Abdomen - soft, non-tender, non-distended, bowel sounds present all four quadrants, no masses  Neurological - normal speech, no focal findings or movement disorder noted, cranial nerves II through XII grossly intact  Extremities - peripheral pulses palpable, no pedal edema or calf pain with palpation  Skin - no gross lesions, rashes, or induration noted  Foot Exam -left foot drop positive, wound is covered with Ace wrap and Kerlix.,  Palpable DP and PT pulses    Data:  CBC:   Recent Labs     02/25/24  0940 02/26/24  0730 02/27/24  0646   WBC 7.5 11.7* 14.5*   HGB 8.3* 8.1* 7.8*   * 455* 454*     Chemistry:   Recent Labs     02/25/24  0940 02/26/24  0730 02/27/24  0646   * 131* 136   K 4.1 4.0 4.3    100 104   CO2 24 24 22   GLUCOSE 120* 108* 160*   BUN 11 12 20   CREATININE 0.7 0.6* 0.6*   ANIONGAP 9 7* 10   LABGLOM >60 >60 >60   CALCIUM 9.1 8.7 8.8     Hepatic: No results for input(s): \"AST\", \"ALT\", \"ALB\", \"ALKPHOS\", \"BILITOT\", \"BILIDIR\" in the last 72 hours.  Coagulation: No results for input(s): \"APTT\", \"PROT\", \"INR\" in the last 72 
     Division of Vascular Surgery             Progress Note      Name: Lee Mejia  MRN: [id number]         Overnight Events:     No acute events overnight    Subjective:     Lee Mejia, 64 year-old male who underwent emergent coronary artery bypass grafting x 3 on 12/26/2023.    Patient was seen and examined at bedside. Afebrile, vital signs are within normal limits. Wound on LLE is covered with wound vac. Patient states he is doing well today and that pain is better in his leg and buttocks. He feels as though the inflammation in his leg is decreasing after the wound vac placement which is helping with the pain today. Patient denies chest pain, shortness of breath, chills, fever, nausea, and vomiting.     Physical Exam:     Vitals:  /68   Pulse 82   Temp 98.2 °F (36.8 °C) (Oral)   Resp 16   Ht 1.676 m (5' 5.98\")   Wt 57.8 kg (127 lb 6.8 oz)   SpO2 97%   BMI 20.58 kg/m²     General appearance - alert, well appearing and in no acute distress  Mental status - oriented to person, place and time with normal affect  Head - normocephalic and atraumatic  Chest - No respiratory distress, normal effort  Heart - normal rate, regular rhythm  Abdomen - soft, non-tender, non-distended  Neurological - no acute neurological deficits.   Extremities - Motor function mostly intact in left foot/ankle, foot drop is noted on left. Sensation intact  Skin - warm to touch, dry.    Imaging:     CTA LOWER EXTREMITY LEFT W CONTRAST     Result Date: 3/2/2024  EXAMINATION: CTA OF THE LEFT LOWER EXTREMITY 3/2/2024 9:20 am TECHNIQUE: CTA of the left lower extremity was performed after the administration of intravenous contrast. Multiplanar reformatted images are provided for review. MIP images are provided for review. Automated exposure control, iterative reconstruction, and/or weight based adjustment of the mA/kV was utilized to reduce the radiation dose to as low as reasonably achievable. COMPARISON: None HISTORY: ORDERING SYSTEM 
     Division of Vascular Surgery             Progress Note      Name: Lee Mejia  MRN: [id number]         Overnight Events:     Patient continues to have intermittent fevers Tmax 39.1 overnight.  Complains of worsening burning sensation to the foot.  Other vitals remained normal.  Voiding spontaneously had a bowel movement yesterday.      Subjective:     Lee Mejia, 62 yo male who underwent emergent coronary artery bypass grafting x 3 on 12/26/2023 patient was then taken to the operating room for a left femoral thrombectomy and left lower extremity thrombectomy on 1/4.     Physical Exam:     Vitals:  BP (!) 148/68   Pulse 100   Temp 99.8 °F (37.7 °C) (Oral)   Resp 19   Ht 1.676 m (5' 5.98\")   Wt 57.8 kg (127 lb 6.8 oz)   SpO2 98%   BMI 20.58 kg/m²       General appearance - alert, well appearing and in no acute distress  Mental status - oriented to person, place and time with normal affect  Head - normocephalic and atraumatic  Neck - supple  Chest - clear to auscultation, normal effort  Heart - normal rate, regular rhythm  Abdomen - soft, non-tender, non-distended  Neurological - normal speech, no focal findings or movement disorder noted  Extremities - peripheral pulses palpable, left lower extremity fasciotomy site with granulation tissue at base, small area of exposed muscle at the middle of the wound.  Tenderness to palpation. Foot drop on left  Skin -warm to touch  Vascular Exam -Palpable DP and PT pulses       Imaging:   No results found.    CTA LLE pending      Assessment:     Lee Mejia, 62 yo M who presents with delayed healing of left lower extremity fasciotomy site.      Plan:   Patient continues to have intermittent fevers with a Tmax of 39.1 overnight.  Infectious workup unremarkable. Will obtain CTA of Left lower extremity to evaluate for infectious source   Increase gabapentin dose to 400mg 3 times daily.  Continue local wound care to the fasciotomy site.  Dakin's soaked 4 x 4's, Kerlix 
     Division of Vascular Surgery             Progress Note      Name: Lee Mejia  MRN: [id number]         Overnight Events:     Patient was seen and examined at bedside.  Afebrile, vital signs within normal limits overnight.  Wound dressing changed today with wet to dry dressing using Dakins.    Subjective:     Lee Mejia, 62 yo male who underwent emergent coronary artery bypass grafting x 3 on 12/26/2023 patient was then taken to the operating room for a left femoral thrombectomy and left lower extremity thrombectomy on 1/4. Patient states he is doing well today and that pain is better in leg and buttocks. Patient expressed concerns about fluid collections seen on CT, and had many questions. Patient states no fevers overnight, no nausea, or vomiting. Patient states he feels the rectal abscess is improving.     Physical Exam:     Vitals:  /65   Pulse 88   Temp 98.4 °F (36.9 °C) (Oral)   Resp 16   Ht 1.676 m (5' 5.98\")   Wt 57.8 kg (127 lb 6.8 oz)   SpO2 100%   BMI 20.58 kg/m²       General appearance - alert, well appearing and in no acute distress  Mental status - oriented to person, place and time with normal affect  Head - normocephalic and atraumatic  Neck - supple  Chest - clear to auscultation, normal effort  Heart - normal rate, regular rhythm  Abdomen - soft, non-tender, non-distended  Neurological -no acute neurological deficits.  Extremities - peripheral pulses palpable, left lower extremity fasciotomy site with granulation tissue at base, small area of exposed muscle at the middle of the wound.  Tenderness to palpation. Foot drop on left, tenderness palpation of superior left foot.  Skin -warm to touch  Vascular Exam -Palpable DP and PT pulses       Imaging:   CTA LOWER EXTREMITY LEFT W CONTRAST    Result Date: 3/2/2024  EXAMINATION: CTA OF THE LEFT LOWER EXTREMITY 3/2/2024 9:20 am TECHNIQUE: CTA of the left lower extremity was performed after the administration of intravenous contrast. 
     Division of Vascular Surgery             Progress Note      Name: Lee Mejia  MRN: [id number]         Overnight Events:     No overnight events.    Subjective:     Patient was seen and examined this morning.  No acute events overnight, patient tolerated procedure well without difficulty on Monday. A wound vac was placed yesterday on his left lower extremity & is wrapped with Ace wrap and Kerlix. He has no complaints this morning besides the occasional spasms. Pain is minimal and controlled. He is afebrile and VSS. Denies pain, SOB, CP, or numbness/tingling.    Physical Exam:     Vitals:  /66   Pulse 88   Temp 98.3 °F (36.8 °C) (Oral)   Resp 14   Ht 1.676 m (5' 6\")   Wt 57.8 kg (127 lb 6.8 oz)   SpO2 99%   BMI 20.57 kg/m²     General appearance - alert, well appearing and in no acute distress  Mental status - oriented to person, place and time with normal affect  Head - normocephalic and atraumatic  Neck - supple, no carotid bruits, thyroid not palpable, no JVD  Chest - clear to auscultation, normal effort  Heart - normal rate, regular rhythm, no murmurs  Abdomen - soft, non-tender, non-distended, bowel sounds present all four quadrants, no masses  Neurological - normal speech, no focal findings or movement disorder noted, cranial nerves II through XII grossly intact  Extremities - peripheral pulses palpable, no pedal edema or calf pain with palpation  Skin - no gross lesions, rashes, or induration noted  Foot Exam -Wound is covered with Ace wrap and Kerlix accompanied by a wound vac. Palpable DP and PT pulses.    Data:  CBC:   Recent Labs     02/25/24  0940 02/26/24  0730 02/27/24  0646   WBC 7.5 11.7* 14.5*   HGB 8.3* 8.1* 7.8*   * 455* 454*     Chemistry:   Recent Labs     02/25/24  0940 02/26/24  0730 02/27/24  0646   * 131* 136   K 4.1 4.0 4.3    100 104   CO2 24 24 22   GLUCOSE 120* 108* 160*   BUN 11 12 20   CREATININE 0.7 0.6* 0.6*   ANIONGAP 9 7* 10   LABGLOM >60 >60 >60 
     Division of Vascular Surgery         Progress Note      Name: Lee Mejia  MRN: [id number]         Overnight Events:      none    Hospital Summary      Presented with left lateral leg wound    Subjective:     Patient seen and examined. Afebrile. VSS. Pain controlled. PT and DP  Pulses palpable bilaterally.  NPO since midnight OR today for debridement.     Physical Exam:     Vitals:  BP (!) 106/59   Pulse 88   Temp 98.9 °F (37.2 °C) (Oral)   Resp 14   Ht 1.676 m (5' 6\")   Wt 57.8 kg (127 lb 6.8 oz)   SpO2 98%   BMI 20.57 kg/m²     Physical Exam  Constitutional:       Appearance: He is normal weight.   HENT:      Head: Normocephalic and atraumatic.      Nose: Nose normal.      Mouth/Throat:      Mouth: Mucous membranes are moist.      Pharynx: Oropharynx is clear.   Eyes:      Extraocular Movements: Extraocular movements intact.      Pupils: Pupils are equal, round, and reactive to light.   Cardiovascular:      Rate and Rhythm: Normal rate and regular rhythm.      Pulses: Normal pulses.      Comments: Palpable dp/pt pulses  Pulmonary:      Effort: Pulmonary effort is normal. No respiratory distress.   Abdominal:      General: There is no distension.      Palpations: Abdomen is soft.      Tenderness: There is no abdominal tenderness.   Musculoskeletal:      Cervical back: Normal range of motion and neck supple.   Skin:     Capillary Refill: Capillary refill takes less than 2 seconds.      Coloration: Skin is not jaundiced.      Comments: Wound with granulation tissue and central fibrinous exudate   Neurological:      General: No focal deficit present.      Mental Status: He is alert. Mental status is at baseline.   Psychiatric:         Mood and Affect: Mood normal.         Imaging/Labs:     XR TIBIA FIBULA LEFT (2 VIEWS)    Result Date: 2/24/2024  Large ulcer or wound over the lateral leg.  No radiographic evidence of osteomyelitis.        Assessment/Plan:     - Plan for excisional debridement of left lower 
    [facility]  Internal Medicine Teaching Residency Program  Inpatient Daily Progress Note  ______________________________________________________________________________    Patient: Lee Mejia  YOB: 1960   MRN:[id number]    Acct: [id number]     Room: 0332/0332-01  Admit date: 2/24/2024  Today's date: 03/03/24  Number of days in the hospital: 8    SUBJECTIVE   Admitting Diagnosis: Non-healing wound of lower extremity, left, initial encounter  CC: Nonhealing left leg wound    - Patient afebrile,hemodynamically stable and saturating well on room air.  - He has 2 abscesses in the leg and perirectal abscess. Currently,on ceftriaxone and flagyl. General surgery and vascular on board.  Cultures show no growth so far. Will continue to monitor.  - CBC and BMP unremarkable.      BRIEF HISTORY     The patient is a pleasant 63 y.o. male with a PMHx significant for      S/p CABG 12/26  S/p fasciotomy  1/12/2024  COPD  Former smoker     presents with a chief complaint of  worsening of wound of lateral left lower extremity.  Patient was admitted in December and underwent a CABG on 12/26.  Post CABG the patient underwent then a femoral-femoral bypass graft,  mechanical thrombectomy 1/4/2024 for concerns of thrombus in left external iliac and common femoral veins.  He developed compartment syndrome and underwent  fasciotomy 1/12/2024.  At that time he was noted to have necrosis of muscle and anterior lateral compartments and he underwent OR for debridement.  Patient was then discharged to rehab with instruction to follow-up.On 2/14/24 his wound repaired well and has appointment scheduled 11-16 was canceled.  His wound VAC was removed on 2/14 and ever since then he has been having home care nurse to his dressing with Adaptic and Hydrofera Blue.Last WBC check was on 2/17, and level was 15. He is finishing a 7-day course of cephalexin today .     Today's nurse is evaluating 
    [facility]  Internal Medicine Teaching Residency Program  Inpatient Daily Progress Note  ______________________________________________________________________________    Patient: Lee Mejia  YOB: 1960   MRN:[id number]    Acct: [id number]     Room: 0332/0332-01  Admit date: 2/24/2024  Today's date: 02/27/24  Number of days in the hospital: 3    SUBJECTIVE   Admitting Diagnosis: Non-healing wound of lower extremity, left, initial encounter  CC: Nonhealing left leg wound  Pt examined at bedside. Chart & results reviewed.   Afebrile  Hemodynamically stable with blood pressure 106/59  On room denies saturation 90%  Labs reviewed suggestive of pending wound culture    Had excisional debridement of lower extremity wound yesterday.  Labs reviewed suggestive of leukocytosis 11.7> 14.5 wound culture no growth thus far  Hemoglobin 8.1> 7.8  Continue Rocephin, aspirin, therapeutic Lovenox    ROS:  Constitutional:  negative for chills, fevers, sweats  Respiratory:  negative for cough, dyspnea on exertion, hemoptysis, shortness of breath, wheezing  Cardiovascular:  negative for chest pain, chest pressure/discomfort, lower extremity edema, palpitations  Gastrointestinal:  negative for abdominal pain, constipation, diarrhea, nausea, vomiting  Neurological:  negative for dizziness, headache    BRIEF HISTORY     The patient is a pleasant 63 y.o. male with a PMHx significant for      S/p CABG 12/26  S/p fasciotomy  1/12/2024  COPD  Former smoker     presents with a chief complaint of  worsening of wound of lateral left lower extremity.  Patient was admitted in December and underwent a CABG on 12/26.  Post CABG the patient underwent then a femoral-femoral bypass graft,  mechanical thrombectomy 1/4/2024 for concerns of thrombus in left external iliac and common femoral veins.  He developed compartment syndrome and underwent  fasciotomy 1/12/2024.  At that time he was noted 
    [facility]  Internal Medicine Teaching Residency Program  Inpatient Daily Progress Note  ______________________________________________________________________________    Patient: Lee Mejia  YOB: 1960   MRN:[id number]    Acct: [id number]     Room: 0332/0332-01  Admit date: 2/24/2024  Today's date: 02/29/24  Number of days in the hospital: 5    SUBJECTIVE   Admitting Diagnosis: Non-healing wound of lower extremity, left, initial encounter  CC: Nonhealing left leg wound  Pt examined at bedside. Chart & results reviewed.   Afebrile currently, spiked fever yesterday T max 102.2  Hemodyn currently mically stable with blood pressure 106/59  On room denies saturation 90%    Had excisional debridement of lower extremity wound yesterday.  Patient was supposed to be discharged but he had concerns for constipation and his KUB was suggestive of stool impaction.  He eventually had a bowel movement after Dulcolax  suppository.  But overnight he did spike a fever 10 2.2.  Labs suggestive of WBC count of 24.6 mm lactic acid.  Patient is currently on Rocephin.  Plan to hold the discharge  Continue Rocephin, aspirin, therapeutic Lovenox    ROS:  Constitutional:  negative for chills, fevers, sweats  Respiratory:  negative for cough, dyspnea on exertion, hemoptysis, shortness of breath, wheezing  Cardiovascular:  negative for chest pain, chest pressure/discomfort, lower extremity edema, palpitations  Gastrointestinal:  negative for abdominal pain, constipation, diarrhea, nausea, vomiting  Neurological:  negative for dizziness, headache    BRIEF HISTORY     The patient is a pleasant 63 y.o. male with a PMHx significant for      S/p CABG 12/26  S/p fasciotomy  1/12/2024  COPD  Former smoker     presents with a chief complaint of  worsening of wound of lateral left lower extremity.  Patient was admitted in December and underwent a CABG on 12/26.  Post CABG the patient 
    [facility]  Internal Medicine Teaching Residency Program  Inpatient Daily Progress Note  ______________________________________________________________________________    Patient: Lee Mejia  YOB: 1960   MRN:[id number]    Acct: [id number]     Room: 0332/0332-01  Admit date: 2/24/2024  Today's date: 03/02/24  Number of days in the hospital: 7    SUBJECTIVE   Admitting Diagnosis: Non-healing wound of lower extremity, left, initial encounter  CC: Nonhealing left leg wound    - Patient had a spike of fever last night .  - CT lower extremity ordered by vascular due to persistent fevers.  Cultures show no growth so far. Will continue to monitor.  - Patient complained of loose stools.no diarrhea . He received laxatives . GI molecular panel ordered.      BRIEF HISTORY     The patient is a pleasant 63 y.o. male with a PMHx significant for      S/p CABG 12/26  S/p fasciotomy  1/12/2024  COPD  Former smoker     presents with a chief complaint of  worsening of wound of lateral left lower extremity.  Patient was admitted in December and underwent a CABG on 12/26.  Post CABG the patient underwent then a femoral-femoral bypass graft,  mechanical thrombectomy 1/4/2024 for concerns of thrombus in left external iliac and common femoral veins.  He developed compartment syndrome and underwent  fasciotomy 1/12/2024.  At that time he was noted to have necrosis of muscle and anterior lateral compartments and he underwent OR for debridement.  Patient was then discharged to rehab with instruction to follow-up.On 2/14/24 his wound repaired well and has appointment scheduled 11-16 was canceled.  His wound VAC was removed on 2/14 and ever since then he has been having home care nurse to his dressing with Adaptic and Hydrofera Blue.Last WBC check was on 2/17, and level was 15. He is finishing a 7-day course of cephalexin today .     Today's nurse is evaluating him at home and she  
    [facility]  Internal Medicine Teaching Residency Program  Inpatient Daily Progress Note  ______________________________________________________________________________    Patient: Lee Mejia  YOB: 1960   MRN:[id number]    Acct: [id number]     Room: 0332/0332-01  Admit date: 2/24/2024  Today's date: 03/04/24  Number of days in the hospital: 9    SUBJECTIVE   Admitting Diagnosis: Non-healing wound of lower extremity, left, initial encounter  CC: Nonhealing left leg wound    - Patient afebrile,hemodynamically stable and saturating well on room air.  - He has 2 abscesses in the leg and perirectal abscess. Currently,on ceftriaxone and flagyl. General surgery recommends no Sx intervention at this time. Reached out to Vascular. Vascular planning Wound Vac today and possible discharge tomorrow.  Cultures show no growth so far. Will continue to monitor.  - CBC and BMP unremarkable.      BRIEF HISTORY     The patient is a pleasant 63 y.o. male with a PMHx significant for      S/p CABG 12/26  S/p fasciotomy  1/12/2024  COPD  Former smoker     presents with a chief complaint of  worsening of wound of lateral left lower extremity.  Patient was admitted in December and underwent a CABG on 12/26.  Post CABG the patient underwent then a femoral-femoral bypass graft,  mechanical thrombectomy 1/4/2024 for concerns of thrombus in left external iliac and common femoral veins.  He developed compartment syndrome and underwent  fasciotomy 1/12/2024.  At that time he was noted to have necrosis of muscle and anterior lateral compartments and he underwent OR for debridement.  Patient was then discharged to rehab with instruction to follow-up.On 2/14/24 his wound repaired well and has appointment scheduled 11-16 was canceled.  His wound VAC was removed on 2/14 and ever since then he has been having home care nurse to his dressing with Adaptic and Hydrofera Blue.Last WBC check was 
    [facility]  Internal Medicine Teaching Residency Program  Inpatient Daily Progress Note  ______________________________________________________________________________    Patient: Lee Mejia  YOB: 1960   MRN:[id number]    Acct: [id number]     Room: 0332/0332-01  Admit date: 2/24/2024  Today's date: 03/01/24  Number of days in the hospital: 6    SUBJECTIVE   Admitting Diagnosis: Non-healing wound of lower extremity, left, initial encounter  CC: Nonhealing left leg wound    - Patient has not had a spike of fever since yesterday AM. Patient was getting Tylenol for pain. It was D/C and for pain he was given roxicodone instead.  - Wound vac was removed.Vascular assessed the wound yesterday. No obvious signs of infection.  - WBC downtrending. Cultures show no growth so far. Will continue to monitor.  - Patient complained of loose stools. He received laxatives yesterday. GI molecular panel ordered.    BRIEF HISTORY     The patient is a pleasant 63 y.o. male with a PMHx significant for      S/p CABG 12/26  S/p fasciotomy  1/12/2024  COPD  Former smoker     presents with a chief complaint of  worsening of wound of lateral left lower extremity.  Patient was admitted in December and underwent a CABG on 12/26.  Post CABG the patient underwent then a femoral-femoral bypass graft,  mechanical thrombectomy 1/4/2024 for concerns of thrombus in left external iliac and common femoral veins.  He developed compartment syndrome and underwent  fasciotomy 1/12/2024.  At that time he was noted to have necrosis of muscle and anterior lateral compartments and he underwent OR for debridement.  Patient was then discharged to rehab with instruction to follow-up.On 2/14/24 his wound repaired well and has appointment scheduled 11-16 was canceled.  His wound VAC was removed on 2/14 and ever since then he has been having home care nurse to his dressing with Adaptic and Hydrofera 
 [facility] Nurse  Consult Note       NAME:  Lee Mejia  MEDICAL RECORD NUMBER:  [id number]  AGE: 63 y.o.   GENDER: male  : 1960  TODAY'S DATE:  2024    Subjective:     Reason for WOCN Evaluation and Assessment: \"wound vac for left lower extremity\"      Lee Mejia is a 63 y.o. male referred by:   [x] Physician  [] Nursing  [] Other:     Wound Identification:  Wound Type: non-healing surgical  Contributing Factors: edema and decreased tissue oxygenation            Objective:      /66   Pulse 83   Temp 98.1 °F (36.7 °C) (Oral)   Resp 16   Ht 1.676 m (5' 6\")   Wt 57.8 kg (127 lb 6.8 oz)   SpO2 97%   BMI 20.57 kg/m²   Emerson Risk Score: Emerson Scale Score: 19    LABS    CBC:   Lab Results   Component Value Date/Time    WBC 14.5 2024 06:46 AM    RBC 2.86 2024 06:46 AM    HGB 7.8 2024 06:46 AM    HCT 27.0 2024 06:46 AM     CMP:  Albumin:    Lab Results   Component Value Date/Time    LABALBU 3.5 2024 06:58 AM     PT/INR:    Lab Results   Component Value Date/Time    PROTIME 18.1 2023 04:10 AM    INR 1.5 2023 04:10 AM     HgBA1c:    Lab Results   Component Value Date/Time    LABA1C 5.8 2023 04:42 PM     PTT: No components found for: \"LABPTT\"      Assessment:       Measurements:       24 1023   Negative Pressure Wound Therapy Leg Left;Lower   Placement Date/Time: 24 1030   Present on Admission/Arrival: No  Location: Leg  Wound Location Orientation: Left;Lower   Wound Type Surgical   Unit Type 3M Vac Ulta   Dressing Type Non adherent contact layer;Black Foam   Number of pieces used 2   Cycle Continuous;On   Target Pressure (mmHg) 125   Canister changed? Yes   Dressing Changed Changed/New   Dressing Change Due 24   Wound 24 Leg Left;Lower;Lateral fasciotomy   Date First Assessed: 24   Primary Wound Type: Surgical Type  Location: Leg  Wound Location Orientation: Left;Lower;Lateral  Wound Description 
..  Attending Physician Statement  I have discussed the care of Lee Mejia, including pertinent history and exam findings,  with the resident. I have seen and examined the patient and the key elements of all parts of the encounter have been performed by me.  I agree with the assessment, plan and orders as documented by the resident with additions .  Improving.Has abscesses on the left lower leg.Also has perirectal absces. Fever not prsent.Continue antibiotics.Surgey may have t debride it.No osteo.    Treatment plan Discussed with nursing staff in detail , all questions answered .   Electronically signed by Gary Galvan MD on   3/3/24 at 10:04 PM EST    Please note that this chart was generated using voice recognition Dragon dictation software.  Although every effort was made to ensure the accuracy of this automated transcription, some errors in transcription may have occurred.   
CLINICAL PHARMACY NOTE: MEDS TO BEDS    Total # of Prescriptions Filled: 2   The following medications were delivered to the patient  Dakins 1/4  cephalexin    Additional Documentation: patient +2, cash payment.  
Comprehensive Nutrition Assessment    Type and Reason for Visit:  Initial, RD Nutrition Re-Screen/LOS    Nutrition Recommendations/Plan:   Modify ONS order to send chocolate standard ONS twice daily, frozen ONS once daily     Malnutrition Assessment:  Malnutrition Status:  Moderate malnutrition (03/01/24 1228)    Context:  Chronic Illness     Findings of the 6 clinical characteristics of malnutrition:  Energy Intake:  Unable to assess  Weight Loss:  Mild weight loss (specify amount and time period)     Body Fat Loss:  No significant body fat loss     Muscle Mass Loss:  Mild muscle mass loss Calf (gastrocnemius)  Fluid Accumulation:  Mild Extremities   Strength:  Not Performed    Nutrition Assessment:    Pt seen for LOS, currently on Regular, low fat/cholesterol, high fiber, JASPAL diet though few meal intakes are recorded to assess.  Spoke with Pt who reports he is eating well at times, and other times he skips meals because he just isn't hungry.  He believes this is partly d/t his medications.  Pt has tried Ensure in the past and is not fond of it, but understands that ONS was ordered TID with meals (though he has not yet received any while here).  He prefers chocolate flavor, and agrees to try Magic Cup as well.  Will send chocolate standard ONS BID and Magic Cup once daily while here.  Weight records show non-significant weight loss of ~5% in 1-2 months.  Encouraged oral intake.    Nutrition Related Findings:    Meds/Labs reviewed.  Na 132 Wound Type: Surgical Incision, Multiple       Current Nutrition Intake & Therapies:    Average Meal Intake: Unable to assess  Average Supplements Intake: None Ordered  ADULT DIET; Regular; Low Fat/Low Chol/High Fiber/JASPAL  ADULT ORAL NUTRITION SUPPLEMENT; Breakfast, Lunch, Dinner; Standard High Calorie/High Protein Oral Supplement    Anthropometric Measures:  Height: 167.6 cm (5' 5.98\")  Ideal Body Weight (IBW): 142 lbs (65 kg)    Admission Body Weight: 57.8 kg (127 lb 6.8 
Nutrition Assessment     Type and Reason for Visit: Reassess    Nutrition Recommendations/Plan:   Continue current diet and supplement orders     Malnutrition Assessment:  Malnutrition Status: Moderate malnutrition    Nutrition Assessment:  Pt currently on Regular diet though few meal intakes are recorded.  Spoke with Pt who reports he is still not eating as well as usual for him, which he attributes to the side effects of his medications.  Pt is eager to go home, and believes he will eat much better at home as the food offered here is much different from his usual dietary pattern which consists of primarily fruits and vegetables.  Pt reports he follows a mediterranean-style diet at home.  Pt continues to drink the standard ONS while here, being sent BID with meals.  Encouraged protein intake and home and reviewed high protein foods with patient, who verbalizes understanding.    Estimated Daily Nutrient Needs:  Energy (kcal):  9184-4057 kcal/day  (25-30 kcal/kg) Weight Used for Energy Requirements: Current     Protein (g):  80-90 g/day Weight Used for Protein Requirements: Current        Fluid (ml/day):  per physician Method Used for Fluid Requirements: Other (Comment)    Nutrition Related Findings:   Meds/Labs reviewed.  Na 134 Wound Type: Surgical Incision, Wound Vac    Current Nutrition Therapies:    ADULT DIET; Regular  ADULT ORAL NUTRITION SUPPLEMENT; Breakfast, Lunch; Standard High Calorie/High Protein Oral Supplement    Anthropometric Measures:  Height: 167.6 cm (5' 5.98\")  Current Body Wt: 57.8 kg (127 lb 6.8 oz)   BMI: 20.6    Nutrition Diagnosis:   Inadequate oral intake related to increase demand for energy/nutrients, early satiety as evidenced by weight loss    Nutrition Interventions:   Food and/or Nutrient Delivery: Continue Current Diet, Continue Oral Nutrition Supplement  Nutrition Education/Counseling: No recommendation at this time  Coordination of Nutrition Care: Continue to monitor while 
Patient admitted, consent signed and questions answered. Patient ready for procedure. Call light to reach with side rails up 2 of 2. History and physical complete.  
Patient returned to PCC room post procedure.  Assessment & VS obtained per policy. Restrictions/Education reviewed with patient. Post procedure pathway initiated. Pt without complications.  RN at bedside. Pulses obtained and unchanged. Will continue to monitor.    Report called to [facility]. All questions answered. Writer to transport.  
Vascular Surgery    Due to concern for infection the wound vac was changed to dakins wet to dry. I inspected the wound and there is no obvious sign of infection. There is healthy surrounding tissue. There is no tenderness to surrounding skin. Will continue to monitor and continue wound care team changing packing daily with dakins wet to dry.    Electronically signed by SAVANAH MIRANDA MD on 2/29/2024 at 11:22 PM   
  No acute cardiopulmonary findings         XR ABDOMEN (KUB) (SINGLE AP VIEW)   Final Result   Large stool burden in the colon         XR TIBIA FIBULA LEFT (2 VIEWS)   Final Result   Large ulcer or wound over the lateral leg.  No radiographic evidence of   osteomyelitis.             Assessment:     63 years old male presented hospital with left lateral calf wound s/p left lower extremity fasciotomy.      Plan:     2/26: Left lateral leg excisional debridement of subcutaneous tissue, muscle and fascia  Daily dressing change with Dakins wet to dry.    No further vascular intervention is indicated at this point.  Will continue to follow. Please reach out if any changes.       Electronically signed by Jose HENDRICKS on 3/1/2024 at 7:50 AM        Surgery Resident Statement/Note:  I have discussed the case, including pertinent history and exam findings with the above medical student have personally seen the patient.     Pt seen and examined at bedside.  I performed both history and physical exam.     Note was edited and changes made by me.   Assessment and plan reviewed and changes made by me.   I agree with the assessment and plan as stated above.    Electronically signed by David Haines DO on 3/1/2024 at 8:34 AM          
Automated exposure control, iterative reconstruction, and/or weight based adjustment of the mA/kV was utilized to reduce the radiation dose to as low as reasonably achievable. COMPARISON: None HISTORY: ORDERING SYSTEM PROVIDED HISTORY: Concern for infection, s/p common femoral and iliac thrombectomy, fasciotomy TECHNOLOGIST PROVIDED HISTORY: Concern for infection, s/p common femoral and iliac thrombectomy, fasciotomy FINDINGS: Nonvascular The visualized lower abdomen and pelvis demonstrate intramural perirectal/perianal fluid collection measuring 2.7 x 2.4 cm indicating an abscess. Small amount of free fluid and inflammatory changes in the perirectal region. The urinary bladder and prostate appear stable.  No bowel obstruction. Right inguinal low left iliac nodes measure up to 1.3 cm.  Pelvic lymphadenopathy with nodes measuring up to 1.1 cm in diameter. In the anterolateral calf there is a large soft tissue defect and fluid collection lateral to the fibula in the deep muscular compartment measuring 2.9 x 1.8 cm concerning for abscess.  Additional fluid collection seen in the lower anterolateral calf just lateral to the anterior tibial artery which measures 2 x 1.6 cm, also likely abscess. VASCULAR The lower abdominal aorta demonstrates mild calcification with adequate patency.  Left common iliac, internal iliac and external iliac arteries are patent with no significant disease.  The left common femoral, profunda femoris and superficial femoral arteries are widely patent.  The popliteal artery and trifurcation vessels are patent throughout their course.     1. Intramural perirectal/perianal fluid collection measuring 2.7 x 2.4 cm indicating an abscess. Small amount of free fluid and inflammatory changes in the perirectal region. 2. In the anterolateral calf there is a large soft tissue defect and fluid collection lateral to the fibula in the deep muscular compartment measuring 2.9 x 1.8 cm concerning for abscess. 
Left;Lower   Placement Date/Time: 03/04/24 1335   Location: Leg  Wound Location Orientation: Left;Lower   Wound Type Surgical   Unit Type 3M ActiVAC   Dressing Type Non adherent contact layer;Black Foam   Number of pieces used 3   Cycle Continuous;On   Target Pressure (mmHg) 125   Canister changed? Yes   Dressing Status New dressing applied   Dressing Change Due 03/06/24           Plan:      LLE: -125 mmHg, M-W-F, oil emulsion to base of wound, Granufoam over all.     Plan of Care:   []  Turn and reposition every 2 hours while in bed.     [x] Float heels off of bed with pillows under calves.     [] Heel protective boots (heel medix boots) at all times while in bed.     [] Sacral foam dressing to sacrococcygeal area. Use skin barrier film prior to placement. Peel back dressing, inspect skin beneath, and re-secure every shift. Change every 3 days and as needed if loose or soiled. Discontinue sacral foam if repeatedly soiled by incontinence.     [] Apply zinc oxide /Triad Hydrophilic Wound Dressing cream twice daily and as needed after incontinent episodes.     [] Perform routine incontinence care with use of foam cleanser.     [] Use single layer moisture wicking underpad.     [] Use comfort glide system and wedges to reposition patient.     [] Keep the head of the bed below 30 degrees unless contraindicated.     [] Pressure reducing chair cushion while up to chair. Reposition every hour while in chair and limit chair time to 2 hour intervals.     [] Encourage good nutritional intake and fluids. Consult dietician if needed.     Specialty Bed Required : No   [] Low Air Loss   [] Pressure Redistribution  [] Fluid Immersion  [] Bariatric  [] Total Pressure Relief  [] Other:      Discharge Plan:  TBD     Patient/Caregiver Teaching:  Reviewed resumption of NPWT, use of the ActiVAC system and follow up to Dr Liu at the [facility].   Level of patient/caregiver understanding:    [] Indicates understanding      
primary  - risk and benefits described, questions answered and informed consent obtained  - type and screen in am    Electronically signed by Kary Ram DO on 2/25/24 at 1:01 PM [facility] [place]                  
  Extremities:   Left lower extremity edema, with wound   Skin: Wound present, palpable dorsalis pedis  Neurological/Psychiatric: The patient's general behavior, level of consciousness, thought content and emotional status is normal.      Medications:  Scheduled Medications:    metroNIDAZOLE  500 mg Oral q8h    cefTRIAXone (ROCEPHIN) IV  2,000 mg IntraVENous Q24H    acetaminophen  1,000 mg Oral 3 times per day    gabapentin  400 mg Oral TID    hydrocortisone   Topical BID    enoxaparin  1 mg/kg SubCUTAneous BID    hydrOXYzine HCl  25 mg Oral BID    budesonide-formoterol  2 puff Inhalation BID RT    sodium chloride flush  5-40 mL IntraVENous 2 times per day    aspirin EC  81 mg Oral Daily    atorvastatin  80 mg Oral Nightly    carvedilol  12.5 mg Oral BID WC    nicotine  1 patch TransDERmal Q24H    sodium hypochlorite   Irrigation Daily     Continuous Infusions:    sodium chloride Stopped (02/24/24 1932)     PRN MedicationsoxyCODONE, 5 mg, Q6H PRN  bisacodyl, 10 mg, Daily PRN  docusate, 100 mg, BID PRN  magnesium hydroxide, 30 mL, Daily PRN  melatonin, 9 mg, Nightly PRN  cyclobenzaprine, 5 mg, TID PRN  sennosides-docusate sodium, 2 tablet, Daily PRN  albuterol sulfate HFA, 2 puff, Q6H PRN  morphine, 1 mg, Q4H PRN  sodium chloride flush, 5-40 mL, PRN  sodium chloride, , PRN  potassium chloride, 40 mEq, PRN   Or  potassium alternative oral replacement, 40 mEq, PRN   Or  potassium chloride, 10 mEq, PRN  magnesium sulfate, 2,000 mg, PRN  ondansetron, 4 mg, Q8H PRN   Or  ondansetron, 4 mg, Q6H PRN  polyethylene glycol, 17 g, Daily PRN        Diagnostic Labs:  CBC:   Recent Labs     03/03/24  0523 03/04/24  0605   WBC 8.7 7.7   RBC 3.05* 2.93*   HGB 8.0* 7.6*   HCT 27.8* 25.6*   MCV 91.1 87.4   RDW 16.2* 16.2*   * 483*     BMP:   Recent Labs     03/03/24  0523 03/04/24  0605   * 134*   K 3.9 3.8    106   CO2 22 21   BUN 13 13   CREATININE 0.5* 0.5*     BNP: No results for input(s): \"BNP\" in the last 72 
loose or soiled. Discontinue sacral foam if repeatedly soiled by incontinence.     [] Apply zinc oxide /Triad Hydrophilic Wound Dressing cream twice daily and as needed after incontinent episodes.     [] Perform routine incontinence care with use of foam cleanser.     [] Use single layer moisture wicking underpad.     [] Use comfort glide system and wedges to reposition patient.     [] Keep the head of the bed below 30 degrees unless contraindicated.     [] Pressure reducing chair cushion while up to chair. Reposition every hour while in chair and limit chair time to 2 hour intervals.     [] Encourage good nutritional intake and fluids. Consult dietician if needed.    Specialty Bed Required : No   [] Low Air Loss   [] Pressure Redistribution  [] Fluid Immersion  [] Bariatric  [] Total Pressure Relief  [] Other:     Discharge Plan:  TBD    Patient/Caregiver Teaching:  Reviewed removal of the NPWT dressing to allow more frequent assessments of the wound while the source of his fever and elevated WBC is pursued.   Level of patient/caregiver understanding:    [] Indicates understanding       [] Needs reinforcement  [] Unsuccessful      [x] Verbal Understanding  [] Demonstrated understanding       [] No evidence of learning  [] Refused teaching         [] N/A    Contact the Wound Ostomy RN on-call during working hours Monday-Friday [phone] via [employer] by searching \"wound\" under \"groups\" and selecting the on-call clinician. Sending messages via individual names will not reach a clinician.        Electronically signed by Katherine Rogers RN, CWON on 2/29/2024 at 10:43 AM     
midline.  Respiratory: Chest was symmetrical without dullness to percussion.  Breath sounds bilaterally were clear to auscultation. There were no wheezes, rhonchi or rales. There is no intercostal retraction or use of accessory muscles. No egophony noted.   Cardiovascular: Regular without murmur, clicks, gallops or rubs.   Abdomen: Slightly rounded and soft without organomegaly. No rebound, rigidity or guarding was appreciated.    Lymphatic: No lymphadenopathy.  Musculoskeletal: General: No swelling or tenderness. Normal range of motion.   Extremities:   Left lower extremity edema, with wound   Skin: Wound present, palpable dorsalis pedis  Neurological/Psychiatric: The patient's general behavior, level of consciousness, thought content and emotional status is normal.      Medications:  Scheduled Medications:    [Held by provider] enoxaparin  1 mg/kg SubCUTAneous BID    hydrOXYzine HCl  25 mg Oral BID    cefTRIAXone (ROCEPHIN) IV  2,000 mg IntraVENous Q24H    budesonide-formoterol  2 puff Inhalation BID RT    sodium chloride flush  5-40 mL IntraVENous 2 times per day    aspirin EC  81 mg Oral Daily    atorvastatin  80 mg Oral Nightly    carvedilol  12.5 mg Oral BID WC    nicotine  1 patch TransDERmal Q24H    sodium hypochlorite   Irrigation Daily    gabapentin  300 mg Oral TID     Continuous Infusions:    sodium chloride 50 mL/hr at 02/26/24 0636    sodium chloride Stopped (02/24/24 1932)     PRN Medicationsmelatonin, 9 mg, Nightly PRN  cyclobenzaprine, 5 mg, TID PRN  sennosides-docusate sodium, 2 tablet, Daily PRN  albuterol sulfate HFA, 2 puff, Q6H PRN  morphine, 1 mg, Q4H PRN  sodium chloride flush, 5-40 mL, PRN  sodium chloride, , PRN  potassium chloride, 40 mEq, PRN   Or  potassium alternative oral replacement, 40 mEq, PRN   Or  potassium chloride, 10 mEq, PRN  magnesium sulfate, 2,000 mg, PRN  ondansetron, 4 mg, Q8H PRN   Or  ondansetron, 4 mg, Q6H PRN  polyethylene glycol, 17 g, Daily PRN  acetaminophen, 650 
  Component Value Date    CHOL 226 (H) 11/25/2014    HDL 43 11/25/2014    TRIG 193 (H) 12/29/2023     LIVER PROFILE: No results for input(s): \"AST\", \"ALT\", \"ALB\", \"BILIDIR\", \"BILITOT\", \"ALKPHOS\" in the last 72 hours.   MICROBIOLOGY:   Lab Results   Component Value Date/Time    CULTURE NO GROWTH 7 DAYS 01/09/2024 04:57 PM       Imaging:    XR TIBIA FIBULA LEFT (2 VIEWS)    Result Date: 2/24/2024  Large ulcer or wound over the lateral leg.  No radiographic evidence of osteomyelitis.       ASSESSMENT & PLAN     Assessment and Plan:    Principal Problem:    Non-healing wound of lower extremity, left, initial encounter  Active Problems:    S/P CABG x 3    COPD (chronic obstructive pulmonary disease) (HCC)    Dependence on nicotine from cigarettes  Resolved Problems:    * No resolved hospital problems. *      Nonhealing wound of lower left extremity:  -Post CABG the patient underwent then a femoral-femoral bypass graft,  -S/p mechanical thrombectomy 1/4/2024 for left external iliac and common femoral vein due to thrombus  -S/p fasciotomy left lower extremity 1/12 for 24  -S/p debridement anterior lateral compartments for concerns of muscle necrosis  -Currently concerns for worsening of the wound  -Labs reviewed suggestive of elevated CRP and ESR  -Follow-up on the x-ray left lower extremity  -Follow-up on the wound culture  -Vascular on board plan for debridement next week  -Started on Rocephin as per ID recommendation, watch for response  -Patient on Eliquis at home, currently held manage patient debridement on Monday  -  -Continue to monitor     S/p CABG:  CABG x 3 12/26  No acute complaints on this admission  Continue aspirin, Lipitor  Continue to monitor        History of COPD:  Ex-smoker  No acute complaints on this admission  At home on  Continue  Keep SpO2 more than 92%     For smoking cessation on nicotine patches  Continue nicotine patch once daily      2/25/24: Patient is here for left lower extremity nonhealing 
debridement anterior lateral compartments for concerns of muscle necrosis  -Currently concerns for worsening of the wound  -Left leg wound debridement done on 2/26  -Wound VAC placed 2/27/2024  -Patient stable to be discharged with wound VAC at home  -As per ID keep Dokins  - ceftriaxone and switch to Keflex for 7 days    S/p CABG:  CABG x 3 12/26  No acute complaints on this admission  Continue aspirin, Lipitor  Continue to monitor        History of COPD:  Ex-smoker  No acute complaints on this admission  At home on  Continue  Keep SpO2 more than 92%     For smoking cessation on nicotine patches  Continue nicotine patch once daily      2/28/24: Patient is here for left lower extremity nonhealing wound.  Debridement done 2/26/24.wound vac placed 2/27. Patient is stable to be discharged as per vascular.  Switch Rocephin to Keflex as per ID       Diet: Regular diet  DVT ppx : Lovenox held for now  GI ppx:     PT/OT: PT OT on board  Discharge Planning / SW:  for discharge planning    Nurys Crooks MD  Internal Medicine Resident, PGY-1  [place], [place]  2/28/2024, 7:32 AM    Attestation and add on       I have discussed the care of Lee Mejia , including pertinent history and exam findings,                    2/28/24    with the resident.  I have seen and examined the patient and the key elements of all parts of the encounter have been performed by me .   I agree with the assessment, plan and orders as documented by the resident.     ---- ;     [place] [address].   Phone [phone]   Fax: [phone]  Answering Service: [phone]              
results:        Thank you for allowing us to participate in the care of this patient.Please call with questions.    This note is created with the assistance of a speech recognition program.  While intending to generate adocument that actually reflects the content of the visit, the document can still have some errors including those of syntax and sound a like substitutions which may escape proof reading.  It such instances, actual meaningcan be extrapolated by contextual diversion.    Skye Coronado MD  Internal Medicine Resident, PGY-1  [facility]; [place], [place]          I have discussed the care of the patient, including pertinent history and exam findings,  with the resident., student or CNP- I have seen and examined the patient and the key elements of all parts of the encounter have been performed by me.  I have decided the assessment, plan and orders as documented by the resident.student or CNP    Milli Lim, Infectious Diseases     
AM    TURBIDITY Clear 02/29/2024 03:42 AM     Lab Results   Component Value Date/Time    CREATININE 0.5 03/03/2024 05:23 AM    GLUCOSE 89 03/03/2024 05:23 AM       Medical Decision Making-Imaging:   XR CHEST PORTABLE    Result Date: 2/29/2024  EXAMINATION: ONE XRAY VIEW OF THE CHEST 2/29/2024 7:04 am COMPARISON: None. HISTORY: ORDERING SYSTEM PROVIDED HISTORY: rule out pneumonia TECHNOLOGIST PROVIDED HISTORY: rule out pneumonia FINDINGS: Normal cardiomediastinal silhouette.  Status post median sternotomy.  No acute airspace infiltrate.  No pneumothorax or pleural effusion     No acute cardiopulmonary findings     XR ABDOMEN (KUB) (SINGLE AP VIEW)    Result Date: 2/28/2024  EXAMINATION: ONE SUPINE XRAY VIEW(S) OF THE ABDOMEN 2/28/2024 4:14 pm COMPARISON: 01/05/2024 HISTORY: ORDERING SYSTEM PROVIDED HISTORY: constipation TECHNOLOGIST PROVIDED HISTORY: constipation Reason for Exam: constipation port supine at 424pm FINDINGS: Gas in nondilated stomach.  Large stool burden in the colon.  No significant small bowel gas.     Large stool burden in the colon     XR TIBIA FIBULA LEFT (2 VIEWS)    Result Date: 2/24/2024  EXAMINATION: 2 XRAY VIEWS OF THE LEFT TIBIA AND FIBULA 2/24/2024 5:54 pm COMPARISON: None. HISTORY: ORDERING SYSTEM PROVIDED HISTORY: rule out osteomyelitis TECHNOLOGIST PROVIDED HISTORY: rule out osteomyelitis FINDINGS: Large defect within the skin favoring ulcer at the lateral leg.  No periostitis or osseous erosions.  No evidence of acute fracture.  Staples or clips over the medial proximal leg.     Large ulcer or wound over the lateral leg.  No radiographic evidence of osteomyelitis.       Medical Decision Uhhfeg-Czonakqw-Beyhm:     Results       Procedure Component Value Units Date/Time    Gastrointestinal Panel, Molecular [[id number]] Collected: 03/01/24 1448    Order Status: Completed Specimen: Stool Updated: 03/02/24 1505     Specimen Description .FECES     Campylobacter PCR NEGATIVE: No Campylobacter 
clips over the medial proximal leg.     Large ulcer or wound over the lateral leg.  No radiographic evidence of osteomyelitis.       Medical Decision [id number]-Axwnatph-Ybhzk:     Results       Procedure Component Value Units Date/Time    Gastrointestinal Panel, Molecular [[id number]] Collected: 03/01/24 1448    Order Status: Completed Specimen: Stool Updated: 03/02/24 1505     Specimen Description .FECES     Campylobacter PCR NEGATIVE: No Campylobacter spp. (jejuni or coli) DNA Detected     Salmonella PCR NEGATIVE: No Salmonella spp. DNA Detected     Shigatoxin Gene PCR NEGATIVE: No Shiga toxin-producing gene(s) Detected     Shigella Sp PCR NEGATIVE: No Shigella spp. / EIEC DNA Detected     Plesiomonas Shigelloides PCR NEGATIVE: No Plesionomas shigelloides DNA Detected     Vibrio PCR NEGATIVE: No Vibrio (V. vulnificus, V, parahaemolyticus and V. cholerae) DNA Detected     E Coli Enterotoxigenic PCR NEGATIVE: No Enterotoxigenic E. coli (ETEC) Heat-labile and heat-stable (LT/ST) DNA Detected     Yersinia Enterocolitica PCR NEGATIVE: No Yersinia enterocolitica DNA Detected    Culture, Urine [[id number]] Collected: 02/29/24 0342    Order Status: Completed Specimen: Urine, clean catch Updated: 03/01/24 0810     Specimen Description .CLEAN CATCH URINE     Culture NO GROWTH    Culture, Blood 1 [[id number]] Collected: 02/29/24 0108    Order Status: Completed Specimen: Blood Updated: 03/04/24 0140     Specimen Description .BLOOD     Special Requests LEFT LOWER ARM 4ML     Culture NO GROWTH 4 DAYS    Culture, Blood 1 [[id number]] Collected: 02/29/24 0108    Order Status: Completed Specimen: Blood Updated: 03/04/24 0141     Specimen Description .BLOOD     Special Requests LEFT AC 2ML     Culture NO GROWTH 4 DAYS    Culture, Wound [[id number]] Collected: 02/24/24 0741    Order Status: Completed Specimen: Leg Updated: 02/27/24 0822     Specimen Description .LEG     Special Requests LEFT LEG SWAB     Direct Exam FEW NEUTROPHILS     
Specimen Description .BLOOD     Special Requests LEFT AC 2ML     Culture NO GROWTH 5 DAYS    Culture, Wound [[id number]] Collected: 02/24/24 0741    Order Status: Completed Specimen: Leg Updated: 02/27/24 0822     Specimen Description .LEG     Special Requests LEFT LEG SWAB     Direct Exam FEW NEUTROPHILS      NO BACTERIA SEEN     Culture NO GROWTH              Medical Decision Making-Other:     Note:    Thank you for allowing us to participate in the care of this patient. Please call with questions.      MD Theodore Abdalla, MS4 contributed to the evaluation of the patient, under observation.     ATTESTATION:     I have discussed the case, including pertinent history and exam findings with the medical resident. I have evaluated the  History, physical findings and pictures of the patient and the key elements of the encounter have been performed by me. I have reviewed the laboratory data, other diagnostic studies and discussed them with the medical resident. I have updated the medical record where necessary.     I agree with the assessment, plan and orders as documented by the medical resident and I have modified them as necessary.      Micha Hartmann MD.      3/5/2024    Office: [phone]

## 2024-03-05 NOTE — PLAN OF CARE
Problem: Discharge Planning  Goal: Discharge to home or other facility with appropriate resources  3/5/2024 1539 by Jaylan Morley, RN  Outcome: Progressing  3/5/2024 0500 by Kristin Trimble, RN  Outcome: Progressing     Problem: Safety - Adult  Goal: Free from fall injury  Outcome: Progressing     Problem: Pain  Goal: Verbalizes/displays adequate comfort level or baseline comfort level  Outcome: Progressing     Problem: ABCDS Injury Assessment  Goal: Absence of physical injury  Outcome: Progressing     Problem: Skin/Tissue Integrity  Goal: Absence of new skin breakdown  Description: 1.  Monitor for areas of redness and/or skin breakdown  2.  Assess vascular access sites hourly  3.  Every 4-6 hours minimum:  Change oxygen saturation probe site  4.  Every 4-6 hours:  If on nasal continuous positive airway pressure, respiratory therapy assess nares and determine need for appliance change or resting period.  Outcome: Progressing

## 2024-03-05 NOTE — PLAN OF CARE
Problem: Respiratory - Adult  Goal: Achieves optimal ventilation and oxygenation  Outcome: Progressing  Flowsheets (Taken 3/5/2024 6552)  Achieves optimal ventilation and oxygenation:   Assess for changes in respiratory status   Assess for changes in mentation and behavior   Position to facilitate oxygenation and minimize respiratory effort   Oxygen supplementation based on oxygen saturation or arterial blood gases   Initiate smoking cessation protocol as indicated   Encourage broncho-pulmonary hygiene including cough, deep breathe, incentive spirometry   Assess the need for suctioning and aspirate as needed   Assess and instruct to report shortness of breath or any respiratory difficulty   Respiratory therapy support as indicated

## 2024-03-05 NOTE — CARE COORDINATION
Spoke with Eliza from [facility] approved for continuation of care. Please contact with d/c date @ [phone] @ 1333 PS message sent to Dr. Hartmann. Notified of plan to d/c today. Await ABX reconciliation.     Spoke with Dawn @ [facility]. Will plan start of care on 3/6/24    @ 4570 Spoke with Dawn @ [facility]. Patient will discharge today on oral abx.    Patient is a 65y old  Male who presents with a chief complaint of C/F Foot osteomyelitis (28 Dec 2022 06:40)       INTERVAL HPI/OVERNIGHT EVENTS:  Patient seen and evaluated at bedside.  Pt is resting comfortable in NAD. Denies N/V/F/C.  Pain rated at X/10    Allergies    codeine (Anaphylaxis)    Intolerances        Vital Signs Last 24 Hrs  T(C): 36.6 (28 Dec 2022 00:55), Max: 36.7 (27 Dec 2022 19:00)  T(F): 97.8 (28 Dec 2022 00:55), Max: 98 (27 Dec 2022 19:00)  HR: 60 (28 Dec 2022 00:55) (60 - 72)  BP: 125/66 (28 Dec 2022 00:55) (125/66 - 161/81)  BP(mean): --  RR: 18 (28 Dec 2022 00:55) (18 - 18)  SpO2: 97% (28 Dec 2022 00:55) (95% - 97%)    Parameters below as of 28 Dec 2022 00:55  Patient On (Oxygen Delivery Method): room air        LABS:                        10.3   5.62  )-----------( 192      ( 28 Dec 2022 06:56 )             31.8     12-28    139  |  109<H>  |  35<H>  ----------------------------<  173<H>  4.5   |  20<L>  |  1.86<H>    Ca    8.9      28 Dec 2022 06:55  Phos  3.3     12-28  Mg     1.9     12-28    TPro  x   /  Alb  x   /  TBili  0.2  /  DBili  x   /  AST  x   /  ALT  x   /  AlkPhos  x   12-28    PT/INR - ( 28 Dec 2022 06:55 )   PT: 12.1 sec;   INR: 1.05 ratio         PTT - ( 26 Dec 2022 20:08 )  PTT:27.3 sec    CAPILLARY BLOOD GLUCOSE      POCT Blood Glucose.: 157 mg/dL (28 Dec 2022 07:50)  POCT Blood Glucose.: 219 mg/dL (27 Dec 2022 22:10)  POCT Blood Glucose.: 318 mg/dL (27 Dec 2022 18:16)  POCT Blood Glucose.: 225 mg/dL (27 Dec 2022 11:14)      Lower Extremity Physical Exam:  Neuro: Epicritic sensation diminished to the level of toes, B/L.  Musculoskeletal/Ortho: s/p  Right foot partial first and second partial amputation healed  Skin: Right foot lateral 5th metatarsal wound to bone, fibronecrotic wound bed, no malodor, scant serosanguinous drainage, erythema resolved to the lateral midfoot, edema improved. Left foot with no acute signs of infection.     RADIOLOGY & ADDITIONAL TESTS:

## 2024-03-11 NOTE — DISCHARGE INSTRUCTIONS
[facility] -Phone: [phone] Fax: [phone]   Visit  Discharge Instructions / Physician Orders    DATE: 3/13/2024     Home Care: [facility]     SUPPLIES ORDERED THRU: [facility]     Wound Location: Left Lateral Lower Leg     Cleanse with: Liquid antibacterial soap and water, rinse well      Dressing Orders: Skin Prep and Drape around wound, black foam into wound, continuous suction at 125 mmHg     Frequency: Change on Monday, Wednesday, Friday (Wound Care Center will change on appointment days)     Additional Orders: Increase protein to diet (meat, cheese, eggs, fish, peanut butter, nuts and beans)  ELEVATE LEGS AS MUCH AS POSSIBLE  Bring Foam and canister to wound care appointments    Your next appointment with Wound Care Center is in 1 week     (Please note your next appointment above and if you are unable to keep, kindly give a 24 hour notice. Thank you.)  If more than 15 min late we cannot guarantee you will be seen due to clinician schedule  Per Policy, Excessive cancellation will call for dismissal from program.     If you experience any of the following, please call the Wound Care Center during business hours:  [phone]  Your Phone call may be forwarded to [facility] [facility] during business hours that [place] is closed.     * Increase in Pain  * Temperature over 101  * Increase in drainage from your wound  * Drainage with a foul odor  * Bleeding  * Increase in swelling  * Need for compression bandage changes due to slippage, breakthrough drainage.     If you need medical attention outside of the business hours of the Wound Care Centers please contact your PCP or go to the nearest emergency room.     The information contained in the After Visit Summary has been reviewed with me, the patient and/or responsible adult, by my health care provider(s). I had the opportunity to ask questions regarding this information. I have elected to receive;      []After Visit

## 2024-03-13 ENCOUNTER — HOSPITAL ENCOUNTER (OUTPATIENT)
Dept: WOUND CARE | Age: 64
Discharge: HOME | End: 2024-03-13
Attending: STUDENT IN AN ORGANIZED HEALTH CARE EDUCATION/TRAINING PROGRAM | Admitting: STUDENT IN AN ORGANIZED HEALTH CARE EDUCATION/TRAINING PROGRAM
Payer: MEDICAID

## 2024-03-13 VITALS
SYSTOLIC BLOOD PRESSURE: 106 MMHG | HEART RATE: 81 BPM | TEMPERATURE: 97.9 F | BODY MASS INDEX: 20.29 KG/M2 | WEIGHT: 125.66 LBS | DIASTOLIC BLOOD PRESSURE: 65 MMHG | RESPIRATION RATE: 18 BRPM

## 2024-03-13 DIAGNOSIS — T81.89XD NON-HEALING SURGICAL WOUND, SUBSEQUENT ENCOUNTER: Primary | ICD-10-CM

## 2024-03-13 PROBLEM — T81.89XA SURGICAL WOUND, NON HEALING: Status: ACTIVE | Noted: 2024-03-13

## 2024-03-13 PROCEDURE — 11045 DBRDMT SUBQ TISS EACH ADDL: CPT

## 2024-03-13 PROCEDURE — 97605 NEG PRS WND THER DME<=50SQCM: CPT

## 2024-03-13 PROCEDURE — 11045 DBRDMT SUBQ TISS EACH ADDL: CPT | Performed by: STUDENT IN AN ORGANIZED HEALTH CARE EDUCATION/TRAINING PROGRAM

## 2024-03-13 PROCEDURE — 11042 DBRDMT SUBQ TIS 1ST 20SQCM/<: CPT

## 2024-03-13 PROCEDURE — 99213 OFFICE O/P EST LOW 20 MIN: CPT

## 2024-03-13 PROCEDURE — 11042 DBRDMT SUBQ TIS 1ST 20SQCM/<: CPT | Performed by: STUDENT IN AN ORGANIZED HEALTH CARE EDUCATION/TRAINING PROGRAM

## 2024-03-13 RX ORDER — LIDOCAINE 40 MG/G
CREAM TOPICAL ONCE
OUTPATIENT
Start: 2024-03-13 | End: 2024-03-13

## 2024-03-13 RX ORDER — GINSENG 100 MG
CAPSULE ORAL ONCE
OUTPATIENT
Start: 2024-03-13 | End: 2024-03-13

## 2024-03-13 RX ORDER — LIDOCAINE HYDROCHLORIDE 20 MG/ML
JELLY TOPICAL ONCE
OUTPATIENT
Start: 2024-03-13 | End: 2024-03-13

## 2024-03-13 RX ORDER — LIDOCAINE HYDROCHLORIDE 40 MG/ML
SOLUTION TOPICAL ONCE
OUTPATIENT
Start: 2024-03-13 | End: 2024-03-13

## 2024-03-13 RX ORDER — CLOBETASOL PROPIONATE 0.5 MG/G
OINTMENT TOPICAL ONCE
OUTPATIENT
Start: 2024-03-13 | End: 2024-03-13

## 2024-03-13 RX ORDER — TRIAMCINOLONE ACETONIDE 1 MG/G
OINTMENT TOPICAL ONCE
OUTPATIENT
Start: 2024-03-13 | End: 2024-03-13

## 2024-03-13 RX ORDER — SODIUM CHLOR/HYPOCHLOROUS ACID 0.033 %
SOLUTION, IRRIGATION IRRIGATION ONCE
OUTPATIENT
Start: 2024-03-13 | End: 2024-03-13

## 2024-03-13 RX ORDER — BETAMETHASONE DIPROPIONATE 0.5 MG/G
CREAM TOPICAL ONCE
OUTPATIENT
Start: 2024-03-13 | End: 2024-03-13

## 2024-03-13 RX ORDER — GENTAMICIN SULFATE 1 MG/G
OINTMENT TOPICAL ONCE
OUTPATIENT
Start: 2024-03-13 | End: 2024-03-13

## 2024-03-13 RX ORDER — BACITRACIN ZINC AND POLYMYXIN B SULFATE 500; 1000 [USP'U]/G; [USP'U]/G
OINTMENT TOPICAL ONCE
OUTPATIENT
Start: 2024-03-13 | End: 2024-03-13

## 2024-03-13 RX ORDER — LIDOCAINE HYDROCHLORIDE 20 MG/ML
JELLY TOPICAL ONCE
Status: DISCONTINUED | OUTPATIENT
Start: 2024-03-13 | End: 2024-03-14 | Stop reason: HOSPADM

## 2024-03-13 RX ORDER — LIDOCAINE 50 MG/G
OINTMENT TOPICAL ONCE
OUTPATIENT
Start: 2024-03-13 | End: 2024-03-13

## 2024-03-13 RX ORDER — IBUPROFEN 200 MG
TABLET ORAL ONCE
OUTPATIENT
Start: 2024-03-13 | End: 2024-03-13

## 2024-03-13 ASSESSMENT — PAIN SCALES - GENERAL: PAINLEVEL_OUTOF10: 7

## 2024-03-13 ASSESSMENT — PAIN DESCRIPTION - DESCRIPTORS: DESCRIPTORS: SHOOTING;SHARP

## 2024-03-13 NOTE — PLAN OF CARE
Problem: Chronic Conditions and Co-morbidities  Goal: Patient's chronic conditions and co-morbidity symptoms are monitored and maintained or improved  Outcome: Progressing     Problem: Pain  Goal: Verbalizes/displays adequate comfort level or baseline comfort level  Outcome: Progressing     Problem: ABCDS Injury Assessment  Goal: Absence of physical injury  Outcome: Progressing     Problem: Wound:  Goal: Will show signs of wound healing; wound closure and no evidence of infection  Description: Will show signs of wound healing; wound closure and no evidence of infection  Outcome: Progressing     Problem: Falls - Risk of:  Goal: Will remain free from falls  Description: Will remain free from falls  Outcome: Progressing

## 2024-03-13 NOTE — PROGRESS NOTES
[facility]   Progress Note and Procedure Note      Lee Mejia  MEDICAL RECORD NUMBER:  [id number]  AGE: 64 y.o.   GENDER: male  : 1960  EPISODE DATE:  3/13/2024    Subjective:     Chief Complaint   Patient presents with    Wound Check     Left lower extremity         HISTORY of PRESENT ILLNESS HPI     Lee Mejia is a 64 y.o. male who presents today for wound/ulcer evaluation.     History of left leg arterial thrombectomy and fasciotomy on 24. He has slow healing left lateral fasciotomy wound that required debridement on 24. He has history of foot drop on the left side from his initial ischemia. He is getting treated with wound vac therapy.        PAST MEDICAL HISTORY        Diagnosis Date    ATN (acute tubular necrosis) (HCC) 2023    COPD (chronic obstructive pulmonary disease) (HCC)     COPD (chronic obstructive pulmonary disease) (HCC)     Diabetes mellitus (HCC)     controlled with diet    Diabetes mellitus (HCC)     Left main coronary artery disease 2023    Mixed hyperlipidemia 2020       PAST SURGICAL HISTORY    Past Surgical History:   Procedure Laterality Date    CARDIAC PROCEDURE N/A 2023    Coronary angiography performed by Radha Escobar MD at [facility] CARDIAC CATH LAB    CARDIAC PROCEDURE N/A 2023    Percutaneous coronary intervention performed by Radha Escobar MD at [facility] CARDIAC CATH LAB    CARDIAC PROCEDURE N/A 2023    Intra-aortic balloon pump insertion performed by Radha Escobar MD at [facility] CARDIAC CATH LAB    CARDIAC PROCEDURE N/A 2023    Insert temporary pacemaker performed by Radha Escobar MD at [facility] CARDIAC CATH LAB    CARDIAC PROCEDURE N/A 2023    Intra-aortic balloon removal performed by Taj Daly MD at [facility] CARDIAC CATH LAB    CARDIAC PROCEDURE N/A 2023    Insert temporary pacemaker performed by Taj Daly MD at [facility] CARDIAC CATH LAB    CARDIAC PROCEDURE N/A

## 2024-03-13 NOTE — PROGRESS NOTES
Negative Pressure    NAME:  Lee Mejia  YOB: 1960  MEDICAL RECORD NUMBER:  [id number]  DATE:  3/13/2024    Applied Negative Pressure to left lower leg wound(s)/ulcer(s).  [x] Applied skin barrier prep to heraclio-wound.   [x] Cut strips of plastic drape to picture frame wound so that heraclio-wound is     covered with the drape.   [x] If bridging dressing to less prominent site, cover any intact skin that will come in contact with the Negative Pressure Therapy sponge, gauze or channel drain with plastic drape.  The sponge should never touch intact skin.   [x] Cut sponge, gauze or channel drain to size which will fit into the wound/ulcer bed without being forced.   [x] Be sure the sponge is large enough to hold the entire round plastic flange which is attached to the tubing.  Never allow flange to be larger than the sponge or it will produce suction damaging intact skin.  Total number of individual pieces of foam used within the wound bed: 1    [x] If bridging the dressing away from the primary site, be sure the bridge leads to a piece of sponge large enough to hold the entire flange without allowing any of the flange to overlap onto intact skin.   [x] Covered sponge, gauze or channel drain with plastic drape.   [x] Cut a hole in this plastic drape directly over the sponge the same size as the plastic drain tubing.   [x] Removed plastic liner from flange and apply it directly over the hole you cut.   [x] Removed the plastic cover from the flange.   [x] Attached the tubing to the wound/ulcer Negative Pressure Therapy and turn it on to be sure a vacuum is created and that there are no leaks.   [x] If air leaks occur, use plastic drape to patch them.   [x] Secured Negative Pressure Therapy dressing with ace wrap loosely if located on an extremity. Maintain tubing outside of ace wrap. Tubing must not exert pressure on intact skin.    Applied per  Guidelines      Electronically signed by Falguni WATTS

## 2024-03-18 NOTE — DISCHARGE INSTRUCTIONS
[facility] -Phone: [phone] Fax: [phone]    Visit  Discharge Instructions / Physician Orders     DATE: 3/20/2024     Home Care: [facility]     SUPPLIES ORDERED THRU: [facility]     Wound Location: Left Lateral Lower Leg     Cleanse with: Liquid antibacterial soap and water, rinse well      Dressing Orders: Skin Prep and Drape around wound, black foam into wound, continuous suction at 125 mmHg, ACE Wrap     Frequency: Change on Monday, Wednesday, Friday (Wound Care Center will change on appointment days)     Additional Orders: Increase protein to diet (meat, cheese, eggs, fish, peanut butter, nuts and beans)  ELEVATE LEGS AS MUCH AS POSSIBLE  Bring Foam and canister to wound care appointments     Your next appointment with Wound Care Center is in 1 week     (Please note your next appointment above and if you are unable to keep, kindly give a 24 hour notice. Thank you.)  If more than 15 min late we cannot guarantee you will be seen due to clinician schedule  Per Policy, Excessive cancellation will call for dismissal from program.     If you experience any of the following, please call the Wound Care Center during business hours:  [phone]  Your Phone call may be forwarded to [place] [facility] [facility] during business hours that [place] is closed.     * Increase in Pain  * Temperature over 101  * Increase in drainage from your wound  * Drainage with a foul odor  * Bleeding  * Increase in swelling  * Need for compression bandage changes due to slippage, breakthrough drainage.     If you need medical attention outside of the business hours of the Wound Care Centers please contact your PCP or go to the nearest emergency room.     The information contained in the After Visit Summary has been reviewed with me, the patient and/or responsible adult, by my health care provider(s). I had the opportunity to ask questions regarding this information. I have elected to receive;      []After Visit

## 2024-03-20 ENCOUNTER — HOSPITAL ENCOUNTER (OUTPATIENT)
Dept: WOUND CARE | Age: 64
Discharge: HOME | End: 2024-03-20
Attending: STUDENT IN AN ORGANIZED HEALTH CARE EDUCATION/TRAINING PROGRAM
Payer: MEDICAID

## 2024-03-20 VITALS
TEMPERATURE: 97.9 F | SYSTOLIC BLOOD PRESSURE: 116 MMHG | HEART RATE: 88 BPM | BODY MASS INDEX: 20.29 KG/M2 | DIASTOLIC BLOOD PRESSURE: 63 MMHG | WEIGHT: 125.66 LBS | RESPIRATION RATE: 16 BRPM

## 2024-03-20 DIAGNOSIS — T81.89XD NON-HEALING SURGICAL WOUND, SUBSEQUENT ENCOUNTER: Primary | ICD-10-CM

## 2024-03-20 PROCEDURE — 97605 NEG PRS WND THER DME<=50SQCM: CPT

## 2024-03-20 PROCEDURE — 11042 DBRDMT SUBQ TIS 1ST 20SQCM/<: CPT

## 2024-03-20 PROCEDURE — 11042 DBRDMT SUBQ TIS 1ST 20SQCM/<: CPT | Performed by: STUDENT IN AN ORGANIZED HEALTH CARE EDUCATION/TRAINING PROGRAM

## 2024-03-20 PROCEDURE — 11045 DBRDMT SUBQ TISS EACH ADDL: CPT | Performed by: STUDENT IN AN ORGANIZED HEALTH CARE EDUCATION/TRAINING PROGRAM

## 2024-03-20 PROCEDURE — 11045 DBRDMT SUBQ TISS EACH ADDL: CPT

## 2024-03-20 RX ORDER — SODIUM CHLOR/HYPOCHLOROUS ACID 0.033 %
SOLUTION, IRRIGATION IRRIGATION ONCE
OUTPATIENT
Start: 2024-03-20 | End: 2024-03-20

## 2024-03-20 RX ORDER — GINSENG 100 MG
CAPSULE ORAL ONCE
OUTPATIENT
Start: 2024-03-20 | End: 2024-03-20

## 2024-03-20 RX ORDER — LIDOCAINE HYDROCHLORIDE 20 MG/ML
JELLY TOPICAL ONCE
OUTPATIENT
Start: 2024-03-20 | End: 2024-03-20

## 2024-03-20 RX ORDER — TRIAMCINOLONE ACETONIDE 1 MG/G
OINTMENT TOPICAL ONCE
OUTPATIENT
Start: 2024-03-20 | End: 2024-03-20

## 2024-03-20 RX ORDER — LIDOCAINE HYDROCHLORIDE 20 MG/ML
JELLY TOPICAL ONCE
Status: COMPLETED | OUTPATIENT
Start: 2024-03-20 | End: 2024-03-20

## 2024-03-20 RX ORDER — BETAMETHASONE DIPROPIONATE 0.5 MG/G
CREAM TOPICAL ONCE
OUTPATIENT
Start: 2024-03-20 | End: 2024-03-20

## 2024-03-20 RX ORDER — BACITRACIN ZINC AND POLYMYXIN B SULFATE 500; 1000 [USP'U]/G; [USP'U]/G
OINTMENT TOPICAL ONCE
OUTPATIENT
Start: 2024-03-20 | End: 2024-03-20

## 2024-03-20 RX ORDER — CLOBETASOL PROPIONATE 0.5 MG/G
OINTMENT TOPICAL ONCE
OUTPATIENT
Start: 2024-03-20 | End: 2024-03-20

## 2024-03-20 RX ORDER — GENTAMICIN SULFATE 1 MG/G
OINTMENT TOPICAL ONCE
OUTPATIENT
Start: 2024-03-20 | End: 2024-03-20

## 2024-03-20 RX ORDER — LIDOCAINE 40 MG/G
CREAM TOPICAL ONCE
OUTPATIENT
Start: 2024-03-20 | End: 2024-03-20

## 2024-03-20 RX ORDER — LIDOCAINE HYDROCHLORIDE 40 MG/ML
SOLUTION TOPICAL ONCE
OUTPATIENT
Start: 2024-03-20 | End: 2024-03-20

## 2024-03-20 RX ORDER — LIDOCAINE 50 MG/G
OINTMENT TOPICAL ONCE
OUTPATIENT
Start: 2024-03-20 | End: 2024-03-20

## 2024-03-20 RX ORDER — IBUPROFEN 200 MG
TABLET ORAL ONCE
OUTPATIENT
Start: 2024-03-20 | End: 2024-03-20

## 2024-03-20 RX ADMIN — LIDOCAINE HYDROCHLORIDE 6 ML: 20 JELLY TOPICAL at 10:04

## 2024-03-20 ASSESSMENT — PAIN SCALES - GENERAL: PAINLEVEL_OUTOF10: 6

## 2024-03-20 ASSESSMENT — PAIN DESCRIPTION - ORIENTATION: ORIENTATION: RIGHT

## 2024-03-20 ASSESSMENT — PAIN DESCRIPTION - LOCATION: LOCATION: LEG

## 2024-03-20 ASSESSMENT — PAIN DESCRIPTION - ONSET: ONSET: AWAKENED FROM SLEEP

## 2024-03-20 ASSESSMENT — PAIN DESCRIPTION - FREQUENCY: FREQUENCY: INTERMITTENT

## 2024-03-20 ASSESSMENT — PAIN - FUNCTIONAL ASSESSMENT: PAIN_FUNCTIONAL_ASSESSMENT: ACTIVITIES ARE NOT PREVENTED

## 2024-03-20 ASSESSMENT — PAIN DESCRIPTION - DESCRIPTORS: DESCRIPTORS: SHARP;SHOOTING

## 2024-03-20 NOTE — PROGRESS NOTES
PRABHAKAR BECKETT on 3/20/2024 at 11:10 AM  
Bleeding;Slough;Granulation tissue 03/20/24 1000   Drainage Amount Moderate (25-50%) 03/20/24 1000   Drainage Description Serosanguinous 03/20/24 1000   Odor None 03/20/24 1000   Karlie-wound Assessment Blanchable erythema 03/20/24 1000   Margins Defined edges 03/20/24 1000   Wound Thickness Description not for Pressure Injury Full thickness 03/20/24 1000   Number of days: 75     Incision 01/05/24 Leg Left;Lower;Medial (Active)   Number of days: 75       Percent of Wound(s)/Ulcer(s) Debrided: 100%    Total Surface Area Debrided:  33 sq cm     Estimated Blood Loss:  Minimal    Hemostasis Achieved:  by pressure    Procedural Pain:  2  / 10     Post Procedural Pain:  1 / 10     Response to treatment:  Well tolerated by patient.       Plan:     Continue wound vac. I explained to him that his nerve injury is unrelated to his wound healing prognosis. The wound appears to be healing well.    Treatment Note please see Discharge Instructions    Written patient dismissal instructions given to patient and signed by patient or POA.             Electronically signed by SAVANAH MIRANDA MD on 3/20/2024 at 11:03 AM

## 2024-03-25 NOTE — DISCHARGE INSTRUCTIONS
[facility] -Phone: [phone] Fax: [phone]    Visit  Discharge Instructions / Physician Orders     DATE: 3/27/2024     Home Care: [facility]     SUPPLIES ORDERED THRU: [facility]     Wound Location: Left Lateral Lower Leg     Cleanse with: Liquid antibacterial soap and water, rinse well      Dressing Orders: Skin Prep and Drape around wound, black foam into wound, continuous suction at 125 mmHg, ACE Wrap     Frequency: Change on Monday, Wednesday, Friday (Wound Care Center will change on appointment days)     Additional Orders: Increase protein to diet (meat, cheese, eggs, fish, peanut butter, nuts and beans)  ELEVATE LEGS AS MUCH AS POSSIBLE  Bring Foam and canister to wound care appointments     Your next appointment with Wound Care Center is in 1 week     (Please note your next appointment above and if you are unable to keep, kindly give a 24 hour notice. Thank you.)  If more than 15 min late we cannot guarantee you will be seen due to clinician schedule  Per Policy, Excessive cancellation will call for dismissal from program.     If you experience any of the following, please call the Wound Care Center during business hours:  [phone]  Your Phone call may be forwarded to [facility] during business hours that [place] is closed.     * Increase in Pain  * Temperature over 101  * Increase in drainage from your wound  * Drainage with a foul odor  * Bleeding  * Increase in swelling  * Need for compression bandage changes due to slippage, breakthrough drainage.     If you need medical attention outside of the business hours of the Wound Care Centers please contact your PCP or go to the nearest emergency room.     The information contained in the After Visit Summary has been reviewed with me, the patient and/or responsible adult, by my health care provider(s). I had the opportunity to ask questions regarding this information. I have elected to receive;      []After Visit

## 2024-03-27 ENCOUNTER — HOSPITAL ENCOUNTER (OUTPATIENT)
Dept: WOUND CARE | Age: 64
Discharge: HOME | End: 2024-03-27
Attending: STUDENT IN AN ORGANIZED HEALTH CARE EDUCATION/TRAINING PROGRAM
Payer: MEDICAID

## 2024-03-27 VITALS — DIASTOLIC BLOOD PRESSURE: 55 MMHG | HEART RATE: 86 BPM | TEMPERATURE: 97.5 F | SYSTOLIC BLOOD PRESSURE: 110 MMHG

## 2024-03-27 DIAGNOSIS — T81.89XD NON-HEALING SURGICAL WOUND, SUBSEQUENT ENCOUNTER: Primary | ICD-10-CM

## 2024-03-27 PROCEDURE — 11042 DBRDMT SUBQ TIS 1ST 20SQCM/<: CPT | Performed by: STUDENT IN AN ORGANIZED HEALTH CARE EDUCATION/TRAINING PROGRAM

## 2024-03-27 PROCEDURE — 11045 DBRDMT SUBQ TISS EACH ADDL: CPT | Performed by: STUDENT IN AN ORGANIZED HEALTH CARE EDUCATION/TRAINING PROGRAM

## 2024-03-27 PROCEDURE — 97605 NEG PRS WND THER DME<=50SQCM: CPT

## 2024-03-27 PROCEDURE — 11045 DBRDMT SUBQ TISS EACH ADDL: CPT

## 2024-03-27 PROCEDURE — 11042 DBRDMT SUBQ TIS 1ST 20SQCM/<: CPT

## 2024-03-27 RX ORDER — CLOBETASOL PROPIONATE 0.5 MG/G
OINTMENT TOPICAL ONCE
OUTPATIENT
Start: 2024-03-27 | End: 2024-03-27

## 2024-03-27 RX ORDER — GENTAMICIN SULFATE 1 MG/G
OINTMENT TOPICAL ONCE
OUTPATIENT
Start: 2024-03-27 | End: 2024-03-27

## 2024-03-27 RX ORDER — SODIUM CHLOR/HYPOCHLOROUS ACID 0.033 %
SOLUTION, IRRIGATION IRRIGATION ONCE
OUTPATIENT
Start: 2024-03-27 | End: 2024-03-27

## 2024-03-27 RX ORDER — BACITRACIN ZINC AND POLYMYXIN B SULFATE 500; 1000 [USP'U]/G; [USP'U]/G
OINTMENT TOPICAL ONCE
OUTPATIENT
Start: 2024-03-27 | End: 2024-03-27

## 2024-03-27 RX ORDER — TRIAMCINOLONE ACETONIDE 1 MG/G
OINTMENT TOPICAL ONCE
OUTPATIENT
Start: 2024-03-27 | End: 2024-03-27

## 2024-03-27 RX ORDER — IBUPROFEN 200 MG
TABLET ORAL ONCE
OUTPATIENT
Start: 2024-03-27 | End: 2024-03-27

## 2024-03-27 RX ORDER — LIDOCAINE HYDROCHLORIDE 20 MG/ML
JELLY TOPICAL ONCE
OUTPATIENT
Start: 2024-03-27 | End: 2024-03-27

## 2024-03-27 RX ORDER — LIDOCAINE HYDROCHLORIDE 40 MG/ML
SOLUTION TOPICAL ONCE
OUTPATIENT
Start: 2024-03-27 | End: 2024-03-27

## 2024-03-27 RX ORDER — LIDOCAINE HYDROCHLORIDE 20 MG/ML
JELLY TOPICAL ONCE
Status: COMPLETED | OUTPATIENT
Start: 2024-03-27 | End: 2024-03-27

## 2024-03-27 RX ORDER — GINSENG 100 MG
CAPSULE ORAL ONCE
OUTPATIENT
Start: 2024-03-27 | End: 2024-03-27

## 2024-03-27 RX ORDER — LIDOCAINE 50 MG/G
OINTMENT TOPICAL ONCE
OUTPATIENT
Start: 2024-03-27 | End: 2024-03-27

## 2024-03-27 RX ORDER — LIDOCAINE 40 MG/G
CREAM TOPICAL ONCE
OUTPATIENT
Start: 2024-03-27 | End: 2024-03-27

## 2024-03-27 RX ORDER — BETAMETHASONE DIPROPIONATE 0.5 MG/G
CREAM TOPICAL ONCE
OUTPATIENT
Start: 2024-03-27 | End: 2024-03-27

## 2024-03-27 RX ADMIN — LIDOCAINE HYDROCHLORIDE 6 ML: 20 JELLY TOPICAL at 11:11

## 2024-03-27 ASSESSMENT — PAIN SCALES - GENERAL: PAINLEVEL_OUTOF10: 7

## 2024-03-27 ASSESSMENT — PAIN DESCRIPTION - ORIENTATION: ORIENTATION: LEFT;LOWER

## 2024-03-27 ASSESSMENT — PAIN DESCRIPTION - LOCATION: LOCATION: LEG

## 2024-03-27 ASSESSMENT — PAIN DESCRIPTION - DESCRIPTORS: DESCRIPTORS: THROBBING;SHOOTING

## 2024-03-27 NOTE — PROGRESS NOTES
PRABHAKAR BECKETT on 3/27/2024 at 11:30 AM   
03/27/24 1101   Karlie-wound Assessment Edematous 03/27/24 1101   Margins Defined edges 03/27/24 1101   Wound Thickness Description not for Pressure Injury Full thickness 03/27/24 1101   Number of days: 82     Incision 01/05/24 Leg Left;Lower;Medial (Active)   Number of days: 82       Percent of Wound(s)/Ulcer(s) Debrided: 100%    Total Surface Area Debrided:  22.26 sq cm     Estimated Blood Loss:  Minimal    Hemostasis Achieved:  by pressure    Procedural Pain:  2  / 10     Post Procedural Pain:  1 / 10     Response to treatment:  Well tolerated by patient.       Plan:     Continue wound vac. Recommend follow up with cardiology regarding cardiac meds and follow up with PCP regarding referral to pain management team.    Treatment Note please see Discharge Instructions    Written patient dismissal instructions given to patient and signed by patient or POA.             Electronically signed by SAVANAH MIRANDA MD on 3/27/2024 at 11:32 AM

## 2024-03-28 PROBLEM — F17.200 SMOKING: Status: ACTIVE | Noted: 2024-01-06

## 2024-03-28 PROBLEM — F51.02 ADJUSTMENT INSOMNIA: Status: ACTIVE | Noted: 2024-03-28

## 2024-03-28 PROBLEM — M62.838 MUSCLE SPASM: Status: ACTIVE | Noted: 2024-03-28

## 2024-03-28 PROBLEM — D50.9 NORMOCYTIC HYPOCHROMIC ANEMIA: Status: ACTIVE | Noted: 2024-03-28

## 2024-03-31 PROBLEM — E83.51 HYPOCALCEMIA: Status: ACTIVE | Noted: 2024-03-31

## 2024-03-31 PROBLEM — E87.1 HYPONATREMIA: Status: ACTIVE | Noted: 2024-03-31

## 2024-03-31 PROBLEM — Z71.89 ACP (ADVANCE CARE PLANNING): Status: ACTIVE | Noted: 2024-03-31

## 2024-04-01 NOTE — DISCHARGE INSTRUCTIONS
formerly [facility] -Phone: [phone] Fax: [phone]    Visit  Discharge Instructions / Physician Orders     DATE: 4/3/2024     Home Care: [facility]     SUPPLIES ORDERED THRU: [facility]     Wound Location: Left Lateral Lower Leg     Cleanse with: Liquid antibacterial soap and water, rinse well      Dressing Orders: Skin Prep and Drape around wound, black foam into wound, continuous suction at 125 mmHg, ACE Wrap     Frequency: Change on Monday, Wednesday, Friday (Wound Care Center will change on appointment days)     Additional Orders: Increase protein to diet (meat, cheese, eggs, fish, peanut butter, nuts and beans)  ELEVATE LEGS AS MUCH AS POSSIBLE  Bring Foam and canister to wound care appointments     Your next appointment with Wound Care Center is in 1 week     (Please note your next appointment above and if you are unable to keep, kindly give a 24 hour notice. Thank you.)  If more than 15 min late we cannot guarantee you will be seen due to clinician schedule  Per Policy, Excessive cancellation will call for dismissal from program.     If you experience any of the following, please call the Wound Care Center during business hours:  [phone]  Your Phone call may be forwarded to [facility] during business hours that [place] is closed.     * Increase in Pain  * Temperature over 101  * Increase in drainage from your wound  * Drainage with a foul odor  * Bleeding  * Increase in swelling  * Need for compression bandage changes due to slippage, breakthrough drainage.     If you need medical attention outside of the business hours of the Wound Care Centers please contact your PCP or go to the nearest emergency room.     The information contained in the After Visit Summary has been reviewed with me, the patient and/or responsible adult, by my health care provider(s). I had the opportunity to ask questions regarding this information. I have elected to receive;      []After Visit

## 2024-04-03 ENCOUNTER — HOSPITAL ENCOUNTER (OUTPATIENT)
Dept: WOUND CARE | Age: 64
Discharge: HOME | End: 2024-04-03
Attending: STUDENT IN AN ORGANIZED HEALTH CARE EDUCATION/TRAINING PROGRAM
Payer: MEDICAID

## 2024-04-03 ENCOUNTER — HOSPITAL ENCOUNTER (OUTPATIENT)
Age: 64
Setting detail: SPECIMEN
Discharge: HOME | End: 2024-04-03
Payer: MEDICAID

## 2024-04-03 VITALS
HEART RATE: 76 BPM | DIASTOLIC BLOOD PRESSURE: 76 MMHG | HEIGHT: 66 IN | TEMPERATURE: 96 F | RESPIRATION RATE: 15 BRPM | SYSTOLIC BLOOD PRESSURE: 128 MMHG | BODY MASS INDEX: 19.29 KG/M2 | WEIGHT: 120 LBS

## 2024-04-03 DIAGNOSIS — E83.51 HYPOCALCEMIA: ICD-10-CM

## 2024-04-03 DIAGNOSIS — E87.1 HYPONATREMIA: ICD-10-CM

## 2024-04-03 DIAGNOSIS — D50.9 NORMOCYTIC HYPOCHROMIC ANEMIA: ICD-10-CM

## 2024-04-03 DIAGNOSIS — T81.89XD NON-HEALING SURGICAL WOUND, SUBSEQUENT ENCOUNTER: Primary | ICD-10-CM

## 2024-04-03 LAB
ALBUMIN SERPL-MCNC: 4 G/DL (ref 3.5–5.2)
ALBUMIN/GLOB SERPL: 1 {RATIO} (ref 1–2.5)
ALP SERPL-CCNC: 114 U/L (ref 40–129)
ALT SERPL-CCNC: 12 U/L (ref 10–50)
ANION GAP SERPL CALCULATED.3IONS-SCNC: 9 MMOL/L (ref 9–16)
AST SERPL-CCNC: 20 U/L (ref 10–50)
BILIRUB SERPL-MCNC: 0.2 MG/DL (ref 0–1.2)
BUN SERPL-MCNC: 12 MG/DL (ref 8–23)
CALCIUM SERPL-MCNC: 9.2 MG/DL (ref 8.6–10.4)
CHLORIDE SERPL-SCNC: 103 MMOL/L (ref 98–107)
CO2 SERPL-SCNC: 25 MMOL/L (ref 20–31)
CREAT SERPL-MCNC: 0.7 MG/DL (ref 0.7–1.2)
ERYTHROCYTE [DISTWIDTH] IN BLOOD BY AUTOMATED COUNT: 16.5 % (ref 11.8–14.4)
GFR SERPL CREATININE-BSD FRML MDRD: >90 ML/MIN/1.73M2
GLUCOSE SERPL-MCNC: 110 MG/DL (ref 74–99)
HCT VFR BLD AUTO: 32.4 % (ref 40.7–50.3)
HGB BLD-MCNC: 9.7 G/DL (ref 13–17)
MCH RBC QN AUTO: 24.4 PG (ref 25.2–33.5)
MCHC RBC AUTO-ENTMCNC: 29.9 G/DL (ref 28.4–34.8)
MCV RBC AUTO: 81.6 FL (ref 82.6–102.9)
NRBC BLD-RTO: 0 PER 100 WBC
PLATELET # BLD AUTO: 441 K/UL (ref 138–453)
PMV BLD AUTO: 10.6 FL (ref 8.1–13.5)
POTASSIUM SERPL-SCNC: 4.3 MMOL/L (ref 3.7–5.3)
PROT SERPL-MCNC: 7.6 G/DL (ref 6.6–8.7)
RBC # BLD AUTO: 3.97 M/UL (ref 4.21–5.77)
SODIUM SERPL-SCNC: 137 MMOL/L (ref 136–145)
WBC OTHER # BLD: 10 K/UL (ref 3.5–11.3)

## 2024-04-03 PROCEDURE — 11042 DBRDMT SUBQ TIS 1ST 20SQCM/<: CPT

## 2024-04-03 PROCEDURE — 80053 COMPREHEN METABOLIC PANEL: CPT

## 2024-04-03 PROCEDURE — 11042 DBRDMT SUBQ TIS 1ST 20SQCM/<: CPT | Performed by: STUDENT IN AN ORGANIZED HEALTH CARE EDUCATION/TRAINING PROGRAM

## 2024-04-03 PROCEDURE — 11045 DBRDMT SUBQ TISS EACH ADDL: CPT | Performed by: STUDENT IN AN ORGANIZED HEALTH CARE EDUCATION/TRAINING PROGRAM

## 2024-04-03 PROCEDURE — 36415 COLL VENOUS BLD VENIPUNCTURE: CPT

## 2024-04-03 PROCEDURE — 85027 COMPLETE CBC AUTOMATED: CPT

## 2024-04-03 PROCEDURE — 11045 DBRDMT SUBQ TISS EACH ADDL: CPT

## 2024-04-03 PROCEDURE — 97605 NEG PRS WND THER DME<=50SQCM: CPT

## 2024-04-03 RX ORDER — GINSENG 100 MG
CAPSULE ORAL ONCE
OUTPATIENT
Start: 2024-04-03 | End: 2024-04-03

## 2024-04-03 RX ORDER — TRIAMCINOLONE ACETONIDE 1 MG/G
OINTMENT TOPICAL ONCE
OUTPATIENT
Start: 2024-04-03 | End: 2024-04-03

## 2024-04-03 RX ORDER — SODIUM CHLOR/HYPOCHLOROUS ACID 0.033 %
SOLUTION, IRRIGATION IRRIGATION ONCE
OUTPATIENT
Start: 2024-04-03 | End: 2024-04-03

## 2024-04-03 RX ORDER — GENTAMICIN SULFATE 1 MG/G
OINTMENT TOPICAL ONCE
OUTPATIENT
Start: 2024-04-03 | End: 2024-04-03

## 2024-04-03 RX ORDER — CLOBETASOL PROPIONATE 0.5 MG/G
OINTMENT TOPICAL ONCE
OUTPATIENT
Start: 2024-04-03 | End: 2024-04-03

## 2024-04-03 RX ORDER — BETAMETHASONE DIPROPIONATE 0.5 MG/G
CREAM TOPICAL ONCE
OUTPATIENT
Start: 2024-04-03 | End: 2024-04-03

## 2024-04-03 RX ORDER — LIDOCAINE 50 MG/G
OINTMENT TOPICAL ONCE
OUTPATIENT
Start: 2024-04-03 | End: 2024-04-03

## 2024-04-03 RX ORDER — LIDOCAINE HYDROCHLORIDE 40 MG/ML
SOLUTION TOPICAL ONCE
OUTPATIENT
Start: 2024-04-03 | End: 2024-04-03

## 2024-04-03 RX ORDER — LIDOCAINE HYDROCHLORIDE 20 MG/ML
JELLY TOPICAL ONCE
OUTPATIENT
Start: 2024-04-03 | End: 2024-04-03

## 2024-04-03 RX ORDER — BACITRACIN ZINC AND POLYMYXIN B SULFATE 500; 1000 [USP'U]/G; [USP'U]/G
OINTMENT TOPICAL ONCE
OUTPATIENT
Start: 2024-04-03 | End: 2024-04-03

## 2024-04-03 RX ORDER — IBUPROFEN 200 MG
TABLET ORAL ONCE
OUTPATIENT
Start: 2024-04-03 | End: 2024-04-03

## 2024-04-03 RX ORDER — LIDOCAINE HYDROCHLORIDE 20 MG/ML
JELLY TOPICAL ONCE
Status: COMPLETED | OUTPATIENT
Start: 2024-04-03 | End: 2024-04-03

## 2024-04-03 RX ORDER — LIDOCAINE 40 MG/G
CREAM TOPICAL ONCE
OUTPATIENT
Start: 2024-04-03 | End: 2024-04-03

## 2024-04-03 RX ADMIN — LIDOCAINE HYDROCHLORIDE 6 ML: 20 JELLY TOPICAL at 09:50

## 2024-04-03 ASSESSMENT — PAIN - FUNCTIONAL ASSESSMENT: PAIN_FUNCTIONAL_ASSESSMENT: ACTIVITIES ARE NOT PREVENTED

## 2024-04-03 ASSESSMENT — PAIN DESCRIPTION - LOCATION: LOCATION: LEG

## 2024-04-03 ASSESSMENT — PAIN DESCRIPTION - ORIENTATION: ORIENTATION: LEFT;LOWER

## 2024-04-03 ASSESSMENT — PAIN SCALES - GENERAL: PAINLEVEL_OUTOF10: 7

## 2024-04-03 ASSESSMENT — PAIN DESCRIPTION - ONSET: ONSET: ON-GOING

## 2024-04-03 ASSESSMENT — PAIN DESCRIPTION - FREQUENCY: FREQUENCY: INTERMITTENT

## 2024-04-03 ASSESSMENT — PAIN DESCRIPTION - DESCRIPTORS: DESCRIPTORS: ACHING;THROBBING;SHOOTING

## 2024-04-03 ASSESSMENT — PAIN DESCRIPTION - PAIN TYPE: TYPE: ACUTE PAIN

## 2024-04-03 ASSESSMENT — PAIN SCALES - WONG BAKER: WONGBAKER_NUMERICALRESPONSE: NO HURT

## 2024-04-03 NOTE — PLAN OF CARE
Problem: Chronic Conditions and Co-morbidities  Goal: Patient's chronic conditions and co-morbidity symptoms are monitored and maintained or improved  Outcome: Progressing     Problem: Pain  Goal: Verbalizes/displays adequate comfort level or baseline comfort level  Outcome: Progressing     Problem: Wound:  Goal: Will show signs of wound healing; wound closure and no evidence of infection  Description: Will show signs of wound healing; wound closure and no evidence of infection  Outcome: Progressing     Problem: Falls - Risk of:  Goal: Will remain free from falls  Description: Will remain free from falls  Outcome: Progressing      No

## 2024-04-03 NOTE — PROGRESS NOTES
[facility]   Progress Note and Procedure Note      Lee Mejia  MEDICAL RECORD NUMBER:  [id number]  AGE: 64 y.o.   GENDER: male  : 1960  EPISODE DATE:  4/3/2024    Subjective:     Chief Complaint   Patient presents with    Wound Check     Left lower leg         HISTORY of PRESENT ILLNESS HPI    Lee Mejia is a 64 y.o. male who presents today for wound/ulcer evaluation.      History of left leg arterial thrombectomy and fasciotomy on 24. He has slow healing left lateral fasciotomy wound that required debridement on 24. He has history of foot drop on the left side from his initial ischemia. He is getting treated with wound vac therapy.                                PAST MEDICAL HISTORY        Diagnosis Date    ATN (acute tubular necrosis) (HCC) 2023    COPD (chronic obstructive pulmonary disease) (HCC)     COPD (chronic obstructive pulmonary disease) (HCC)     Diabetes mellitus (HCC)     controlled with diet    Diabetes mellitus (HCC)     Left main coronary artery disease 2023    Mixed hyperlipidemia 2020       PAST SURGICAL HISTORY    Past Surgical History:   Procedure Laterality Date    CARDIAC PROCEDURE N/A 2023    Coronary angiography performed by Radha Escobar MD at [facility] CARDIAC CATH LAB    CARDIAC PROCEDURE N/A 2023    Percutaneous coronary intervention performed by Radha Escobar MD at [facility] CARDIAC CATH LAB    CARDIAC PROCEDURE N/A 2023    Intra-aortic balloon pump insertion performed by Radha Escobar MD at [facility] CARDIAC CATH LAB    CARDIAC PROCEDURE N/A 2023    Insert temporary pacemaker performed by Radha Escobar MD at [facility] CARDIAC CATH LAB    CARDIAC PROCEDURE N/A 2023    Intra-aortic balloon removal performed by Taj Daly MD at [facility] CARDIAC CATH LAB    CARDIAC PROCEDURE N/A 2023    Insert temporary pacemaker performed by Taj Daly MD at [facility] CARDIAC CATH LAB    CARDIAC

## 2024-04-09 NOTE — DISCHARGE INSTRUCTIONS
[facility] -Phone: [phone] Fax: [phone]    Visit  Discharge Instructions / Physician Orders     DATE: 4/10/2024     Home Care: [facility]     SUPPLIES ORDERED THRU: [facility]     Wound Location: Left Lateral Lower Leg     Cleanse with: Liquid antibacterial soap and water, rinse well      Dressing Orders: Skin Prep and Drape around wound, black foam into wound, continuous suction at 125 mmHg, ACE Wrap (Ensure suction pad is not in contact with skin)     Frequency: Change on Monday, Wednesday, Friday (Wound Care Center will change on appointment days)     Additional Orders: Increase protein to diet (meat, cheese, eggs, fish, peanut butter, nuts and beans)  ELEVATE LEGS AS MUCH AS POSSIBLE  Bring Foam and canister to wound care appointments     Your next appointment with Wound Care Center is in 1 week     (Please note your next appointment above and if you are unable to keep, kindly give a 24 hour notice. Thank you.)  If more than 15 min late we cannot guarantee you will be seen due to clinician schedule  Per Policy, Excessive cancellation will call for dismissal from program.     If you experience any of the following, please call the Wound Care Center during business hours:  [phone]  Your Phone call may be forwarded to [facility] [place] during business hours that [place] is closed.     * Increase in Pain  * Temperature over 101  * Increase in drainage from your wound  * Drainage with a foul odor  * Bleeding  * Increase in swelling  * Need for compression bandage changes due to slippage, breakthrough drainage.     If you need medical attention outside of the business hours of the Wound Care Centers please contact your PCP or go to the nearest emergency room.     The information contained in the After Visit Summary has been reviewed with me, the patient and/or responsible adult, by my health care provider(s). I had the opportunity to ask questions regarding this information. I

## 2024-04-10 ENCOUNTER — HOSPITAL ENCOUNTER (OUTPATIENT)
Dept: WOUND CARE | Age: 64
Discharge: HOME | End: 2024-04-10
Attending: STUDENT IN AN ORGANIZED HEALTH CARE EDUCATION/TRAINING PROGRAM
Payer: MEDICAID

## 2024-04-10 VITALS
HEART RATE: 88 BPM | SYSTOLIC BLOOD PRESSURE: 121 MMHG | BODY MASS INDEX: 19.21 KG/M2 | RESPIRATION RATE: 16 BRPM | TEMPERATURE: 97.2 F | DIASTOLIC BLOOD PRESSURE: 78 MMHG | WEIGHT: 119.05 LBS

## 2024-04-10 DIAGNOSIS — T81.89XD NON-HEALING SURGICAL WOUND, SUBSEQUENT ENCOUNTER: Primary | ICD-10-CM

## 2024-04-10 PROCEDURE — 97605 NEG PRS WND THER DME<=50SQCM: CPT

## 2024-04-10 PROCEDURE — 11042 DBRDMT SUBQ TIS 1ST 20SQCM/<: CPT

## 2024-04-10 PROCEDURE — 11045 DBRDMT SUBQ TISS EACH ADDL: CPT

## 2024-04-10 RX ORDER — TRIAMCINOLONE ACETONIDE 1 MG/G
OINTMENT TOPICAL ONCE
OUTPATIENT
Start: 2024-04-10 | End: 2024-04-10

## 2024-04-10 RX ORDER — IBUPROFEN 200 MG
TABLET ORAL ONCE
OUTPATIENT
Start: 2024-04-10 | End: 2024-04-10

## 2024-04-10 RX ORDER — BACITRACIN ZINC AND POLYMYXIN B SULFATE 500; 1000 [USP'U]/G; [USP'U]/G
OINTMENT TOPICAL ONCE
OUTPATIENT
Start: 2024-04-10 | End: 2024-04-10

## 2024-04-10 RX ORDER — CLOBETASOL PROPIONATE 0.5 MG/G
OINTMENT TOPICAL ONCE
OUTPATIENT
Start: 2024-04-10 | End: 2024-04-10

## 2024-04-10 RX ORDER — LIDOCAINE 50 MG/G
OINTMENT TOPICAL ONCE
OUTPATIENT
Start: 2024-04-10 | End: 2024-04-10

## 2024-04-10 RX ORDER — BETAMETHASONE DIPROPIONATE 0.5 MG/G
CREAM TOPICAL ONCE
OUTPATIENT
Start: 2024-04-10 | End: 2024-04-10

## 2024-04-10 RX ORDER — LIDOCAINE HYDROCHLORIDE 20 MG/ML
JELLY TOPICAL ONCE
Status: COMPLETED | OUTPATIENT
Start: 2024-04-10 | End: 2024-04-10

## 2024-04-10 RX ORDER — LIDOCAINE 40 MG/G
CREAM TOPICAL ONCE
OUTPATIENT
Start: 2024-04-10 | End: 2024-04-10

## 2024-04-10 RX ORDER — SODIUM CHLOR/HYPOCHLOROUS ACID 0.033 %
SOLUTION, IRRIGATION IRRIGATION ONCE
OUTPATIENT
Start: 2024-04-10 | End: 2024-04-10

## 2024-04-10 RX ORDER — GINSENG 100 MG
CAPSULE ORAL ONCE
OUTPATIENT
Start: 2024-04-10 | End: 2024-04-10

## 2024-04-10 RX ORDER — GENTAMICIN SULFATE 1 MG/G
OINTMENT TOPICAL ONCE
OUTPATIENT
Start: 2024-04-10 | End: 2024-04-10

## 2024-04-10 RX ORDER — LIDOCAINE HYDROCHLORIDE 40 MG/ML
SOLUTION TOPICAL ONCE
OUTPATIENT
Start: 2024-04-10 | End: 2024-04-10

## 2024-04-10 RX ORDER — LIDOCAINE HYDROCHLORIDE 20 MG/ML
JELLY TOPICAL ONCE
OUTPATIENT
Start: 2024-04-10 | End: 2024-04-10

## 2024-04-10 RX ADMIN — LIDOCAINE HYDROCHLORIDE 6 ML: 20 JELLY TOPICAL at 10:07

## 2024-04-10 ASSESSMENT — PAIN DESCRIPTION - ORIENTATION: ORIENTATION: LEFT;LOWER

## 2024-04-10 ASSESSMENT — PAIN DESCRIPTION - DESCRIPTORS: DESCRIPTORS: ACHING;THROBBING

## 2024-04-10 ASSESSMENT — PAIN SCALES - GENERAL: PAINLEVEL_OUTOF10: 8

## 2024-04-10 ASSESSMENT — PAIN DESCRIPTION - LOCATION: LOCATION: LEG

## 2024-04-10 NOTE — PROGRESS NOTES
[facility]   Progress Note and Procedure Note      Lee Mejia  MEDICAL RECORD NUMBER:  [id number]  AGE: 64 y.o.   GENDER: male  : 1960  EPISODE DATE:  4/10/2024    Subjective:     Chief Complaint   Patient presents with    Wound Check     Left leg         HISTORY of PRESENT ILLNESS HPI     Lee Mejia is a 64 y.o. male who presents today for wound/ulcer evaluation.     History of Wound Context: History of left leg arterial thrombectomy and fasciotomy on 24. He has slow healing left lateral fasciotomy wound that required debridement on 24. He has history of foot drop on the left side from his initial ischemia. He is getting treated with wound vac therapy.     Interval history: wound continues to improve. Some minor hypergranulation treated with silver nitrate today. Complaining of increased pain to area, but believes is related to suction pad not being supported well with dressing changes.     Wound/Ulcer Pain Timing/Severity: constant  Quality of pain: aching, burning, shooting  Severity:  4 / 10   Modifying Factors: Pain is relieved/improved with rest  Associated Signs/Symptoms: edema, erythema, and drainage    Wound/Ulcer Identification:  Ulcer Type:  infectious process, history of fasciotomy  Contributing Factors: edema and diabetes          PAST MEDICAL HISTORY        Diagnosis Date    ATN (acute tubular necrosis) (HCC) 2023    COPD (chronic obstructive pulmonary disease) (HCC)     COPD (chronic obstructive pulmonary disease) (HCC)     Diabetes mellitus (HCC)     controlled with diet    Diabetes mellitus (HCC)     Left main coronary artery disease 2023    Mixed hyperlipidemia 2020       PAST SURGICAL HISTORY    Past Surgical History:   Procedure Laterality Date    CARDIAC PROCEDURE N/A 2023    Coronary angiography performed by Radha Escobar MD at [facility] CARDIAC CATH LAB    CARDIAC PROCEDURE N/A 2023    Percutaneous coronary 
PRABHAKAR BECKETT on 4/10/2024 at 10:36 AM

## 2024-04-15 NOTE — DISCHARGE INSTRUCTIONS
[facility] -Phone: [phone] Fax: [phone]    Visit  Discharge Instructions / Physician Orders     DATE: 4/17/2024     Home Care: [facility]     SUPPLIES ORDERED THRU: [facility]- Wound Vac can be returned at this time     Wound Location: Left Lateral Lower Leg     Cleanse with: Soap and Water if wrap needs changed     Dressing Orders: Silvercel to wound, KerraMax, Zinc Unna Boot- Home Care may change dressing once if draining through wrap     Frequency: Keep Dry and Intact     Additional Orders: Increase protein to diet (meat, cheese, eggs, fish, peanut butter, nuts and beans)  ELEVATE LEGS AS MUCH AS POSSIBLE  Bring Foam and canister to wound care appointments     Your next appointment with Wound Care Center is in 1 week     (Please note your next appointment above and if you are unable to keep, kindly give a 24 hour notice. Thank you.)  If more than 15 min late we cannot guarantee you will be seen due to clinician schedule  Per Policy, Excessive cancellation will call for dismissal from program.     If you experience any of the following, please call the Wound Care Center during business hours:  [phone]  Your Phone call may be forwarded to [facility] during business hours that [place] is closed.     * Increase in Pain  * Temperature over 101  * Increase in drainage from your wound  * Drainage with a foul odor  * Bleeding  * Increase in swelling  * Need for compression bandage changes due to slippage, breakthrough drainage.     If you need medical attention outside of the business hours of the Wound Care Centers please contact your PCP or go to the nearest emergency room.     The information contained in the After Visit Summary has been reviewed with me, the patient and/or responsible adult, by my health care provider(s). I had the opportunity to ask questions regarding this information. I have elected to receive;      []After Visit Summary  [x]Comprehensive Discharge

## 2024-04-16 PROBLEM — E11.9 TYPE 2 DIABETES MELLITUS WITHOUT COMPLICATIONS (HCC): Chronic | Status: ACTIVE | Noted: 2021-03-22

## 2024-04-16 PROBLEM — R07.9 CHEST PAIN: Status: ACTIVE | Noted: 2017-04-15

## 2024-04-16 PROBLEM — K76.0 FATTY (CHANGE OF) LIVER, NOT ELSEWHERE CLASSIFIED: Status: ACTIVE | Noted: 2021-03-22

## 2024-04-17 ENCOUNTER — HOSPITAL ENCOUNTER (OUTPATIENT)
Dept: WOUND CARE | Age: 64
Discharge: HOME | End: 2024-04-17
Attending: STUDENT IN AN ORGANIZED HEALTH CARE EDUCATION/TRAINING PROGRAM
Payer: MEDICAID

## 2024-04-17 VITALS
TEMPERATURE: 96.5 F | SYSTOLIC BLOOD PRESSURE: 120 MMHG | HEIGHT: 65 IN | WEIGHT: 125 LBS | DIASTOLIC BLOOD PRESSURE: 63 MMHG | BODY MASS INDEX: 20.83 KG/M2 | HEART RATE: 77 BPM | RESPIRATION RATE: 18 BRPM

## 2024-04-17 DIAGNOSIS — T81.89XD NON-HEALING SURGICAL WOUND, SUBSEQUENT ENCOUNTER: Primary | ICD-10-CM

## 2024-04-17 PROCEDURE — 11042 DBRDMT SUBQ TIS 1ST 20SQCM/<: CPT

## 2024-04-17 PROCEDURE — 11042 DBRDMT SUBQ TIS 1ST 20SQCM/<: CPT | Performed by: STUDENT IN AN ORGANIZED HEALTH CARE EDUCATION/TRAINING PROGRAM

## 2024-04-17 PROCEDURE — 11045 DBRDMT SUBQ TISS EACH ADDL: CPT

## 2024-04-17 PROCEDURE — 11045 DBRDMT SUBQ TISS EACH ADDL: CPT | Performed by: STUDENT IN AN ORGANIZED HEALTH CARE EDUCATION/TRAINING PROGRAM

## 2024-04-17 RX ORDER — LIDOCAINE HYDROCHLORIDE 20 MG/ML
JELLY TOPICAL ONCE
OUTPATIENT
Start: 2024-04-17 | End: 2024-04-17

## 2024-04-17 RX ORDER — IBUPROFEN 200 MG
TABLET ORAL ONCE
OUTPATIENT
Start: 2024-04-17 | End: 2024-04-17

## 2024-04-17 RX ORDER — BACITRACIN ZINC AND POLYMYXIN B SULFATE 500; 1000 [USP'U]/G; [USP'U]/G
OINTMENT TOPICAL ONCE
OUTPATIENT
Start: 2024-04-17 | End: 2024-04-17

## 2024-04-17 RX ORDER — LIDOCAINE HYDROCHLORIDE 20 MG/ML
JELLY TOPICAL ONCE
Status: COMPLETED | OUTPATIENT
Start: 2024-04-17 | End: 2024-04-17

## 2024-04-17 RX ORDER — LIDOCAINE 50 MG/G
OINTMENT TOPICAL ONCE
OUTPATIENT
Start: 2024-04-17 | End: 2024-04-17

## 2024-04-17 RX ORDER — GINSENG 100 MG
CAPSULE ORAL ONCE
OUTPATIENT
Start: 2024-04-17 | End: 2024-04-17

## 2024-04-17 RX ORDER — TRIAMCINOLONE ACETONIDE 1 MG/G
OINTMENT TOPICAL ONCE
OUTPATIENT
Start: 2024-04-17 | End: 2024-04-17

## 2024-04-17 RX ORDER — LIDOCAINE HYDROCHLORIDE 40 MG/ML
SOLUTION TOPICAL ONCE
OUTPATIENT
Start: 2024-04-17 | End: 2024-04-17

## 2024-04-17 RX ORDER — BETAMETHASONE DIPROPIONATE 0.5 MG/G
CREAM TOPICAL ONCE
OUTPATIENT
Start: 2024-04-17 | End: 2024-04-17

## 2024-04-17 RX ORDER — CLOBETASOL PROPIONATE 0.5 MG/G
OINTMENT TOPICAL ONCE
OUTPATIENT
Start: 2024-04-17 | End: 2024-04-17

## 2024-04-17 RX ORDER — SODIUM CHLOR/HYPOCHLOROUS ACID 0.033 %
SOLUTION, IRRIGATION IRRIGATION ONCE
OUTPATIENT
Start: 2024-04-17 | End: 2024-04-17

## 2024-04-17 RX ORDER — GENTAMICIN SULFATE 1 MG/G
OINTMENT TOPICAL ONCE
OUTPATIENT
Start: 2024-04-17 | End: 2024-04-17

## 2024-04-17 RX ORDER — LIDOCAINE 40 MG/G
CREAM TOPICAL ONCE
OUTPATIENT
Start: 2024-04-17 | End: 2024-04-17

## 2024-04-17 RX ADMIN — LIDOCAINE HYDROCHLORIDE 6 ML: 20 JELLY TOPICAL at 09:45

## 2024-04-17 ASSESSMENT — PAIN SCALES - GENERAL: PAINLEVEL_OUTOF10: 9

## 2024-04-17 NOTE — PROGRESS NOTES
[facility]   Progress Note and Procedure Note      Lee Mejia  MEDICAL RECORD NUMBER:  [id number]  AGE: 64 y.o.   GENDER: male  : 1960  EPISODE DATE:  2024    Subjective:     Chief Complaint   Patient presents with    Wound Check     Left Leg         HISTORY of PRESENT ILLNESS HPI    Lee Mejia is a 64 y.o. male who presents today for wound/ulcer evaluation.      History of left leg arterial thrombectomy and fasciotomy on 24. He has slow healing left lateral fasciotomy wound that required debridement on 24. He has history of foot drop on the left side from his initial ischemia. He is getting treated with wound vac therapy.        PAST MEDICAL HISTORY        Diagnosis Date    ATN (acute tubular necrosis) (HCC) 2023    COPD (chronic obstructive pulmonary disease) (HCC)     COPD (chronic obstructive pulmonary disease) (HCC)     Diabetes mellitus (HCC)     controlled with diet    Diabetes mellitus (HCC)     Fatty (change of) liver, not elsewhere classified 3/22/2021    Left main coronary artery disease 2023    Mixed hyperlipidemia 2020       PAST SURGICAL HISTORY    Past Surgical History:   Procedure Laterality Date    CARDIAC PROCEDURE N/A 2023    Coronary angiography performed by Radha Escobar MD at [facility] CARDIAC CATH LAB    CARDIAC PROCEDURE N/A 2023    Percutaneous coronary intervention performed by Radha Escobar MD at [facility] CARDIAC CATH LAB    CARDIAC PROCEDURE N/A 2023    Intra-aortic balloon pump insertion performed by Radha Escobar MD at [facility] CARDIAC CATH LAB    CARDIAC PROCEDURE N/A 2023    Insert temporary pacemaker performed by Radha Escobar MD at [facility] CARDIAC CATH LAB    CARDIAC PROCEDURE N/A 2023    Intra-aortic balloon removal performed by Taj Daly MD at [facility] CARDIAC CATH LAB    CARDIAC PROCEDURE N/A 2023    Insert temporary pacemaker performed by Taj Daly MD at

## 2024-04-17 NOTE — PROGRESS NOTES
Unna Boot Application   Below Knee    NAME:  Lee Mejia  YOB: 1960  MEDICAL RECORD NUMBER:  [id number]  DATE:  4/17/2024    [x] Applied moisturizing agent to dry skin as needed.   [x] Appied primary and secondary dressing as ordered    [x] Applied Unna roll from toes to knee overlapping each time.   [x] Applied ace wrap or coban from toes to below the knee.   [x] Secured with tape and/or metal clips covered with tape.   [x] Instructed patient/caregiver to keep dressing dry and intact. DO NOT REMOVE DRESSING.   [x] Instructed pt/family/caregiver to report excessive draining, loose bandage, wet dressing, severe pain or tingling in toes.  [x] Applied Unna Boot dressing below the knee to Left lower leg(s)       Unna Boot(s) were applied per  Guidelines.     Electronically signed by Elida Bocanegra RN on 4/17/2024 at 10:51 AM

## 2024-04-23 NOTE — DISCHARGE INSTRUCTIONS
[facility] -Phone: [phone] Fax: [phone]    Visit  Discharge Instructions / Physician Orders     DATE: 4/24/2024     Home Care: [facility]     SUPPLIES ORDERED THRU:      Wound Location: Left Lateral Lower Leg     Cleanse with: Soap and Water if wrap needs changed     Dressing Orders: Silvercel to wound, KerraMax, Zinc Unna Boot- Home Care may change dressing once if draining through wrap     Frequency: Keep Dry and Intact     Additional Orders: Increase protein to diet (meat, cheese, eggs, fish, peanut butter, nuts and beans)  ELEVATE LEGS AS MUCH AS POSSIBLE       Your next appointment with Wound Care Center is in 1 week     (Please note your next appointment above and if you are unable to keep, kindly give a 24 hour notice. Thank you.)  If more than 15 min late we cannot guarantee you will be seen due to clinician schedule  Per Policy, Excessive cancellation will call for dismissal from program.     If you experience any of the following, please call the Wound Care Center during business hours:  [phone]  Your Phone call may be forwarded to [facility] during business hours that [place] is closed.     * Increase in Pain  * Temperature over 101  * Increase in drainage from your wound  * Drainage with a foul odor  * Bleeding  * Increase in swelling  * Need for compression bandage changes due to slippage, breakthrough drainage.     If you need medical attention outside of the business hours of the Wound Care Centers please contact your PCP or go to the nearest emergency room.     The information contained in the After Visit Summary has been reviewed with me, the patient and/or responsible adult, by my health care provider(s). I had the opportunity to ask questions regarding this information. I have elected to receive;      []After Visit Summary  [x]Comprehensive Discharge Instruction        Patient

## 2024-04-24 ENCOUNTER — HOSPITAL ENCOUNTER (OUTPATIENT)
Age: 64
Setting detail: SPECIMEN
Discharge: HOME | End: 2024-04-24
Payer: MEDICAID

## 2024-04-24 ENCOUNTER — HOSPITAL ENCOUNTER (OUTPATIENT)
Dept: WOUND CARE | Age: 64
Discharge: HOME | End: 2024-04-24
Attending: STUDENT IN AN ORGANIZED HEALTH CARE EDUCATION/TRAINING PROGRAM
Payer: MEDICAID

## 2024-04-24 VITALS — HEART RATE: 86 BPM | TEMPERATURE: 97.5 F | DIASTOLIC BLOOD PRESSURE: 79 MMHG | SYSTOLIC BLOOD PRESSURE: 146 MMHG

## 2024-04-24 DIAGNOSIS — N62 DRUG-INDUCED BREAST ENLARGEMENT: ICD-10-CM

## 2024-04-24 DIAGNOSIS — R73.03 PREDIABETES: ICD-10-CM

## 2024-04-24 DIAGNOSIS — E83.42 HYPOMAGNESEMIA: ICD-10-CM

## 2024-04-24 DIAGNOSIS — T50.905A DRUG-INDUCED BREAST ENLARGEMENT: ICD-10-CM

## 2024-04-24 DIAGNOSIS — D50.9 NORMOCYTIC HYPOCHROMIC ANEMIA: ICD-10-CM

## 2024-04-24 DIAGNOSIS — T81.89XD NON-HEALING SURGICAL WOUND, SUBSEQUENT ENCOUNTER: Primary | ICD-10-CM

## 2024-04-24 LAB
BASOPHILS # BLD: 0.05 K/UL (ref 0–0.2)
BASOPHILS NFR BLD: 1 % (ref 0–2)
EOSINOPHIL # BLD: 0.16 K/UL (ref 0–0.44)
EOSINOPHILS RELATIVE PERCENT: 2 % (ref 1–4)
ERYTHROCYTE [DISTWIDTH] IN BLOOD BY AUTOMATED COUNT: 16.3 % (ref 11.8–14.4)
EST. AVERAGE GLUCOSE BLD GHB EST-MCNC: 117 MG/DL
FERRITIN SERPL-MCNC: 23 NG/ML (ref 30–400)
HBA1C MFR BLD: 5.7 % (ref 4–6)
HCT VFR BLD AUTO: 35.2 % (ref 40.7–50.3)
HGB BLD-MCNC: 10.2 G/DL (ref 13–17)
IMM GRANULOCYTES # BLD AUTO: <0.03 K/UL (ref 0–0.3)
IMM GRANULOCYTES NFR BLD: 0 %
INSULIN REFERENCE RANGE:: NORMAL
INSULIN: 10.1 MU/L
IRON SATN MFR SERPL: 6 % (ref 20–55)
IRON SERPL-MCNC: 24 UG/DL (ref 61–157)
LYMPHOCYTES NFR BLD: 2.87 K/UL (ref 1.1–3.7)
LYMPHOCYTES RELATIVE PERCENT: 35 % (ref 24–43)
MAGNESIUM, IONIZED: 0.65 MMOL/L (ref 0.45–0.6)
MCH RBC QN AUTO: 23.4 PG (ref 25.2–33.5)
MCHC RBC AUTO-ENTMCNC: 29 G/DL (ref 28.4–34.8)
MCV RBC AUTO: 80.9 FL (ref 82.6–102.9)
MONOCYTES NFR BLD: 0.6 K/UL (ref 0.1–1.2)
MONOCYTES NFR BLD: 7 % (ref 3–12)
NEUTROPHILS NFR BLD: 55 % (ref 36–65)
NEUTS SEG NFR BLD: 4.42 K/UL (ref 1.5–8.1)
NRBC BLD-RTO: 0 PER 100 WBC
PLATELET # BLD AUTO: 405 K/UL (ref 138–453)
PMV BLD AUTO: 10.3 FL (ref 8.1–13.5)
PROLACTIN SERPL-MCNC: 7.7 NG/ML (ref 4.04–15.2)
RBC # BLD AUTO: 4.35 M/UL (ref 4.21–5.77)
RBC # BLD: ABNORMAL 10*6/UL
TIBC SERPL-MCNC: 374 UG/DL (ref 250–450)
TRANSFERRIN SERPL-MCNC: 302 MG/DL (ref 200–360)
UNSATURATED IRON BINDING CAPACITY: 350 UG/DL (ref 112–347)
WBC OTHER # BLD: 8.1 K/UL (ref 3.5–11.3)

## 2024-04-24 PROCEDURE — 83550 IRON BINDING TEST: CPT

## 2024-04-24 PROCEDURE — 84146 ASSAY OF PROLACTIN: CPT

## 2024-04-24 PROCEDURE — 11042 DBRDMT SUBQ TIS 1ST 20SQCM/<: CPT

## 2024-04-24 PROCEDURE — 83036 HEMOGLOBIN GLYCOSYLATED A1C: CPT

## 2024-04-24 PROCEDURE — 11042 DBRDMT SUBQ TIS 1ST 20SQCM/<: CPT | Performed by: STUDENT IN AN ORGANIZED HEALTH CARE EDUCATION/TRAINING PROGRAM

## 2024-04-24 PROCEDURE — 36415 COLL VENOUS BLD VENIPUNCTURE: CPT

## 2024-04-24 PROCEDURE — 83525 ASSAY OF INSULIN: CPT

## 2024-04-24 PROCEDURE — 83540 ASSAY OF IRON: CPT

## 2024-04-24 PROCEDURE — 85025 COMPLETE CBC W/AUTO DIFF WBC: CPT

## 2024-04-24 PROCEDURE — 84466 ASSAY OF TRANSFERRIN: CPT

## 2024-04-24 PROCEDURE — 83735 ASSAY OF MAGNESIUM: CPT

## 2024-04-24 PROCEDURE — 82728 ASSAY OF FERRITIN: CPT

## 2024-04-24 RX ORDER — GINSENG 100 MG
CAPSULE ORAL ONCE
OUTPATIENT
Start: 2024-04-24 | End: 2024-04-24

## 2024-04-24 RX ORDER — LIDOCAINE HYDROCHLORIDE 20 MG/ML
JELLY TOPICAL ONCE
OUTPATIENT
Start: 2024-04-24 | End: 2024-04-24

## 2024-04-24 RX ORDER — LIDOCAINE 40 MG/G
CREAM TOPICAL ONCE
OUTPATIENT
Start: 2024-04-24 | End: 2024-04-24

## 2024-04-24 RX ORDER — CLOBETASOL PROPIONATE 0.5 MG/G
OINTMENT TOPICAL ONCE
OUTPATIENT
Start: 2024-04-24 | End: 2024-04-24

## 2024-04-24 RX ORDER — LIDOCAINE 50 MG/G
OINTMENT TOPICAL ONCE
OUTPATIENT
Start: 2024-04-24 | End: 2024-04-24

## 2024-04-24 RX ORDER — BACITRACIN ZINC AND POLYMYXIN B SULFATE 500; 1000 [USP'U]/G; [USP'U]/G
OINTMENT TOPICAL ONCE
OUTPATIENT
Start: 2024-04-24 | End: 2024-04-24

## 2024-04-24 RX ORDER — LIDOCAINE HYDROCHLORIDE 20 MG/ML
JELLY TOPICAL ONCE
Status: COMPLETED | OUTPATIENT
Start: 2024-04-24 | End: 2024-04-24

## 2024-04-24 RX ORDER — SODIUM CHLOR/HYPOCHLOROUS ACID 0.033 %
SOLUTION, IRRIGATION IRRIGATION ONCE
OUTPATIENT
Start: 2024-04-24 | End: 2024-04-24

## 2024-04-24 RX ORDER — LIDOCAINE HYDROCHLORIDE 40 MG/ML
SOLUTION TOPICAL ONCE
OUTPATIENT
Start: 2024-04-24 | End: 2024-04-24

## 2024-04-24 RX ORDER — IBUPROFEN 200 MG
TABLET ORAL ONCE
OUTPATIENT
Start: 2024-04-24 | End: 2024-04-24

## 2024-04-24 RX ORDER — GENTAMICIN SULFATE 1 MG/G
OINTMENT TOPICAL ONCE
OUTPATIENT
Start: 2024-04-24 | End: 2024-04-24

## 2024-04-24 RX ORDER — BETAMETHASONE DIPROPIONATE 0.5 MG/G
CREAM TOPICAL ONCE
OUTPATIENT
Start: 2024-04-24 | End: 2024-04-24

## 2024-04-24 RX ORDER — TRIAMCINOLONE ACETONIDE 1 MG/G
OINTMENT TOPICAL ONCE
OUTPATIENT
Start: 2024-04-24 | End: 2024-04-24

## 2024-04-24 RX ADMIN — LIDOCAINE HYDROCHLORIDE 6 ML: 20 JELLY TOPICAL at 10:20

## 2024-04-24 NOTE — PROGRESS NOTES
PROCEDURE N/A 2023    Insert temporary pacemaker performed by Taj Daly MD at [facility] CARDIAC CATH LAB    CARDIAC PROCEDURE N/A 2023    Left heart cath performed by Taj Daly MD at [facility] CARDIAC CATH LAB    COLONOSCOPY  2015    CORONARY ARTERY BYPASS GRAFT N/A 2023    EMERGENCY CABG, CORONARY ARTERY BYPASS X3, ON PUMP, ANGELLA PER ANESTHESIA performed by David Bañuelos MD at [facility] CVOR    FEMORAL-FEMORAL BYPASS GRAFT Left 2024    FEMORAL THROMBECTOMY WITH ANGIOGRAM, POSSIBLE FEMORAL FEMORAL BYPASS, POSSIBLE FASCIOTOMY performed by Brent Anderson MD at [facility] CVOR    LEG SURGERY Left 2024    LEFT LOWER DEBRIDEMENT OF WOUND performed by Jamir Liu MD at [facility]    TOOTH EXTRACTION  14       FAMILY HISTORY    Family History   Problem Relation Age of Onset    High Blood Pressure Mother     High Blood Pressure Father     Cancer Brother        SOCIAL HISTORY    Social History     Tobacco Use    Smoking status: Former     Current packs/day: 0.00     Average packs/day: 0.5 packs/day for 20.0 years (10.0 ttl pk-yrs)     Types: Cigarettes     Quit date: 2024     Years since quittin.3    Smokeless tobacco: Never   Vaping Use    Vaping Use: Never used   Substance Use Topics    Alcohol use: No    Drug use: No       ALLERGIES    No Known Allergies    MEDICATIONS    Current Outpatient Medications on File Prior to Encounter   Medication Sig Dispense Refill    ELIQUIS 5 MG TABS tablet Take 1 tablet by mouth 2 times daily 60 tablet 11    magnesium oxide (MAG-OX) 400 MG tablet       Magnesium 400 MG TABS Take 2 tablets by mouth nightly 180 tablet 0    cyclobenzaprine (FLEXERIL) 5 MG tablet Take 1 tablet by mouth 2 times daily as needed for Muscle spasms 60 tablet 0    Melatonin 10 MG TABS Take 1 tablet by mouth nightly 30 tablet 3    nicotine (NICODERM CQ) 21 MG/24HR Place 1 patch onto the skin daily 30 patch 0    gabapentin (NEURONTIN) 300 MG capsule Take 1

## 2024-04-29 NOTE — DISCHARGE INSTRUCTIONS
[facility] -Phone: [phone] Fax: [phone]    Visit  Discharge Instructions / Physician Orders     DATE: 5/1/2024     Home Care: [facility]     SUPPLIES ORDERED THRU:      Wound Location: Left Lateral Lower Leg     Cleanse with: Soap and Water if wrap needs changed     Dressing Orders: Silvercel to wound, KerraMax, Zinc Unna Boot- Home Care may change dressing once if draining through wrap     Frequency: Keep Dry and Intact     Additional Orders: Increase protein to diet (meat, cheese, eggs, fish, peanut butter, nuts and beans)  ELEVATE LEGS AS MUCH AS POSSIBLE        Your next appointment with Wound Care Center is in 1 week     (Please note your next appointment above and if you are unable to keep, kindly give a 24 hour notice. Thank you.)  If more than 15 min late we cannot guarantee you will be seen due to clinician schedule  Per Policy, Excessive cancellation will call for dismissal from program.     If you experience any of the following, please call the Wound Care Center during business hours:  [phone]  Your Phone call may be forwarded to [facility] during business hours that [place] is closed.     * Increase in Pain  * Temperature over 101  * Increase in drainage from your wound  * Drainage with a foul odor  * Bleeding  * Increase in swelling  * Need for compression bandage changes due to slippage, breakthrough drainage.     If you need medical attention outside of the business hours of the Wound Care Centers please contact your PCP or go to the nearest emergency room.     The information contained in the After Visit Summary has been reviewed with me, the patient and/or responsible adult, by my health care provider(s). I had the opportunity to ask questions regarding this information. I have elected to receive;      []After Visit Summary  [x]Comprehensive Discharge Instruction        Patient

## 2024-05-01 ENCOUNTER — HOSPITAL ENCOUNTER (OUTPATIENT)
Dept: WOUND CARE | Age: 64
Discharge: HOME | End: 2024-05-01
Attending: STUDENT IN AN ORGANIZED HEALTH CARE EDUCATION/TRAINING PROGRAM
Payer: MEDICAID

## 2024-05-01 VITALS
DIASTOLIC BLOOD PRESSURE: 70 MMHG | TEMPERATURE: 97 F | SYSTOLIC BLOOD PRESSURE: 142 MMHG | HEART RATE: 71 BPM | WEIGHT: 125 LBS | BODY MASS INDEX: 20.09 KG/M2 | HEIGHT: 66 IN

## 2024-05-01 DIAGNOSIS — T81.89XD NON-HEALING SURGICAL WOUND, SUBSEQUENT ENCOUNTER: Primary | ICD-10-CM

## 2024-05-01 PROCEDURE — 11042 DBRDMT SUBQ TIS 1ST 20SQCM/<: CPT

## 2024-05-01 PROCEDURE — 11042 DBRDMT SUBQ TIS 1ST 20SQCM/<: CPT | Performed by: STUDENT IN AN ORGANIZED HEALTH CARE EDUCATION/TRAINING PROGRAM

## 2024-05-01 RX ORDER — LIDOCAINE 40 MG/G
CREAM TOPICAL ONCE
OUTPATIENT
Start: 2024-05-01 | End: 2024-05-01

## 2024-05-01 RX ORDER — LIDOCAINE 50 MG/G
OINTMENT TOPICAL ONCE
OUTPATIENT
Start: 2024-05-01 | End: 2024-05-01

## 2024-05-01 RX ORDER — CLOBETASOL PROPIONATE 0.5 MG/G
OINTMENT TOPICAL ONCE
OUTPATIENT
Start: 2024-05-01 | End: 2024-05-01

## 2024-05-01 RX ORDER — GENTAMICIN SULFATE 1 MG/G
OINTMENT TOPICAL ONCE
OUTPATIENT
Start: 2024-05-01 | End: 2024-05-01

## 2024-05-01 RX ORDER — GINSENG 100 MG
CAPSULE ORAL ONCE
OUTPATIENT
Start: 2024-05-01 | End: 2024-05-01

## 2024-05-01 RX ORDER — LIDOCAINE HYDROCHLORIDE 20 MG/ML
JELLY TOPICAL ONCE
OUTPATIENT
Start: 2024-05-01 | End: 2024-05-01

## 2024-05-01 RX ORDER — TRIAMCINOLONE ACETONIDE 1 MG/G
OINTMENT TOPICAL ONCE
OUTPATIENT
Start: 2024-05-01 | End: 2024-05-01

## 2024-05-01 RX ORDER — IBUPROFEN 200 MG
TABLET ORAL ONCE
OUTPATIENT
Start: 2024-05-01 | End: 2024-05-01

## 2024-05-01 RX ORDER — LIDOCAINE HYDROCHLORIDE 40 MG/ML
SOLUTION TOPICAL ONCE
OUTPATIENT
Start: 2024-05-01 | End: 2024-05-01

## 2024-05-01 RX ORDER — LIDOCAINE HYDROCHLORIDE 20 MG/ML
JELLY TOPICAL ONCE
Status: COMPLETED | OUTPATIENT
Start: 2024-05-01 | End: 2024-05-01

## 2024-05-01 RX ORDER — BACITRACIN ZINC AND POLYMYXIN B SULFATE 500; 1000 [USP'U]/G; [USP'U]/G
OINTMENT TOPICAL ONCE
OUTPATIENT
Start: 2024-05-01 | End: 2024-05-01

## 2024-05-01 RX ORDER — BETAMETHASONE DIPROPIONATE 0.5 MG/G
CREAM TOPICAL ONCE
OUTPATIENT
Start: 2024-05-01 | End: 2024-05-01

## 2024-05-01 RX ORDER — SODIUM CHLOR/HYPOCHLOROUS ACID 0.033 %
SOLUTION, IRRIGATION IRRIGATION ONCE
OUTPATIENT
Start: 2024-05-01 | End: 2024-05-01

## 2024-05-01 RX ADMIN — LIDOCAINE HYDROCHLORIDE 6 ML: 20 JELLY TOPICAL at 10:14

## 2024-05-01 ASSESSMENT — PAIN SCALES - GENERAL: PAINLEVEL_OUTOF10: 9

## 2024-05-01 ASSESSMENT — PAIN DESCRIPTION - DESCRIPTORS: DESCRIPTORS: ACHING;THROBBING

## 2024-05-01 ASSESSMENT — PAIN DESCRIPTION - LOCATION: LOCATION: LEG

## 2024-05-01 NOTE — PROGRESS NOTES
[facility]   Progress Note and Procedure Note      Lee Mejia  MEDICAL RECORD NUMBER:  [id number]  AGE: 64 y.o.   GENDER: male  : 1960  EPISODE DATE:  2024    Subjective:     No chief complaint on file.        HISTORY of PRESENT ILLNESS HPI    Lee Mejia is a 64 y.o. male who presents today for wound/ulcer evaluation.      History of left leg arterial thrombectomy and fasciotomy on 24. He has slow healing left lateral fasciotomy wound that required debridement on 24. He has history of foot drop on the left side from his initial ischemia. He is getting treated with wound vac therapy.     Interval history: Wound is healing well with unna boot.         PAST MEDICAL HISTORY        Diagnosis Date    ATN (acute tubular necrosis) (HCC) 2023    COPD (chronic obstructive pulmonary disease) (HCC)     COPD (chronic obstructive pulmonary disease) (HCC)     Diabetes mellitus (HCC)     controlled with diet    Diabetes mellitus (HCC)     Fatty (change of) liver, not elsewhere classified 3/22/2021    Left main coronary artery disease 2023    Mixed hyperlipidemia 2020       PAST SURGICAL HISTORY    Past Surgical History:   Procedure Laterality Date    CARDIAC PROCEDURE N/A 2023    Coronary angiography performed by Radha Escobar MD at [facility] CARDIAC CATH LAB    CARDIAC PROCEDURE N/A 2023    Percutaneous coronary intervention performed by Radha Escobar MD at [facility] CARDIAC CATH LAB    CARDIAC PROCEDURE N/A 2023    Intra-aortic balloon pump insertion performed by Radha Escobar MD at [facility] CARDIAC CATH LAB    CARDIAC PROCEDURE N/A 2023    Insert temporary pacemaker performed by Radha Escobar MD at [facility] CARDIAC CATH LAB    CARDIAC PROCEDURE N/A 2023    Intra-aortic balloon removal performed by Taj Daly MD at [facility] CARDIAC CATH LAB    CARDIAC PROCEDURE N/A 2023    Insert temporary pacemaker performed by Malika

## 2024-05-06 NOTE — DISCHARGE INSTRUCTIONS
[facility] -Phone: [phone] Fax: [phone]    Visit  Discharge Instructions / Physician Orders     DATE: 5/8/2024     Home Care: [facility]     SUPPLIES ORDERED THRU:      Wound Location: Left Lateral Lower Leg     Cleanse with: Soap and Water if wrap needs changed     Dressing Orders: Silvercel to wound, KerraMax, Zinc Unna Boot     Frequency: Keep Dry and Intact     Additional Orders: Increase protein to diet (meat, cheese, eggs, fish, peanut butter, nuts and beans)  ELEVATE LEGS AS MUCH AS POSSIBLE        Your next appointment with Wound Care Center is in 1 week     (Please note your next appointment above and if you are unable to keep, kindly give a 24 hour notice. Thank you.)  If more than 15 min late we cannot guarantee you will be seen due to clinician schedule  Per Policy, Excessive cancellation will call for dismissal from program.     If you experience any of the following, please call the Wound Care Center during business hours:  [phone]  Your Phone call may be forwarded to [facility] during business hours that [place] is closed.     * Increase in Pain  * Temperature over 101  * Increase in drainage from your wound  * Drainage with a foul odor  * Bleeding  * Increase in swelling  * Need for compression bandage changes due to slippage, breakthrough drainage.     If you need medical attention outside of the business hours of the Wound Care Centers please contact your PCP or go to the nearest emergency room.     The information contained in the After Visit Summary has been reviewed with me, the patient and/or responsible adult, by my health care provider(s). I had the opportunity to ask questions regarding this information. I have elected to receive;      []After Visit Summary  [x]Comprehensive Discharge Instruction        Patient signature______________________________________Date:________  Electronically signed by Edis Ladd RN on 5/8/2024 at 9:44  No

## 2024-05-08 ENCOUNTER — HOSPITAL ENCOUNTER (OUTPATIENT)
Dept: WOUND CARE | Age: 64
Discharge: HOME | End: 2024-05-08
Attending: STUDENT IN AN ORGANIZED HEALTH CARE EDUCATION/TRAINING PROGRAM
Payer: MEDICAID

## 2024-05-08 VITALS
RESPIRATION RATE: 18 BRPM | HEART RATE: 80 BPM | DIASTOLIC BLOOD PRESSURE: 63 MMHG | TEMPERATURE: 97.2 F | SYSTOLIC BLOOD PRESSURE: 116 MMHG

## 2024-05-08 DIAGNOSIS — T81.89XD NON-HEALING SURGICAL WOUND, SUBSEQUENT ENCOUNTER: Primary | ICD-10-CM

## 2024-05-08 PROCEDURE — 11042 DBRDMT SUBQ TIS 1ST 20SQCM/<: CPT | Performed by: STUDENT IN AN ORGANIZED HEALTH CARE EDUCATION/TRAINING PROGRAM

## 2024-05-08 PROCEDURE — 11042 DBRDMT SUBQ TIS 1ST 20SQCM/<: CPT

## 2024-05-08 RX ORDER — LIDOCAINE 40 MG/G
CREAM TOPICAL ONCE
OUTPATIENT
Start: 2024-05-08 | End: 2024-05-08

## 2024-05-08 RX ORDER — LIDOCAINE HYDROCHLORIDE 20 MG/ML
JELLY TOPICAL ONCE
OUTPATIENT
Start: 2024-05-08 | End: 2024-05-08

## 2024-05-08 RX ORDER — IBUPROFEN 200 MG
TABLET ORAL ONCE
OUTPATIENT
Start: 2024-05-08 | End: 2024-05-08

## 2024-05-08 RX ORDER — GINSENG 100 MG
CAPSULE ORAL ONCE
OUTPATIENT
Start: 2024-05-08 | End: 2024-05-08

## 2024-05-08 RX ORDER — BACITRACIN ZINC AND POLYMYXIN B SULFATE 500; 1000 [USP'U]/G; [USP'U]/G
OINTMENT TOPICAL ONCE
OUTPATIENT
Start: 2024-05-08 | End: 2024-05-08

## 2024-05-08 RX ORDER — GENTAMICIN SULFATE 1 MG/G
OINTMENT TOPICAL ONCE
OUTPATIENT
Start: 2024-05-08 | End: 2024-05-08

## 2024-05-08 RX ORDER — TRIAMCINOLONE ACETONIDE 1 MG/G
OINTMENT TOPICAL ONCE
OUTPATIENT
Start: 2024-05-08 | End: 2024-05-08

## 2024-05-08 RX ORDER — SODIUM CHLOR/HYPOCHLOROUS ACID 0.033 %
SOLUTION, IRRIGATION IRRIGATION ONCE
OUTPATIENT
Start: 2024-05-08 | End: 2024-05-08

## 2024-05-08 RX ORDER — BETAMETHASONE DIPROPIONATE 0.5 MG/G
CREAM TOPICAL ONCE
OUTPATIENT
Start: 2024-05-08 | End: 2024-05-08

## 2024-05-08 RX ORDER — LIDOCAINE HYDROCHLORIDE 40 MG/ML
SOLUTION TOPICAL ONCE
OUTPATIENT
Start: 2024-05-08 | End: 2024-05-08

## 2024-05-08 RX ORDER — CLOBETASOL PROPIONATE 0.5 MG/G
OINTMENT TOPICAL ONCE
OUTPATIENT
Start: 2024-05-08 | End: 2024-05-08

## 2024-05-08 RX ORDER — LIDOCAINE 50 MG/G
OINTMENT TOPICAL ONCE
OUTPATIENT
Start: 2024-05-08 | End: 2024-05-08

## 2024-05-08 RX ORDER — LIDOCAINE HYDROCHLORIDE 20 MG/ML
JELLY TOPICAL ONCE
Status: COMPLETED | OUTPATIENT
Start: 2024-05-08 | End: 2024-05-08

## 2024-05-08 RX ADMIN — LIDOCAINE HYDROCHLORIDE 6 ML: 20 JELLY TOPICAL at 09:19

## 2024-05-08 ASSESSMENT — PAIN - FUNCTIONAL ASSESSMENT: PAIN_FUNCTIONAL_ASSESSMENT: ACTIVITIES ARE NOT PREVENTED

## 2024-05-08 ASSESSMENT — PAIN DESCRIPTION - ORIENTATION: ORIENTATION: LEFT

## 2024-05-08 ASSESSMENT — PAIN DESCRIPTION - LOCATION: LOCATION: LEG

## 2024-05-08 ASSESSMENT — PAIN DESCRIPTION - DESCRIPTORS: DESCRIPTORS: ACHING

## 2024-05-08 ASSESSMENT — PAIN SCALES - GENERAL: PAINLEVEL_OUTOF10: 6

## 2024-05-08 ASSESSMENT — PAIN SCALES - WONG BAKER: WONGBAKER_NUMERICALRESPONSE: NO HURT

## 2024-05-08 NOTE — PROGRESS NOTES
Unna Boot Application   Below Knee    NAME:  Lee Mejia  YOB: 1960  MEDICAL RECORD NUMBER:  [id number]  DATE:  5/8/2024    [x] Applied moisturizing agent to dry skin as needed.   [x] Appied primary and secondary dressing as ordered    [x] Applied Unna roll from toes to knee overlapping each time.   [x] Applied ace wrap or coban from toes to below the knee.   [x] Secured with tape and/or metal clips covered with tape.   [x] Instructed patient/caregiver to keep dressing dry and intact. DO NOT REMOVE DRESSING.   [x] Instructed pt/family/caregiver to report excessive draining, loose bandage, wet dressing, severe pain or tingling in toes.  [x] Applied Unna Boot dressing below the knee to Left lower leg(s)       Unna Boot(s) were applied per  Guidelines.     Electronically signed by KAILASH BECKETT RN on 5/8/2024 at 9:51 AM     
Insert temporary pacemaker performed by Taj Daly MD at [facility] CARDIAC CATH LAB    CARDIAC PROCEDURE N/A 2023    Left heart cath performed by Taj Daly MD at [facility] CARDIAC CATH LAB    COLONOSCOPY  2015    CORONARY ARTERY BYPASS GRAFT N/A 2023    EMERGENCY CABG, CORONARY ARTERY BYPASS X3, ON PUMP, ANGELLA PER ANESTHESIA performed by David Bañuelos MD at [facility] CVOR    FEMORAL-FEMORAL BYPASS GRAFT Left 2024    FEMORAL THROMBECTOMY WITH ANGIOGRAM, POSSIBLE FEMORAL FEMORAL BYPASS, POSSIBLE FASCIOTOMY performed by Brent Anderson MD at [facility] CVOR    LEG SURGERY Left 2024    LEFT LOWER DEBRIDEMENT OF WOUND performed by Jamir Liu MD at [facility]    TOOTH EXTRACTION  14       FAMILY HISTORY    Family History   Problem Relation Age of Onset    High Blood Pressure Mother     High Blood Pressure Father     Cancer Brother        SOCIAL HISTORY    Social History     Tobacco Use    Smoking status: Former     Current packs/day: 0.00     Average packs/day: 0.5 packs/day for 20.0 years (10.0 ttl pk-yrs)     Types: Cigarettes     Quit date: 2024     Years since quittin.3    Smokeless tobacco: Never   Vaping Use    Vaping Use: Never used   Substance Use Topics    Alcohol use: No    Drug use: No       ALLERGIES    No Known Allergies    MEDICATIONS    Current Outpatient Medications on File Prior to Encounter   Medication Sig Dispense Refill    ELIQUIS 5 MG TABS tablet Take 1 tablet by mouth 2 times daily 60 tablet 11    magnesium oxide (MAG-OX) 400 MG tablet       Magnesium 400 MG TABS Take 2 tablets by mouth nightly 180 tablet 0    Melatonin 10 MG TABS Take 1 tablet by mouth nightly 30 tablet 3    nicotine (NICODERM CQ) 21 MG/24HR Place 1 patch onto the skin daily 30 patch 0    gabapentin (NEURONTIN) 300 MG capsule Take 1 capsule by mouth 3 times daily for 90 days. 90 capsule 2    sodium hypochlorite (DAKINS) 0.125 % SOLN external solution Apply topically daily 473 mL 2

## 2024-05-10 NOTE — DISCHARGE INSTRUCTIONS
[facility] -Phone: [phone] Fax: [phone]    Visit  Discharge Instructions / Physician Orders     DATE: 5/15/2024     Home Care: [facility]     SUPPLIES ORDERED THRU:      Wound Location: Left Lateral Lower Leg     Cleanse with: Soap and Water if wrap needs changed     Dressing Orders: Silvercel to wound, KerraMax, Zinc Unna Boot     Frequency: Keep Dry and Intact     Additional Orders: Increase protein to diet (meat, cheese, eggs, fish, peanut butter, nuts and beans)  ELEVATE LEGS AS MUCH AS POSSIBLE        Your next appointment with Wound Care Center is in 1 week     (Please note your next appointment above and if you are unable to keep, kindly give a 24 hour notice. Thank you.)  If more than 15 min late we cannot guarantee you will be seen due to clinician schedule  Per Policy, Excessive cancellation will call for dismissal from program.     If you experience any of the following, please call the Wound Care Center during business hours:  [phone]  Your Phone call may be forwarded to [facility] during business hours that [place] is closed.     * Increase in Pain  * Temperature over 101  * Increase in drainage from your wound  * Drainage with a foul odor  * Bleeding  * Increase in swelling  * Need for compression bandage changes due to slippage, breakthrough drainage.     If you need medical attention outside of the business hours of the Wound Care Centers please contact your PCP or go to the nearest emergency room.     The information contained in the After Visit Summary has been reviewed with me, the patient and/or responsible adult, by my health care provider(s). I had the opportunity to ask questions regarding this information. I have elected to receive;      []After Visit Summary  [x]Comprehensive Discharge Instruction        Patient signature______________________________________Date:________  Electronically signed by Edis Ladd RN on 5/15/2024 at 10:02

## 2024-05-13 PROBLEM — G62.9 NEUROPATHY: Status: ACTIVE | Noted: 2024-05-13

## 2024-05-13 PROBLEM — M79.605 LEFT LEG PAIN: Status: ACTIVE | Noted: 2024-05-13

## 2024-05-13 PROBLEM — R73.03 PREDIABETES: Status: ACTIVE | Noted: 2024-05-13

## 2024-05-15 ENCOUNTER — HOSPITAL ENCOUNTER (OUTPATIENT)
Dept: WOUND CARE | Age: 64
Discharge: HOME | End: 2024-05-15
Attending: STUDENT IN AN ORGANIZED HEALTH CARE EDUCATION/TRAINING PROGRAM
Payer: MEDICAID

## 2024-05-15 VITALS
HEIGHT: 66 IN | TEMPERATURE: 97.2 F | SYSTOLIC BLOOD PRESSURE: 160 MMHG | BODY MASS INDEX: 20.89 KG/M2 | HEART RATE: 70 BPM | WEIGHT: 130 LBS | RESPIRATION RATE: 19 BRPM | DIASTOLIC BLOOD PRESSURE: 79 MMHG

## 2024-05-15 DIAGNOSIS — T81.89XD NON-HEALING SURGICAL WOUND, SUBSEQUENT ENCOUNTER: Primary | ICD-10-CM

## 2024-05-15 PROCEDURE — 11042 DBRDMT SUBQ TIS 1ST 20SQCM/<: CPT | Performed by: STUDENT IN AN ORGANIZED HEALTH CARE EDUCATION/TRAINING PROGRAM

## 2024-05-15 PROCEDURE — 11042 DBRDMT SUBQ TIS 1ST 20SQCM/<: CPT

## 2024-05-15 RX ORDER — BETAMETHASONE DIPROPIONATE 0.5 MG/G
CREAM TOPICAL ONCE
OUTPATIENT
Start: 2024-05-15 | End: 2024-05-15

## 2024-05-15 RX ORDER — LIDOCAINE HYDROCHLORIDE 20 MG/ML
JELLY TOPICAL ONCE
OUTPATIENT
Start: 2024-05-15 | End: 2024-05-15

## 2024-05-15 RX ORDER — GINSENG 100 MG
CAPSULE ORAL ONCE
OUTPATIENT
Start: 2024-05-15 | End: 2024-05-15

## 2024-05-15 RX ORDER — LIDOCAINE HYDROCHLORIDE 40 MG/ML
SOLUTION TOPICAL ONCE
OUTPATIENT
Start: 2024-05-15 | End: 2024-05-15

## 2024-05-15 RX ORDER — CLOBETASOL PROPIONATE 0.5 MG/G
OINTMENT TOPICAL ONCE
OUTPATIENT
Start: 2024-05-15 | End: 2024-05-15

## 2024-05-15 RX ORDER — LIDOCAINE HYDROCHLORIDE 20 MG/ML
JELLY TOPICAL ONCE
Status: COMPLETED | OUTPATIENT
Start: 2024-05-15 | End: 2024-05-15

## 2024-05-15 RX ORDER — BACITRACIN ZINC AND POLYMYXIN B SULFATE 500; 1000 [USP'U]/G; [USP'U]/G
OINTMENT TOPICAL ONCE
OUTPATIENT
Start: 2024-05-15 | End: 2024-05-15

## 2024-05-15 RX ORDER — GENTAMICIN SULFATE 1 MG/G
OINTMENT TOPICAL ONCE
OUTPATIENT
Start: 2024-05-15 | End: 2024-05-15

## 2024-05-15 RX ORDER — LIDOCAINE 40 MG/G
CREAM TOPICAL ONCE
OUTPATIENT
Start: 2024-05-15 | End: 2024-05-15

## 2024-05-15 RX ORDER — IBUPROFEN 200 MG
TABLET ORAL ONCE
OUTPATIENT
Start: 2024-05-15 | End: 2024-05-15

## 2024-05-15 RX ORDER — TRIAMCINOLONE ACETONIDE 1 MG/G
OINTMENT TOPICAL ONCE
OUTPATIENT
Start: 2024-05-15 | End: 2024-05-15

## 2024-05-15 RX ORDER — SODIUM CHLOR/HYPOCHLOROUS ACID 0.033 %
SOLUTION, IRRIGATION IRRIGATION ONCE
OUTPATIENT
Start: 2024-05-15 | End: 2024-05-15

## 2024-05-15 RX ORDER — LIDOCAINE 50 MG/G
OINTMENT TOPICAL ONCE
OUTPATIENT
Start: 2024-05-15 | End: 2024-05-15

## 2024-05-15 RX ADMIN — LIDOCAINE HYDROCHLORIDE 6 ML: 20 JELLY TOPICAL at 09:44

## 2024-05-15 ASSESSMENT — PAIN DESCRIPTION - DESCRIPTORS
DESCRIPTORS: ACHING
DESCRIPTORS: ACHING

## 2024-05-15 ASSESSMENT — PAIN SCALES - GENERAL
PAINLEVEL_OUTOF10: 7
PAINLEVEL_OUTOF10: 7

## 2024-05-15 ASSESSMENT — PAIN - FUNCTIONAL ASSESSMENT: PAIN_FUNCTIONAL_ASSESSMENT: ACTIVITIES ARE NOT PREVENTED

## 2024-05-15 ASSESSMENT — PAIN DESCRIPTION - ORIENTATION
ORIENTATION: LEFT;LOWER
ORIENTATION: LEFT

## 2024-05-15 ASSESSMENT — PAIN SCALES - WONG BAKER: WONGBAKER_NUMERICALRESPONSE: NO HURT

## 2024-05-15 ASSESSMENT — PAIN DESCRIPTION - LOCATION
LOCATION: LEG
LOCATION: LEG

## 2024-05-15 ASSESSMENT — PAIN DESCRIPTION - PAIN TYPE: TYPE: ACUTE PAIN

## 2024-05-15 ASSESSMENT — PAIN DESCRIPTION - FREQUENCY: FREQUENCY: INTERMITTENT

## 2024-05-15 NOTE — PROGRESS NOTES
Unna Boot Application   Below Knee    NAME:  Lee Mejia  YOB: 1960  MEDICAL RECORD NUMBER:  [id number]  DATE:  5/15/2024    [x] Applied moisturizing agent to dry skin as needed.   [x] Appied primary and secondary dressing as ordered    [x] Applied Unna roll from toes to knee overlapping each time.   [x] Applied ace wrap or coban from toes to below the knee.   [x] Secured with tape and/or metal clips covered with tape.   [x] Instructed patient/caregiver to keep dressing dry and intact. DO NOT REMOVE DRESSING.   [x] Instructed pt/family/caregiver to report excessive draining, loose bandage, wet dressing, severe pain or tingling in toes.  [x] Applied Unna Boot dressing below the knee to Left lower leg(s)       Unna Boot(s) were applied per  Guidelines.     Electronically signed by Falguni Gagnon RN on 5/15/2024 at 10:09 AM      
PROCEDURE N/A 2023    Insert temporary pacemaker performed by Taj Daly MD at [facility] CARDIAC CATH LAB    CARDIAC PROCEDURE N/A 2023    Left heart cath performed by Taj Daly MD at [facility] CARDIAC CATH LAB    COLONOSCOPY  2015    CORONARY ARTERY BYPASS GRAFT N/A 2023    EMERGENCY CABG, CORONARY ARTERY BYPASS X3, ON PUMP, ANGELLA PER ANESTHESIA performed by David Bañuelos MD at [facility] CVOR    FEMORAL-FEMORAL BYPASS GRAFT Left 2024    FEMORAL THROMBECTOMY WITH ANGIOGRAM, POSSIBLE FEMORAL FEMORAL BYPASS, POSSIBLE FASCIOTOMY performed by Brent Anderson MD at [facility] CVOR    LEG SURGERY Left 2024    LEFT LOWER DEBRIDEMENT OF WOUND performed by Jamir Liu MD at [facility]    TOOTH EXTRACTION  14       FAMILY HISTORY    Family History   Problem Relation Age of Onset    High Blood Pressure Mother     High Blood Pressure Father     Cancer Brother        SOCIAL HISTORY    Social History     Tobacco Use    Smoking status: Former     Current packs/day: 0.00     Average packs/day: 0.5 packs/day for 20.0 years (10.0 ttl pk-yrs)     Types: Cigarettes     Quit date: 2024     Years since quittin.3    Smokeless tobacco: Never   Vaping Use    Vaping Use: Never used   Substance Use Topics    Alcohol use: No    Drug use: No       ALLERGIES    No Known Allergies    MEDICATIONS    Current Outpatient Medications on File Prior to Encounter   Medication Sig Dispense Refill    nicotine (NICODERM CQ) 21 MG/24HR Place 1 patch onto the skin daily 30 patch 0    Handicap Placard MISC by Does not apply route 1 each 0    DULoxetine (CYMBALTA) 20 MG extended release capsule Take 1 capsule by mouth daily 30 capsule 3    ELIQUIS 5 MG TABS tablet Take 1 tablet by mouth 2 times daily 60 tablet 11    Melatonin 10 MG TABS Take 1 tablet by mouth nightly 30 tablet 3    gabapentin (NEURONTIN) 300 MG capsule Take 1 capsule by mouth 3 times daily for 90 days. 90 capsule 2    acetaminophen

## 2024-05-20 NOTE — DISCHARGE INSTRUCTIONS
[facility] -Phone: [phone] Fax: [phone]    Visit  Discharge Instructions / Physician Orders     DATE: 5/22/2024     Home Care: [facility]     SUPPLIES ORDERED THRU:      Wound Location: Left Lateral Lower Leg     Cleanse with: Soap and Water if wrap needs changed     Dressing Orders: Zinc Unna Boot     Frequency: Keep Dry and Intact     Additional Orders: Increase protein to diet (meat, cheese, eggs, fish, peanut butter, nuts and beans)  ELEVATE LEGS AS MUCH AS POSSIBLE     Your next appointment with Wound Care Center is in 1 week     (Please note your next appointment above and if you are unable to keep, kindly give a 24 hour notice. Thank you.)  If more than 15 min late we cannot guarantee you will be seen due to clinician schedule  Per Policy, Excessive cancellation will call for dismissal from program.     If you experience any of the following, please call the Wound Care Center during business hours:  [phone]  Your Phone call may be forwarded to [facility] [place] during business hours that [place] is closed.     * Increase in Pain  * Temperature over 101  * Increase in drainage from your wound  * Drainage with a foul odor  * Bleeding  * Increase in swelling  * Need for compression bandage changes due to slippage, breakthrough drainage.     If you need medical attention outside of the business hours of the Wound Care Centers please contact your PCP or go to the nearest emergency room.     The information contained in the After Visit Summary has been reviewed with me, the patient and/or responsible adult, by my health care provider(s). I had the opportunity to ask questions regarding this information. I have elected to receive;      []After Visit Summary  [x]Comprehensive Discharge Instruction        Patient signature______________________________________Date:________  Electronically signed by Edis Ladd RN on 5/22/2024 at 11:18 AM  Electronically signed by

## 2024-05-22 ENCOUNTER — HOSPITAL ENCOUNTER (OUTPATIENT)
Dept: WOUND CARE | Age: 64
Discharge: HOME | End: 2024-05-22
Attending: STUDENT IN AN ORGANIZED HEALTH CARE EDUCATION/TRAINING PROGRAM
Payer: MEDICAID

## 2024-05-22 VITALS
WEIGHT: 130 LBS | DIASTOLIC BLOOD PRESSURE: 71 MMHG | TEMPERATURE: 98.5 F | SYSTOLIC BLOOD PRESSURE: 147 MMHG | RESPIRATION RATE: 18 BRPM | HEIGHT: 66 IN | BODY MASS INDEX: 20.89 KG/M2 | HEART RATE: 71 BPM

## 2024-05-22 DIAGNOSIS — T81.89XD NON-HEALING SURGICAL WOUND, SUBSEQUENT ENCOUNTER: Primary | ICD-10-CM

## 2024-05-22 PROCEDURE — 99212 OFFICE O/P EST SF 10 MIN: CPT | Performed by: STUDENT IN AN ORGANIZED HEALTH CARE EDUCATION/TRAINING PROGRAM

## 2024-05-22 PROCEDURE — 29580 STRAPPING UNNA BOOT: CPT

## 2024-05-22 RX ORDER — IBUPROFEN 200 MG
TABLET ORAL ONCE
OUTPATIENT
Start: 2024-05-22 | End: 2024-05-22

## 2024-05-22 RX ORDER — LIDOCAINE HYDROCHLORIDE 40 MG/ML
SOLUTION TOPICAL ONCE
OUTPATIENT
Start: 2024-05-22 | End: 2024-05-22

## 2024-05-22 RX ORDER — CLOBETASOL PROPIONATE 0.5 MG/G
OINTMENT TOPICAL ONCE
OUTPATIENT
Start: 2024-05-22 | End: 2024-05-22

## 2024-05-22 RX ORDER — LIDOCAINE HYDROCHLORIDE 20 MG/ML
JELLY TOPICAL ONCE
Status: DISCONTINUED | OUTPATIENT
Start: 2024-05-22 | End: 2024-05-23 | Stop reason: HOSPADM

## 2024-05-22 RX ORDER — BACITRACIN ZINC AND POLYMYXIN B SULFATE 500; 1000 [USP'U]/G; [USP'U]/G
OINTMENT TOPICAL ONCE
OUTPATIENT
Start: 2024-05-22 | End: 2024-05-22

## 2024-05-22 RX ORDER — LIDOCAINE HYDROCHLORIDE 20 MG/ML
JELLY TOPICAL ONCE
OUTPATIENT
Start: 2024-05-22 | End: 2024-05-22

## 2024-05-22 RX ORDER — GINSENG 100 MG
CAPSULE ORAL ONCE
OUTPATIENT
Start: 2024-05-22 | End: 2024-05-22

## 2024-05-22 RX ORDER — GENTAMICIN SULFATE 1 MG/G
OINTMENT TOPICAL ONCE
OUTPATIENT
Start: 2024-05-22 | End: 2024-05-22

## 2024-05-22 RX ORDER — BETAMETHASONE DIPROPIONATE 0.5 MG/G
CREAM TOPICAL ONCE
OUTPATIENT
Start: 2024-05-22 | End: 2024-05-22

## 2024-05-22 RX ORDER — SODIUM CHLOR/HYPOCHLOROUS ACID 0.033 %
SOLUTION, IRRIGATION IRRIGATION ONCE
OUTPATIENT
Start: 2024-05-22 | End: 2024-05-22

## 2024-05-22 RX ORDER — TRIAMCINOLONE ACETONIDE 1 MG/G
OINTMENT TOPICAL ONCE
OUTPATIENT
Start: 2024-05-22 | End: 2024-05-22

## 2024-05-22 RX ORDER — LIDOCAINE 40 MG/G
CREAM TOPICAL ONCE
OUTPATIENT
Start: 2024-05-22 | End: 2024-05-22

## 2024-05-22 RX ORDER — LIDOCAINE 50 MG/G
OINTMENT TOPICAL ONCE
OUTPATIENT
Start: 2024-05-22 | End: 2024-05-22

## 2024-05-22 ASSESSMENT — PAIN SCALES - WONG BAKER: WONGBAKER_NUMERICALRESPONSE: NO HURT

## 2024-05-22 ASSESSMENT — PAIN DESCRIPTION - LOCATION: LOCATION: LEG

## 2024-05-22 ASSESSMENT — PAIN DESCRIPTION - ORIENTATION: ORIENTATION: LEFT;LOWER

## 2024-05-22 ASSESSMENT — PAIN - FUNCTIONAL ASSESSMENT: PAIN_FUNCTIONAL_ASSESSMENT: ACTIVITIES ARE NOT PREVENTED

## 2024-05-22 ASSESSMENT — PAIN DESCRIPTION - DESCRIPTORS: DESCRIPTORS: ACHING

## 2024-05-22 ASSESSMENT — PAIN SCALES - GENERAL: PAINLEVEL_OUTOF10: 5

## 2024-05-22 ASSESSMENT — PAIN DESCRIPTION - PAIN TYPE: TYPE: ACUTE PAIN;CHRONIC PAIN

## 2024-05-22 NOTE — PROGRESS NOTES
Unna Boot Application   Below Knee    NAME:  Lee Mejia  YOB: 1960  MEDICAL RECORD NUMBER:  [id number]  DATE:  5/22/2024    [x] Applied moisturizing agent to dry skin as needed.   [x] Appied primary and secondary dressing as ordered    [x] Applied Unna roll from toes to knee overlapping each time.   [x] Applied ace wrap or coban from toes to below the knee.   [x] Secured with tape and/or metal clips covered with tape.   [x] Instructed patient/caregiver to keep dressing dry and intact. DO NOT REMOVE DRESSING.   [x] Instructed pt/family/caregiver to report excessive draining, loose bandage, wet dressing, severe pain or tingling in toes.  [x] Applied Unna Boot dressing below the knee to Left lower leg(s)       Unna Boot(s) were applied per  Guidelines.     Electronically signed by KAILASH BECKETT RN on 5/22/2024 at 11:28 AM

## 2024-05-22 NOTE — PROGRESS NOTES
[facility]   Progress Note and Procedure Note      Lee Mejia  MEDICAL RECORD NUMBER:  [id number]  AGE: 64 y.o.   GENDER: male  : 1960  EPISODE DATE:  2024    Subjective:     Chief Complaint   Patient presents with    Wound Check     Left lower leg         HISTORY of PRESENT ILLNESS HPI    Lee Mejia is a 64 y.o. male who presents today for wound/ulcer evaluation.      History of left leg arterial thrombectomy and fasciotomy on 24. He has slow healing left lateral fasciotomy wound that required debridement on 24. He has history of foot drop on the left side from his initial ischemia. He is getting treated with wound vac therapy.     Interval history: Wound is healed.        PAST MEDICAL HISTORY        Diagnosis Date    ATN (acute tubular necrosis) (HCC) 2023    COPD (chronic obstructive pulmonary disease) (HCC)     COPD (chronic obstructive pulmonary disease) (HCC)     Diabetes mellitus (HCC)     controlled with diet    Diabetes mellitus (HCC)     Fatty (change of) liver, not elsewhere classified 3/22/2021    Left main coronary artery disease 2023    Mixed hyperlipidemia 2020    Neuropathy 2024       PAST SURGICAL HISTORY    Past Surgical History:   Procedure Laterality Date    CARDIAC PROCEDURE N/A 2023    Coronary angiography performed by Radha Escobar MD at [facility] CARDIAC CATH LAB    CARDIAC PROCEDURE N/A 2023    Percutaneous coronary intervention performed by Radha Escobar MD at [facility] CARDIAC CATH LAB    CARDIAC PROCEDURE N/A 2023    Intra-aortic balloon pump insertion performed by Radha Escobar MD at [facility] CARDIAC CATH LAB    CARDIAC PROCEDURE N/A 2023    Insert temporary pacemaker performed by Radha Escobar MD at [facility] CARDIAC CATH LAB    CARDIAC PROCEDURE N/A 2023    Intra-aortic balloon removal performed by Taj Daly MD at [facility] CARDIAC CATH LAB    CARDIAC PROCEDURE N/A 2023

## 2024-05-28 NOTE — DISCHARGE INSTRUCTIONS
[facility] -Phone: [phone] Fax: [phone]    Visit  Discharge Instructions / Physician Orders     DATE: 5/29/2024     Home Care: [facility]     SUPPLIES ORDERED THRU:      Wound Location: Left Lateral Lower Leg     Cleanse with: As Normal     Dressing Orders: Compression Stockings on during the day, off at night while in bed     Frequency: Daily     Additional Orders: Increase protein to diet (meat, cheese, eggs, fish, peanut butter, nuts and beans)  ELEVATE LEGS AS MUCH AS POSSIBLE  Vascular Studies have been ordered, Please Call [phone] to schedule an appointment at your earliest convenience.      Your next appointment with Wound Care Center is- follow up with Dr. Anderson in 1 month     (Please note your next appointment above and if you are unable to keep, kindly give a 24 hour notice. Thank you.)  If more than 15 min late we cannot guarantee you will be seen due to clinician schedule  Per Policy, Excessive cancellation will call for dismissal from program.     If you experience any of the following, please call the Wound Care Center during business hours:  [phone]  Your Phone call may be forwarded to [facility] [place] during business hours that [place] is closed.     * Increase in Pain  * Temperature over 101  * Increase in drainage from your wound  * Drainage with a foul odor  * Bleeding  * Increase in swelling  * Need for compression bandage changes due to slippage, breakthrough drainage.     If you need medical attention outside of the business hours of the Wound Care Centers please contact your PCP or go to the nearest emergency room.     The information contained in the After Visit Summary has been reviewed with me, the patient and/or responsible adult, by my health care provider(s). I had the opportunity to ask questions regarding this information. I have elected to receive;      []After Visit Summary  [x]Comprehensive Discharge Instruction        Patient

## 2024-05-29 ENCOUNTER — HOSPITAL ENCOUNTER (OUTPATIENT)
Dept: WOUND CARE | Age: 64
Discharge: HOME | End: 2024-05-29
Attending: STUDENT IN AN ORGANIZED HEALTH CARE EDUCATION/TRAINING PROGRAM
Payer: MEDICAID

## 2024-05-29 VITALS
HEIGHT: 66 IN | DIASTOLIC BLOOD PRESSURE: 74 MMHG | HEART RATE: 77 BPM | BODY MASS INDEX: 20.89 KG/M2 | WEIGHT: 130 LBS | SYSTOLIC BLOOD PRESSURE: 161 MMHG | TEMPERATURE: 97.1 F

## 2024-05-29 DIAGNOSIS — R09.89 DECREASED PEDAL PULSES: ICD-10-CM

## 2024-05-29 DIAGNOSIS — T81.89XD NON-HEALING SURGICAL WOUND, SUBSEQUENT ENCOUNTER: Primary | ICD-10-CM

## 2024-05-29 PROCEDURE — 99212 OFFICE O/P EST SF 10 MIN: CPT

## 2024-05-29 PROCEDURE — 99212 OFFICE O/P EST SF 10 MIN: CPT | Performed by: STUDENT IN AN ORGANIZED HEALTH CARE EDUCATION/TRAINING PROGRAM

## 2024-05-29 RX ORDER — IBUPROFEN 200 MG
TABLET ORAL ONCE
OUTPATIENT
Start: 2024-05-29 | End: 2024-05-29

## 2024-05-29 RX ORDER — BETAMETHASONE DIPROPIONATE 0.5 MG/G
CREAM TOPICAL ONCE
OUTPATIENT
Start: 2024-05-29 | End: 2024-05-29

## 2024-05-29 RX ORDER — LIDOCAINE HYDROCHLORIDE 20 MG/ML
JELLY TOPICAL ONCE
OUTPATIENT
Start: 2024-05-29 | End: 2024-05-29

## 2024-05-29 RX ORDER — LIDOCAINE 40 MG/G
CREAM TOPICAL ONCE
OUTPATIENT
Start: 2024-05-29 | End: 2024-05-29

## 2024-05-29 RX ORDER — LIDOCAINE 50 MG/G
OINTMENT TOPICAL ONCE
OUTPATIENT
Start: 2024-05-29 | End: 2024-05-29

## 2024-05-29 RX ORDER — BACITRACIN ZINC AND POLYMYXIN B SULFATE 500; 1000 [USP'U]/G; [USP'U]/G
OINTMENT TOPICAL ONCE
OUTPATIENT
Start: 2024-05-29 | End: 2024-05-29

## 2024-05-29 RX ORDER — GENTAMICIN SULFATE 1 MG/G
OINTMENT TOPICAL ONCE
OUTPATIENT
Start: 2024-05-29 | End: 2024-05-29

## 2024-05-29 RX ORDER — CLOBETASOL PROPIONATE 0.5 MG/G
OINTMENT TOPICAL ONCE
OUTPATIENT
Start: 2024-05-29 | End: 2024-05-29

## 2024-05-29 RX ORDER — TRIAMCINOLONE ACETONIDE 1 MG/G
OINTMENT TOPICAL ONCE
OUTPATIENT
Start: 2024-05-29 | End: 2024-05-29

## 2024-05-29 RX ORDER — GINSENG 100 MG
CAPSULE ORAL ONCE
OUTPATIENT
Start: 2024-05-29 | End: 2024-05-29

## 2024-05-29 RX ORDER — SODIUM CHLOR/HYPOCHLOROUS ACID 0.033 %
SOLUTION, IRRIGATION IRRIGATION ONCE
OUTPATIENT
Start: 2024-05-29 | End: 2024-05-29

## 2024-05-29 RX ORDER — LIDOCAINE HYDROCHLORIDE 40 MG/ML
SOLUTION TOPICAL ONCE
OUTPATIENT
Start: 2024-05-29 | End: 2024-05-29

## 2024-05-29 NOTE — PLAN OF CARE
Problem: Chronic Conditions and Co-morbidities  Goal: Patient's chronic conditions and co-morbidity symptoms are monitored and maintained or improved  Outcome: Completed     Problem: Wound:  Goal: Will show signs of wound healing; wound closure and no evidence of infection  Description: Will show signs of wound healing; wound closure and no evidence of infection  Outcome: Completed     Problem: Falls - Risk of:  Goal: Will remain free from falls  Description: Will remain free from falls  Outcome: Completed

## 2024-06-03 PROBLEM — I10 PRIMARY HYPERTENSION: Status: ACTIVE | Noted: 2024-06-03

## 2024-06-11 ENCOUNTER — HOSPITAL ENCOUNTER (OUTPATIENT)
Dept: VASCULAR LAB | Age: 64
Discharge: HOME | End: 2024-06-13
Attending: STUDENT IN AN ORGANIZED HEALTH CARE EDUCATION/TRAINING PROGRAM
Payer: MEDICAID

## 2024-06-11 DIAGNOSIS — R09.89 DECREASED PEDAL PULSES: ICD-10-CM

## 2024-06-11 LAB
VAS LEFT ABI: 1.12
VAS LEFT ARM BP: 129 MMHG
VAS LEFT DORSALIS PEDIS BP: 145 MMHG
VAS LEFT PTA BP: 141 MMHG
VAS LEFT TBI: 0.64
VAS LEFT TOE PRESSURE: 83 MMHG
VAS RIGHT ABI: 1.3
VAS RIGHT ARM BP: 129 MMHG
VAS RIGHT DORSALIS PEDIS BP: 140 MMHG
VAS RIGHT PTA BP: 168 MMHG
VAS RIGHT TBI: 1.36
VAS RIGHT TOE PRESSURE: 176 MMHG

## 2024-06-11 PROCEDURE — 93923 UPR/LXTR ART STDY 3+ LVLS: CPT

## 2024-06-24 ENCOUNTER — HOSPITAL ENCOUNTER (OUTPATIENT)
Age: 64
Discharge: HOME | End: 2024-06-24
Payer: MEDICAID

## 2024-06-24 DIAGNOSIS — I10 PRIMARY HYPERTENSION: ICD-10-CM

## 2024-06-24 DIAGNOSIS — Z79.899 ON POTASSIUM WASTING DIURETIC THERAPY: ICD-10-CM

## 2024-06-24 LAB
ALBUMIN SERPL-MCNC: 4.4 G/DL (ref 3.5–5.2)
ALBUMIN/GLOB SERPL: 1 {RATIO} (ref 1–2.5)
ALP SERPL-CCNC: 114 U/L (ref 40–129)
ALT SERPL-CCNC: 14 U/L (ref 10–50)
ANION GAP SERPL CALCULATED.3IONS-SCNC: 10 MMOL/L (ref 9–16)
AST SERPL-CCNC: 17 U/L (ref 10–50)
BILIRUB SERPL-MCNC: 0.3 MG/DL (ref 0–1.2)
BUN SERPL-MCNC: 17 MG/DL (ref 8–23)
CALCIUM SERPL-MCNC: 9.3 MG/DL (ref 8.6–10.4)
CHLORIDE SERPL-SCNC: 102 MMOL/L (ref 98–107)
CO2 SERPL-SCNC: 26 MMOL/L (ref 20–31)
CREAT SERPL-MCNC: 0.8 MG/DL (ref 0.7–1.2)
GFR, ESTIMATED: >90 ML/MIN/1.73M2
GLUCOSE SERPL-MCNC: 101 MG/DL (ref 74–99)
POTASSIUM SERPL-SCNC: 4.9 MMOL/L (ref 3.7–5.3)
PROT SERPL-MCNC: 7.7 G/DL (ref 6.6–8.7)
SODIUM SERPL-SCNC: 138 MMOL/L (ref 136–145)

## 2024-06-24 PROCEDURE — 80053 COMPREHEN METABOLIC PANEL: CPT

## 2024-06-24 PROCEDURE — 36415 COLL VENOUS BLD VENIPUNCTURE: CPT

## 2024-06-28 ENCOUNTER — OFFICE VISIT (OUTPATIENT)
Dept: VASCULAR SURGERY | Age: 64
End: 2024-06-28

## 2024-06-28 VITALS
SYSTOLIC BLOOD PRESSURE: 105 MMHG | BODY MASS INDEX: 20.89 KG/M2 | DIASTOLIC BLOOD PRESSURE: 67 MMHG | TEMPERATURE: 97.9 F | HEART RATE: 78 BPM | HEIGHT: 66 IN | OXYGEN SATURATION: 97 % | WEIGHT: 130 LBS

## 2024-06-28 DIAGNOSIS — Z48.89 ENCOUNTER FOR POST SURGICAL WOUND CHECK: Primary | ICD-10-CM

## 2024-06-28 NOTE — PROGRESS NOTES
[facility], [facility]  [address]  [place] [address]  Dept: [phone]     Patient: Lee Mejia  : 1960  MRN: [id number]  DOS: 2024            HPI:  Lee Mejai is a 64 y.o. male who returns to the office regarding his left lower extremity acute ischemic episode which we repaired with thrombectomy.  His leg then required fasciotomy.  He recovered relatively well despite some residual neurologic symptoms including sensory neuropathies on the left and a very slight foot drop that has improved.  His sensory neuropathy he reports has improved as well.  He was in the wound care center for wound healing and he has finally healed his fasciotomy sites.    Review of Systems    Vitals:    24 1344   BP: 105/67   Site: Right Upper Arm   Position: Sitting   Cuff Size: Medium Adult   Pulse: 78   Temp: 97.9 °F (36.6 °C)   TempSrc: Temporal   SpO2: 97%   Weight: 59 kg (130 lb)   Height: 1.676 m (5' 6\")          Physical Exam  On examination his left lower extremity is warm and well-perfused with an outstanding dorsalis pedis pulse and a weakly palpable posterior tibial pulse.  All of his incisions have healed well.  Assessment:  1. Encounter for post surgical wound check          Plan:  Currently I do not think that he needs further vascular follow-up.  I do not know if cardiology needs Eliquis to continue and therefore I left it in place for now.  He sees cardiology in 2 months.  If it is not needed from their standpoint it certainly is not needed for continued therapy from my standpoint as his thrombotic episode is over.  Recall that this was due to a balloon pump.  We can see him as needed.    Electronically signed by:  Brent Anderson MD

## 2024-10-24 PROBLEM — J44.9 CHRONIC OBSTRUCTIVE PULMONARY DISEASE (HCC): Status: ACTIVE | Noted: 2020-03-06

## 2024-10-24 PROBLEM — J44.1 COPD EXACERBATION (HCC): Status: ACTIVE | Noted: 2020-03-06

## 2024-11-11 PROBLEM — B37.0 THRUSH, ORAL: Status: ACTIVE | Noted: 2024-11-11

## 2024-11-11 PROBLEM — H60.392: Status: ACTIVE | Noted: 2024-11-11

## 2024-11-25 PROBLEM — H81.10 BENIGN PAROXYSMAL POSITIONAL VERTIGO: Status: ACTIVE | Noted: 2024-11-25

## 2025-01-06 PROBLEM — H65.92 LEFT OTITIS MEDIA WITH EFFUSION: Status: ACTIVE | Noted: 2025-01-06

## 2025-01-06 PROBLEM — H60.332 ACUTE SWIMMER'S EAR OF LEFT SIDE: Status: ACTIVE | Noted: 2025-01-06

## 2025-02-03 ENCOUNTER — HOSPITAL ENCOUNTER (OUTPATIENT)
Age: 65
Discharge: HOME | End: 2025-02-03
Payer: MEDICAID

## 2025-02-03 DIAGNOSIS — I25.10 CORONARY ARTERY DISEASE INVOLVING NATIVE CORONARY ARTERY OF NATIVE HEART WITHOUT ANGINA PECTORIS: ICD-10-CM

## 2025-02-03 DIAGNOSIS — I10 PRIMARY HYPERTENSION: ICD-10-CM

## 2025-02-03 LAB
ALBUMIN SERPL-MCNC: 4.3 G/DL (ref 3.5–5.2)
ALBUMIN/GLOB SERPL: 1.8 {RATIO} (ref 1–2.5)
ALP SERPL-CCNC: 98 U/L (ref 40–129)
ALT SERPL-CCNC: 8 U/L (ref 10–50)
ANION GAP SERPL CALCULATED.3IONS-SCNC: 9 MMOL/L (ref 9–16)
AST SERPL-CCNC: 14 U/L (ref 10–50)
AST SERPL-CCNC: 15 U/L (ref 10–50)
BASOPHILS # BLD: 0.06 K/UL (ref 0–0.2)
BASOPHILS NFR BLD: 1 % (ref 0–2)
BILIRUB SERPL-MCNC: 0.3 MG/DL (ref 0–1.2)
BUN SERPL-MCNC: 18 MG/DL (ref 8–23)
CALCIUM SERPL-MCNC: 9.2 MG/DL (ref 8.6–10.4)
CHLORIDE SERPL-SCNC: 108 MMOL/L (ref 98–107)
CHOLEST SERPL-MCNC: 89 MG/DL (ref 0–199)
CHOLESTEROL/HDL RATIO: 2.9
CK SERPL-CCNC: 36 U/L (ref 39–308)
CO2 SERPL-SCNC: 22 MMOL/L (ref 20–31)
CREAT SERPL-MCNC: 0.8 MG/DL (ref 0.7–1.2)
EOSINOPHIL # BLD: 0.13 K/UL (ref 0–0.4)
EOSINOPHILS RELATIVE PERCENT: 2 % (ref 1–4)
ERYTHROCYTE [DISTWIDTH] IN BLOOD BY AUTOMATED COUNT: 21.5 % (ref 11.8–14.4)
GFR, ESTIMATED: >90 ML/MIN/1.73M2
GLUCOSE SERPL-MCNC: 104 MG/DL (ref 74–99)
HCT VFR BLD AUTO: 21.2 % (ref 40.7–50.3)
HDLC SERPL-MCNC: 31 MG/DL
HGB BLD-MCNC: 5.2 G/DL (ref 13–17)
IMM GRANULOCYTES # BLD AUTO: 0 K/UL (ref 0–0.3)
IMM GRANULOCYTES NFR BLD: 0 %
LDLC SERPL CALC-MCNC: 50 MG/DL (ref 0–100)
LYMPHOCYTES NFR BLD: 1.66 K/UL (ref 1–4.8)
LYMPHOCYTES RELATIVE PERCENT: 26 % (ref 24–44)
MCH RBC QN AUTO: 13.8 PG (ref 25.2–33.5)
MCHC RBC AUTO-ENTMCNC: 24.5 G/DL (ref 28.4–34.8)
MCV RBC AUTO: 56.4 FL (ref 82.6–102.9)
MONOCYTES NFR BLD: 0.38 K/UL (ref 0.1–0.8)
MONOCYTES NFR BLD: 6 % (ref 1–7)
MORPHOLOGY: ABNORMAL
NEUTROPHILS NFR BLD: 65 % (ref 36–66)
NEUTS SEG NFR BLD: 4.17 K/UL (ref 1.8–7.7)
NRBC BLD-RTO: 1.1 PER 100 WBC
PLATELET # BLD AUTO: ABNORMAL K/UL (ref 138–453)
PLATELET, FLUORESCENCE: 371 K/UL (ref 138–453)
PLATELETS.RETICULATED NFR BLD AUTO: 4.9 % (ref 1.1–10.3)
POTASSIUM SERPL-SCNC: 4.4 MMOL/L (ref 3.7–5.3)
PROT SERPL-MCNC: 6.7 G/DL (ref 6.6–8.7)
RBC # BLD AUTO: 3.76 M/UL (ref 4.21–5.77)
SODIUM SERPL-SCNC: 139 MMOL/L (ref 136–145)
TRIGL SERPL-MCNC: 40 MG/DL
VLDLC SERPL CALC-MCNC: 8 MG/DL (ref 1–30)
WBC OTHER # BLD: 6.4 K/UL (ref 3.5–11.3)

## 2025-02-03 PROCEDURE — 84450 TRANSFERASE (AST) (SGOT): CPT

## 2025-02-03 PROCEDURE — 80053 COMPREHEN METABOLIC PANEL: CPT

## 2025-02-03 PROCEDURE — 80061 LIPID PANEL: CPT

## 2025-02-03 PROCEDURE — 85025 COMPLETE CBC W/AUTO DIFF WBC: CPT

## 2025-02-03 PROCEDURE — 85055 RETICULATED PLATELET ASSAY: CPT

## 2025-02-03 PROCEDURE — 36415 COLL VENOUS BLD VENIPUNCTURE: CPT

## 2025-02-03 PROCEDURE — 82550 ASSAY OF CK (CPK): CPT

## 2025-02-10 ENCOUNTER — TELEPHONE (OUTPATIENT)
Dept: ONCOLOGY | Age: 65
End: 2025-02-10

## 2025-02-10 ENCOUNTER — INITIAL CONSULT (OUTPATIENT)
Dept: ONCOLOGY | Age: 65
End: 2025-02-10
Payer: MEDICAID

## 2025-02-10 VITALS
WEIGHT: 138 LBS | SYSTOLIC BLOOD PRESSURE: 101 MMHG | DIASTOLIC BLOOD PRESSURE: 54 MMHG | HEART RATE: 70 BPM | RESPIRATION RATE: 18 BRPM | OXYGEN SATURATION: 100 % | TEMPERATURE: 96.8 F | BODY MASS INDEX: 22.28 KG/M2

## 2025-02-10 DIAGNOSIS — D64.9 SYMPTOMATIC ANEMIA: ICD-10-CM

## 2025-02-10 DIAGNOSIS — D50.8 IRON DEFICIENCY ANEMIA SECONDARY TO INADEQUATE DIETARY IRON INTAKE: Primary | ICD-10-CM

## 2025-02-10 DIAGNOSIS — E61.1 IRON DEFICIENCY: ICD-10-CM

## 2025-02-10 DIAGNOSIS — D50.9 MICROCYTIC ANEMIA: ICD-10-CM

## 2025-02-10 DIAGNOSIS — K90.9 IRON MALABSORPTION: ICD-10-CM

## 2025-02-10 PROCEDURE — 3078F DIAST BP <80 MM HG: CPT | Performed by: INTERNAL MEDICINE

## 2025-02-10 PROCEDURE — 99245 OFF/OP CONSLTJ NEW/EST HI 55: CPT | Performed by: INTERNAL MEDICINE

## 2025-02-10 PROCEDURE — 3074F SYST BP LT 130 MM HG: CPT | Performed by: INTERNAL MEDICINE

## 2025-02-10 RX ORDER — FAMOTIDINE 10 MG/ML
20 INJECTION, SOLUTION INTRAVENOUS
OUTPATIENT
Start: 2025-02-10

## 2025-02-10 RX ORDER — ONDANSETRON 2 MG/ML
8 INJECTION INTRAMUSCULAR; INTRAVENOUS
OUTPATIENT
Start: 2025-02-10

## 2025-02-10 RX ORDER — SODIUM CHLORIDE 9 MG/ML
5-250 INJECTION, SOLUTION INTRAVENOUS PRN
OUTPATIENT
Start: 2025-02-10

## 2025-02-10 RX ORDER — HEPARIN SODIUM (PORCINE) LOCK FLUSH IV SOLN 100 UNIT/ML 100 UNIT/ML
500 SOLUTION INTRAVENOUS PRN
OUTPATIENT
Start: 2025-02-10

## 2025-02-10 RX ORDER — MULTIVITAMIN WITH IRON
500 TABLET ORAL DAILY
COMMUNITY

## 2025-02-10 RX ORDER — SODIUM CHLORIDE 9 MG/ML
INJECTION, SOLUTION INTRAVENOUS CONTINUOUS
OUTPATIENT
Start: 2025-02-10

## 2025-02-10 RX ORDER — EPINEPHRINE 1 MG/ML
0.3 INJECTION, SOLUTION, CONCENTRATE INTRAVENOUS PRN
OUTPATIENT
Start: 2025-02-10

## 2025-02-10 RX ORDER — ALBUTEROL SULFATE 90 UG/1
4 INHALANT RESPIRATORY (INHALATION) PRN
OUTPATIENT
Start: 2025-02-10

## 2025-02-10 RX ORDER — OMEGA-3S/DHA/EPA/FISH OIL/D3 300MG-1000
400 CAPSULE ORAL DAILY
COMMUNITY

## 2025-02-10 RX ORDER — ACETAMINOPHEN 325 MG/1
650 TABLET ORAL
OUTPATIENT
Start: 2025-02-10

## 2025-02-10 RX ORDER — SODIUM CHLORIDE 9 MG/ML
INJECTION, SOLUTION INTRAVENOUS PRN
Status: COMPLETED | OUTPATIENT
Start: 2025-02-10 | End: 2025-02-11

## 2025-02-10 RX ORDER — HYDROCORTISONE SODIUM SUCCINATE 100 MG/2ML
100 INJECTION INTRAMUSCULAR; INTRAVENOUS
OUTPATIENT
Start: 2025-02-10

## 2025-02-10 RX ORDER — SODIUM CHLORIDE 0.9 % (FLUSH) 0.9 %
5-40 SYRINGE (ML) INJECTION PRN
OUTPATIENT
Start: 2025-02-10

## 2025-02-10 RX ORDER — DIPHENHYDRAMINE HYDROCHLORIDE 50 MG/ML
50 INJECTION INTRAMUSCULAR; INTRAVENOUS
OUTPATIENT
Start: 2025-02-10

## 2025-02-10 NOTE — TELEPHONE ENCOUNTER
Called pt to schedule for appt today per Dr. Tay. LVM w/ hospital directions and encouraged pt to return call w/ questions.

## 2025-02-10 NOTE — PATIENT INSTRUCTIONS
Blood transfusion 2 units at [facility] ASAP  IV iron infusion at [facility] soon  RV 6-8 weeks with CBC, Iron studies at [facility]  Refer to GI

## 2025-02-10 NOTE — TELEPHONE ENCOUNTER
Instructions   from Dr. Angelica Mccarthy MD    Blood transfusion 2 units at [facility] ASAP  IV iron infusion at [facility] soon  RV 6-8 weeks with CBC, Iron studies at [facility]  Refer to GI     Gave pts face sheet to triage nurse(Carrie) to have them schedule pt for blood transfusion for 2/11/2025 @9 at [facility]  Sent over pt information to GI electronically  Rv scheduled on 3/24/2025 @2:30; pt is ok with being seen here at [facility] rather than [facility]

## 2025-02-10 NOTE — PROGRESS NOTES
_           Mr. Lee Mejia is a very pleasant 64 y.o. male with history of multiple co morbidities as listed.  Patient is referred for evaluation further management of severe anemia.  Patient had cardiac surgery about 1-1/2 years ago.  Since then he had 2 hospitalizations due to anemia.  Anemia was attributed to active bleeding from the cardiac surgery and left leg surgery.  However the last surgery was done more than 8 months ago.  Currently patient is having increasing weakness and fatigue.  He has significant shortness of breath on minimal exertion.  He has palpitation.  His labs showed hemoglobin of 5.2.  Patient denies any clear active bleeding.  No melena or hematochezia.  No hematemesis.  His last colonoscopy was more than 10 years ago.  Patient quit smoking about a year ago.  Using nicotine patches.  No alcohol drinking.      PAST MEDICAL HISTORY: has a past medical history of ATN (acute tubular necrosis) (HCC), COPD (chronic obstructive pulmonary disease) (HCC), COPD (chronic obstructive pulmonary disease) (HCC), Diabetes mellitus (HCC), Diabetes mellitus (HCC), Fatty (change of) liver, not elsewhere classified, Left main coronary artery disease, Mixed hyperlipidemia, Neuropathy, and Primary hypertension.    PAST SURGICAL HISTORY: has a past surgical history that includes Tooth Extraction (11/06/14); Colonoscopy (01/05/2015); Cardiac procedure (N/A, 12/26/2023); Cardiac procedure (N/A, 12/26/2023); Cardiac procedure (N/A, 12/26/2023); Cardiac procedure (N/A, 12/26/2023); Coronary artery bypass graft (N/A, 12/26/2023); Cardiac procedure (N/A, 12/30/2023); Cardiac procedure (N/A, 12/30/2023); Cardiac procedure (N/A, 12/30/2023); Femoral-femoral Bypass Graft (Left, 1/4/2024); and Leg Surgery (Left, 2/26/2024).     CURRENT MEDICATIONS:  has a current medication list which includes the following prescription(s): vitamin

## 2025-02-11 ENCOUNTER — HOSPITAL ENCOUNTER (OUTPATIENT)
Dept: INFUSION THERAPY | Facility: MEDICAL CENTER | Age: 65
Discharge: HOME | End: 2025-02-11
Payer: MEDICAID

## 2025-02-11 ENCOUNTER — HOSPITAL ENCOUNTER (OUTPATIENT)
Facility: MEDICAL CENTER | Age: 65
Discharge: HOME | End: 2025-02-11
Payer: MEDICAID

## 2025-02-11 VITALS
HEART RATE: 72 BPM | TEMPERATURE: 97.7 F | RESPIRATION RATE: 18 BRPM | DIASTOLIC BLOOD PRESSURE: 63 MMHG | SYSTOLIC BLOOD PRESSURE: 129 MMHG

## 2025-02-11 DIAGNOSIS — D50.8 IRON DEFICIENCY ANEMIA SECONDARY TO INADEQUATE DIETARY IRON INTAKE: ICD-10-CM

## 2025-02-11 LAB
HCT VFR BLD AUTO: 22.7 % (ref 40.7–50.3)
HGB BLD-MCNC: 5.8 G/DL (ref 13–17)

## 2025-02-11 PROCEDURE — 85014 HEMATOCRIT: CPT

## 2025-02-11 PROCEDURE — 86920 COMPATIBILITY TEST SPIN: CPT

## 2025-02-11 PROCEDURE — 86850 RBC ANTIBODY SCREEN: CPT

## 2025-02-11 PROCEDURE — 2580000003 HC RX 258: Performed by: INTERNAL MEDICINE

## 2025-02-11 PROCEDURE — 86900 BLOOD TYPING SEROLOGIC ABO: CPT

## 2025-02-11 PROCEDURE — 36430 TRANSFUSION BLD/BLD COMPNT: CPT

## 2025-02-11 PROCEDURE — 85018 HEMOGLOBIN: CPT

## 2025-02-11 PROCEDURE — 36415 COLL VENOUS BLD VENIPUNCTURE: CPT

## 2025-02-11 PROCEDURE — P9016 RBC LEUKOCYTES REDUCED: HCPCS

## 2025-02-11 PROCEDURE — 86901 BLOOD TYPING SEROLOGIC RH(D): CPT

## 2025-02-11 RX ADMIN — SODIUM CHLORIDE: 0.9 INJECTION, SOLUTION INTRAVENOUS at 11:00

## 2025-02-11 NOTE — PROGRESS NOTES
Spiritual Health Outpatient Oncology/Hematology Progress Note: [facility]    Situation: Writer encountered Patient and Partner, Hazel, in the treatment cubicle of the infusion clinic.    Assessment: Patient shared the reason he was receiving treatment today. Pt spoke of his major surgery and the impact on him. Pt voiced hopes that he will respond to the treatment. Pt expressed gratitude to his Partner and specifically for her help in getting him to the hospital in time before his cardiac event. Partner acknowledged and gave thanks for how Pt's life was spared. Partner affirmed that she draws strength from her biju.     Intervention: Writer introduced herself and her services. Writer inquired about Pt's and Partner's coping and needs. Writer offered words of support and encouragement. Writer affirmed Pt's and Partner's strengths.     Outcome: Patient and Partner thanked writer for the visit.    Plan: Spiritual Health Services are available for Patient by phone and/or in person.    02/11/25 1413   Encounter Summary   Encounter Overview/Reason Spiritual/Emotional Needs;Initial Encounter   Service Provided For Patient and family together   Referral/Consult From [facility]   Support System Spouse   Last Encounter  02/11/25   Complexity of Encounter Low   Begin Time 0950   End Time  1000   Total Time Calculated 10 min   Spiritual/Emotional needs   Type Spiritual Support   Assessment/Intervention/Outcome   Assessment Powerlessness;Coping   Intervention Sustaining Presence/Ministry of presence;Active listening;Explored/Affirmed feelings, thoughts, concerns;Explored Coping Skills/Resources;Discussed illness injury and it’s impact;Discussed belief system/Uatsdin practices/biju   Outcome Engaged in conversation;Expressed feelings, needs, and concerns   Plan and Referrals   Plan/Referrals Continue Support (comment)     PACHECO Barros  [facility]   [phone]

## 2025-02-11 NOTE — PROGRESS NOTES
Patient arrived ambulatory with wife for possible blood transfusion.  Patient complains of fatigue, dizziness and shortness of breath.. No other complaints or concerns.  Labs reviewed.  IV inserted per unit protocol.  1 unit PRBC infused with no sign adverse reaction;line flushed.  2 nd unit PRBC infused with no sign adverse reaction;line flushed.   IV removed with pressure dressing applied.  Patient ambulated off unit with wife at discharge.

## 2025-02-12 LAB
ABO/RH: NORMAL
ANTIBODY SCREEN: NEGATIVE
ARM BAND NUMBER: NORMAL
BLOOD BANK DISPENSE STATUS: NORMAL
BLOOD BANK DISPENSE STATUS: NORMAL
BLOOD BANK SAMPLE EXPIRATION: NORMAL
BPU ID: NORMAL
BPU ID: NORMAL
COMPONENT: NORMAL
COMPONENT: NORMAL
CROSSMATCH RESULT: NORMAL
CROSSMATCH RESULT: NORMAL
TRANSFUSION STATUS: NORMAL
TRANSFUSION STATUS: NORMAL
UNIT DIVISION: 0
UNIT DIVISION: 0

## 2025-02-21 RX ORDER — SODIUM CHLORIDE 9 MG/ML
5-250 INJECTION, SOLUTION INTRAVENOUS PRN
OUTPATIENT
Start: 2025-02-21

## 2025-02-21 RX ORDER — SODIUM CHLORIDE 9 MG/ML
INJECTION, SOLUTION INTRAVENOUS CONTINUOUS
OUTPATIENT
Start: 2025-02-21

## 2025-02-21 RX ORDER — DIPHENHYDRAMINE HYDROCHLORIDE 50 MG/ML
50 INJECTION INTRAMUSCULAR; INTRAVENOUS
OUTPATIENT
Start: 2025-02-21

## 2025-02-21 RX ORDER — ALBUTEROL SULFATE 90 UG/1
4 INHALANT RESPIRATORY (INHALATION) PRN
OUTPATIENT
Start: 2025-02-21

## 2025-02-21 RX ORDER — HYDROCORTISONE SODIUM SUCCINATE 100 MG/2ML
100 INJECTION INTRAMUSCULAR; INTRAVENOUS
OUTPATIENT
Start: 2025-02-21

## 2025-02-21 RX ORDER — HEPARIN 100 UNIT/ML
500 SYRINGE INTRAVENOUS PRN
OUTPATIENT
Start: 2025-02-21

## 2025-02-21 RX ORDER — ACETAMINOPHEN 325 MG/1
650 TABLET ORAL
OUTPATIENT
Start: 2025-02-21

## 2025-02-21 RX ORDER — ONDANSETRON 2 MG/ML
8 INJECTION INTRAMUSCULAR; INTRAVENOUS
OUTPATIENT
Start: 2025-02-21

## 2025-02-21 RX ORDER — EPINEPHRINE 1 MG/ML
0.3 INJECTION, SOLUTION INTRAMUSCULAR; SUBCUTANEOUS PRN
OUTPATIENT
Start: 2025-02-21

## 2025-02-21 RX ORDER — SODIUM CHLORIDE 0.9 % (FLUSH) 0.9 %
5-40 SYRINGE (ML) INJECTION PRN
OUTPATIENT
Start: 2025-02-21

## 2025-02-25 ENCOUNTER — HOSPITAL ENCOUNTER (OUTPATIENT)
Dept: INFUSION THERAPY | Facility: MEDICAL CENTER | Age: 65
Discharge: HOME | End: 2025-02-25
Payer: MEDICAID

## 2025-02-25 VITALS
OXYGEN SATURATION: 98 % | RESPIRATION RATE: 16 BRPM | SYSTOLIC BLOOD PRESSURE: 125 MMHG | HEART RATE: 70 BPM | TEMPERATURE: 98.6 F | DIASTOLIC BLOOD PRESSURE: 57 MMHG

## 2025-02-25 DIAGNOSIS — D50.8 IRON DEFICIENCY ANEMIA SECONDARY TO INADEQUATE DIETARY IRON INTAKE: Primary | ICD-10-CM

## 2025-02-25 DIAGNOSIS — K90.9 IRON MALABSORPTION: ICD-10-CM

## 2025-02-25 PROCEDURE — 2580000003 HC RX 258: Performed by: INTERNAL MEDICINE

## 2025-02-25 PROCEDURE — 96365 THER/PROPH/DIAG IV INF INIT: CPT

## 2025-02-25 PROCEDURE — 96366 THER/PROPH/DIAG IV INF ADDON: CPT

## 2025-02-25 PROCEDURE — 6360000002 HC RX W HCPCS: Performed by: INTERNAL MEDICINE

## 2025-02-25 RX ORDER — ONDANSETRON 2 MG/ML
8 INJECTION INTRAMUSCULAR; INTRAVENOUS
OUTPATIENT
Start: 2025-03-04

## 2025-02-25 RX ORDER — HYDROCORTISONE SODIUM SUCCINATE 100 MG/2ML
100 INJECTION INTRAMUSCULAR; INTRAVENOUS
OUTPATIENT
Start: 2025-03-04

## 2025-02-25 RX ORDER — FAMOTIDINE 10 MG/ML
20 INJECTION, SOLUTION INTRAVENOUS
OUTPATIENT
Start: 2025-03-04

## 2025-02-25 RX ORDER — HEPARIN 100 UNIT/ML
500 SYRINGE INTRAVENOUS PRN
OUTPATIENT
Start: 2025-03-04

## 2025-02-25 RX ORDER — SODIUM CHLORIDE 9 MG/ML
5-250 INJECTION, SOLUTION INTRAVENOUS PRN
OUTPATIENT
Start: 2025-03-04

## 2025-02-25 RX ORDER — ACETAMINOPHEN 325 MG/1
650 TABLET ORAL
OUTPATIENT
Start: 2025-03-04

## 2025-02-25 RX ORDER — SODIUM CHLORIDE 0.9 % (FLUSH) 0.9 %
5-40 SYRINGE (ML) INJECTION PRN
OUTPATIENT
Start: 2025-03-04

## 2025-02-25 RX ORDER — ALBUTEROL SULFATE 90 UG/1
4 INHALANT RESPIRATORY (INHALATION) PRN
OUTPATIENT
Start: 2025-03-04

## 2025-02-25 RX ORDER — SODIUM CHLORIDE 9 MG/ML
INJECTION, SOLUTION INTRAVENOUS CONTINUOUS
OUTPATIENT
Start: 2025-03-04

## 2025-02-25 RX ORDER — EPINEPHRINE 1 MG/ML
0.3 INJECTION, SOLUTION INTRAMUSCULAR; SUBCUTANEOUS PRN
OUTPATIENT
Start: 2025-03-04

## 2025-02-25 RX ORDER — SODIUM CHLORIDE 9 MG/ML
5-250 INJECTION, SOLUTION INTRAVENOUS PRN
Status: DISCONTINUED | OUTPATIENT
Start: 2025-02-25 | End: 2025-02-26 | Stop reason: HOSPADM

## 2025-02-25 RX ORDER — DIPHENHYDRAMINE HYDROCHLORIDE 50 MG/ML
50 INJECTION INTRAMUSCULAR; INTRAVENOUS
OUTPATIENT
Start: 2025-03-04

## 2025-02-25 RX ADMIN — SODIUM CHLORIDE 100 ML/HR: 0.9 INJECTION, SOLUTION INTRAVENOUS at 13:15

## 2025-02-25 RX ADMIN — SODIUM CHLORIDE 300 MG: 0.9 INJECTION, SOLUTION INTRAVENOUS at 13:21

## 2025-02-25 NOTE — PROGRESS NOTES
Patient arrived ambulatory for 1/3 Venofer. VS as charted. Patient given verbal and printed information on Venofer and all questions answered to patient's satisfaction.     IV inserted per protocol, brisk blood return noted, and IV fluids started. Venofer infused with no adverse reactions noted. Line flushed and patient monitored for an additional 30 minutes with no reactions noted.     IV removed per protocol and dressing applied. Patient ambulatory at discharged with calendar in hand.

## 2025-03-03 ENCOUNTER — HOSPITAL ENCOUNTER (OUTPATIENT)
Facility: MEDICAL CENTER | Age: 65
End: 2025-03-03

## 2025-03-04 ENCOUNTER — HOSPITAL ENCOUNTER (OUTPATIENT)
Dept: INFUSION THERAPY | Facility: MEDICAL CENTER | Age: 65
Discharge: HOME | End: 2025-03-04
Payer: MEDICAID

## 2025-03-04 VITALS
RESPIRATION RATE: 18 BRPM | DIASTOLIC BLOOD PRESSURE: 64 MMHG | TEMPERATURE: 98.2 F | SYSTOLIC BLOOD PRESSURE: 133 MMHG | HEART RATE: 65 BPM

## 2025-03-04 DIAGNOSIS — K90.9 IRON MALABSORPTION: ICD-10-CM

## 2025-03-04 DIAGNOSIS — D50.8 IRON DEFICIENCY ANEMIA SECONDARY TO INADEQUATE DIETARY IRON INTAKE: Primary | ICD-10-CM

## 2025-03-04 PROCEDURE — 6360000002 HC RX W HCPCS: Performed by: INTERNAL MEDICINE

## 2025-03-04 PROCEDURE — 2580000003 HC RX 258: Performed by: INTERNAL MEDICINE

## 2025-03-04 PROCEDURE — 96366 THER/PROPH/DIAG IV INF ADDON: CPT

## 2025-03-04 PROCEDURE — 96365 THER/PROPH/DIAG IV INF INIT: CPT

## 2025-03-04 RX ORDER — EPINEPHRINE 1 MG/ML
0.3 INJECTION, SOLUTION INTRAMUSCULAR; SUBCUTANEOUS PRN
Status: CANCELLED | OUTPATIENT
Start: 2025-03-11

## 2025-03-04 RX ORDER — ALBUTEROL SULFATE 90 UG/1
4 INHALANT RESPIRATORY (INHALATION) PRN
Status: CANCELLED | OUTPATIENT
Start: 2025-03-11

## 2025-03-04 RX ORDER — HEPARIN 100 UNIT/ML
500 SYRINGE INTRAVENOUS PRN
Status: CANCELLED | OUTPATIENT
Start: 2025-03-11

## 2025-03-04 RX ORDER — SODIUM CHLORIDE 9 MG/ML
INJECTION, SOLUTION INTRAVENOUS CONTINUOUS
Status: CANCELLED | OUTPATIENT
Start: 2025-03-11

## 2025-03-04 RX ORDER — DIPHENHYDRAMINE HYDROCHLORIDE 50 MG/ML
50 INJECTION INTRAMUSCULAR; INTRAVENOUS
Status: CANCELLED | OUTPATIENT
Start: 2025-03-11

## 2025-03-04 RX ORDER — HYDROCORTISONE SODIUM SUCCINATE 100 MG/2ML
100 INJECTION INTRAMUSCULAR; INTRAVENOUS
Status: CANCELLED | OUTPATIENT
Start: 2025-03-11

## 2025-03-04 RX ORDER — SODIUM CHLORIDE 9 MG/ML
5-250 INJECTION, SOLUTION INTRAVENOUS PRN
Status: DISCONTINUED | OUTPATIENT
Start: 2025-03-04 | End: 2025-03-05 | Stop reason: HOSPADM

## 2025-03-04 RX ORDER — ACETAMINOPHEN 325 MG/1
650 TABLET ORAL
Status: CANCELLED | OUTPATIENT
Start: 2025-03-11

## 2025-03-04 RX ORDER — SODIUM CHLORIDE 9 MG/ML
5-250 INJECTION, SOLUTION INTRAVENOUS PRN
Status: CANCELLED | OUTPATIENT
Start: 2025-03-11

## 2025-03-04 RX ORDER — SODIUM CHLORIDE 0.9 % (FLUSH) 0.9 %
5-40 SYRINGE (ML) INJECTION PRN
Status: CANCELLED | OUTPATIENT
Start: 2025-03-11

## 2025-03-04 RX ORDER — ONDANSETRON 2 MG/ML
8 INJECTION INTRAMUSCULAR; INTRAVENOUS
Status: CANCELLED | OUTPATIENT
Start: 2025-03-11

## 2025-03-04 RX ORDER — FAMOTIDINE 10 MG/ML
20 INJECTION, SOLUTION INTRAVENOUS
Status: CANCELLED | OUTPATIENT
Start: 2025-03-11

## 2025-03-04 RX ADMIN — IRON SUCROSE 300 MG: 20 INJECTION, SOLUTION INTRAVENOUS at 13:32

## 2025-03-04 RX ADMIN — SODIUM CHLORIDE 50 ML/HR: 0.9 INJECTION, SOLUTION INTRAVENOUS at 13:28

## 2025-03-04 NOTE — PROGRESS NOTES
Spiritual Health Outpatient Oncology/Hematology Progress Note: [facility]    Situation: Writer visited with Patient and Spouse in the treatment cubicle of the infusion clinic.     Assessment: Patient smiled and greeted writer. Pt shared that he was doing better after receiving his infusion. Spouse commented on the improvements as well. Pt mentioned the symptoms that are better. Pt and Spouse spoke of Spouse's work and the binta it brings her.     Intervention: Writer inquired about Pt's coping and needs. Writer celebrated Pt's good news. Writer offered supportive presence and active listening. Writer affirmed Pt's strengths. Writer offered words of support and encouragement.    Outcome:  Pt and Spouse thanked writer for the visit.    Plan: Spiritual Health Services are available for Patient and Family by phone and/or in person.    03/04/25 1624   Encounter Summary   Encounter Overview/Reason Spiritual/Emotional Needs   Service Provided For Patient and family together   Referral/Consult From [facility] Spouse   Last Encounter  02/11/25   Complexity of Encounter Low   Begin Time 1530   End Time  1540   Total Time Calculated 10 min   Spiritual/Emotional needs   Type Spiritual Support   Assessment/Intervention/Outcome   Assessment Coping;Calm   Intervention Sustaining Presence/Ministry of presence;Active listening;Explored/Affirmed feelings, thoughts, concerns;Explored Coping Skills/Resources   Outcome Receptive;Engaged in conversation;Expressed feelings, needs, and concerns;Coping   Plan and Referrals   Plan/Referrals Continue Support (comment)     PACHECO Barros  [facility] Spiritual Health   [phone]

## 2025-03-04 NOTE — PROGRESS NOTES
Patient arrived ambulatory with daughter for 2/3 Venofer. VS as charted. Pt denies any current issues. Medications and allergies reviewed. IV inserted per protocol to right AC, brisk blood return noted, and IV fluids started.     Venofer administered with no adverse reactions noted. Line flushed and patient monitored for 30 minutes. IV removed per protocol and dressing applied. Patient discharged ambulatory with calendar in hand.

## 2025-03-11 ENCOUNTER — HOSPITAL ENCOUNTER (OUTPATIENT)
Dept: INFUSION THERAPY | Facility: MEDICAL CENTER | Age: 65
Discharge: HOME | End: 2025-03-11
Payer: MEDICAID

## 2025-03-11 VITALS
SYSTOLIC BLOOD PRESSURE: 114 MMHG | DIASTOLIC BLOOD PRESSURE: 52 MMHG | TEMPERATURE: 98.8 F | HEART RATE: 69 BPM | RESPIRATION RATE: 18 BRPM

## 2025-03-11 DIAGNOSIS — D50.8 IRON DEFICIENCY ANEMIA SECONDARY TO INADEQUATE DIETARY IRON INTAKE: Primary | ICD-10-CM

## 2025-03-11 DIAGNOSIS — K90.9 IRON MALABSORPTION: ICD-10-CM

## 2025-03-11 PROCEDURE — 2580000003 HC RX 258: Performed by: INTERNAL MEDICINE

## 2025-03-11 PROCEDURE — 96365 THER/PROPH/DIAG IV INF INIT: CPT

## 2025-03-11 PROCEDURE — 96366 THER/PROPH/DIAG IV INF ADDON: CPT

## 2025-03-11 PROCEDURE — 6360000002 HC RX W HCPCS: Performed by: INTERNAL MEDICINE

## 2025-03-11 RX ORDER — SODIUM CHLORIDE 9 MG/ML
INJECTION, SOLUTION INTRAVENOUS CONTINUOUS
OUTPATIENT
Start: 2025-03-11

## 2025-03-11 RX ORDER — EPINEPHRINE 1 MG/ML
0.3 INJECTION, SOLUTION INTRAMUSCULAR; SUBCUTANEOUS PRN
OUTPATIENT
Start: 2025-03-11

## 2025-03-11 RX ORDER — HYDROCORTISONE SODIUM SUCCINATE 100 MG/2ML
100 INJECTION INTRAMUSCULAR; INTRAVENOUS
OUTPATIENT
Start: 2025-03-11

## 2025-03-11 RX ORDER — SODIUM CHLORIDE 9 MG/ML
5-250 INJECTION, SOLUTION INTRAVENOUS PRN
Status: DISCONTINUED | OUTPATIENT
Start: 2025-03-11 | End: 2025-03-12 | Stop reason: HOSPADM

## 2025-03-11 RX ORDER — DIPHENHYDRAMINE HYDROCHLORIDE 50 MG/ML
50 INJECTION, SOLUTION INTRAMUSCULAR; INTRAVENOUS
OUTPATIENT
Start: 2025-03-11

## 2025-03-11 RX ORDER — SODIUM CHLORIDE 9 MG/ML
5-250 INJECTION, SOLUTION INTRAVENOUS PRN
OUTPATIENT
Start: 2025-03-11

## 2025-03-11 RX ORDER — FAMOTIDINE 10 MG/ML
20 INJECTION, SOLUTION INTRAVENOUS
OUTPATIENT
Start: 2025-03-11

## 2025-03-11 RX ORDER — ACETAMINOPHEN 325 MG/1
650 TABLET ORAL
OUTPATIENT
Start: 2025-03-11

## 2025-03-11 RX ORDER — ONDANSETRON 2 MG/ML
8 INJECTION INTRAMUSCULAR; INTRAVENOUS
OUTPATIENT
Start: 2025-03-11

## 2025-03-11 RX ORDER — SODIUM CHLORIDE 0.9 % (FLUSH) 0.9 %
5-40 SYRINGE (ML) INJECTION PRN
OUTPATIENT
Start: 2025-03-11

## 2025-03-11 RX ORDER — ALBUTEROL SULFATE 90 UG/1
4 INHALANT RESPIRATORY (INHALATION) PRN
OUTPATIENT
Start: 2025-03-11

## 2025-03-11 RX ORDER — HEPARIN 100 UNIT/ML
500 SYRINGE INTRAVENOUS PRN
OUTPATIENT
Start: 2025-03-11

## 2025-03-11 RX ADMIN — SODIUM CHLORIDE 100 ML/HR: 0.9 INJECTION, SOLUTION INTRAVENOUS at 13:29

## 2025-03-11 RX ADMIN — IRON SUCROSE 400 MG: 20 INJECTION, SOLUTION INTRAVENOUS at 13:43

## 2025-03-11 NOTE — PROGRESS NOTES
Spiritual Health Outpatient Oncology/Hematology Progress Note: [facility]    Situation: Writer visited with Patient in the treatment cubicle of the infusion clinic.     Assessment: Patient smiled and greeted writer. Pt shared how he was doing. Pt noted that he has one more treatment left. Pt voiced hopes for the outcome of the medicine. Pt spoke of his nephews, daughter, and grandchildren and their activities. Pt affirmed their strengths and gifts. Pt expressed gratitude for his significant other, noting her kindness and generosity. Pt accessed his sense of humor. Pt asked writer to keep him in her prayers.     Intervention:  Writer inquired about Pt's coping and needs. Writer explored Pt's sources of support and strength. Writer offered supportive presence and active listening. Writer affirmed Pt's strengths. Writer offered words of support and encouragement. Writer assured Pt of her prayers.    Outcome:  Pt thanked writer for the visit.    Plan: Spiritual Health Services are available for Patient by phone and/or in person.        03/11/25 1657   Encounter Summary   Encounter Overview/Reason Spiritual/Emotional Needs   Service Provided For Patient   Referral/Consult From [facility]   Support System Spouse;Family members;Children   Last Encounter  03/04/25   Complexity of Encounter Low   Begin Time 1600   End Time  1620   Total Time Calculated 20 min   Spiritual/Emotional needs   Type Spiritual Support   Assessment/Intervention/Outcome   Assessment Calm;Coping   Intervention Sustaining Presence/Ministry of presence;Active listening;Explored/Affirmed feelings, thoughts, concerns;Explored Coping Skills/Resources;Discussed illness injury and it’s impact;Discussed belief system/Mormonism practices/biju   Outcome Expressed feelings, needs, and concerns;Engaged in conversation;Coping   Plan and Referrals   Plan/Referrals Continue Support (comment)     Manda Li [facility] Spiritual Health   [phone]

## 2025-03-11 NOTE — PROGRESS NOTES
Pt arrives per amb per self and orders reviewed and NS infusing before and after venofer and pt tolerated well and no reactions or complaints and blood return present throughout infusion and pt discharged per amb per self and pt given calendar with next appt.

## 2025-03-13 DIAGNOSIS — D50.8 IRON DEFICIENCY ANEMIA SECONDARY TO INADEQUATE DIETARY IRON INTAKE: Primary | ICD-10-CM

## 2025-03-17 ENCOUNTER — HOSPITAL ENCOUNTER (OUTPATIENT)
Age: 65
Setting detail: SPECIMEN
Discharge: HOME | End: 2025-03-17
Payer: MEDICAID

## 2025-03-17 DIAGNOSIS — D50.8 IRON DEFICIENCY ANEMIA SECONDARY TO INADEQUATE DIETARY IRON INTAKE: ICD-10-CM

## 2025-03-17 LAB
BASOPHILS # BLD: 0.08 K/UL (ref 0–0.2)
BASOPHILS NFR BLD: 1 % (ref 0–2)
EOSINOPHIL # BLD: 0.16 K/UL (ref 0–0.44)
EOSINOPHILS RELATIVE PERCENT: 2 % (ref 1–4)
ERYTHROCYTE [DISTWIDTH] IN BLOOD BY AUTOMATED COUNT: 37.2 % (ref 11.8–14.4)
FERRITIN SERPL-MCNC: 224 NG/ML
HCT VFR BLD AUTO: 36.1 % (ref 40.7–50.3)
HGB BLD-MCNC: 10.2 G/DL (ref 13–17)
IMM GRANULOCYTES # BLD AUTO: 0 K/UL (ref 0–0.3)
IMM GRANULOCYTES NFR BLD: 0 %
IRON SATN MFR SERPL: 9 % (ref 20–55)
IRON SERPL-MCNC: 35 UG/DL (ref 61–157)
LYMPHOCYTES NFR BLD: 2.79 K/UL (ref 1.1–3.7)
LYMPHOCYTES RELATIVE PERCENT: 34 % (ref 24–43)
MCH RBC QN AUTO: 21.9 PG (ref 25.2–33.5)
MCHC RBC AUTO-ENTMCNC: 28.3 G/DL (ref 28.4–34.8)
MCV RBC AUTO: 77.5 FL (ref 82.6–102.9)
MONOCYTES NFR BLD: 0.57 K/UL (ref 0.1–1.2)
MONOCYTES NFR BLD: 7 % (ref 3–12)
MORPHOLOGY: ABNORMAL
NEUTROPHILS NFR BLD: 56 % (ref 36–65)
NEUTS SEG NFR BLD: 4.6 K/UL (ref 1.5–8.1)
NRBC BLD-RTO: 0 PER 100 WBC
PLATELET # BLD AUTO: 379 K/UL (ref 138–453)
PMV BLD AUTO: ABNORMAL FL (ref 8.1–13.5)
RBC # BLD AUTO: 4.66 M/UL (ref 4.21–5.77)
TIBC SERPL-MCNC: 370 UG/DL (ref 250–450)
UNSATURATED IRON BINDING CAPACITY: 335 UG/DL (ref 112–347)
WBC OTHER # BLD: 8.2 K/UL (ref 3.5–11.3)

## 2025-03-17 PROCEDURE — 83540 ASSAY OF IRON: CPT

## 2025-03-17 PROCEDURE — 83550 IRON BINDING TEST: CPT

## 2025-03-17 PROCEDURE — 36415 COLL VENOUS BLD VENIPUNCTURE: CPT

## 2025-03-17 PROCEDURE — 85025 COMPLETE CBC W/AUTO DIFF WBC: CPT

## 2025-03-17 PROCEDURE — 82728 ASSAY OF FERRITIN: CPT

## 2025-03-18 ENCOUNTER — TELEPHONE (OUTPATIENT)
Dept: ONCOLOGY | Age: 65
End: 2025-03-18

## 2025-03-18 ENCOUNTER — OFFICE VISIT (OUTPATIENT)
Dept: ONCOLOGY | Age: 65
End: 2025-03-18
Payer: MEDICAID

## 2025-03-18 VITALS
DIASTOLIC BLOOD PRESSURE: 67 MMHG | WEIGHT: 132.7 LBS | TEMPERATURE: 97 F | SYSTOLIC BLOOD PRESSURE: 125 MMHG | RESPIRATION RATE: 16 BRPM | HEART RATE: 65 BPM | BODY MASS INDEX: 21.42 KG/M2

## 2025-03-18 DIAGNOSIS — D50.9 MICROCYTIC ANEMIA: Primary | ICD-10-CM

## 2025-03-18 DIAGNOSIS — D50.8 IRON DEFICIENCY ANEMIA SECONDARY TO INADEQUATE DIETARY IRON INTAKE: ICD-10-CM

## 2025-03-18 DIAGNOSIS — K90.9 IRON MALABSORPTION: ICD-10-CM

## 2025-03-18 PROCEDURE — 99214 OFFICE O/P EST MOD 30 MIN: CPT | Performed by: INTERNAL MEDICINE

## 2025-03-18 PROCEDURE — 99211 OFF/OP EST MAY X REQ PHY/QHP: CPT | Performed by: INTERNAL MEDICINE

## 2025-03-18 PROCEDURE — 3074F SYST BP LT 130 MM HG: CPT | Performed by: INTERNAL MEDICINE

## 2025-03-18 PROCEDURE — 1123F ACP DISCUSS/DSCN MKR DOCD: CPT | Performed by: INTERNAL MEDICINE

## 2025-03-18 PROCEDURE — 3078F DIAST BP <80 MM HG: CPT | Performed by: INTERNAL MEDICINE

## 2025-03-18 RX ORDER — ASPIRIN 81 MG/1
81 TABLET ORAL DAILY
COMMUNITY

## 2025-03-18 NOTE — TELEPHONE ENCOUNTER
ADEL HERE FOR MD VISIT  RV 2-3 with CBC, Iron studies   LAB ORDERS GIVEN TO PT  MD VISIT 5/20/25 @ 12:45PM  AVS PRINTED W/ INSTRUCTIONS AND GIVEN TO PT ON EXIT

## 2025-03-18 NOTE — PROGRESS NOTES
_      Chief Complaint   Patient presents with    Follow-up    Discuss Labs     DIAGNOSIS:       Symptomatic anemia  Severe microcytic anemia  Iron deficiency anemia  Multiple comorbidities as listed    CURRENT THERAPY:         Blood transfusion.  IV iron infusion.  GI work up in progress.   BRIEF CASE HISTORY:      Mr. Lee Mejia is a very pleasant 65 y.o. male with history of multiple co morbidities as listed.  Patient is referred for evaluation further management of severe anemia.  Patient had cardiac surgery about 1-1/2 years ago.  Since then he had 2 hospitalizations due to anemia.  Anemia was attributed to active bleeding from the cardiac surgery and left leg surgery.  However the last surgery was done more than 8 months ago.  Currently patient is having increasing weakness and fatigue.  He has significant shortness of breath on minimal exertion.  He has palpitation.  His labs showed hemoglobin of 5.2.  Patient denies any clear active bleeding.  No melena or hematochezia.  No hematemesis.  His last colonoscopy was more than 10 years ago.  Patient quit smoking about a year ago.  Using nicotine patches.  No alcohol drinking.    INTERIM HISTORY:    Seen for follow up severe anemia. S/p blood transfusion and iron infusion.   No clear active bleeding.  Patient was referred to GI for work up.      PAST MEDICAL HISTORY: has a past medical history of ATN (acute tubular necrosis), COPD (chronic obstructive pulmonary disease) (HCC), COPD (chronic obstructive pulmonary disease) (HCC), Diabetes mellitus (HCC), Diabetes mellitus (HCC), Fatty (change of) liver, not elsewhere classified, Left main coronary artery disease, Mixed hyperlipidemia, Neuropathy, and Primary hypertension.    PAST SURGICAL HISTORY: has a past surgical history that includes Tooth Extraction (11/06/14); Colonoscopy (01/05/2015); Cardiac procedure (N/A,

## 2025-03-23 PROBLEM — D50.9 MICROCYTIC ANEMIA: Status: ACTIVE | Noted: 2025-03-23

## 2025-04-08 ENCOUNTER — OFFICE VISIT (OUTPATIENT)
Dept: GASTROENTEROLOGY | Age: 65
End: 2025-04-08
Payer: MEDICAID

## 2025-04-08 ENCOUNTER — PREP FOR PROCEDURE (OUTPATIENT)
Dept: GASTROENTEROLOGY | Age: 65
End: 2025-04-08

## 2025-04-08 VITALS
SYSTOLIC BLOOD PRESSURE: 128 MMHG | TEMPERATURE: 97.6 F | WEIGHT: 128 LBS | DIASTOLIC BLOOD PRESSURE: 68 MMHG | OXYGEN SATURATION: 97 % | BODY MASS INDEX: 20.66 KG/M2 | RESPIRATION RATE: 16 BRPM | HEART RATE: 63 BPM

## 2025-04-08 DIAGNOSIS — D50.0 IRON DEFICIENCY ANEMIA DUE TO CHRONIC BLOOD LOSS: ICD-10-CM

## 2025-04-08 DIAGNOSIS — D50.0 ANEMIA DUE TO BLOOD LOSS, CHRONIC: Primary | ICD-10-CM

## 2025-04-08 DIAGNOSIS — D50.0 ANEMIA DUE TO BLOOD LOSS, CHRONIC: ICD-10-CM

## 2025-04-08 PROCEDURE — 3078F DIAST BP <80 MM HG: CPT | Performed by: PHYSICIAN ASSISTANT

## 2025-04-08 PROCEDURE — 99204 OFFICE O/P NEW MOD 45 MIN: CPT | Performed by: PHYSICIAN ASSISTANT

## 2025-04-08 PROCEDURE — 3074F SYST BP LT 130 MM HG: CPT | Performed by: PHYSICIAN ASSISTANT

## 2025-04-08 PROCEDURE — 1123F ACP DISCUSS/DSCN MKR DOCD: CPT | Performed by: PHYSICIAN ASSISTANT

## 2025-04-08 RX ORDER — POLYETHYLENE GLYCOL 3350, SODIUM CHLORIDE, SODIUM BICARBONATE, POTASSIUM CHLORIDE 420; 11.2; 5.72; 1.48 G/4L; G/4L; G/4L; G/4L
POWDER, FOR SOLUTION ORAL
Qty: 4000 ML | Refills: 0 | Status: SHIPPED | OUTPATIENT
Start: 2025-04-08

## 2025-04-08 RX ORDER — BISACODYL 5 MG
TABLET, DELAYED RELEASE (ENTERIC COATED) ORAL
Qty: 4 TABLET | Refills: 0 | Status: SHIPPED | OUTPATIENT
Start: 2025-04-08

## 2025-04-08 ASSESSMENT — ENCOUNTER SYMPTOMS
BLOOD IN STOOL: 0
RECTAL PAIN: 0
WHEEZING: 0
CONSTIPATION: 0
ABDOMINAL PAIN: 0
VOICE CHANGE: 0
ABDOMINAL DISTENTION: 0
VOMITING: 0
CHOKING: 0
TROUBLE SWALLOWING: 0
ANAL BLEEDING: 0
SORE THROAT: 0
COUGH: 0
NAUSEA: 0
DIARRHEA: 0

## 2025-04-08 NOTE — TELEPHONE ENCOUNTER
Procedure scheduled/Dr Telles  Procedure:Colonoscopy / EGD  Dx:Anemia due to blood loss, chronic; Iron deficiency anemia due to chronic blood loss  Date:08/01/2025  Time:9:15 am / Arrive: 7:45 am  Hospital:[facility] phone call:lanie  Bowel Prep instructions given:letters tab - Golytely  In office/via phone:office   Clearance needed:hemtaology and cardiology  GLP-1:n/a

## 2025-04-08 NOTE — PROGRESS NOTES
Reason for Referral:   Angelica Mccarthy MD  [address] [place] [address]    Chief Complaint   Patient presents with    New Patient     Referred for iron anemia due to malabsorption.        HISTORY OF PRESENT ILLNESS: Mr.Adel DIOGENES Mejia is a 65 y.o. male , referred for evaluation of iron deficiency anemia, blood loss.    He is a new patient  Referred to us by Dr Lopez for severe microcytic anemia/iron deficiency  He has received multiple iron infusions and blood transfusions in the past, most recently after hgb 5.8 on 2/11/25.  Repeat CBC showing hgb 10.2. normal LFTs. GFR >90    Symptomatic with increasing weakness and fatigue, but states it is improving  Believes his anemia is from his open heart surgery/leg surgery 2024  Previously on Eliquis - has been switched to Plavix which he could not tolerate (significant nose bleeds) - now only on ASA81    No heartburn, n/v, abd pain  Regular BMs without constipation/diarrhea  Eating prunes  No black/bloody stools    KUB 2/28/24 - large stool burden  His last colonoscopy was more than 10 years ago (1/5/15) and showed no polyps.  Smoking 3 cigarettes per day, trying to quit again, on 7mg patch  No EtOH  No NSAIDs/APAP    Past Medical,Family, and Social History reviewed and does contribute to the patient presentingcondition.    I did review all the labs results available for the labs which were ordered by the primary care physician, and the other consultants, we search on [employer] at [facility] and all the available care everywhere epic    I did review all the imaging studies of the abdomen available on EMR, ordered by the primary care physician and the other consultant    I did review all the pathology from the biopsies done on the previous endoscopies        Patient's PMH/PSH,SH,PSYCH Hx, MEDs, ALLERGIES, and ROS were all reviewed and updated in the appropriate sections.    PAST MEDICAL HISTORY:  Past Medical History:   Diagnosis Date    ATN (acute tubular

## 2025-04-24 PROBLEM — E78.00 HYPERCHOLESTEREMIA: Status: ACTIVE | Noted: 2025-04-24

## 2025-05-19 ENCOUNTER — HOSPITAL ENCOUNTER (OUTPATIENT)
Age: 65
Setting detail: SPECIMEN
Discharge: HOME | End: 2025-05-19
Payer: MEDICAID

## 2025-05-19 DIAGNOSIS — D50.8 IRON DEFICIENCY ANEMIA SECONDARY TO INADEQUATE DIETARY IRON INTAKE: ICD-10-CM

## 2025-05-19 LAB
BASOPHILS # BLD: 0.08 K/UL (ref 0–0.2)
BASOPHILS NFR BLD: 1 % (ref 0–2)
EOSINOPHIL # BLD: 0.15 K/UL (ref 0–0.44)
EOSINOPHILS RELATIVE PERCENT: 2 % (ref 1–4)
ERYTHROCYTE [DISTWIDTH] IN BLOOD BY AUTOMATED COUNT: 22 % (ref 11.8–14.4)
FERRITIN SERPL-MCNC: 17 NG/ML
HCT VFR BLD AUTO: 41.3 % (ref 40.7–50.3)
HGB BLD-MCNC: 13.1 G/DL (ref 13–17)
IMM GRANULOCYTES # BLD AUTO: 0 K/UL (ref 0–0.3)
IMM GRANULOCYTES NFR BLD: 0 %
IRON SATN MFR SERPL: 7 % (ref 20–55)
IRON SERPL-MCNC: 24 UG/DL (ref 61–157)
LYMPHOCYTES NFR BLD: 1.98 K/UL (ref 1.1–3.7)
LYMPHOCYTES RELATIVE PERCENT: 26 % (ref 24–43)
MCH RBC QN AUTO: 25.8 PG (ref 25.2–33.5)
MCHC RBC AUTO-ENTMCNC: 31.7 G/DL (ref 28.4–34.8)
MCV RBC AUTO: 81.5 FL (ref 82.6–102.9)
MONOCYTES NFR BLD: 0.46 K/UL (ref 0.1–1.2)
MONOCYTES NFR BLD: 6 % (ref 3–12)
MORPHOLOGY: ABNORMAL
NEUTROPHILS NFR BLD: 65 % (ref 36–65)
NEUTS SEG NFR BLD: 4.93 K/UL (ref 1.5–8.1)
NRBC BLD-RTO: 0 PER 100 WBC
PLATELET # BLD AUTO: 307 K/UL (ref 138–453)
PMV BLD AUTO: ABNORMAL FL (ref 8.1–13.5)
RBC # BLD AUTO: 5.07 M/UL (ref 4.21–5.77)
TIBC SERPL-MCNC: 367 UG/DL (ref 250–450)
UNSATURATED IRON BINDING CAPACITY: 343 UG/DL (ref 112–347)
WBC OTHER # BLD: 7.6 K/UL (ref 3.5–11.3)

## 2025-05-19 PROCEDURE — 82728 ASSAY OF FERRITIN: CPT

## 2025-05-19 PROCEDURE — 83540 ASSAY OF IRON: CPT

## 2025-05-19 PROCEDURE — 85025 COMPLETE CBC W/AUTO DIFF WBC: CPT

## 2025-05-19 PROCEDURE — 83550 IRON BINDING TEST: CPT

## 2025-05-19 PROCEDURE — 36415 COLL VENOUS BLD VENIPUNCTURE: CPT

## 2025-05-20 ENCOUNTER — TELEPHONE (OUTPATIENT)
Age: 65
End: 2025-05-20

## 2025-05-20 ENCOUNTER — OFFICE VISIT (OUTPATIENT)
Age: 65
End: 2025-05-20
Payer: MEDICAID

## 2025-05-20 VITALS
DIASTOLIC BLOOD PRESSURE: 72 MMHG | HEART RATE: 62 BPM | BODY MASS INDEX: 21.86 KG/M2 | TEMPERATURE: 97.4 F | RESPIRATION RATE: 16 BRPM | WEIGHT: 135.4 LBS | SYSTOLIC BLOOD PRESSURE: 120 MMHG

## 2025-05-20 DIAGNOSIS — D50.8 IRON DEFICIENCY ANEMIA SECONDARY TO INADEQUATE DIETARY IRON INTAKE: ICD-10-CM

## 2025-05-20 DIAGNOSIS — D50.9 MICROCYTIC ANEMIA: Primary | ICD-10-CM

## 2025-05-20 PROCEDURE — 3074F SYST BP LT 130 MM HG: CPT | Performed by: INTERNAL MEDICINE

## 2025-05-20 PROCEDURE — 3078F DIAST BP <80 MM HG: CPT | Performed by: INTERNAL MEDICINE

## 2025-05-20 PROCEDURE — 99214 OFFICE O/P EST MOD 30 MIN: CPT | Performed by: INTERNAL MEDICINE

## 2025-05-20 PROCEDURE — 1123F ACP DISCUSS/DSCN MKR DOCD: CPT | Performed by: INTERNAL MEDICINE

## 2025-05-20 PROCEDURE — 99213 OFFICE O/P EST LOW 20 MIN: CPT | Performed by: INTERNAL MEDICINE

## 2025-05-20 NOTE — TELEPHONE ENCOUNTER
GILES HERE FOR MD VISIT  PO iron once daily  RV 3-4 months with labs   LAB ORDERS MAILED TO PT  MD VISIT 8/7/25 @ 1:45PM  AVS PRINTED W/ INSTRUCTIONS AND GIVEN TO PT ON EXIT

## 2025-05-22 DIAGNOSIS — D50.9 MICROCYTIC ANEMIA: Primary | ICD-10-CM

## 2025-07-07 ENCOUNTER — TELEPHONE (OUTPATIENT)
Dept: INFUSION THERAPY | Age: 65
End: 2025-07-07

## 2025-08-01 ENCOUNTER — HOSPITAL ENCOUNTER (OUTPATIENT)
Age: 65
Setting detail: OUTPATIENT SURGERY
Discharge: HOME | End: 2025-08-01
Attending: INTERNAL MEDICINE | Admitting: INTERNAL MEDICINE
Payer: MEDICAID

## 2025-08-01 ENCOUNTER — ANESTHESIA EVENT (OUTPATIENT)
Dept: OPERATING ROOM | Age: 65
End: 2025-08-01
Payer: MEDICAID

## 2025-08-01 ENCOUNTER — ANESTHESIA (OUTPATIENT)
Dept: OPERATING ROOM | Age: 65
End: 2025-08-01
Payer: MEDICAID

## 2025-08-01 VITALS
HEIGHT: 66 IN | RESPIRATION RATE: 21 BRPM | BODY MASS INDEX: 21.05 KG/M2 | TEMPERATURE: 96.8 F | OXYGEN SATURATION: 100 % | WEIGHT: 131 LBS | DIASTOLIC BLOOD PRESSURE: 64 MMHG | SYSTOLIC BLOOD PRESSURE: 147 MMHG | HEART RATE: 56 BPM

## 2025-08-01 DIAGNOSIS — D50.0 ANEMIA DUE TO BLOOD LOSS, CHRONIC: ICD-10-CM

## 2025-08-01 DIAGNOSIS — D50.0 IRON DEFICIENCY ANEMIA DUE TO CHRONIC BLOOD LOSS: ICD-10-CM

## 2025-08-01 PROCEDURE — 43270 EGD LESION ABLATION: CPT | Performed by: INTERNAL MEDICINE

## 2025-08-01 PROCEDURE — 45380 COLONOSCOPY AND BIOPSY: CPT | Performed by: INTERNAL MEDICINE

## 2025-08-01 PROCEDURE — 43239 EGD BIOPSY SINGLE/MULTIPLE: CPT | Performed by: INTERNAL MEDICINE

## 2025-08-01 PROCEDURE — 45388 COLONOSCOPY W/ABLATION: CPT | Performed by: INTERNAL MEDICINE

## 2025-08-01 PROCEDURE — 2720000010 HC SURG SUPPLY STERILE: Performed by: INTERNAL MEDICINE

## 2025-08-01 PROCEDURE — 7100000011 HC PHASE II RECOVERY - ADDTL 15 MIN: Performed by: INTERNAL MEDICINE

## 2025-08-01 PROCEDURE — 6360000002 HC RX W HCPCS: Performed by: NURSE ANESTHETIST, CERTIFIED REGISTERED

## 2025-08-01 PROCEDURE — 3609012400 HC EGD TRANSORAL BIOPSY SINGLE/MULTIPLE: Performed by: INTERNAL MEDICINE

## 2025-08-01 PROCEDURE — 3609010300 HC COLONOSCOPY W/BIOPSY SINGLE/MULTIPLE: Performed by: INTERNAL MEDICINE

## 2025-08-01 PROCEDURE — 2580000003 HC RX 258: Performed by: ANESTHESIOLOGY

## 2025-08-01 PROCEDURE — 88305 TISSUE EXAM BY PATHOLOGIST: CPT

## 2025-08-01 PROCEDURE — 3700000000 HC ANESTHESIA ATTENDED CARE: Performed by: INTERNAL MEDICINE

## 2025-08-01 PROCEDURE — 2709999900 HC NON-CHARGEABLE SUPPLY: Performed by: INTERNAL MEDICINE

## 2025-08-01 PROCEDURE — 7100000010 HC PHASE II RECOVERY - FIRST 15 MIN: Performed by: INTERNAL MEDICINE

## 2025-08-01 PROCEDURE — 3700000001 HC ADD 15 MINUTES (ANESTHESIA): Performed by: INTERNAL MEDICINE

## 2025-08-01 RX ORDER — LIDOCAINE HYDROCHLORIDE 10 MG/ML
1 INJECTION, SOLUTION EPIDURAL; INFILTRATION; INTRACAUDAL; PERINEURAL
Status: DISCONTINUED | OUTPATIENT
Start: 2025-08-02 | End: 2025-08-01 | Stop reason: HOSPADM

## 2025-08-01 RX ORDER — HYDROMORPHONE HYDROCHLORIDE 1 MG/ML
0.5 INJECTION, SOLUTION INTRAMUSCULAR; INTRAVENOUS; SUBCUTANEOUS EVERY 5 MIN PRN
Status: DISCONTINUED | OUTPATIENT
Start: 2025-08-01 | End: 2025-08-01 | Stop reason: HOSPADM

## 2025-08-01 RX ORDER — PHENYLEPHRINE HCL IN 0.9% NACL 1 MG/10 ML
SYRINGE (ML) INTRAVENOUS
Status: DISCONTINUED | OUTPATIENT
Start: 2025-08-01 | End: 2025-08-01 | Stop reason: SDUPTHER

## 2025-08-01 RX ORDER — FENTANYL CITRATE 50 UG/ML
25 INJECTION, SOLUTION INTRAMUSCULAR; INTRAVENOUS EVERY 5 MIN PRN
Status: DISCONTINUED | OUTPATIENT
Start: 2025-08-01 | End: 2025-08-01 | Stop reason: HOSPADM

## 2025-08-01 RX ORDER — SODIUM CHLORIDE 0.9 % (FLUSH) 0.9 %
5-40 SYRINGE (ML) INJECTION EVERY 12 HOURS SCHEDULED
Status: DISCONTINUED | OUTPATIENT
Start: 2025-08-01 | End: 2025-08-01 | Stop reason: HOSPADM

## 2025-08-01 RX ORDER — SODIUM CHLORIDE 0.9 % (FLUSH) 0.9 %
5-40 SYRINGE (ML) INJECTION PRN
Status: DISCONTINUED | OUTPATIENT
Start: 2025-08-01 | End: 2025-08-01 | Stop reason: HOSPADM

## 2025-08-01 RX ORDER — ONDANSETRON 2 MG/ML
4 INJECTION INTRAMUSCULAR; INTRAVENOUS
Status: DISCONTINUED | OUTPATIENT
Start: 2025-08-01 | End: 2025-08-01 | Stop reason: HOSPADM

## 2025-08-01 RX ORDER — LIDOCAINE HYDROCHLORIDE 20 MG/ML
INJECTION, SOLUTION EPIDURAL; INFILTRATION; INTRACAUDAL; PERINEURAL
Status: DISCONTINUED | OUTPATIENT
Start: 2025-08-01 | End: 2025-08-01 | Stop reason: SDUPTHER

## 2025-08-01 RX ORDER — PROPOFOL 10 MG/ML
INJECTION, EMULSION INTRAVENOUS
Status: DISCONTINUED | OUTPATIENT
Start: 2025-08-01 | End: 2025-08-01 | Stop reason: SDUPTHER

## 2025-08-01 RX ORDER — SODIUM CHLORIDE 9 MG/ML
INJECTION, SOLUTION INTRAVENOUS PRN
Status: DISCONTINUED | OUTPATIENT
Start: 2025-08-01 | End: 2025-08-01 | Stop reason: HOSPADM

## 2025-08-01 RX ORDER — OXYCODONE HYDROCHLORIDE 5 MG/1
5 TABLET ORAL
Status: DISCONTINUED | OUTPATIENT
Start: 2025-08-01 | End: 2025-08-01 | Stop reason: HOSPADM

## 2025-08-01 RX ORDER — SODIUM CHLORIDE 9 MG/ML
INJECTION, SOLUTION INTRAVENOUS CONTINUOUS
Status: DISCONTINUED | OUTPATIENT
Start: 2025-08-01 | End: 2025-08-01 | Stop reason: HOSPADM

## 2025-08-01 RX ORDER — SODIUM CHLORIDE, SODIUM LACTATE, POTASSIUM CHLORIDE, CALCIUM CHLORIDE 600; 310; 30; 20 MG/100ML; MG/100ML; MG/100ML; MG/100ML
INJECTION, SOLUTION INTRAVENOUS CONTINUOUS
Status: DISCONTINUED | OUTPATIENT
Start: 2025-08-01 | End: 2025-08-01 | Stop reason: HOSPADM

## 2025-08-01 RX ORDER — METOCLOPRAMIDE HYDROCHLORIDE 5 MG/ML
10 INJECTION INTRAMUSCULAR; INTRAVENOUS
Status: DISCONTINUED | OUTPATIENT
Start: 2025-08-01 | End: 2025-08-01 | Stop reason: HOSPADM

## 2025-08-01 RX ADMIN — PROPOFOL 50 MG: 10 INJECTION, EMULSION INTRAVENOUS at 09:15

## 2025-08-01 RX ADMIN — PROPOFOL 50 MG: 10 INJECTION, EMULSION INTRAVENOUS at 09:19

## 2025-08-01 RX ADMIN — PROPOFOL 50 MG: 10 INJECTION, EMULSION INTRAVENOUS at 09:22

## 2025-08-01 RX ADMIN — PROPOFOL 30 MG: 10 INJECTION, EMULSION INTRAVENOUS at 09:36

## 2025-08-01 RX ADMIN — Medication 100 MCG: at 09:39

## 2025-08-01 RX ADMIN — LIDOCAINE HYDROCHLORIDE 80 MG: 20 INJECTION, SOLUTION EPIDURAL; INFILTRATION; INTRACAUDAL; PERINEURAL at 09:15

## 2025-08-01 RX ADMIN — PROPOFOL 50 MG: 10 INJECTION, EMULSION INTRAVENOUS at 09:27

## 2025-08-01 RX ADMIN — SODIUM CHLORIDE, SODIUM LACTATE, POTASSIUM CHLORIDE, AND CALCIUM CHLORIDE: .6; .31; .03; .02 INJECTION, SOLUTION INTRAVENOUS at 08:19

## 2025-08-01 RX ADMIN — PROPOFOL 25 MG: 10 INJECTION, EMULSION INTRAVENOUS at 09:42

## 2025-08-01 RX ADMIN — Medication 100 MCG: at 09:31

## 2025-08-01 RX ADMIN — Medication 100 MCG: at 09:45

## 2025-08-01 ASSESSMENT — LIFESTYLE VARIABLES: SMOKING_STATUS: 1

## 2025-08-01 ASSESSMENT — PAIN - FUNCTIONAL ASSESSMENT: PAIN_FUNCTIONAL_ASSESSMENT: 0-10

## 2025-08-01 NOTE — OP NOTE
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     SPECIMENS:  Was Obtained: As below    HISTORY: The patient is a 65 y.o. year old male with history of above preop diagnosis.  I recommended esophagogastroduodenoscopy with possible biopsy and I explained the risk, benefits, expected outcome, and alternatives to the procedure.  Risks included but are not limited to bleeding, infection, respiratory distress, hypotension, and perforation of the esophagus, stomach, or duodenum.  Patient understands and is in agreement.      The patient was counseled at length about the risks of sunny Covid-19 during their perioperative period and any recovery window from their procedure.  The patient was made aware that sunny Covid-19  may worsen their prognosis for recovering from their procedure  and lend to a higher morbidity and/or mortality risk.  All material risks, benefits, and reasonable alternatives including postponing the procedure were discussed. The patient does wish to proceed with the procedure at this time.         PROCEDURE: The patient was given IV conscious sedation.  The patient's SPO2 remained above 90% throughout the procedure.The gastroscope was inserted orally and advanced under direct vision through the esophagus, through the stomach, through the pylorus, and

## 2025-08-01 NOTE — OP NOTE
PROCEDURE NOTE    DATE OF PROCEDURE: 8/1/2025    SURGEON: Merrick Telles MD  Facility : [facility]  ASSISTANT: None  Anesthesia: MAC  PREOPERATIVE DIAGNOSIS:   Iron deficiency anemia    POSTOPERATIVE DIAGNOSIS: as described below    OPERATION: Total colonoscopy     ANESTHESIA: Moderate Sedation    ESTIMATED BLOOD LOSS: less than 50     COMPLICATIONS: None.     SPECIMENS:  Was Obtained:     External hemorrhoids with skin tags  2 polyps in the rectum removed with cold forceps and cauterized  After about    7 mm AVM in the cecum cauterized using bipolar gold probe            HISTORY: The patient is a 65 y.o. year old male with history of above preop diagnosis.  I recommended colonoscopy with possible biopsy or polypectomy and I explained the risk, benefits, expected outcome, and alternatives to the procedure.  Risks included but are not limited to bleeding, infection, respiratory distress, hypotension, and perforation of the colon and possibility of missing a lesion.  The patient understands and is in agreement.        The patient was counseled at length about the risks of sunny Covid-19 during their perioperative period and any recovery window from their procedure.  The patient was made aware that sunny Covid-19  may worsen their prognosis for recovering from their procedure  and lend to a higher morbidity and/or mortality risk.  All material risks, benefits, and reasonable alternatives including postponing the procedure were discussed. The patient does wish to proceed with the procedure at this time.       PROCEDURE: The patient was given IV conscious sedation.  The patient's SPO2 remained above 90% throughout the procedure.     The colonoscope was inserted per rectum and advanced under direct vision to the cecum without difficulty.      Post sedation note :The patient's SPO2 remained above 90% throughout the procedure.the vital signs remained stable , and no immediate complication form the procedure

## 2025-08-01 NOTE — ANESTHESIA POSTPROCEDURE EVALUATION
Department of Anesthesiology  Postprocedure Note    Patient: Lee Mejia  MRN: [id number]  YOB: 1960  Date of evaluation: 8/1/2025    Procedure Summary       Date: 08/01/25 Room / Location: [address]    Anesthesia Start: 0914 Anesthesia Stop: 0953    Procedures:       ESOPHAGOGASTRODUODENOSCOPY BIOPSIES AND GOLDPROBE CAUTERY OF AVMS      COLONOSCOPY DIAGNOSTIC WITH GOLDPROBE CAUTERY OF AVMS AND POLYPECTOMIES Diagnosis:       Iron deficiency anemia due to chronic blood loss      Anemia due to blood loss, chronic      (Iron deficiency anemia due to chronic blood loss [D50.0])      (Anemia due to blood loss, chronic [D50.0])    Surgeons: Merrick Telles MD Responsible Provider: Seema Vann MD    Anesthesia Type: MAC ASA Status: 3            Anesthesia Type: No value filed.    Rhina Phase I: Rhina Score: 10    Rhina Phase II: Rhina Score: 10    Anesthesia Post Evaluation    Patient location during evaluation: PACU  Patient participation: complete - patient participated  Level of consciousness: awake  Airway patency: patent  Nausea & Vomiting: no nausea  Cardiovascular status: blood pressure returned to baseline  Respiratory status: acceptable  Hydration status: euvolemic  Comments: Multimodal analgesia pain management as indicated by procedure  Multimodal analgesia pain management approach  Pain management: adequate    No notable events documented.

## 2025-08-01 NOTE — DISCHARGE INSTR - ACTIVITY
Recommendations/Plan:   F/U Biopsies  F/U In Office in 8-12 weeks     Colonoscopy: What to Expect at Home  Your Recovery  Your doctor will talk to you about when you will need your next colonoscopy. The results of your test and your risk for colorectal cancer will help your doctor decide how often you need to be checked.  After the test, you may be bloated or have gas pains. You may need to pass gas. If a biopsy was done or a polyp was removed, you may have streaks of blood in your stool (feces) for a few days.    How can you care for yourself at home?  Activity  Rest as much as you need to after you go home.  You should be able to go back to your usual activities the day after the test.  Diet  Follow your doctor’s directions for eating.  Drink plenty of fluids (unless your doctor has told you not to) to replace the fluids that were lost during the colon prep.  Medicines  If polyps were removed or a biopsy was done during the test, your doctor may tell you not to take aspirin or other anti-inflammatory medicines, such as ibuprofen (Advil, Motrin) and naproxen (Aleve), for a few days.  Follow-up care is a key part of your treatment and safety. Be sure to make and go to all appointments, and call your doctor if you are having problems.  When should you call for help?  Call 911 anytime you think you may need emergency care. For example, call if:  You passed out (lost consciousness).  You pass maroon or bloody stools.  You have severe belly pain.  Call your doctor now or seek immediate medical care if:  Your stools are black and tarlike.  Your stools have streaks of blood, but you did not have a biopsy or any polyps removed.  You have belly pain, or your belly is swollen and firm.  You vomit.  You have a fever.  You are very dizzy.  Watch closely for changes in your health, and be sure to contact your doctor if you have any problems.   Where can you learn more?   Go to [email or web address] and sign in

## 2025-08-01 NOTE — H&P
Interval H&P Note    Pt Name: Lee Mejia  MRN: [id number]  YOB: 1960  Date of evaluation: 8/1/2025      [x] I have reviewed in Epic the progress note by Dr. Carnes dated 07/10/2025, attached below for an Interval History and Physical note.     [x] I have examined  Lee Mejia, a 65 y.o. male.There are no changes to the patient who is scheduled for ESOPHAGOGASTRODUODENOSCOPY BIOPSY, COLONOSCOPY DIAGNOSTIC by Merrick Telles MD for Iron deficiency anemia due to chronic blood loss; Anemia due to blood loss*. Patient has had persistent anemia since cardiac surgery two years ago. Labs in March showed hemoglobin of 5.2 for which he received blood. Last hemoglobin 13.1 May 19, 2025. He denies bloody tarry stools, diarrhea alternating with constipation, nausea, vomiting, abdominal pain or unintentional weight loss. Patient followed bowel prep until watery clear.  Has had previous colonoscopy. No FH colon cancer or polyps.The patient denies new health changes, fever, chills, wheezing, cough, increased SOB, chest pain, open sores or wounds. Known diabetes, diet controlled. Last aspirin 8 days ago.    Extensive medical hx including COPD, diabetes, microcytic anemia, HLD, HTN, CAD s/p CABG x3 12/2023, previous femoral bypass with thrombectomy January 2024. Follows with cardiology Dr. Chopra and oncology Dr Cheek (oncology provided clearance, see in media).    Vital signs: BP 99/64   Pulse 63   Temp 97.3 °F (36.3 °C)   Resp 16   Ht 1.676 m (5' 6\")   Wt 59.4 kg (131 lb)   SpO2 95%   BMI 21.14 kg/m²     Allergies:  Patient has no known allergies.    Medications:    Prior to Admission medications    Medication Sig Start Date End Date Taking? Authorizing Provider   atorvastatin (LIPITOR) 80 MG tablet Take 1 tablet by mouth daily 7/10/25 10/8/25  John Carnes MD   carvedilol (COREG) 12.5 MG tablet Take 1 tablet by mouth 2 times daily (with meals) 7/10/25 10/8/25  John Carnes MD   gabapentin

## 2025-08-01 NOTE — ANESTHESIA PRE PROCEDURE
Department of Anesthesiology  Preprocedure Note       Name:  Lee Mejia   Age:  65 y.o.  :  1960                                          MRN:  [id number]         Date:  2025      Surgeon: Surgeon(s):  Merrick Telles MD    Procedure: Procedure(s):  ESOPHAGOGASTRODUODENOSCOPY BIOPSY  COLONOSCOPY DIAGNOSTIC    Medications prior to admission:   Prior to Admission medications    Medication Sig Start Date End Date Taking? Authorizing Provider   atorvastatin (LIPITOR) 80 MG tablet Take 1 tablet by mouth daily 7/10/25 10/8/25  John Carnes MD   carvedilol (COREG) 12.5 MG tablet Take 1 tablet by mouth 2 times daily (with meals) 7/10/25 10/8/25  John Carnes MD   gabapentin (NEURONTIN) 300 MG capsule Take 1 capsule by mouth nightly for 90 days. 7/10/25 10/8/25  John Carnes MD   DULoxetine (CYMBALTA) 60 MG extended release capsule Take 1 capsule by mouth daily 7/10/25 10/8/25  John Carnes MD   lisinopril (PRINIVIL;ZESTRIL) 10 MG tablet Take 1 tablet by mouth daily 7/10/25 10/8/25  John Carnes MD   Melatonin 10 MG TABS Take 1 tablet by mouth nightly 7/10/25 10/8/25  John Carnes MD   nicotine (NICODERM CQ) 14 MG/24HR Place 1 patch onto the skin daily 7/10/25 8/9/25  John Carnes MD   polyethylene glycol-electrolytes (NULYTELY) 420 g solution Take as directed for bowel prep for colonoscopy  Patient not taking: Reported on 2025   Merrick Telles MD   bisacodyl 5 MG EC tablet Take as directed by physician office for bowel prep  Patient not taking: Reported on 2025   Merrick Telles MD   aspirin 81 MG EC tablet Take 1 tablet by mouth daily    Candie Rhodes MD   cholecalciferol (VITAMIN D3) 400 UNIT TABS tablet Take 1 tablet by mouth daily    Candie Rhodes MD   vitamin B-12 (CYANOCOBALAMIN) 500 MCG tablet Take 1 tablet by mouth daily    Candie Rhodes, MD   Magnesium 400 MG TABS Take by mouth    Provider, MD Candie   albuterol

## 2025-08-04 LAB — SURGICAL PATHOLOGY REPORT: NORMAL

## 2025-08-06 ENCOUNTER — HOSPITAL ENCOUNTER (OUTPATIENT)
Dept: LAB | Age: 65
Discharge: HOME | End: 2025-08-06
Payer: MEDICAID

## 2025-08-06 DIAGNOSIS — D50.9 MICROCYTIC ANEMIA: ICD-10-CM

## 2025-08-06 LAB
BASOPHILS # BLD: 0.07 K/UL (ref 0–0.2)
BASOPHILS NFR BLD: 1 % (ref 0–2)
EOSINOPHIL # BLD: 0.2 K/UL (ref 0–0.44)
EOSINOPHILS RELATIVE PERCENT: 2 % (ref 1–4)
ERYTHROCYTE [DISTWIDTH] IN BLOOD BY AUTOMATED COUNT: 18.6 % (ref 11.8–14.4)
FERRITIN SERPL-MCNC: 19 NG/ML
HCT VFR BLD AUTO: 40.9 % (ref 40.7–50.3)
HGB BLD-MCNC: 13.1 G/DL (ref 13–17)
IMM GRANULOCYTES # BLD AUTO: 0.03 K/UL (ref 0–0.3)
IMM GRANULOCYTES NFR BLD: 0 %
IRON SATN MFR SERPL: 9 % (ref 20–55)
IRON SERPL-MCNC: 32 UG/DL (ref 61–157)
LYMPHOCYTES NFR BLD: 3.38 K/UL (ref 1.1–3.7)
LYMPHOCYTES RELATIVE PERCENT: 37 % (ref 24–43)
MCH RBC QN AUTO: 27.3 PG (ref 25.2–33.5)
MCHC RBC AUTO-ENTMCNC: 32 G/DL (ref 28.4–34.8)
MCV RBC AUTO: 85.4 FL (ref 82.6–102.9)
MONOCYTES NFR BLD: 0.6 K/UL (ref 0.1–1.2)
MONOCYTES NFR BLD: 7 % (ref 3–12)
NEUTROPHILS NFR BLD: 53 % (ref 36–65)
NEUTS SEG NFR BLD: 4.9 K/UL (ref 1.5–8.1)
NRBC BLD-RTO: 0 PER 100 WBC
PLATELET # BLD AUTO: 255 K/UL (ref 138–453)
PMV BLD AUTO: 10.4 FL (ref 8.1–13.5)
RBC # BLD AUTO: 4.79 M/UL (ref 4.21–5.77)
RBC # BLD: ABNORMAL 10*6/UL
TIBC SERPL-MCNC: 356 UG/DL (ref 250–450)
UNSATURATED IRON BINDING CAPACITY: 324 UG/DL (ref 112–347)
WBC OTHER # BLD: 9.2 K/UL (ref 3.5–11.3)

## 2025-08-06 PROCEDURE — 82728 ASSAY OF FERRITIN: CPT

## 2025-08-06 PROCEDURE — 83550 IRON BINDING TEST: CPT

## 2025-08-06 PROCEDURE — 83540 ASSAY OF IRON: CPT

## 2025-08-06 PROCEDURE — 83036 HEMOGLOBIN GLYCOSYLATED A1C: CPT

## 2025-08-06 PROCEDURE — 36415 COLL VENOUS BLD VENIPUNCTURE: CPT

## 2025-08-06 PROCEDURE — 85025 COMPLETE CBC W/AUTO DIFF WBC: CPT

## 2025-08-07 ENCOUNTER — OFFICE VISIT (OUTPATIENT)
Age: 65
End: 2025-08-07
Payer: MEDICAID

## 2025-08-07 ENCOUNTER — TELEPHONE (OUTPATIENT)
Age: 65
End: 2025-08-07

## 2025-08-07 ENCOUNTER — HOSPITAL ENCOUNTER (OUTPATIENT)
Facility: MEDICAL CENTER | Age: 65
End: 2025-08-07

## 2025-08-07 VITALS
BODY MASS INDEX: 21.68 KG/M2 | DIASTOLIC BLOOD PRESSURE: 73 MMHG | WEIGHT: 134.3 LBS | RESPIRATION RATE: 18 BRPM | HEART RATE: 60 BPM | TEMPERATURE: 97.7 F | SYSTOLIC BLOOD PRESSURE: 123 MMHG

## 2025-08-07 DIAGNOSIS — D50.8 IRON DEFICIENCY ANEMIA SECONDARY TO INADEQUATE DIETARY IRON INTAKE: ICD-10-CM

## 2025-08-07 DIAGNOSIS — E61.1 IRON DEFICIENCY: ICD-10-CM

## 2025-08-07 DIAGNOSIS — D50.9 MICROCYTIC ANEMIA: Primary | ICD-10-CM

## 2025-08-07 DIAGNOSIS — K90.9 IRON MALABSORPTION: ICD-10-CM

## 2025-08-07 DIAGNOSIS — D64.9 SYMPTOMATIC ANEMIA: ICD-10-CM

## 2025-08-07 LAB
EST. AVERAGE GLUCOSE BLD GHB EST-MCNC: 120 MG/DL
EST. AVERAGE GLUCOSE BLD GHB EST-MCNC: NORMAL MG/DL
HBA1C MFR BLD: 5.8 % (ref 4–6)
HBA1C MFR BLD: NORMAL % (ref 4–6)

## 2025-08-07 PROCEDURE — 99213 OFFICE O/P EST LOW 20 MIN: CPT | Performed by: INTERNAL MEDICINE

## 2025-08-19 ENCOUNTER — OFFICE VISIT (OUTPATIENT)
Dept: GASTROENTEROLOGY | Age: 65
End: 2025-08-19
Payer: MEDICAID

## 2025-08-19 VITALS
DIASTOLIC BLOOD PRESSURE: 74 MMHG | OXYGEN SATURATION: 98 % | SYSTOLIC BLOOD PRESSURE: 115 MMHG | RESPIRATION RATE: 16 BRPM | BODY MASS INDEX: 21.64 KG/M2 | HEART RATE: 63 BPM | WEIGHT: 134 LBS | TEMPERATURE: 97.6 F

## 2025-08-19 DIAGNOSIS — K31.819 AVM (ARTERIOVENOUS MALFORMATION) OF DUODENUM, ACQUIRED: ICD-10-CM

## 2025-08-19 DIAGNOSIS — D50.0 IRON DEFICIENCY ANEMIA DUE TO CHRONIC BLOOD LOSS: Primary | ICD-10-CM

## 2025-08-19 DIAGNOSIS — K22.10 ULCER OF ESOPHAGUS WITHOUT BLEEDING: ICD-10-CM

## 2025-08-19 DIAGNOSIS — Z86.0100 HX OF COLONIC POLYPS: ICD-10-CM

## 2025-08-19 PROCEDURE — 99214 OFFICE O/P EST MOD 30 MIN: CPT | Performed by: INTERNAL MEDICINE

## 2025-08-19 PROCEDURE — 1123F ACP DISCUSS/DSCN MKR DOCD: CPT | Performed by: INTERNAL MEDICINE

## 2025-08-19 PROCEDURE — 3078F DIAST BP <80 MM HG: CPT | Performed by: INTERNAL MEDICINE

## 2025-08-19 PROCEDURE — 3074F SYST BP LT 130 MM HG: CPT | Performed by: INTERNAL MEDICINE

## 2025-08-19 RX ORDER — PANTOPRAZOLE SODIUM 40 MG/1
40 TABLET, DELAYED RELEASE ORAL
Qty: 30 TABLET | Refills: 5 | Status: SHIPPED | OUTPATIENT
Start: 2025-08-19

## 2025-08-19 ASSESSMENT — ENCOUNTER SYMPTOMS
CHOKING: 0
ABDOMINAL DISTENTION: 0
VOICE CHANGE: 0
DIARRHEA: 0
NAUSEA: 0
WHEEZING: 0
RECTAL PAIN: 0
COUGH: 0
ABDOMINAL PAIN: 0
VOMITING: 0
CONSTIPATION: 0
BLOOD IN STOOL: 0
TROUBLE SWALLOWING: 0
SORE THROAT: 0
ANAL BLEEDING: 0

## (undated) DEVICE — SUTURE VCRL + SZ 0 L27IN ABSRB UD CT-1 L36MM 1/2 CIR TAPR VCP260H

## (undated) DEVICE — CATHETER THOR 36FR L23IN PVC R ANG 5 EYE TAPR CONN TIP SFT

## (undated) DEVICE — SUTURE NONABSORBABLE MONOFILAMENT 4-0 RB-1 36 IN BLU PROLENE 8557H

## (undated) DEVICE — GLOVE SURG SZ 8 L11.77IN FNGR THK9.8MIL STRW LTX POLYMER

## (undated) DEVICE — ENDOSCOPIC KIT 1.1+ 10 FT DE 2 GWN AAMI LEVEL 3

## (undated) DEVICE — Device

## (undated) DEVICE — 4-PORT MANIFOLD: Brand: NEPTUNE 2

## (undated) DEVICE — SUTURE VCRL + SZ 4-0 L27IN ABSRB UD L26MM SH 1/2 CIR VCP415H

## (undated) DEVICE — BITE BLOCK ENDOSCP AD 60 FR W/ ADJ STRP PLAS GRN BLOX

## (undated) DEVICE — SYRINGE 20ML LL S/C 50

## (undated) DEVICE — 4F 80 CM LEMAITRE EMBOLECTOMY CATHETER, EIFU: Brand: LEMAITRE EMBOLECTOMY CATHETER

## (undated) DEVICE — GOWN,AURORA,NONREINFORCED,LARGE: Brand: MEDLINE

## (undated) DEVICE — FORCEPS BX L240CM WRK CHN 2.8MM STD CAP W/ NDL MIC MESH

## (undated) DEVICE — CONNECTOR PERF 1/4X1/4 STR

## (undated) DEVICE — STRAP,POSITIONING,KNEE/BODY,FOAM,4X60": Brand: MEDLINE

## (undated) DEVICE — GLOVE SURG SZ 7 CRM LTX FREE POLYISOPRENE POLYMER BEAD ANTI

## (undated) DEVICE — GOWN,SIRUS,NONRNF,SETINSLV,XL,20/CS: Brand: MEDLINE

## (undated) DEVICE — CATHETER IABP 8FR 50CC FBROPT CONN W INSRT KT TWO STATLOK

## (undated) DEVICE — SPONGE LAP W18XL18IN WHT COT 4 PLY FLD STRUNG RADPQ DISP ST 2 PER PACK

## (undated) DEVICE — PAD,ABDOMINAL,5"X9",ST,LF,25/BX: Brand: MEDLINE INDUSTRIES, INC.

## (undated) DEVICE — BNDG,ELSTC,MATRIX,STRL,6"X5YD,LF,HOOK&LP: Brand: MEDLINE

## (undated) DEVICE — .: Brand: PERFECTCUT AORTOTOMY SYSTEM

## (undated) DEVICE — STRAP ARMBRD W1.5XL32IN FOAM STR YET SFT W/ HK AND LOOP

## (undated) DEVICE — TUBING SUCT 12FR MAL ALUM SHFT FN CAP VENT UNIV CONN W/ OBT

## (undated) DEVICE — 3M™ IOBAN™ 2 ANTIMICROBIAL INCISE DRAPE 6651EZ: Brand: IOBAN™ 2

## (undated) DEVICE — DRAPE SLUSH DISC W44XL66IN ST FOR RND BSIN HUSH SLUSH SYS

## (undated) DEVICE — MPS® DELIVERY SET W/ARREST AGENT AND ADDITIVE CASSETTES, HEAT EXCHANGER & 10 FT. DELIVERY TUBING: Brand: MPS

## (undated) DEVICE — SET PEN AND LBL AND L BWL LBL PAL PEN AND LBLS PAL700]

## (undated) DEVICE — NEEDLE HYPO 25GA L1.5IN BLU POLYPR HUB S STL REG BVL STR

## (undated) DEVICE — SUTURE MCRYL SZ 4-0 L18IN ABSRB UD L19MM PS-2 3/8 CIR PRIM Y496G

## (undated) DEVICE — GLOVE SURG SZ 75 L12IN FNGR THK79MIL GRN LTX FREE

## (undated) DEVICE — KIT APPL 11:1 PROC W/ FIBRIJET MED CUP APPL TIP TY

## (undated) DEVICE — SUTURE ETHIB EXCL BR GRN TAPR PT 2-0 30 X563H X563H

## (undated) DEVICE — SUTURE NONABSORBABLE MONOFILAMENT 7-0 BV-1 1X24 IN PROLENE 8702H

## (undated) DEVICE — CANNULA PERF L2IN BLNT TIP 2MM VES CLR RADPQ BODY FEM LUER

## (undated) DEVICE — CATHETER THOR 36FR L23CM DIA12MM POLYVI CHL TAPR CONN TIP

## (undated) DEVICE — APPLICATOR MEDICATED 10.5 CC SOLUTION HI LT ORNG CHLORAPREP

## (undated) DEVICE — TOWEL,OR,DSP,ST,BLUE,DLX,XR,4/PK,20PK/CS: Brand: MEDLINE

## (undated) DEVICE — SOLUTION IV 500ML 0.9% SOD CHL PH 5 INJ USP VIAFLX PLAS

## (undated) DEVICE — Device: Brand: VIRTUOSAPH PLUS WITH RADIAL INDICATION

## (undated) DEVICE — ELECTROSURGICAL PENCIL BUTTON SWITCH E-Z CLEAN COATED BLADE ELECTRODE 10 FT (3 M) CORD HOLSTER: Brand: MEGADYNE

## (undated) DEVICE — INTRODUCER SHTH 5FR CANN L11CM GWIRE 0.038IN STD KINK

## (undated) DEVICE — APPLICATOR MEDICATED 26 CC SOLUTION HI LT ORNG CHLORAPREP

## (undated) DEVICE — PAD GEN USE BORDERED ADH 14IN 2IN AND 12IN 4IN GZ UNIV ST

## (undated) DEVICE — AVID DUAL STAGE VENOUS DRAINAGE CANNULA: Brand: AVID DUAL STAGE VENOUS DRAINAGE CANNULA

## (undated) DEVICE — SENSOR KIT AD INVOS 7100

## (undated) DEVICE — STERNUM BLADE, OFFSET (32.0 X 0.8 X 6.3MM)

## (undated) DEVICE — SUTURE ETHLN SZ 4-0 L18IN NONABSORBABLE BLK L19MM PS-2 3/8 1667H

## (undated) DEVICE — BLOOD TRANSFER PACK 600 CC W/ CPLR

## (undated) DEVICE — COVER,LIGHT HANDLE,FLX,2/PK: Brand: MEDLINE INDUSTRIES, INC.

## (undated) DEVICE — SHEET, ORTHO, SPLIT, STERILE: Brand: MEDLINE

## (undated) DEVICE — CATHETER ANGIO JL4 0.038 IN AD 6 FRX100 CM STD SUPER TORQUE

## (undated) DEVICE — PINNACLE INTRODUCER SHEATH: Brand: PINNACLE

## (undated) DEVICE — PACK PROCEDURE SURG OPN HRT ADD ON

## (undated) DEVICE — CANNULA AORT L55IN OD9FR STD TIP FLOW GRD

## (undated) DEVICE — GLOVE SURG SZ 75 CRM LTX FREE POLYISOPRENE POLYMER BEAD ANTI

## (undated) DEVICE — GLOVE SURG SZ 6 CRM LTX FREE POLYISOPRENE POLYMER BEAD ANTI

## (undated) DEVICE — TRANSPARENT FILM ROLL: Brand: OPSITE FLEXIFIX 10CMX10M

## (undated) DEVICE — DRESSING TRNSPAR W5XL4.5IN FLM SHT SEMIPERMEABLE WIND

## (undated) DEVICE — CANNULA TIP SPRY MAGELLAN

## (undated) DEVICE — RADIFOCUS GLIDEWIRE ADVANTAGE GUIDEWIRE: Brand: GLIDEWIRE ADVANTAGE

## (undated) DEVICE — THE STERILE LIGHT HANDLE COVER IS USED WITH STERIS SURGICAL LIGHTING AND VISUALIZATION SYSTEMS.

## (undated) DEVICE — SUMP INTCARD W/ 1/4INCH CONN 20FRENCH 15INCH DLP

## (undated) DEVICE — SUTURE PERMAHAND SZ 4-0 L18IN NONABSORBABLE BLK SILK BRAID A183H

## (undated) DEVICE — SUTURE PROL SZ 7-0 L24IN NONABSORBABLE BLU L8MM BV175-6 3/8 8735H

## (undated) DEVICE — CATHETER,URETHRAL,REDRUBBER,STRL,18FR: Brand: MEDLINE

## (undated) DEVICE — BIPOLAR ELECTROHEMOSTASIS CATHETER: Brand: GOLD PROBE

## (undated) DEVICE — SUTURE PROL SZ 6-0 L24IN NONABSORBABLE BLU L13MM C-1 3/8 8726H

## (undated) DEVICE — SUTURE NONABSORBABLE MONOFILAMENT 6-0 C-1 1X30 IN PROLENE 8706H

## (undated) DEVICE — INTRODUCER SHTH 6FR L11CM 0.038IN STD SIDEPRT EXTN 3 W

## (undated) DEVICE — FORCEPS BX 240CM 2.4MM L NDL RAD JAW 4 M00513334

## (undated) DEVICE — TUBING, SUCTION, 9/32" X 20', STRAIGHT: Brand: MEDLINE INDUSTRIES, INC.

## (undated) DEVICE — BANDAGE,GAUZE,BULKEE II,4.5"X4.1YD,STRL: Brand: MEDLINE

## (undated) DEVICE — POSITIONER,HEAD,MULTIRING,36CS: Brand: MEDLINE

## (undated) DEVICE — PACK PROCEDURE SURG OPN HRT

## (undated) DEVICE — INTRODUCER SHTH STD 8 FRX11 CM FLX KINK RESIST CANN AVNT +

## (undated) DEVICE — TUBE CARDIAC SUCTION 6FR SOFT TIP 10FR

## (undated) DEVICE — SVMMC AMPUTATION PK

## (undated) DEVICE — LIQUIBAND RAPID ADHESIVE 36/CS 0.8ML: Brand: MEDLINE

## (undated) DEVICE — 1LYRTR 16FR10ML100%SIL UMS SNP: Brand: MEDLINE INDUSTRIES, INC.

## (undated) DEVICE — CONTAINER,SPECIMEN,4OZ,OR STRL: Brand: MEDLINE

## (undated) DEVICE — SUTURE VCRL + SZ 2-0 L27IN ABSRB CLR CT-1 1/2 CIR TAPERCUT VCP259H

## (undated) DEVICE — SVMMC VASC MAJ PK

## (undated) DEVICE — YANKAUER,FLEXIBLE HANDLE,REGLR CAPACITY: Brand: MEDLINE INDUSTRIES, INC.

## (undated) DEVICE — CATHETER DIAG AD 6FR L100CM 0.038IN STD COR POLYUR JUDKINS

## (undated) DEVICE — KIT ANGEL PRP

## (undated) DEVICE — SUTURE PROL SZ 7-0 L30IN NONABSORBABLE BLU L9.3MM BV-1 1/2 8703H

## (undated) DEVICE — CATHETER PTCA L135CM BLLN L60MM DIA7MM INTRO SHTH 6FR 7ATM

## (undated) DEVICE — SYRINGE, LUER LOCK, 10ML: Brand: MEDLINE

## (undated) DEVICE — SUTURE VCRL + SZ 4-0 L18IN ABSRB UD L19MM PS-2 3/8 CIR PRIM VCP496H

## (undated) DEVICE — TIP APPL GEL PLT ENDO 5MMX32CM

## (undated) DEVICE — SUTURE VCRL SZ 0 L27IN ABSRB VLT L48MM CTX 1/2 CIR TAPR PNT J364H

## (undated) DEVICE — GLOVE SURG SZ 7 L12IN FNGR THK79MIL GRN LTX FREE

## (undated) DEVICE — 1/4 FORCE SURGICAL SPRING CLIP: Brand: STEALTH® SPRING CLIP

## (undated) DEVICE — MARKER,SKIN,WI/RULER AND LABELS: Brand: MEDLINE

## (undated) DEVICE — FOGARTY - HYDRAGRIP SURGICAL - CLAMP INSERTS: Brand: FOGARTY HYDRAJAW

## (undated) DEVICE — SSC BONE WAX: Brand: SSC BONE WAX

## (undated) DEVICE — DRESSING TRNSPAR W4XL10IN FLM MIC POR SURESITE 123

## (undated) DEVICE — PRESSURE MONITORING LINES 4FT. (122CM) M/F: Brand: PRESSURE MONITORING LINES

## (undated) DEVICE — 3M™ IOBAN™ 2 ANTIMICROBIAL INCISE DRAPE 6650EZ: Brand: IOBAN™ 2

## (undated) DEVICE — SUTURE PROL SZ 6-0 L24IN NONABSORBABLE BLU L9.3MM BV-1 3/8 8805H

## (undated) DEVICE — SUTURE NONABSORBABLE MONOFILAMENT 5-0 C-1 1X24 IN PROLENE 8725H

## (undated) DEVICE — PROTECTOR ULN NRV PUR FOAM HK LOOP STRP ANATOMICALLY

## (undated) DEVICE — PREMIUM DRY TRAY LF: Brand: MEDLINE INDUSTRIES, INC.

## (undated) DEVICE — EZ GLIDE AORTIC CANNULA: Brand: EDWARDS LIFESCIENCES EZ GLIDE AORTIC CANNULA

## (undated) DEVICE — AGENT HEMSTAT W2XL4IN OXIDIZED REGENERATED CELOS ABSRB SFT

## (undated) DEVICE — BLADE OPHTH D5MM 15DEG GRN W/ RND KNURLED HNDL MICRO-SHARP

## (undated) DEVICE — SUTURE PROL SZ 4-0 L36IN NONABSORBABLE BLU L26MM SH 1/2 CIR 8521H

## (undated) DEVICE — CLEANER,CAUTERY TIP,2X2",STERILE: Brand: MEDLINE

## (undated) DEVICE — DRAPE,REIN 53X77,STERILE: Brand: MEDLINE

## (undated) DEVICE — SUTURE PERMA-HAND SZ 0 L18IN NONABSORBABLE BLK CT-2 L26MM C027D

## (undated) DEVICE — EVERGRIP INSERT SET: Brand: FOGARTY EVERGRIP

## (undated) DEVICE — SURGICAL PROCEDURE TRAY CRD CATH SVMMC

## (undated) DEVICE — MEDICINE CUP, GRADUATED, STER: Brand: MEDLINE

## (undated) DEVICE — 1LYRTR 16FR10ML100%SILTMPS SNP: Brand: MEDLINE INDUSTRIES, INC.

## (undated) DEVICE — UNDERPAD HOSP W30XL36IN GRN POLYPR HI ABSRB DISP

## (undated) DEVICE — GLOVE SURG SZ 7.5 L11.73IN FNGR THK9.8MIL STRW LTX POLYMER

## (undated) DEVICE — INTENDED FOR TISSUE SEPARATION, AND OTHER PROCEDURES THAT REQUIRE A SHARP SURGICAL BLADE TO PUNCTURE OR CUT.: Brand: BARD-PARKER ® CARBON RIB-BACK BLADES

## (undated) DEVICE — GUIDEWIRE VASC L150CM DIA0.025IN TIP DIA3MM L7MM INTVASC S

## (undated) DEVICE — SUTURE SZ 7 L18IN NONABSORBABLE SIL CCS L48MM 1/2 CIR STRNM M655G

## (undated) DEVICE — SHEET, T, LAPAROTOMY, STERILE: Brand: MEDLINE